# Patient Record
Sex: MALE | Race: WHITE | NOT HISPANIC OR LATINO | Employment: FULL TIME | URBAN - NONMETROPOLITAN AREA
[De-identification: names, ages, dates, MRNs, and addresses within clinical notes are randomized per-mention and may not be internally consistent; named-entity substitution may affect disease eponyms.]

---

## 2017-01-04 ENCOUNTER — OFFICE VISIT (OUTPATIENT)
Dept: CARDIOLOGY | Facility: CLINIC | Age: 47
End: 2017-01-04

## 2017-01-04 VITALS
SYSTOLIC BLOOD PRESSURE: 135 MMHG | WEIGHT: 310.2 LBS | HEART RATE: 84 BPM | DIASTOLIC BLOOD PRESSURE: 86 MMHG | BODY MASS INDEX: 37.77 KG/M2 | HEIGHT: 76 IN | OXYGEN SATURATION: 98 %

## 2017-01-04 DIAGNOSIS — I10 ESSENTIAL HYPERTENSION: ICD-10-CM

## 2017-01-04 DIAGNOSIS — I47.1 SVT (SUPRAVENTRICULAR TACHYCARDIA) (HCC): ICD-10-CM

## 2017-01-04 DIAGNOSIS — R00.2 PALPITATIONS: ICD-10-CM

## 2017-01-04 DIAGNOSIS — K21.9 GASTROESOPHAGEAL REFLUX DISEASE WITHOUT ESOPHAGITIS: ICD-10-CM

## 2017-01-04 DIAGNOSIS — I10 ESSENTIAL HYPERTENSION: Primary | ICD-10-CM

## 2017-01-04 DIAGNOSIS — I45.6 WPW (WOLFF-PARKINSON-WHITE SYNDROME): ICD-10-CM

## 2017-01-04 PROBLEM — I47.10 SVT (SUPRAVENTRICULAR TACHYCARDIA): Status: ACTIVE | Noted: 2017-01-04

## 2017-01-04 PROBLEM — I47.10 SVT (SUPRAVENTRICULAR TACHYCARDIA): Status: RESOLVED | Noted: 2017-01-04 | Resolved: 2017-01-04

## 2017-01-04 PROCEDURE — 93000 ELECTROCARDIOGRAM COMPLETE: CPT | Performed by: INTERNAL MEDICINE

## 2017-01-04 PROCEDURE — 99213 OFFICE O/P EST LOW 20 MIN: CPT | Performed by: INTERNAL MEDICINE

## 2017-01-04 NOTE — TELEPHONE ENCOUNTER
TERRENCE NEEDS REFILL ON LOSARTAN   ZOLOFT   RANITIDINE   CATAFLAM . SEND TO [facility]. HIS # [phone]

## 2017-01-04 NOTE — TELEPHONE ENCOUNTER
Last OV 11/3/16    Losartan  9/29/16  #30 x 0 refills    Ranitidine  9/29/16  #30 x 0 refills    Cataflam  11/3/16  #90 x 1 refill    Zoloft  9/29/16  #45 x 2 refills

## 2017-01-04 NOTE — LETTER
January 5, 2017     Miels Garvin MD  [address] [facility] [address]    Patient: Jimmy Dawson   YOB: 1970   Date of Visit: 1/4/2017       Dear Dr. Bharathi MD:    Thank you for referring Jimmy Dawson to me for evaluation. Below are the relevant portions of my assessment and plan of care.    If you have questions, please do not hesitate to call me. I look forward to following Jimmy along with you.         Sincerely,        Ric Cotton MD        CC: No Recipients  Ric Cotton MD  1/5/2017  6:27 PM  Sign at close encounter  Miles Garvin MD  Jimmy Dawson  1970 01/04/2017    Patient Active Problem List   Diagnosis   • Gastroesophageal reflux disease without esophagitis   • Depression   • Essential hypertension   • Palpitations   • SVT (supraventricular tachycardia), s/p RF ablation, in 2000 x2   • WPW (Brayan-Parkinson-White syndrome), s/p RF ablation 2000.       Dear Dr. Garvin:    Subjective      Chief Complaint   Patient presents with   • Follow-up     SVT       Jimmy Dawson is a 46 y.o. male with the problems as listed above, presents for follow up of history of WPW syndrome and SVT, status post RF ablation.      History of Present Illness: Mr. Dawson has a history of WPW syndrome and SVT s/p RF ablation x2 in 2000.  At this time, he denies palpitations, dizziness or syncope.  He notes the feeling of congestion after eating.  No exertional chest pain or shortness of breath.  No history of PND, orthopnea or pedal edema.        Allergies   Allergen Reactions   • Aleve [Naproxen] Hives   :      Current Outpatient Prescriptions:   •  Cetirizine HCl (ZYRTEC ALLERGY) 10 MG capsule, Take 10 mg by mouth Daily., Disp: , Rfl:   •  diclofenac (CATAFLAM) 50 MG tablet, Take 1 tablet by mouth 3 (Three) Times a Day., Disp: 90 tablet, Rfl: 1  •  losartan (COZAAR) 50 MG tablet, Take 1 tablet by mouth Daily., Disp: 30 tablet, Rfl: 0  •  ranitidine (ZANTAC) 300 MG  "tablet, TAKE ONE TABLET BY MOUTH EVERY DAY FOR THE STOMACH, Disp: 30 tablet, Rfl: 0  •  sertraline (ZOLOFT) 100 MG tablet, Take 1.5 tablets by mouth Daily., Disp: 45 tablet, Rfl: 2      The following portions of the patient's history were reviewed and updated as appropriate: allergies, current medications, past family history, past medical history, past social history, past surgical history and problem list.    Social History   Substance Use Topics   • Smoking status: Never Smoker   • Smokeless tobacco: Never Used   • Alcohol use Yes      Comment: 1-3 times a month       Review of Systems   Constitution: Negative for chills and fever.   HENT: Positive for congestion (Worse after eats. ), headaches and sore throat. Negative for nosebleeds.    Cardiovascular: Negative for chest pain, leg swelling, palpitations and syncope.   Respiratory: Positive for cough. Negative for hemoptysis, shortness of breath and wheezing.    Gastrointestinal: Negative for abdominal pain, hematemesis, hematochezia, melena, nausea and vomiting.   Genitourinary: Negative for dysuria and hematuria.   Neurological: Negative for dizziness and light-headedness.       Objective   Vitals:    01/04/17 1311   BP: 135/86   BP Location: Left arm   Patient Position: Sitting   Cuff Size: Adult   Pulse: 84   SpO2: 98%   Weight: (!) 310 lb 3.2 oz (141 kg)   Height: 76\" (193 cm)     Body mass index is 37.76 kg/(m^2).        Physical Exam   Constitutional: He is oriented to person, place, and time. He appears well-developed and well-nourished.   HENT:   Mouth/Throat: Oropharynx is clear and moist.   Eyes: EOM are normal. Pupils are equal, round, and reactive to light.   Neck: Neck supple. No JVD present. No tracheal deviation present. No thyromegaly present.   Cardiovascular: Normal rate, regular rhythm, S1 normal and S2 normal.  Exam reveals no gallop and no friction rub.    No murmur heard.  Pulmonary/Chest: Effort normal and breath sounds normal. "   Abdominal: Soft. Bowel sounds are normal. He exhibits no mass. There is no tenderness.   Musculoskeletal: Normal range of motion. He exhibits no edema.   Lymphadenopathy:     He has no cervical adenopathy.   Neurological: He is alert and oriented to person, place, and time.   Skin: Skin is warm and dry. No rash noted.   Psychiatric: He has a normal mood and affect.       Lab Results   Component Value Date     11/03/2016    K 4.3 11/03/2016     11/03/2016    CO2 35.6 (H) 11/03/2016    BUN 29 (H) 11/03/2016    CREATININE 0.87 11/03/2016    GLUCOSE 96 11/03/2016    CALCIUM 10.2 (H) 11/03/2016    AST 26 01/25/2016    ALT 41 01/25/2016    ALKPHOS 86 01/25/2016    LABIL2 1.3 (L) 01/25/2016     Lab Results   Component Value Date    WBC 8.15 11/03/2016    HGB 14.7 11/03/2016    HCT 44.0 11/03/2016     11/03/2016       Lab Results   Component Value Date    TSH 3.654 11/03/2016      No results found for: BNP  Echo   No results found for: ECHOEFEST    ECG 12 Lead  Date/Time: 1/4/2017 1:04 PM  Performed by: RIC UL  Authorized by: RIC LU   Rhythm: sinus rhythm  BPM: 82  Clinical impression: normal ECG  Comments: QTc 401          Assessment/Plan :  1. SVT (supraventricular tachycardia), s/p RF ablation, in 2000 x2     2. WPW (Brayan-Parkinson-White syndrome), s/p RF ablation 2000.     3.  Essential hypertension, controlled.             Recommendations:     Continue Losartan.  Return in about 7 months (around 8/4/2017) for Or sooner if needed.    As always, I appreciate very much the opportunity to participate in the cardiovascular care of your patients.      With Best Regards,    Ric Lu MD, [facility]    Dragon disclaimer:  Much of this encounter note is an electronic transcription/translation of spoken language to printed text. The electronic translation of spoken language may permit erroneous, or at times, nonsensical words or phrases to be inadvertently transcribed; Although I have  reviewed the note for such errors, some may still exist.

## 2017-01-04 NOTE — PROGRESS NOTES
Miles Garvin MD  Jimmy Dawson  1970 01/04/2017    Patient Active Problem List   Diagnosis   • Gastroesophageal reflux disease without esophagitis   • Depression   • Essential hypertension   • Palpitations   • SVT (supraventricular tachycardia), s/p RF ablation, in 2000 x2   • WPW (Brayan-Parkinson-White syndrome), s/p RF ablation 2000.       Dear Dr. Garvin:    Subjective      Chief Complaint   Patient presents with   • Follow-up     SVT       Jimmy Dawson is a 46 y.o. male with the problems as listed above, presents for follow up of history of WPW syndrome and SVT, status post RF ablation.      History of Present Illness: Mr. Dawson has a history of WPW syndrome and SVT s/p RF ablation x2 in 2000.  At this time, he denies palpitations, dizziness or syncope.  He notes the feeling of congestion after eating.  No exertional chest pain or shortness of breath.  No history of PND, orthopnea or pedal edema.        Allergies   Allergen Reactions   • Aleve [Naproxen] Hives   :      Current Outpatient Prescriptions:   •  Cetirizine HCl (ZYRTEC ALLERGY) 10 MG capsule, Take 10 mg by mouth Daily., Disp: , Rfl:   •  diclofenac (CATAFLAM) 50 MG tablet, Take 1 tablet by mouth 3 (Three) Times a Day., Disp: 90 tablet, Rfl: 1  •  losartan (COZAAR) 50 MG tablet, Take 1 tablet by mouth Daily., Disp: 30 tablet, Rfl: 0  •  ranitidine (ZANTAC) 300 MG tablet, TAKE ONE TABLET BY MOUTH EVERY DAY FOR THE STOMACH, Disp: 30 tablet, Rfl: 0  •  sertraline (ZOLOFT) 100 MG tablet, Take 1.5 tablets by mouth Daily., Disp: 45 tablet, Rfl: 2      The following portions of the patient's history were reviewed and updated as appropriate: allergies, current medications, past family history, past medical history, past social history, past surgical history and problem list.    Social History   Substance Use Topics   • Smoking status: Never Smoker   • Smokeless tobacco: Never Used   • Alcohol use Yes      Comment: 1-3 times a month  "      Review of Systems   Constitution: Negative for chills and fever.   HENT: Positive for congestion (Worse after eats. ), headaches and sore throat. Negative for nosebleeds.    Cardiovascular: Negative for chest pain, leg swelling, palpitations and syncope.   Respiratory: Positive for cough. Negative for hemoptysis, shortness of breath and wheezing.    Gastrointestinal: Negative for abdominal pain, hematemesis, hematochezia, melena, nausea and vomiting.   Genitourinary: Negative for dysuria and hematuria.   Neurological: Negative for dizziness and light-headedness.       Objective   Vitals:    01/04/17 1311   BP: 135/86   BP Location: Left arm   Patient Position: Sitting   Cuff Size: Adult   Pulse: 84   SpO2: 98%   Weight: (!) 310 lb 3.2 oz (141 kg)   Height: 76\" (193 cm)     Body mass index is 37.76 kg/(m^2).        Physical Exam   Constitutional: He is oriented to person, place, and time. He appears well-developed and well-nourished.   HENT:   Mouth/Throat: Oropharynx is clear and moist.   Eyes: EOM are normal. Pupils are equal, round, and reactive to light.   Neck: Neck supple. No JVD present. No tracheal deviation present. No thyromegaly present.   Cardiovascular: Normal rate, regular rhythm, S1 normal and S2 normal.  Exam reveals no gallop and no friction rub.    No murmur heard.  Pulmonary/Chest: Effort normal and breath sounds normal.   Abdominal: Soft. Bowel sounds are normal. He exhibits no mass. There is no tenderness.   Musculoskeletal: Normal range of motion. He exhibits no edema.   Lymphadenopathy:     He has no cervical adenopathy.   Neurological: He is alert and oriented to person, place, and time.   Skin: Skin is warm and dry. No rash noted.   Psychiatric: He has a normal mood and affect.       Lab Results   Component Value Date     11/03/2016    K 4.3 11/03/2016     11/03/2016    CO2 35.6 (H) 11/03/2016    BUN 29 (H) 11/03/2016    CREATININE 0.87 11/03/2016    GLUCOSE 96 11/03/2016    " CALCIUM 10.2 (H) 11/03/2016    AST 26 01/25/2016    ALT 41 01/25/2016    ALKPHOS 86 01/25/2016    LABIL2 1.3 (L) 01/25/2016     Lab Results   Component Value Date    WBC 8.15 11/03/2016    HGB 14.7 11/03/2016    HCT 44.0 11/03/2016     11/03/2016       Lab Results   Component Value Date    TSH 3.654 11/03/2016      No results found for: BNP  Echo   No results found for: ECHOEFEST    ECG 12 Lead  Date/Time: 1/4/2017 1:04 PM  Performed by: RIC LU  Authorized by: RIC LU   Rhythm: sinus rhythm  BPM: 82  Clinical impression: normal ECG  Comments: QTc 401          Assessment/Plan :  1. SVT (supraventricular tachycardia), s/p RF ablation, in 2000 x2     2. WPW (Brayan-Parkinson-White syndrome), s/p RF ablation 2000.     3.  Essential hypertension, controlled.             Recommendations:     Continue Losartan.  Return in about 7 months (around 8/4/2017) for Or sooner if needed.    As always, I appreciate very much the opportunity to participate in the cardiovascular care of your patients.      With Best Regards,    Ric Lu MD, [facility]    Dragon disclaimer:  Much of this encounter note is an electronic transcription/translation of spoken language to printed text. The electronic translation of spoken language may permit erroneous, or at times, nonsensical words or phrases to be inadvertently transcribed; Although I have reviewed the note for such errors, some may still exist.

## 2017-01-04 NOTE — MR AVS SNAPSHOT
Jimmy Dawson   2017 1:00 PM   Office Visit    Dept Phone:  [phone]   Encounter #:  [id number]    Provider:  Ric Cotton MD   Department:  [facility] CARDIOLOGY                Your Full Care Plan              Your Updated Medication List          This list is accurate as of: 17  1:53 PM.  Always use your most recent med list.                diclofenac 50 MG tablet   Commonly known as:  CATAFLAM   Take 1 tablet by mouth 3 (Three) Times a Day.       losartan 50 MG tablet   Commonly known as:  COZAAR   Take 1 tablet by mouth Daily.       raNITIdine 300 MG tablet   Commonly known as:  ZANTAC   TAKE ONE TABLET BY MOUTH EVERY DAY FOR THE STOMACH       sertraline 100 MG tablet   Commonly known as:  ZOLOFT   Take 1.5 tablets by mouth Daily.       ZYRTEC ALLERGY 10 MG capsule   Generic drug:  Cetirizine HCl               We Performed the Following     ECG 12 Lead       You Were Diagnosed With        Codes Comments    Essential hypertension    -  Primary ICD-10-CM: I10  ICD-9-CM: 401.9     Palpitations     ICD-10-CM: R00.2  ICD-9-CM: 785.1     SVT (supraventricular tachycardia)     ICD-10-CM: I47.1  ICD-9-CM: 427.89     WPW (Brayan-Parkinson-White syndrome)     ICD-10-CM: I45.6  ICD-9-CM: 426.7       Instructions     None    Patient Instructions History      Upcoming Appointments     Visit Type Date Time Department    FOLLOW UP 2017  1:00 PM [facility] Signup     [facility] allows you to send messages to your doctor, view your test results, renew your prescriptions, schedule appointments, and more. To sign up, go to [employer] and click on the Sign Up Now link in the New User? box. Enter your [employer] Activation Code exactly as it appears below along with the last four digits of your Social Security Number and your Date of Birth () to complete the sign-up process. If you do not sign up before the  "expiration date, you must request a new code.    [employer] Activation Code: [email or web address]  Expires: 1/18/2017  1:53 PM    If you have questions, you can email [email or web address] or call [phone] to talk to our [employer] staff. Remember, [employer] is NOT to be used for urgent needs. For medical emergencies, dial 911.               Other Info from Your Visit           Allergies     Aleve [Naproxen]  Hives      Reason for Visit     Follow-up SVT      Vital Signs     Blood Pressure Pulse Height Weight Oxygen Saturation Body Mass Index    135/86 (BP Location: Left arm, Patient Position: Sitting, Cuff Size: Adult) 84 76\" (193 cm) 310 lb 3.2 oz (141 kg) 98% 37.76 kg/m2    Smoking Status                   Never Smoker           Problems and Diagnoses Noted     High blood pressure    Palpitations    SVT (supraventricular tachycardia)    WPW (Brayan-Parkinson-White syndrome)      No Longer an Issue     SVT (supraventricular tachycardia)        "

## 2017-01-05 RX ORDER — DICLOFENAC POTASSIUM 50 MG/1
50 TABLET, FILM COATED ORAL 3 TIMES DAILY
Qty: 90 TABLET | Refills: 6 | Status: SHIPPED | OUTPATIENT
Start: 2017-01-05 | End: 2018-01-25 | Stop reason: SDUPTHER

## 2017-01-05 RX ORDER — RANITIDINE 300 MG/1
300 TABLET ORAL DAILY
Qty: 30 TABLET | Refills: 6 | Status: SHIPPED | OUTPATIENT
Start: 2017-01-05 | End: 2018-01-30 | Stop reason: SDUPTHER

## 2017-01-05 RX ORDER — SERTRALINE HYDROCHLORIDE 100 MG/1
150 TABLET, FILM COATED ORAL DAILY
Qty: 45 TABLET | Refills: 6 | Status: SHIPPED | OUTPATIENT
Start: 2017-01-05 | End: 2017-11-15 | Stop reason: SDUPTHER

## 2017-01-05 RX ORDER — LOSARTAN POTASSIUM 50 MG/1
50 TABLET ORAL DAILY
Qty: 30 TABLET | Refills: 6 | Status: SHIPPED | OUTPATIENT
Start: 2017-01-05 | End: 2017-12-19 | Stop reason: SDUPTHER

## 2017-01-18 ENCOUNTER — TELEPHONE (OUTPATIENT)
Dept: FAMILY MEDICINE CLINIC | Facility: CLINIC | Age: 47
End: 2017-01-18

## 2017-10-13 DIAGNOSIS — I10 ESSENTIAL HYPERTENSION: ICD-10-CM

## 2017-10-13 DIAGNOSIS — F32.A DEPRESSION: ICD-10-CM

## 2017-10-16 RX ORDER — LOSARTAN POTASSIUM 50 MG/1
TABLET ORAL
Qty: 30 TABLET | Refills: 0 | OUTPATIENT
Start: 2017-10-16

## 2017-10-16 RX ORDER — SERTRALINE HYDROCHLORIDE 100 MG/1
TABLET, FILM COATED ORAL
Qty: 45 TABLET | Refills: 2 | OUTPATIENT
Start: 2017-10-16

## 2017-11-14 NOTE — TELEPHONE ENCOUNTER
It has been over a year since he has been in.  Call him and see when you can get him in for an appointment and how much meds he has left in relation to that appointment.

## 2017-11-15 NOTE — TELEPHONE ENCOUNTER
PATIENT WIFE CALLED AND STATES PATIENT IS OUT OF ZOLOFT AND WILL HAVE HIM CALL TO SCHEDULE APPOINTMENT. APPOINTMENT SCHEDULED FOR Tuesday 11/28/17

## 2017-11-16 DIAGNOSIS — I10 ESSENTIAL HYPERTENSION: ICD-10-CM

## 2017-11-16 RX ORDER — SERTRALINE HYDROCHLORIDE 100 MG/1
150 TABLET, FILM COATED ORAL DAILY
Qty: 45 TABLET | Refills: 0 | Status: SHIPPED | OUTPATIENT
Start: 2017-11-16 | End: 2017-12-19 | Stop reason: SDUPTHER

## 2017-11-16 RX ORDER — LOSARTAN POTASSIUM 50 MG/1
TABLET ORAL
Qty: 30 TABLET | Refills: 0 | OUTPATIENT
Start: 2017-11-16

## 2017-12-07 RX ORDER — FLUCONAZOLE 150 MG/1
150 TABLET ORAL ONCE
Qty: 1 TABLET | Refills: 0 | Status: SHIPPED | OUTPATIENT
Start: 2017-12-07 | End: 2017-12-07

## 2017-12-07 NOTE — PROGRESS NOTES
The patient's wife has had a yeast infection. He is now having burning and itching. Requests a diflucan. Sent to pharmacy

## 2017-12-19 DIAGNOSIS — I10 ESSENTIAL HYPERTENSION: ICD-10-CM

## 2017-12-19 RX ORDER — LOSARTAN POTASSIUM 50 MG/1
50 TABLET ORAL DAILY
Qty: 30 TABLET | Refills: 0 | Status: SHIPPED | OUTPATIENT
Start: 2017-12-19 | End: 2018-01-30 | Stop reason: SDUPTHER

## 2017-12-19 RX ORDER — SERTRALINE HYDROCHLORIDE 100 MG/1
150 TABLET, FILM COATED ORAL DAILY
Qty: 45 TABLET | Refills: 0 | Status: SHIPPED | OUTPATIENT
Start: 2017-12-19 | End: 2018-01-25 | Stop reason: SDUPTHER

## 2017-12-19 NOTE — TELEPHONE ENCOUNTER
Having problems getting [facility] for visit here.  Can we send in one month, problem is almost fixed, has appt tues but [facility] did not promise the it would be authorized by then

## 2018-01-25 RX ORDER — DICLOFENAC POTASSIUM 50 MG/1
50 TABLET, FILM COATED ORAL 3 TIMES DAILY
Qty: 90 TABLET | Refills: 6 | Status: SHIPPED | OUTPATIENT
Start: 2018-01-25 | End: 2018-01-30 | Stop reason: SDUPTHER

## 2018-01-25 RX ORDER — SERTRALINE HYDROCHLORIDE 100 MG/1
150 TABLET, FILM COATED ORAL DAILY
Qty: 45 TABLET | Refills: 0 | Status: SHIPPED | OUTPATIENT
Start: 2018-01-25 | End: 2018-01-30 | Stop reason: SDUPTHER

## 2018-01-25 NOTE — TELEPHONE ENCOUNTER
DICLOFENAC LAST FILLED ON 1/5/17 WITH 6 REFILLS.  ZOLOFT LAST FILLED ON  12/19/17 WITH 0 REFILLS.      Last seen on 11/3/16 no evelyn blanca.   Please see note below.

## 2018-01-25 NOTE — TELEPHONE ENCOUNTER
PATIENT CALLED AND STATED HE HAD TALK[facility] WITH [facility] AND THEY SHOULD BE CONTACTING THE OFFICE WITHIN 5 DAYS TO SCHEDULE PATIENT APPOINTMENT WITH APPROVAL. PATIENT ONLY HAS 6 DAYS OF MEDICATION LEFT AND NEEDS AT LEAST 1 MONTH SENT IN UNTIL HE CAN GET SCHEDULED. SEND TO [facility].

## 2018-01-30 ENCOUNTER — OFFICE VISIT (OUTPATIENT)
Dept: FAMILY MEDICINE CLINIC | Facility: CLINIC | Age: 48
End: 2018-01-30

## 2018-01-30 VITALS
HEIGHT: 76 IN | DIASTOLIC BLOOD PRESSURE: 94 MMHG | SYSTOLIC BLOOD PRESSURE: 156 MMHG | BODY MASS INDEX: 38.36 KG/M2 | TEMPERATURE: 98 F | HEART RATE: 98 BPM | WEIGHT: 315 LBS

## 2018-01-30 DIAGNOSIS — F32.4 MAJOR DEPRESSIVE DISORDER WITH SINGLE EPISODE, IN PARTIAL REMISSION (HCC): ICD-10-CM

## 2018-01-30 DIAGNOSIS — J06.9 ACUTE URI: ICD-10-CM

## 2018-01-30 DIAGNOSIS — H61.21 IMPACTED CERUMEN OF RIGHT EAR: ICD-10-CM

## 2018-01-30 DIAGNOSIS — J31.0 OTHER CHRONIC RHINITIS: ICD-10-CM

## 2018-01-30 DIAGNOSIS — K21.9 GASTROESOPHAGEAL REFLUX DISEASE WITHOUT ESOPHAGITIS: ICD-10-CM

## 2018-01-30 DIAGNOSIS — I10 ESSENTIAL HYPERTENSION: Primary | ICD-10-CM

## 2018-01-30 PROCEDURE — 69210 REMOVE IMPACTED EAR WAX UNI: CPT | Performed by: FAMILY MEDICINE

## 2018-01-30 PROCEDURE — 99213 OFFICE O/P EST LOW 20 MIN: CPT | Performed by: FAMILY MEDICINE

## 2018-01-30 RX ORDER — LOSARTAN POTASSIUM 50 MG/1
50 TABLET ORAL DAILY
Qty: 30 TABLET | Refills: 6 | Status: SHIPPED | OUTPATIENT
Start: 2018-01-30 | End: 2018-08-09 | Stop reason: SDUPTHER

## 2018-01-30 RX ORDER — DICLOFENAC POTASSIUM 50 MG/1
50 TABLET, FILM COATED ORAL 3 TIMES DAILY
Qty: 90 TABLET | Refills: 1 | Status: SHIPPED | OUTPATIENT
Start: 2018-01-30 | End: 2018-10-22

## 2018-01-30 RX ORDER — HYDROCHLOROTHIAZIDE 25 MG/1
12.5 TABLET ORAL DAILY
Qty: 30 TABLET | Refills: 5 | Status: SHIPPED | OUTPATIENT
Start: 2018-01-30 | End: 2018-08-09 | Stop reason: SDUPTHER

## 2018-01-30 RX ORDER — FLUTICASONE PROPIONATE 50 MCG
2 SPRAY, SUSPENSION (ML) NASAL DAILY
Qty: 1 BOTTLE | Refills: 2 | Status: SHIPPED | OUTPATIENT
Start: 2018-01-30 | End: 2018-08-09 | Stop reason: SDUPTHER

## 2018-01-30 RX ORDER — RANITIDINE 300 MG/1
300 TABLET ORAL DAILY
Qty: 30 TABLET | Refills: 6 | Status: SHIPPED | OUTPATIENT
Start: 2018-01-30 | End: 2018-05-27 | Stop reason: CLARIF

## 2018-01-30 RX ORDER — AMOXICILLIN 875 MG/1
875 TABLET, COATED ORAL EVERY 12 HOURS SCHEDULED
Qty: 20 TABLET | Refills: 0 | Status: SHIPPED | OUTPATIENT
Start: 2018-01-30 | End: 2018-02-09

## 2018-01-30 RX ORDER — SERTRALINE HYDROCHLORIDE 100 MG/1
150 TABLET, FILM COATED ORAL DAILY
Qty: 45 TABLET | Refills: 6 | Status: SHIPPED | OUTPATIENT
Start: 2018-01-30 | End: 2018-08-09 | Stop reason: SDUPTHER

## 2018-01-30 NOTE — PROGRESS NOTES
Subjective     Chief Complaint   Patient presents with   • Hypertension   • Headache   • Nasal Congestion       Jimmy Dawson is a 47 y.o. male.     History of Present Illness follow-up regarding hypertension.  Having current upper respiratory symptoms of several days duration with significant nasal congestion some cough.  Bilateral ears stopped up.  No GI  symptoms.  Has not been compliant with dietary.  Very active at work but no aerobic exercise.  Is now working a different job and is enjoying less stress but more responsibility.  Be following with cardiology soon.  Compliant with medications.    The following portions of the patient's history were reviewed and updated as appropriate: allergies, current medications, past medical history, past social history, past surgical history and problem list.    Review of Systems see the history of present illness    Objective   Physical Exam   Constitutional: He is oriented to person, place, and time. He appears well-developed and well-nourished.   HENT:   Head: Normocephalic.   Left Ear: External ear normal.   Mouth/Throat: Oropharynx is clear and moist.   Right TM is negative after removing cerumen impaction utilizing soft curette under direct visualization.  Marked nasal congestion with bilateral mild maxillary percussion tenderness.  Postnasal drainage.   Eyes: Conjunctivae and EOM are normal. Pupils are equal, round, and reactive to light.   Neck: Normal range of motion. Neck supple. No tracheal deviation present. No thyromegaly present.   Cardiovascular: Normal rate, regular rhythm and normal heart sounds.    No murmur heard.  Pulmonary/Chest: Effort normal and breath sounds normal. He has no wheezes.   Musculoskeletal: Normal range of motion. He exhibits no edema.   Lymphadenopathy:     He has no cervical adenopathy.   Neurological: He is alert and oriented to person, place, and time.   Skin: Skin is warm and dry.   Psychiatric: He has a normal mood and affect.  "  Vitals reviewed.    /94 (BP Location: Right arm, Patient Position: Sitting, Cuff Size: Adult)  Pulse 98  Temp 98 °F (36.7 °C) (Oral)   Ht 193 cm (76\")  Wt (!) 146 kg (322 lb 3.2 oz)  BMI 39.22 kg/m2  Assessment/Plan   Jimmy was seen today for hypertension, headache and nasal congestion.    Diagnoses and all orders for this visit:    Essential hypertension  -     losartan (COZAAR) 50 MG tablet; Take 1 tablet by mouth Daily.  -     CBC & Differential; Future  -     Comprehensive Metabolic Panel; Future  -     Lipid Panel; Future  -     TSH; Future  -     hydrochlorothiazide (HYDRODIURIL) 25 MG tablet; Take 0.5 tablets by mouth Daily.    Gastroesophageal reflux disease without esophagitis  -     raNITIdine (ZANTAC) 300 MG tablet; Take 1 tablet by mouth Daily.  -     CBC & Differential; Future    Impacted cerumen of right ear    Major depressive disorder with single episode, in partial remission  -     sertraline (ZOLOFT) 100 MG tablet; Take 1.5 tablets by mouth Daily.    Acute URI  -     amoxicillin (AMOXIL) 875 MG tablet; Take 1 tablet by mouth Every 12 (Twelve) Hours for 10 days.  -     fluticasone (FLONASE) 50 MCG/ACT nasal spray; 2 sprays into each nostril Daily.    Other chronic rhinitis  -     Cetirizine HCl (ZYRTEC ALLERGY) 10 MG capsule; Take 10 mg by mouth Daily.  -     fluticasone (FLONASE) 50 MCG/ACT nasal spray; 2 sprays into each nostril Daily.    Other orders  -     diclofenac (CATAFLAM) 50 MG tablet; Take 1 tablet by mouth 3 (Three) Times a Day.     Antibiotics authorized.  Nasal spray.  Blood pressure not to goal.  Will add low-dose HCTZ.  Will arrange fasting metabolic screening in near future.  Must get aggressive with TLC to get that weight down.  Dietary modification discussed.  Some regular exercise discussed.  Keep follow-up with cardiology.  Recheck here in 6 months or as needed.           "

## 2018-02-02 ENCOUNTER — LAB (OUTPATIENT)
Dept: FAMILY MEDICINE CLINIC | Facility: CLINIC | Age: 48
End: 2018-02-02

## 2018-02-02 DIAGNOSIS — K21.9 GASTROESOPHAGEAL REFLUX DISEASE WITHOUT ESOPHAGITIS: ICD-10-CM

## 2018-02-02 DIAGNOSIS — I10 ESSENTIAL HYPERTENSION: ICD-10-CM

## 2018-02-02 LAB
ALBUMIN SERPL-MCNC: 4.9 G/DL (ref 3.5–5)
ALBUMIN/GLOB SERPL: 1.4 G/DL (ref 1.5–2.5)
ALP SERPL-CCNC: 92 U/L (ref 40–129)
ALT SERPL W P-5'-P-CCNC: 48 U/L (ref 10–44)
ANION GAP SERPL CALCULATED.3IONS-SCNC: 8.1 MMOL/L (ref 3.6–11.2)
AST SERPL-CCNC: 25 U/L (ref 10–34)
BASOPHILS # BLD AUTO: 0.05 10*3/MM3 (ref 0–0.3)
BASOPHILS NFR BLD AUTO: 0.7 % (ref 0–2)
BILIRUB SERPL-MCNC: 0.4 MG/DL (ref 0.2–1.8)
BUN BLD-MCNC: 18 MG/DL (ref 7–21)
BUN/CREAT SERPL: 18.4 (ref 7–25)
CALCIUM SPEC-SCNC: 9.7 MG/DL (ref 7.7–10)
CHLORIDE SERPL-SCNC: 104 MMOL/L (ref 99–112)
CHOLEST SERPL-MCNC: 188 MG/DL (ref 0–200)
CO2 SERPL-SCNC: 26.9 MMOL/L (ref 24.3–31.9)
CREAT BLD-MCNC: 0.98 MG/DL (ref 0.43–1.29)
DEPRECATED RDW RBC AUTO: 42.3 FL (ref 37–54)
EOSINOPHIL # BLD AUTO: 0.1 10*3/MM3 (ref 0–0.7)
EOSINOPHIL NFR BLD AUTO: 1.4 % (ref 0–5)
ERYTHROCYTE [DISTWIDTH] IN BLOOD BY AUTOMATED COUNT: 13.5 % (ref 11.5–14.5)
GFR SERPL CREATININE-BSD FRML MDRD: 81 ML/MIN/1.73
GLOBULIN UR ELPH-MCNC: 3.5 GM/DL
GLUCOSE BLD-MCNC: 109 MG/DL (ref 70–110)
HCT VFR BLD AUTO: 46.1 % (ref 42–52)
HDLC SERPL-MCNC: 34 MG/DL (ref 60–100)
HGB BLD-MCNC: 15.2 G/DL (ref 14–18)
IMM GRANULOCYTES # BLD: 0.01 10*3/MM3 (ref 0–0.03)
IMM GRANULOCYTES NFR BLD: 0.1 % (ref 0–0.5)
LDLC SERPL CALC-MCNC: 110 MG/DL (ref 0–100)
LDLC/HDLC SERPL: 3.24 {RATIO}
LYMPHOCYTES # BLD AUTO: 2.49 10*3/MM3 (ref 1–3)
LYMPHOCYTES NFR BLD AUTO: 33.7 % (ref 21–51)
MCH RBC QN AUTO: 28.4 PG (ref 27–33)
MCHC RBC AUTO-ENTMCNC: 33 G/DL (ref 33–37)
MCV RBC AUTO: 86 FL (ref 80–94)
MONOCYTES # BLD AUTO: 0.61 10*3/MM3 (ref 0.1–0.9)
MONOCYTES NFR BLD AUTO: 8.3 % (ref 0–10)
NEUTROPHILS # BLD AUTO: 4.12 10*3/MM3 (ref 1.4–6.5)
NEUTROPHILS NFR BLD AUTO: 55.8 % (ref 30–70)
OSMOLALITY SERPL CALC.SUM OF ELEC: 280 MOSM/KG (ref 273–305)
PLATELET # BLD AUTO: 302 10*3/MM3 (ref 130–400)
PMV BLD AUTO: 10.2 FL (ref 6–10)
POTASSIUM BLD-SCNC: 4.2 MMOL/L (ref 3.5–5.3)
PROT SERPL-MCNC: 8.4 G/DL (ref 6–8)
RBC # BLD AUTO: 5.36 10*6/MM3 (ref 4.7–6.1)
SODIUM BLD-SCNC: 139 MMOL/L (ref 135–153)
TRIGL SERPL-MCNC: 219 MG/DL (ref 0–150)
TSH SERPL DL<=0.05 MIU/L-ACNC: 2.72 MIU/ML (ref 0.55–4.78)
VLDLC SERPL-MCNC: 43.8 MG/DL
WBC NRBC COR # BLD: 7.38 10*3/MM3 (ref 4.5–12.5)

## 2018-02-02 PROCEDURE — 80061 LIPID PANEL: CPT | Performed by: FAMILY MEDICINE

## 2018-02-02 PROCEDURE — 80053 COMPREHEN METABOLIC PANEL: CPT | Performed by: FAMILY MEDICINE

## 2018-02-02 PROCEDURE — 36415 COLL VENOUS BLD VENIPUNCTURE: CPT | Performed by: NURSE PRACTITIONER

## 2018-02-02 PROCEDURE — 84443 ASSAY THYROID STIM HORMONE: CPT | Performed by: FAMILY MEDICINE

## 2018-02-02 PROCEDURE — 85025 COMPLETE CBC W/AUTO DIFF WBC: CPT | Performed by: FAMILY MEDICINE

## 2018-02-05 ENCOUNTER — OFFICE VISIT (OUTPATIENT)
Dept: CARDIOLOGY | Facility: CLINIC | Age: 48
End: 2018-02-05

## 2018-02-05 VITALS
SYSTOLIC BLOOD PRESSURE: 124 MMHG | WEIGHT: 315 LBS | HEIGHT: 76 IN | BODY MASS INDEX: 38.36 KG/M2 | HEART RATE: 79 BPM | RESPIRATION RATE: 16 BRPM | DIASTOLIC BLOOD PRESSURE: 80 MMHG

## 2018-02-05 DIAGNOSIS — I45.6 WPW (WOLFF-PARKINSON-WHITE SYNDROME): Primary | ICD-10-CM

## 2018-02-05 DIAGNOSIS — I10 ESSENTIAL HYPERTENSION: ICD-10-CM

## 2018-02-05 DIAGNOSIS — I47.1 SVT (SUPRAVENTRICULAR TACHYCARDIA) (HCC): ICD-10-CM

## 2018-02-05 PROCEDURE — 93000 ELECTROCARDIOGRAM COMPLETE: CPT | Performed by: INTERNAL MEDICINE

## 2018-02-05 PROCEDURE — 99213 OFFICE O/P EST LOW 20 MIN: CPT | Performed by: INTERNAL MEDICINE

## 2018-02-05 NOTE — PROGRESS NOTES
Miles Garvin MD  Jimmy Dawson  1970 02/05/2018    Patient Active Problem List   Diagnosis   • Gastroesophageal reflux disease without esophagitis   • Depression   • Essential hypertension   • Palpitations   • SVT (supraventricular tachycardia), s/p RF ablation, in 2000 x2   • WPW (Brayan-Parkinson-White syndrome), s/p RF ablation 2000.   • Chronic rhinitis       Dear Miles Garvin MD:    Subjective     Jimmy Dawson is a 47 y.o. male with the problems as listed above, presents    Chief complaint: Follow-up of paroxysmal SVT and WPW syndrome, status post RF ablation.     History of Present Illness:Mr. Dawson is a pleasant 47-year-old  male with history of WPW syndrome and paroxysmal supraventricular tachycardia for which he underwent RF ablation in 2000.  He is here for regular cardiology follow-up.  He denies any complaints of palpitations, dizziness or syncope and has been doing well.    Allergies   Allergen Reactions   • Aleve [Naproxen] Hives   :      Current Outpatient Prescriptions:   •  amoxicillin (AMOXIL) 875 MG tablet, Take 1 tablet by mouth Every 12 (Twelve) Hours for 10 days., Disp: 20 tablet, Rfl: 0  •  Cetirizine HCl (ZYRTEC ALLERGY) 10 MG capsule, Take 10 mg by mouth Daily., Disp: 30 capsule, Rfl: 6  •  diclofenac (CATAFLAM) 50 MG tablet, Take 1 tablet by mouth 3 (Three) Times a Day., Disp: 90 tablet, Rfl: 1  •  fluticasone (FLONASE) 50 MCG/ACT nasal spray, 2 sprays into each nostril Daily., Disp: 1 bottle, Rfl: 2  •  hydrochlorothiazide (HYDRODIURIL) 25 MG tablet, Take 0.5 tablets by mouth Daily., Disp: 30 tablet, Rfl: 5  •  losartan (COZAAR) 50 MG tablet, Take 1 tablet by mouth Daily., Disp: 30 tablet, Rfl: 6  •  raNITIdine (ZANTAC) 300 MG tablet, Take 1 tablet by mouth Daily., Disp: 30 tablet, Rfl: 6  •  sertraline (ZOLOFT) 100 MG tablet, Take 1.5 tablets by mouth Daily., Disp: 45 tablet, Rfl: 6      The following portions of the patient's history were reviewed and  "updated as appropriate: allergies, current medications, past family history, past medical history, past social history, past surgical history and problem list.    Social History   Substance Use Topics   • Smoking status: Never Smoker   • Smokeless tobacco: Never Used   • Alcohol use Yes      Comment: 1-3 times a month       Review of Systems   Constitution: Negative for chills and fever.   HENT: Negative for nosebleeds and sore throat.    Respiratory: Negative for cough, hemoptysis and wheezing.    Gastrointestinal: Negative for abdominal pain, hematemesis, hematochezia, melena, nausea and vomiting.   Genitourinary: Negative for dysuria and hematuria.   Neurological: Negative for headaches.       Objective   Vitals:    02/05/18 1003   BP: 124/80   BP Location: Right arm   Patient Position: Sitting   Cuff Size: Adult   Pulse: 79   Resp: 16   Weight: (!) 144 kg (317 lb)   Height: 193 cm (76\")     Body mass index is 38.59 kg/(m^2).        Physical Exam   Constitutional: He is oriented to person, place, and time. He appears well-developed and well-nourished.   HENT:   Head: Normocephalic.   Eyes: Conjunctivae and EOM are normal.   Neck: Normal range of motion. Neck supple. No JVD present. No tracheal deviation present. No thyromegaly present.   Cardiovascular: Normal rate and regular rhythm.  Exam reveals no gallop and no friction rub.    No murmur heard.  Pulmonary/Chest: Breath sounds normal. No respiratory distress. He has no wheezes. He has no rales.   Abdominal: Soft. Bowel sounds are normal. He exhibits no mass. There is no tenderness.   Musculoskeletal: He exhibits no edema.   Neurological: He is alert and oriented to person, place, and time. No cranial nerve deficit.   Skin: Skin is warm and dry.   Psychiatric: He has a normal mood and affect.       Lab Results   Component Value Date     02/02/2018    K 4.2 02/02/2018     02/02/2018    CO2 26.9 02/02/2018    BUN 18 02/02/2018    CREATININE 0.98 " 02/02/2018    GLUCOSE 109 02/02/2018    CALCIUM 9.7 02/02/2018    AST 25 02/02/2018    ALT 48 (H) 02/02/2018    ALKPHOS 92 02/02/2018    LABIL2 1.4 (L) 02/02/2018     No results found for: CKTOTAL  Lab Results   Component Value Date    WBC 7.38 02/02/2018    HGB 15.2 02/02/2018    HCT 46.1 02/02/2018     02/02/2018     No results found for: INR  No results found for: MG  Lab Results   Component Value Date    TSH 2.718 02/02/2018    TRIG 219 (H) 02/02/2018    HDL 34 (L) 02/02/2018      No results found for: BNP  Echo   No results found for: ECHOEFEST    ECG 12 Lead  Date/Time: 2/5/2018 10:08 AM  Performed by: RIC LU  Authorized by: RIC LU   Rhythm: sinus rhythm  Conduction: conduction normal  ST Segments: ST segments normal  T Waves: T waves normal  Clinical impression: normal ECG            Assessment/Plan :    1. WPW (Brayan-Parkinson-White syndrome), s/p RF ablation 2000.     2. SVT (supraventricular tachycardia), s/p RF ablation, in 2000 x2     3. Essential hypertension, Controlled.           Recommendations:    1. Continue the losartan and HCTZ for his hypertension.  2. We'll see him back in follow-up in about 9 months.    Return in about 9 months (around 11/5/2018).    As always, I appreciate very much the opportunity to participate in the cardiovascular care of your patients.      With Best Regards,    Ric Lu MD, FACC    Dragon disclaimer:  Much of this encounter note is an electronic transcription/translation of spoken language to printed text. The electronic translation of spoken language may permit erroneous, or at times, nonsensical words or phrases to be inadvertently transcribed; Although I have reviewed the note for such errors, some may still exist.

## 2018-03-02 ENCOUNTER — TELEPHONE (OUTPATIENT)
Dept: FAMILY MEDICINE CLINIC | Facility: CLINIC | Age: 48
End: 2018-03-02

## 2018-03-02 RX ORDER — SULFAMETHOXAZOLE AND TRIMETHOPRIM 800; 160 MG/1; MG/1
1 TABLET ORAL 2 TIMES DAILY
Qty: 20 TABLET | Refills: 0 | Status: SHIPPED | OUTPATIENT
Start: 2018-03-02 | End: 2018-05-27

## 2018-05-27 ENCOUNTER — OFFICE VISIT (OUTPATIENT)
Dept: RETAIL CLINIC | Facility: CLINIC | Age: 48
End: 2018-05-27

## 2018-05-27 VITALS
OXYGEN SATURATION: 98 % | WEIGHT: 315 LBS | HEART RATE: 98 BPM | BODY MASS INDEX: 38.59 KG/M2 | TEMPERATURE: 97.7 F | RESPIRATION RATE: 20 BRPM

## 2018-05-27 DIAGNOSIS — J01.00 ACUTE MAXILLARY SINUSITIS, RECURRENCE NOT SPECIFIED: Primary | ICD-10-CM

## 2018-05-27 DIAGNOSIS — K21.9 GASTROESOPHAGEAL REFLUX DISEASE, ESOPHAGITIS PRESENCE NOT SPECIFIED: ICD-10-CM

## 2018-05-27 DIAGNOSIS — K12.2 UVULITIS: ICD-10-CM

## 2018-05-27 PROCEDURE — 99213 OFFICE O/P EST LOW 20 MIN: CPT | Performed by: NURSE PRACTITIONER

## 2018-05-27 RX ORDER — BENZONATATE 100 MG/1
100 CAPSULE ORAL 3 TIMES DAILY PRN
Qty: 30 CAPSULE | Refills: 0 | Status: SHIPPED | OUTPATIENT
Start: 2018-05-27 | End: 2018-06-06

## 2018-05-27 RX ORDER — OMEPRAZOLE 40 MG/1
40 CAPSULE, DELAYED RELEASE ORAL DAILY
Qty: 30 CAPSULE | Refills: 0 | Status: SHIPPED | OUTPATIENT
Start: 2018-05-27 | End: 2018-08-09 | Stop reason: SDUPTHER

## 2018-05-27 RX ORDER — AMOXICILLIN AND CLAVULANATE POTASSIUM 875; 125 MG/1; MG/1
1 TABLET, FILM COATED ORAL 2 TIMES DAILY
Qty: 20 TABLET | Refills: 0 | Status: SHIPPED | OUTPATIENT
Start: 2018-05-27 | End: 2018-06-06

## 2018-05-27 RX ORDER — PREDNISONE 10 MG/1
TABLET ORAL
Qty: 21 TABLET | Refills: 0 | Status: SHIPPED | OUTPATIENT
Start: 2018-05-27 | End: 2018-08-09

## 2018-05-27 NOTE — PATIENT INSTRUCTIONS
Gastroesophageal Reflux Disease, Adult  Normally, food travels down the esophagus and stays in the stomach to be digested. If a person has gastroesophageal reflux disease (GERD), food and stomach acid move back up into the esophagus. When this happens, the esophagus becomes sore and swollen (inflamed). Over time, GERD can make small holes (ulcers) in the lining of the esophagus.  Follow these instructions at home:  Diet   · Follow a diet as told by your doctor. You may need to avoid foods and drinks such as:  ¨ Coffee and tea (with or without caffeine).  ¨ Drinks that contain alcohol.  ¨ Energy drinks and sports drinks.  ¨ Carbonated drinks or sodas.  ¨ Chocolate and cocoa.  ¨ Peppermint and mint flavorings.  ¨ Garlic and onions.  ¨ Horseradish.  ¨ Spicy and acidic foods, such as peppers, chili powder, mckoy powder, vinegar, hot sauces, and BBQ sauce.  ¨ Citrus fruit juices and citrus fruits, such as oranges, harish, and limes.  ¨ Tomato-based foods, such as red sauce, chili, salsa, and pizza with red sauce.  ¨ Fried and fatty foods, such as donuts, french fries, potato chips, and high-fat dressings.  ¨ High-fat meats, such as hot dogs, rib eye steak, sausage, ham, and west.  ¨ High-fat dairy items, such as whole milk, butter, and cream cheese.  · Eat small meals often. Avoid eating large meals.  · Avoid drinking large amounts of liquid with your meals.  · Avoid eating meals during the 2-3 hours before bedtime.  · Avoid lying down right after you eat.  · Do not exercise right after you eat.  General instructions   · Pay attention to any changes in your symptoms.  · Take over-the-counter and prescription medicines only as told by your doctor. Do not take aspirin, ibuprofen, or other NSAIDs unless your doctor says it is okay.  · Do not use any tobacco products, including cigarettes, chewing tobacco, and e-cigarettes. If you need help quitting, ask your doctor.  · Wear loose clothes. Do not wear anything tight around  your waist.  · Raise (elevate) the head of your bed about 6 inches (15 cm).  · Try to lower your stress. If you need help doing this, ask your doctor.  · If you are overweight, lose an amount of weight that is healthy for you. Ask your doctor about a safe weight loss goal.  · Keep all follow-up visits as told by your doctor. This is important.  Contact a doctor if:  · You have new symptoms.  · You lose weight and you do not know why it is happening.  · You have trouble swallowing, or it hurts to swallow.  · You have wheezing or a cough that keeps happening.  · Your symptoms do not get better with treatment.  · You have a hoarse voice.  Get help right away if:  · You have pain in your arms, neck, jaw, teeth, or back.  · You feel sweaty, dizzy, or light-headed.  · You have chest pain or shortness of breath.  · You throw up (vomit) and your throw up looks like blood or coffee grounds.  · You pass out (faint).  · Your poop (stool) is bloody or black.  · You cannot swallow, drink, or eat.  This information is not intended to replace advice given to you by your health care provider. Make sure you discuss any questions you have with your health care provider.  Document Released: 06/05/2009 Document Revised: 05/25/2017 Document Reviewed: 04/13/2016  [employer] Interactive Patient Education © 2017 [employer]    Sinusitis, Adult  Sinusitis is soreness and inflammation of your sinuses. Sinuses are hollow spaces in the bones around your face. They are located:  · Around your eyes.  · In the middle of your forehead.  · Behind your nose.  · In your cheekbones.  Your sinuses and nasal passages are lined with a stringy fluid (mucus). Mucus normally drains out of your sinuses. When your nasal tissues get inflamed or swollen, the mucus can get trapped or blocked so air cannot flow through your sinuses. This lets bacteria, viruses, and funguses grow, and that leads to infection.  Follow these instructions at home:  Medicines    · Take, use, or apply over-the-counter and prescription medicines only as told by your doctor. These may include nasal sprays.  · If you were prescribed an antibiotic medicine, take it as told by your doctor. Do not stop taking the antibiotic even if you start to feel better.  Hydrate and Humidify   · Drink enough water to keep your pee (urine) clear or pale yellow.  · Use a cool mist humidifier to keep the humidity level in your home above 50%.  · Breathe in steam for 10-15 minutes, 3-4 times a day or as told by your doctor. You can do this in the bathroom while a hot shower is running.  · Try not to spend time in cool or dry air.  Rest   · Rest as much as possible.  · Sleep with your head raised (elevated).  · Make sure to get enough sleep each night.  General instructions   · Put a warm, moist washcloth on your face 3-4 times a day or as told by your doctor. This will help with discomfort.  · Wash your hands often with soap and water. If there is no soap and water, use hand .  · Do not smoke. Avoid being around people who are smoking (secondhand smoke).  · Keep all follow-up visits as told by your doctor. This is important.  Contact a doctor if:  · You have a fever.  · Your symptoms get worse.  · Your symptoms do not get better within 10 days.  Get help right away if:  · You have a very bad headache.  · You cannot stop throwing up (vomiting).  · You have pain or swelling around your face or eyes.  · You have trouble seeing.  · You feel confused.  · Your neck is stiff.  · You have trouble breathing.  This information is not intended to replace advice given to you by your health care provider. Make sure you discuss any questions you have with your health care provider.  Document Released: 06/05/2009 Document Revised: 08/13/2017 Document Reviewed: 10/12/2016  [employer] Interactive Patient Education © 2017 [employer]    Uvulitis  Uvulitis is infection or inflammation of the uvula. The uvula is the small,  finger-like piece of tissue that hangs down at the back of your throat.  What are the causes?  This condition may be caused by:  · An infection in the mouth or throat. This is the most common cause.  · Trauma to the uvula. Causes of trauma include burning your mouth and heavy snoring.  · Fluid build-up (edema). Edema can be triggered be an allergic reaction. Uvulitis that is caused by edema is called Quincke disease.  · Inhaling irritants, such as chemical agents, smoke, or steam.  What are the signs or symptoms?  Symptoms of this condition depend on the cause.  Symptoms of uvulitis that is caused by infection include:  · Red, swollen uvula.  · Sore throat.  · Fever.  · Headache.  · Swollen neck glands.  Symptoms of uvulitis that is caused by trauma, edema, or irritation include:  · Red, swollen uvula.  · Sore throat.  · Trouble swallowing.  · Choking or gagging.  · Trouble breathing.  How is this diagnosed?  This condition is diagnosed with a physical exam. You also may have tests, such as a throat culture and blood tests.  How is this treated?  Treatment for this condition depends on the cause. Treatment may involve:  · Antibiotic medicine. Antibiotics may be prescribed if a bacterial infection is the cause.  · Steroid medicine. Steroids may be given if edema is the cause.  · Surgery to remove part of the uvula (partial uvulectomy).  Follow these instructions at home:  · Rest as much as possible until your condition improves.  · Drink enough fluid to keep your urine clear or pale yellow.  · Take over-the-counter and prescription medicines only as told by your health care provider.  · If you were prescribed an antibiotic medicine, take it as told by your health care provider. Do not stop taking the antibiotic even if you start to feel better.  · Use a cool-mist humidifier to ease irritation in your throat.  · While your throat is sore:  ¨ Eat soft foods or drink liquids, such as soup.  ¨ Gargle with a salt-water  mixture 3-4 times per day or as needed. To make a salt-water mixture, completely dissolve ½-1 tsp of salt in 1 cup of warm water.  · Keep all follow-up visits as told by your health care provider. This is important.  Contact a health care provider if:  · You have a fever.  · You have trouble eating.  · Your symptoms do not get better.  · Your symptoms come back after treatment.  Get help right away if:  · You have trouble breathing.  · You have trouble swallowing.  This information is not intended to replace advice given to you by your health care provider. Make sure you discuss any questions you have with your health care provider.  Document Released: 07/28/2005 Document Revised: 08/20/2017 Document Reviewed: 03/09/2016  [employer] Interactive Patient Education © 2017 [employer]

## 2018-05-27 NOTE — PROGRESS NOTES
Mikayla Dawson is a 47 y.o. male.   Chief Complaint   Patient presents with   • Sinusitis      Sinusitis   This is a new problem. The current episode started 1 to 4 weeks ago (2+ weeks). The problem has been gradually worsening since onset. There has been no fever. Associated symptoms include congestion, coughing, sinus pressure and a sore throat. (Sinus drainage) Treatments tried: allergy meds. The treatment provided no relief.        The following portions of the patient's history were reviewed and updated as appropriate: allergies, current medications, past family history, past medical history, past social history, past surgical history and problem list.    Review of Systems   Constitutional: Negative.  Negative for fever.   HENT: Positive for congestion, postnasal drip, sinus pain, sinus pressure, sore throat and trouble swallowing. Negative for drooling.    Eyes: Negative.    Respiratory: Positive for cough. Negative for choking and chest tightness.    Gastrointestinal: Negative.         Acid reflux; worse in am   Genitourinary: Negative.    Skin: Negative.  Negative for rash.   Allergic/Immunologic: Positive for environmental allergies.   Hematological: Negative for adenopathy.   Psychiatric/Behavioral: Negative.    All other systems reviewed and are negative.      Objective   Allergies   Allergen Reactions   • Aleve [Naproxen] Hives       Physical Exam   Constitutional: He is oriented to person, place, and time. He appears well-developed and well-nourished. He has a sickly appearance.   HENT:   Nose: Mucosal edema present. Right sinus exhibits maxillary sinus tenderness. Left sinus exhibits maxillary sinus tenderness.   Mouth/Throat: Uvula swelling present. Posterior oropharyngeal erythema present. No oropharyngeal exudate. Tonsils are 1+ on the right. Tonsils are 1+ on the left. No tonsillar exudate.   Mucoid PND   Eyes: Pupils are equal, round, and reactive to light.   Neck: Neck supple.    Cardiovascular: Normal rate and regular rhythm.    Pulmonary/Chest: Effort normal and breath sounds normal.   Abdominal: Soft. Bowel sounds are normal.   Lymphadenopathy:     He has no cervical adenopathy.   Neurological: He is alert and oriented to person, place, and time.   Skin: Skin is warm and dry.   Psychiatric: He has a normal mood and affect.   Vitals reviewed.      Assessment/Plan   Jimmy was seen today for sinusitis.    Diagnoses and all orders for this visit:    Acute maxillary sinusitis, recurrence not specified    Gastroesophageal reflux disease, esophagitis presence not specified    Uvulitis    Other orders  -     amoxicillin-clavulanate (AUGMENTIN) 875-125 MG per tablet; Take 1 tablet by mouth 2 (Two) Times a Day for 10 days.  -     predniSONE (DELTASONE) 10 MG tablet; Take as directed per 6 day pred shari  -     benzonatate (TESSALON) 100 MG capsule; Take 1 capsule by mouth 3 (Three) Times a Day As Needed for Cough for up to 10 days.  -     omeprazole (PRILOSEC) 40 MG capsule; Take 1 capsule by mouth Daily.                 This document has been electronically signed by LIONEL Rich May 27, 2018 11:31 AM

## 2018-08-09 ENCOUNTER — OFFICE VISIT (OUTPATIENT)
Dept: FAMILY MEDICINE CLINIC | Facility: CLINIC | Age: 48
End: 2018-08-09

## 2018-08-09 VITALS
HEART RATE: 103 BPM | SYSTOLIC BLOOD PRESSURE: 131 MMHG | BODY MASS INDEX: 38.36 KG/M2 | OXYGEN SATURATION: 98 % | WEIGHT: 315 LBS | TEMPERATURE: 97.9 F | DIASTOLIC BLOOD PRESSURE: 95 MMHG | HEIGHT: 76 IN

## 2018-08-09 DIAGNOSIS — F32.4 MAJOR DEPRESSIVE DISORDER WITH SINGLE EPISODE, IN PARTIAL REMISSION (HCC): ICD-10-CM

## 2018-08-09 DIAGNOSIS — G47.9 SLEEP DISTURBANCE: ICD-10-CM

## 2018-08-09 DIAGNOSIS — I10 ESSENTIAL HYPERTENSION: Primary | ICD-10-CM

## 2018-08-09 DIAGNOSIS — J31.0 OTHER CHRONIC RHINITIS: ICD-10-CM

## 2018-08-09 PROCEDURE — 99214 OFFICE O/P EST MOD 30 MIN: CPT | Performed by: FAMILY MEDICINE

## 2018-08-09 RX ORDER — LOSARTAN POTASSIUM 50 MG/1
50 TABLET ORAL DAILY
Qty: 30 TABLET | Refills: 6 | Status: SHIPPED | OUTPATIENT
Start: 2018-08-09 | End: 2019-03-04 | Stop reason: SDUPTHER

## 2018-08-09 RX ORDER — OMEPRAZOLE 40 MG/1
40 CAPSULE, DELAYED RELEASE ORAL DAILY
Qty: 30 CAPSULE | Refills: 6 | Status: SHIPPED | OUTPATIENT
Start: 2018-08-09 | End: 2019-03-04 | Stop reason: SDUPTHER

## 2018-08-09 RX ORDER — CETIRIZINE HYDROCHLORIDE 10 MG/1
TABLET ORAL
Refills: 6 | COMMUNITY
Start: 2018-07-11 | End: 2019-06-03

## 2018-08-09 RX ORDER — HYDROCHLOROTHIAZIDE 25 MG/1
25 TABLET ORAL DAILY
Qty: 30 TABLET | Refills: 6 | Status: SHIPPED | OUTPATIENT
Start: 2018-08-09 | End: 2019-03-04 | Stop reason: SDUPTHER

## 2018-08-09 RX ORDER — FLUTICASONE PROPIONATE 50 MCG
2 SPRAY, SUSPENSION (ML) NASAL DAILY
Qty: 1 BOTTLE | Refills: 3 | Status: SHIPPED | OUTPATIENT
Start: 2018-08-09 | End: 2019-03-04 | Stop reason: SDUPTHER

## 2018-08-09 RX ORDER — SERTRALINE HYDROCHLORIDE 100 MG/1
150 TABLET, FILM COATED ORAL DAILY
Qty: 45 TABLET | Refills: 6 | Status: SHIPPED | OUTPATIENT
Start: 2018-08-09 | End: 2019-03-04 | Stop reason: SDUPTHER

## 2018-08-09 NOTE — PROGRESS NOTES
Subjective   Jimmy Dawson is a 47 y.o. male.     History of Present Illness follow-up regarding hypertension anxiety.  Generally no new problems.  Active physically.  Dietary compliance is poor.  Apparently over the last several months having significant sleep difficulties.  Is able to go to sleep comfortably but waking up several times.  States wife reports excessive snoring.  There is been some increase in daytime fatigue and sleepiness.  Has had difficulties losing weight.  Denies CP SOB palpitations  GI changes.  No skin concerns.  No new orthopedic concerns.    The following portions of the patient's history were reviewed and updated as appropriate: allergies, current medications, past medical history, past social history, past surgical history and problem list.    Review of Systems see the history of present illness    Objective   Physical Exam   Constitutional: He is oriented to person, place, and time. He appears well-developed and well-nourished.   HENT:   Head: Normocephalic.   Right Ear: External ear normal.   Left Ear: External ear normal.   Mouth/Throat: Oropharynx is clear and moist.   Eyes: Pupils are equal, round, and reactive to light. Conjunctivae and EOM are normal.   Neck: Normal range of motion. Neck supple. No tracheal deviation present. No thyromegaly present.   Cardiovascular: Normal rate, regular rhythm and normal heart sounds.    No murmur heard.  Pulmonary/Chest: Effort normal and breath sounds normal.   Abdominal: He exhibits no distension. There is no tenderness.   Musculoskeletal: He exhibits no edema.   Neurological: He is alert and oriented to person, place, and time.   Skin: Skin is warm and dry.   Psychiatric: He has a normal mood and affect.   Vitals reviewed.      Assessment/Plan   Jimmy was seen today for essential hypertension.    Diagnoses and all orders for this visit:    Essential hypertension  -     hydrochlorothiazide (HYDRODIURIL) 25 MG tablet; Take 1 tablet by mouth  Daily.  -     losartan (COZAAR) 50 MG tablet; Take 1 tablet by mouth Daily.    Other chronic rhinitis  -     fluticasone (FLONASE) 50 MCG/ACT nasal spray; 2 sprays into the nostril(s) as directed by provider Daily.    Major depressive disorder with single episode, in partial remission (CMS/HCC)  -     sertraline (ZOLOFT) 100 MG tablet; Take 1.5 tablets by mouth Daily.    Sleep disturbance  -     Ambulatory Referral to Sleep Medicine    Other orders  -     omeprazole (PRILOSEC) 40 MG capsule; Take 1 capsule by mouth Daily.     Blood pressure not at goal.  Will increase HCTZ to 25 mg.  Counseled about much more aggressive TLC.  In regards to the sleep disturbance I believe a sleep evaluation with potential intervention could help several factors including weight loss would pressure control daytime fatigue.  Referral made.  Will ask you to recheck here in about 6 months or as needed.  Maintain aggressive activity.  Try to maintain heart healthy low-cholesterol diet to achieve some degree of weight loss.    Patient's Body mass index is 39.24 kg/m². BMI is above normal parameters. Recommendations include: exercise counseling and nutrition counseling.

## 2018-10-22 ENCOUNTER — OFFICE VISIT (OUTPATIENT)
Dept: PULMONOLOGY | Facility: CLINIC | Age: 48
End: 2018-10-22

## 2018-10-22 VITALS
BODY MASS INDEX: 38.36 KG/M2 | HEART RATE: 83 BPM | TEMPERATURE: 97.9 F | OXYGEN SATURATION: 97 % | SYSTOLIC BLOOD PRESSURE: 144 MMHG | DIASTOLIC BLOOD PRESSURE: 86 MMHG | HEIGHT: 76 IN | WEIGHT: 315 LBS

## 2018-10-22 DIAGNOSIS — G47.33 OSA (OBSTRUCTIVE SLEEP APNEA): Primary | ICD-10-CM

## 2018-10-22 PROCEDURE — 99203 OFFICE O/P NEW LOW 30 MIN: CPT | Performed by: NURSE PRACTITIONER

## 2018-10-22 NOTE — PROGRESS NOTES
Were you born premature?  no    Any Childhood infections? yes      Breathing problems when you were a child? no    Any childhood allergies?    no             At what age did you begin smoking? no    Smoking marijuana? yes    Any IV drugs? no    How many packs per day? Patient does not smoke.     Lung Function Test? no  Chest X-Ray? no    CT Chest? no Allergy Test? no    Family hx of Lung disease or Lung Cancer?no    If FHx is posivitive for lung cancer, what is the relationship of the family member? na    Any hospitalization in the last year? no    How far can you walk without getting short of breath? Na      Any coughing? yes    Any wheezing? no    Acid Reflux? yes    Do you snore? yes    Daytime Fatigue? yes    Any pets? yes   Any pet allergies? no    Occupation?  at [employer].     Have you been exposed to any chemicals at your job? yes    What inhalers are you currently using? None    Have you had the Influenza Vaccine? Yes    Would you like to receive this Vaccine today? No     Have you had the Pneumonia Vaccine?  no  Would you like to receive this Vaccine today? no      Subjective    Jimmy Dawson presents for the following Sleep Apnea  .    History of Present Illness     Mr. Dawson is a 47 year old male that presented with complaints of snoring and sleep disturbance.  A consultation for sleep apnea was placed by Dr. Garvin.  He states that his wife told him that for the past several months he has been waking up in the middle the night or having periods of apnea he states that she says his snoring has also significantly increased.  He reports feeling fatigued during the day as well as having increased daytime sleepiness.  He also states that he has headaches.  He is a nonsmoker but does use occasional alcohol.        Review of Systems   Constitutional: Positive for fatigue (with increased daytime sleepiness).   HENT:        Loud snoring   Respiratory: Positive for apnea (Witnessed apnea per  family/spouse) and shortness of breath (exhustional).    Neurological: Positive for headaches.   Psychiatric/Behavioral: Positive for sleep disturbance (Fragmented sleep with unrefreshed feeling in the morning ).        SLEEP: Loud snoring, fragmented sleep, un refreshed feeling in the morning, increased daytime sleepiness    All other systems reviewed and are negative.      Active Problems:  Problem List Items Addressed This Visit        Respiratory    TAMIKO (obstructive sleep apnea) - Primary    Relevant Orders    Ambulatory Referral to Sleep Lab          Past Medical History:  Past Medical History:   Diagnosis Date   • Acid reflux    • Allergic    • Anxiety    • Arthritis    • Depression    • GERD (gastroesophageal reflux disease)    • Hypertension    • Infectious mononucleosis    • Sinusitis    • Urinary tract infection    • WPW (Brayan-Parkinson-White syndrome)        Family History:  Family History   Problem Relation Age of Onset   • No Known Problems Mother    • Hypertension Father    • Hyperlipidemia Father    • Diabetes Father    • Heart attack Maternal Grandfather        Social History:  Social History   Substance Use Topics   • Smoking status: Never Smoker   • Smokeless tobacco: Never Used   • Alcohol use Yes      Comment: 1-3 times a month       Current Medications:  Current Outpatient Prescriptions   Medication Sig Dispense Refill   • cetirizine (zyrTEC) 10 MG tablet TAKE ONE TABLET BY MOUTH EVERY DAY FOR ALLERGIES  6   • fluticasone (FLONASE) 50 MCG/ACT nasal spray 2 sprays into the nostril(s) as directed by provider Daily. 1 bottle 3   • hydrochlorothiazide (HYDRODIURIL) 25 MG tablet Take 1 tablet by mouth Daily. 30 tablet 6   • losartan (COZAAR) 50 MG tablet Take 1 tablet by mouth Daily. 30 tablet 6   • omeprazole (PRILOSEC) 40 MG capsule Take 1 capsule by mouth Daily. 30 capsule 6   • sertraline (ZOLOFT) 100 MG tablet Take 1.5 tablets by mouth Daily. 45 tablet 6     No current facility-administered  "medications for this visit.        Allergies:  Allergies   Allergen Reactions   • Aleve [Naproxen] Hives       Vitals:  /86   Pulse 83   Temp 97.9 °F (36.6 °C)   Ht 193 cm (76\")   Wt (!) 145 kg (320 lb)   SpO2 97%   BMI 38.95 kg/m²     Imaging:    Imaging Results (most recent)     None          Pulmonary Functions Testing Results:    No results found for: FEV1, FVC, KMO1OML, TLC, DLCO    Results for orders placed or performed in visit on 02/02/18   Comprehensive Metabolic Panel   Result Value Ref Range    Glucose 109 70 - 110 mg/dL    BUN 18 7 - 21 mg/dL    Creatinine 0.98 0.43 - 1.29 mg/dL    Sodium 139 135 - 153 mmol/L    Potassium 4.2 3.5 - 5.3 mmol/L    Chloride 104 99 - 112 mmol/L    CO2 26.9 24.3 - 31.9 mmol/L    Calcium 9.7 7.7 - 10.0 mg/dL    Total Protein 8.4 (H) 6.0 - 8.0 g/dL    Albumin 4.90 3.50 - 5.00 g/dL    ALT (SGPT) 48 (H) 10 - 44 U/L    AST (SGOT) 25 10 - 34 U/L    Alkaline Phosphatase 92 40 - 129 U/L    Total Bilirubin 0.4 0.2 - 1.8 mg/dL    eGFR Non African Amer 81 >60 mL/min/1.73    Globulin 3.5 gm/dL    A/G Ratio 1.4 (L) 1.5 - 2.5 g/dL    BUN/Creatinine Ratio 18.4 7.0 - 25.0    Anion Gap 8.1 3.6 - 11.2 mmol/L   Lipid Panel   Result Value Ref Range    Total Cholesterol 188 0 - 200 mg/dL    Triglycerides 219 (H) 0 - 150 mg/dL    HDL Cholesterol 34 (L) 60 - 100 mg/dL    LDL Cholesterol  110 (H) 0 - 100 mg/dL    VLDL Cholesterol 43.8 mg/dL    LDL/HDL Ratio 3.24    TSH   Result Value Ref Range    TSH 2.718 0.550 - 4.780 mIU/mL   CBC Auto Differential   Result Value Ref Range    WBC 7.38 4.50 - 12.50 10*3/mm3    RBC 5.36 4.70 - 6.10 10*6/mm3    Hemoglobin 15.2 14.0 - 18.0 g/dL    Hematocrit 46.1 42.0 - 52.0 %    MCV 86.0 80.0 - 94.0 fL    MCH 28.4 27.0 - 33.0 pg    MCHC 33.0 33.0 - 37.0 g/dL    RDW 13.5 11.5 - 14.5 %    RDW-SD 42.3 37.0 - 54.0 fl    MPV 10.2 (H) 6.0 - 10.0 fL    Platelets 302 130 - 400 10*3/mm3    Neutrophil % 55.8 30.0 - 70.0 %    Lymphocyte % 33.7 21.0 - 51.0 %    " Monocyte % 8.3 0.0 - 10.0 %    Eosinophil % 1.4 0.0 - 5.0 %    Basophil % 0.7 0.0 - 2.0 %    Immature Grans % 0.1 0.0 - 0.5 %    Neutrophils, Absolute 4.12 1.40 - 6.50 10*3/mm3    Lymphocytes, Absolute 2.49 1.00 - 3.00 10*3/mm3    Monocytes, Absolute 0.61 0.10 - 0.90 10*3/mm3    Eosinophils, Absolute 0.10 0.00 - 0.70 10*3/mm3    Basophils, Absolute 0.05 0.00 - 0.30 10*3/mm3    Immature Grans, Absolute 0.01 0.00 - 0.03 10*3/mm3   Osmolality, Calculated   Result Value Ref Range    Osmolality Calc 280.0 273.0 - 305.0 mOsm/kg       Objective   Physical Exam   Constitutional:   Neck Size: 20in   Dwarf Score: 12       GENERAL APPEARANCE: Well developed, well nourished, alert and cooperative, and appears to be in no acute distress.    HEAD: normocephalic.    EYES: PERRL, EOMI. Fundi normal, vision is grossly intact.    THROAT: Oral cavity and pharynx normal. No inflammation, swelling, exudate, or lesions.     NECK: Neck supple.     CARDIAC: Normal S1 and S2. No S3, S4 or murmurs. Rhythm is regular. There is no peripheral edema, cyanosis or pallor. Extremities are warm and well perfused. Capillary refill is less than 2 seconds. No carotid bruits.    RESPIRATORY: Clear to auscultation without rales, rhonchi, wheezing or diminished breath sounds.    GI: Positive bowel sounds. Soft, nondistended, nontender.     MUSCULOSKELETAL: No significant deformity or joint abnormality. No edema. Peripheral pulses intact. No varicosities.    NEUROLOGICAL: Strength and sensation symmetric and intact throughout.     PSYCHIATRIC: The mental examination revealed the patient was oriented to person, place, and time.       Assessment/Plan      Obstructive sleep apnea  Dwarf's sleepiness score: 12  STOP-BANG score: 5  Morbid Obesity: present  TSH: within normal limits.    Plan:  - Ordered polysomnography  - Will treat with CPAP according to results of polysomnography.  - Patient's Body mass index is 38.95 kg/m². BMI is above normal parameters.  Recommendations include: exercise counseling and nutrition counseling.  -Educated patient on the complications associated with untreated sleep apnea -- that it increases risk of MI, stroke, depression, hypertension, heart failure, arrhythmias, coronary artery disease, and potential death due to complication of that mentioned previously.  Also patient was educated  about the hazards of driving if they are sleep deprived and have sleep apnea.  Was instructed not to involving driving or any activity which involves higher level of mental function- like operating a machine-  if they are sleep deprived and not wearing  their CPAP and sleep apnea is not treated.  - Educated to Avoid excess sedatives and ETOH use.        ICD-10-CM ICD-9-CM   1. TAMIKO (obstructive sleep apnea) G47.33 327.23       Return in about 2 months (around 12/22/2018).

## 2018-11-15 ENCOUNTER — OFFICE VISIT (OUTPATIENT)
Dept: CARDIOLOGY | Facility: CLINIC | Age: 48
End: 2018-11-15

## 2018-11-15 VITALS
HEART RATE: 92 BPM | DIASTOLIC BLOOD PRESSURE: 85 MMHG | RESPIRATION RATE: 16 BRPM | WEIGHT: 315 LBS | HEIGHT: 76 IN | BODY MASS INDEX: 38.36 KG/M2 | SYSTOLIC BLOOD PRESSURE: 129 MMHG

## 2018-11-15 DIAGNOSIS — R00.2 PALPITATIONS: ICD-10-CM

## 2018-11-15 DIAGNOSIS — I45.6 WPW (WOLFF-PARKINSON-WHITE SYNDROME): Primary | ICD-10-CM

## 2018-11-15 DIAGNOSIS — I47.1 SVT (SUPRAVENTRICULAR TACHYCARDIA) (HCC): ICD-10-CM

## 2018-11-15 PROCEDURE — 93000 ELECTROCARDIOGRAM COMPLETE: CPT | Performed by: PHYSICIAN ASSISTANT

## 2018-11-15 PROCEDURE — 99213 OFFICE O/P EST LOW 20 MIN: CPT | Performed by: PHYSICIAN ASSISTANT

## 2018-11-15 RX ORDER — DICLOFENAC POTASSIUM 50 MG/1
50 TABLET, FILM COATED ORAL 3 TIMES DAILY
COMMUNITY
End: 2019-03-04 | Stop reason: SDUPTHER

## 2018-11-15 NOTE — PROGRESS NOTES
"Miles Garvin MD  Jimmy Dawson  1970  11/15/2018    Patient Active Problem List   Diagnosis   • Gastroesophageal reflux disease without esophagitis   • Depression   • Essential hypertension   • Palpitations   • SVT (supraventricular tachycardia), s/p RF ablation, in 2000 x2   • WPW (Brayan-Parkinson-White syndrome), s/p RF ablation 2000.   • Chronic rhinitis   • TAMIKO (obstructive sleep apnea)       Dear Miles Garvin MD:    Subjective       History of Present Illness:    Chief Complaint   Patient presents with   • Follow-up     WPW, 9mo follow   • Palpitations     excessive last 3 weeks, worse in p.m.   • Shortness of Breath     prior to palp episodes   • Med Management     list provided       Jimmy Dawson is a pleasant 47 y.o. male with a past medical history significant for WPW syndrome and paroxysmal supraventricular tachycardia for which he underwent RF ablation in 2000.  He is here for regular cardiology follow-up.    Patient states that has been having a sensation of his heart slowing down that is sort of like a the symptoms that he would get before he would go into SVT in the past, however, after it slows down he does not go into SVT or at least doesn't feel like it. He has tried to cut back caffeine to try help the symptoms but this has not helped. He states that symptoms will last around a few minutes then resolve but they are severe enoough for him to feel slightly off balance and \"weird.\" He does deny any chest pain, shortness of breath, weakness, fatigue or syncope.         Allergies   Allergen Reactions   • Aleve [Naproxen] Hives       Current Outpatient Medications:   •  cetirizine (zyrTEC) 10 MG tablet, TAKE ONE TABLET BY MOUTH EVERY DAY FOR ALLERGIES, Disp: , Rfl: 6  •  diclofenac (CATAFLAM) 50 MG tablet, Take 50 mg by mouth 3 (Three) Times a Day., Disp: , Rfl:   •  fluticasone (FLONASE) 50 MCG/ACT nasal spray, 2 sprays into the nostril(s) as directed by provider Daily., " "Disp: 1 bottle, Rfl: 3  •  hydrochlorothiazide (HYDRODIURIL) 25 MG tablet, Take 1 tablet by mouth Daily., Disp: 30 tablet, Rfl: 6  •  losartan (COZAAR) 50 MG tablet, Take 1 tablet by mouth Daily., Disp: 30 tablet, Rfl: 6  •  omeprazole (PRILOSEC) 40 MG capsule, Take 1 capsule by mouth Daily., Disp: 30 capsule, Rfl: 6  •  sertraline (ZOLOFT) 100 MG tablet, Take 1.5 tablets by mouth Daily., Disp: 45 tablet, Rfl: 6      The following portions of the patient's history were reviewed and updated as appropriate: allergies, current medications, past family history, past medical history, past social history, past surgical history and problem list.    Social History     Tobacco Use   • Smoking status: Never Smoker   • Smokeless tobacco: Never Used   Substance Use Topics   • Alcohol use: Yes     Comment: 1-3 times a month   • Drug use: No       Review of Systems   Constitution: Negative for weakness and malaise/fatigue.   Cardiovascular: Positive for palpitations. Negative for chest pain, dyspnea on exertion, irregular heartbeat and leg swelling.   Respiratory: Positive for shortness of breath. Negative for cough.         Prior to onset of palp   Hematologic/Lymphatic: Negative for bleeding problem. Does not bruise/bleed easily.   Gastrointestinal: Negative for nausea and vomiting.       Objective   Vitals:    11/15/18 0859   BP: 129/85   Pulse: 92   Resp: 16   Weight: (!) 144 kg (318 lb)   Height: 193 cm (76\")     Body mass index is 38.71 kg/m².        Physical Exam   Constitutional: He is oriented to person, place, and time. He appears well-developed and well-nourished. No distress.   HENT:   Head: Normocephalic and atraumatic.   Cardiovascular: Normal rate, regular rhythm, normal heart sounds and intact distal pulses.   Pulmonary/Chest: Effort normal and breath sounds normal. No respiratory distress.   Musculoskeletal: He exhibits no edema.   Neurological: He is alert and oriented to person, place, and time.   Skin: He is " not diaphoretic.       Lab Results   Component Value Date     02/02/2018    K 4.2 02/02/2018     02/02/2018    CO2 26.9 02/02/2018    BUN 18 02/02/2018    CREATININE 0.98 02/02/2018    GLUCOSE 109 02/02/2018    CALCIUM 9.7 02/02/2018    AST 25 02/02/2018    ALT 48 (H) 02/02/2018    ALKPHOS 92 02/02/2018    LABIL2 1.3 (L) 01/25/2016     No results found for: CKTOTAL  Lab Results   Component Value Date    WBC 7.38 02/02/2018    HGB 15.2 02/02/2018    HCT 46.1 02/02/2018     02/02/2018     No results found for: INR  No results found for: MG  Lab Results   Component Value Date    TSH 2.718 02/02/2018    TRIG 219 (H) 02/02/2018    HDL 34 (L) 02/02/2018     (H) 02/02/2018      No results found for: BNP    During this visit the following were done:  Labs Reviewed [x]    Labs Ordered []    Radiology Reports Reviewed [x]    Radiology Ordered []    PCP Records Reviewed []    Referring Provider Records Reviewed []    ER Records Reviewed []    Hospital Records Reviewed []    History Obtained From Family []    Radiology Images Reviewed []    Other Reviewed []    Records Requested []         ECG 12 Lead  Date/Time: 11/15/2018 9:02 AM  Performed by: Anthony Ruff PA-C  Authorized by: Anthony Ruff PA-C   Rhythm: sinus rhythm  Conduction: conduction normal  ST Segments: ST segments normal  T depression: III  T flattening: V1  Clinical impression: normal ECG and non-specific ECG              Assessment/Plan    Diagnosis Plan   1. WPW (Brayan-Parkinson-White syndrome), s/p RF ablation 2000.     2. SVT (supraventricular tachycardia), s/p RF ablation, in 2000 x2     3. Palpitations  Cardiac Event Monitor                Recommendations:  1. Since patient has been having sensations of his heart slowing down/stopping for a couple seconds I will evaluate further with a cardiac event monitor.   2. Patient is not on any medication that could his heart to slow down like beta blockers or CCB.   3. Follow up in  6 weeks or sooner if monitor reveals any dysrhythmia sooner.     Patient's Body mass index is 38.71 kg/m². BMI is above normal parameters. Recommendations include: educational material.       Return in about 6 weeks (around 12/27/2018).    As always, I appreciate very much the opportunity to participate in the cardiovascular care of your patients.      With Best Regards,    JACINTA Castillo disclaimer:  Much of this encounter note is an electronic transcription/translation of spoken language to printed text. The electronic translation of spoken language may permit erroneous, or at times, nonsensical words or phrases to be inadvertently transcribed; Although I have reviewed the note for such errors, some may still exist.

## 2018-11-15 NOTE — PATIENT INSTRUCTIONS
BMI for Adults  Body mass index (BMI) is a number that is calculated from a person's weight and height. In most adults, the number is used to find how much of an adult's weight is made up of fat. BMI is not as accurate as a direct measure of body fat.  HOW IS BMI CALCULATED?  BMI is calculated by dividing weight in kilograms by height in meters squared. It can also be calculated by dividing weight in pounds by height in inches squared, then multiplying the resulting number by 703. Charts are available to help you find your BMI quickly and easily without doing this calculation.   HOW IS BMI INTERPRETED?  Health care professionals use BMI charts to identify whether an adult is underweight, at a normal weight, or overweight based on the following guidelines:  · Underweight: BMI less than 18.5.  · Normal weight: BMI between 18.5 and 24.9.  · Overweight: BMI between 25 and 29.9.  · Obese: BMI of 30 and above.  BMI is usually interpreted the same for males and females.  Weight includes both fat and muscle, so someone with a muscular build, such as an athlete, may have a BMI that is higher than 24.9. In cases like these, BMI may not accurately depict body fat. To determine if excess body fat is the cause of a BMI of 25 or higher, further assessments may need to be done by a health care provider.  WHY IS BMI A USEFUL TOOL?  BMI is used to identify a possible weight problem that may be related to a medical problem or may increase the risk for medical problems. BMI can also be used to promote changes to reach a healthy weight.     This information is not intended to replace advice given to you by your health care provider. Make sure you discuss any questions you have with your health care provider.     Document Released: 08/29/2005 Document Revised: 01/08/2016 Document Reviewed: 05/15/2015  [employer] Interactive Patient Education ©2017 [employer]       Calorie Counting for Weight Loss  Calories are energy you get from the  things you eat and drink. Your body uses this energy to keep you going throughout the day. The number of calories you eat affects your weight. When you eat more calories than your body needs, your body stores the extra calories as fat. When you eat fewer calories than your body needs, your body burns fat to get the energy it needs.  Calorie counting means keeping track of how many calories you eat and drink each day. If you make sure to eat fewer calories than your body needs, you should lose weight. In order for calorie counting to work, you will need to eat the number of calories that are right for you in a day to lose a healthy amount of weight per week. A healthy amount of weight to lose per week is usually 1-2 lb (0.5-0.9 kg). A dietitian can determine how many calories you need in a day and give you suggestions on how to reach your calorie goal.   WHAT IS MY MY PLAN?  My goal is to have __________ calories per day.   If I have this many calories per day, I should lose around __________ pounds per week.  WHAT DO I NEED TO KNOW ABOUT CALORIE COUNTING?  In order to meet your daily calorie goal, you will need to:  · Find out how many calories are in each food you would like to eat. Try to do this before you eat.  · Decide how much of the food you can eat.  · Write down what you ate and how many calories it had. Doing this is called keeping a food log.  WHERE DO I FIND CALORIE INFORMATION?  The number of calories in a food can be found on a Nutrition Facts label. Note that all the information on a label is based on a specific serving of the food. If a food does not have a Nutrition Facts label, try to look up the calories online or ask your dietitian for help.  HOW DO I DECIDE HOW MUCH TO EAT?  To decide how much of the food you can eat, you will need to consider both the number of calories in one serving and the size of one serving. This information can be found on the Nutrition Facts label. If a food does not  have a Nutrition Facts label, look up the information online or ask your dietitian for help.  Remember that calories are listed per serving. If you choose to have more than one serving of a food, you will have to multiply the calories per serving by the amount of servings you plan to eat. For example, the label on a package of bread might say that a serving size is 1 slice and that there are 90 calories in a serving. If you eat 1 slice, you will have eaten 90 calories. If you eat 2 slices, you will have eaten 180 calories.  HOW DO I KEEP A FOOD LOG?  After each meal, record the following information in your food log:  · What you ate.  · How much of it you ate.  · How many calories it had.  · Then, add up your calories.  Keep your food log near you, such as in a small notebook in your pocket. Another option is to use a mobile evelyn or website. Some programs will calculate calories for you and show you how many calories you have left each time you add an item to the log.  WHAT ARE SOME CALORIE COUNTING TIPS?  · Use your calories on foods and drinks that will fill you up and not leave you hungry. Some examples of this include foods like nuts and nut butters, vegetables, lean proteins, and high-fiber foods (more than 5 g fiber per serving).  · Eat nutritious foods and avoid empty calories. Empty calories are calories you get from foods or beverages that do not have many nutrients, such as candy and soda. It is better to have a nutritious high-calorie food (such as an avocado) than a food with few nutrients (such as a bag of chips).  · Know how many calories are in the foods you eat most often. This way, you do not have to look up how many calories they have each time you eat them.  · Look out for foods that may seem like low-calorie foods but are really high-calorie foods, such as baked goods, soda, and fat-free candy.  · Pay attention to calories in drinks. Drinks such as sodas, specialty coffee drinks, alcohol, and  juices have a lot of calories yet do not fill you up. Choose low-calorie drinks like water and diet drinks.  · Focus your calorie counting efforts on higher calorie items. Logging the calories in a garden salad that contains only vegetables is less important than calculating the calories in a milk shake.  · Find a way of tracking calories that works for you. Get creative. Most people who are successful find ways to keep track of how much they eat in a day, even if they do not count every calorie.  WHAT ARE SOME PORTION CONTROL TIPS?  · Know how many calories are in a serving. This will help you know how many servings of a certain food you can have.  · Use a measuring cup to measure serving sizes. This is helpful when you start out. With time, you will be able to estimate serving sizes for some foods.  · Take some time to put servings of different foods on your favorite plates, bowls, and cups so you know what a serving looks like.  · Try not to eat straight from a bag or box. Doing this can lead to overeating. Put the amount you would like to eat in a cup or on a plate to make sure you are eating the right portion.  · Use smaller plates, glasses, and bowls to prevent overeating. This is a quick and easy way to practice portion control. If your plate is smaller, less food can fit on it.  · Try not to multitask while eating, such as watching TV or using your computer. If it is time to eat, sit down at a table and enjoy your food. Doing this will help you to start recognizing when you are full. It will also make you more aware of what and how much you are eating.  HOW CAN I CALORIE COUNT WHEN EATING OUT?  · Ask for smaller portion sizes or child-sized portions.  · Consider sharing an entree and sides instead of getting your own entree.  · If you get your own entree, eat only half. Ask for a box at the beginning of your meal and put the rest of your entree in it so you are not tempted to eat it.  · Look for the calories  "on the menu. If calories are listed, choose the lower calorie options.  · Choose dishes that include vegetables, fruits, whole grains, low-fat dairy products, and lean protein. Focusing on smart food choices from each of the 5 food groups can help you stay on track at restaurants.  · Choose items that are boiled, broiled, grilled, or steamed.  · Choose water, milk, unsweetened iced tea, or other drinks without added sugars. If you want an alcoholic beverage, choose a lower calorie option. For example, a regular lanie can have up to 700 calories and a glass of wine has around 150.  · Stay away from items that are buttered, battered, fried, or served with cream sauce. Items labeled \"crispy\" are usually fried, unless stated otherwise.  · Ask for dressings, sauces, and syrups on the side. These are usually very high in calories, so do not eat much of them.  · Watch out for salads. Many people think salads are a healthy option, but this is often not the case. Many salads come with west, fried chicken, lots of cheese, fried chips, and dressing. All of these items have a lot of calories. If you want a salad, choose a garden salad and ask for grilled meats or steak. Ask for the dressing on the side, or ask for olive oil and vinegar or lemon to use as dressing.  · Estimate how many servings of a food you are given. For example, a serving of cooked rice is ½ cup or about the size of half a tennis ball or one cupcake wrapper. Knowing serving sizes will help you be aware of how much food you are eating at restaurants. The list below tells you how big or small some common portion sizes are based on everyday objects.  ¨ 1 oz--4 stacked dice.  ¨ 3 oz--1 deck of cards.  ¨ 1 tsp--1 dice.  ¨ 1 Tbsp--½ a Ping-Pong ball.  ¨ 2 Tbsp--1 Ping-Pong ball.  ¨ ½ cup--1 tennis ball or 1 cupcake wrapper.  ¨ 1 cup--1 baseball.     This information is not intended to replace advice given to you by your health care provider. Make sure you " discuss any questions you have with your health care provider.     Document Released: 12/18/2006 Document Revised: 01/08/2016 Document Reviewed: 10/23/2014  [employer] Interactive Patient Education ©2017 [employer]

## 2018-12-04 DIAGNOSIS — G47.33 OSA (OBSTRUCTIVE SLEEP APNEA): Primary | ICD-10-CM

## 2018-12-04 RX ORDER — CETIRIZINE HYDROCHLORIDE 10 MG/1
TABLET ORAL
Qty: 30 TABLET | Refills: 0 | OUTPATIENT
Start: 2018-12-04

## 2018-12-04 NOTE — PROGRESS NOTES
Called patient to let them know their sleep study showed severe sleep apnea. Sent order for autopap, will follow him at his next appointment.

## 2018-12-05 ENCOUNTER — TELEPHONE (OUTPATIENT)
Dept: PULMONOLOGY | Facility: CLINIC | Age: 48
End: 2018-12-05

## 2018-12-05 NOTE — TELEPHONE ENCOUNTER
Patient informed requesting [facility] for dme co.    ----- Message from LIONEL Agrawal sent at 12/4/2018  8:43 AM EST -----  Will you call and let him know that his sleep study showed severe sleep apnea and that I am sending an order for autopap.  I will see him at his next scheduled appointment.    ----- Message -----  From: Saima Polk  Sent: 12/3/2018   3:52 PM  To: LIONEL Agrawal    SLEEP STUDY IN

## 2018-12-27 ENCOUNTER — OFFICE VISIT (OUTPATIENT)
Dept: CARDIOLOGY | Facility: CLINIC | Age: 48
End: 2018-12-27

## 2018-12-27 VITALS
HEART RATE: 90 BPM | DIASTOLIC BLOOD PRESSURE: 89 MMHG | HEIGHT: 76 IN | BODY MASS INDEX: 38.36 KG/M2 | OXYGEN SATURATION: 98 % | RESPIRATION RATE: 16 BRPM | WEIGHT: 315 LBS | SYSTOLIC BLOOD PRESSURE: 133 MMHG

## 2018-12-27 DIAGNOSIS — I48.3 TYPICAL ATRIAL FLUTTER (HCC): ICD-10-CM

## 2018-12-27 DIAGNOSIS — I45.6 WPW (WOLFF-PARKINSON-WHITE SYNDROME): Primary | ICD-10-CM

## 2018-12-27 DIAGNOSIS — R00.2 PALPITATIONS: ICD-10-CM

## 2018-12-27 DIAGNOSIS — G47.33 OSA (OBSTRUCTIVE SLEEP APNEA): ICD-10-CM

## 2018-12-27 DIAGNOSIS — I10 ESSENTIAL HYPERTENSION: ICD-10-CM

## 2018-12-27 PROCEDURE — 99214 OFFICE O/P EST MOD 30 MIN: CPT | Performed by: PHYSICIAN ASSISTANT

## 2018-12-27 NOTE — PROGRESS NOTES
Miles Garvin MD  Jimmy Dawson  1970 12/27/2018    Patient Active Problem List   Diagnosis   • Gastroesophageal reflux disease without esophagitis   • Depression   • Essential hypertension   • Palpitations   • SVT (supraventricular tachycardia), s/p RF ablation, in 2000 x2   • WPW (Brayan-Parkinson-White syndrome), s/p RF ablation 2000.   • Chronic rhinitis   • TAMIKO (obstructive sleep apnea)   • Typical atrial flutter (CMS/HCC)       Dear Miles Garvin MD:    Subjective       History of Present Illness:    Chief Complaint   Patient presents with   • Follow-up     holter monitor results   • Palpitations   • Shortness of Breath   • Med Management     med list        Jimmy Dawson is a pleasant 48 y.o. male with a past medical history significant for WPW syndrome and paroxysmal supraventricular tachycardia for which he underwent RF ablation in 2000.  He is here to discuss recent 30 day event monitor.    Patient's 30 day event monitor did reveal 3 episodes of atrial flutter with controlled ventricular rates he also had several episodes with frequent PACs or PVCs.    Patient states he has been symptomatic while he wore this monitor with shortness of breath he states typically the shortness of breath and palpitations come on in the afternoon.  He denies any chest pain during this period.  He does state that he can drink caffeine so that he did not change his routine while wearing this and he also reports that he recently was seen by pulmonologist who had a sleep study performed that showed severe sleep apnea and was prescribed a CPAP has not received this yet.  He does report that he was seen by Dr. Jensen in 2000 for his Brayan-Parkinson-White where he had an ablation done.      Allergies   Allergen Reactions   • Aleve [Naproxen] Hives   :      Current Outpatient Medications:   •  cetirizine (zyrTEC) 10 MG tablet, TAKE ONE TABLET BY MOUTH EVERY DAY FOR ALLERGIES, Disp: , Rfl: 6  •  diclofenac  "(CATAFLAM) 50 MG tablet, Take 50 mg by mouth 3 (Three) Times a Day., Disp: , Rfl:   •  fluticasone (FLONASE) 50 MCG/ACT nasal spray, 2 sprays into the nostril(s) as directed by provider Daily., Disp: 1 bottle, Rfl: 3  •  hydrochlorothiazide (HYDRODIURIL) 25 MG tablet, Take 1 tablet by mouth Daily., Disp: 30 tablet, Rfl: 6  •  losartan (COZAAR) 50 MG tablet, Take 1 tablet by mouth Daily., Disp: 30 tablet, Rfl: 6  •  omeprazole (PRILOSEC) 40 MG capsule, Take 1 capsule by mouth Daily., Disp: 30 capsule, Rfl: 6  •  sertraline (ZOLOFT) 100 MG tablet, Take 1.5 tablets by mouth Daily., Disp: 45 tablet, Rfl: 6  •  metoprolol tartrate (LOPRESSOR) 25 MG tablet, Take 0.5 tablets by mouth 2 (Two) Times a Day., Disp: 30 tablet, Rfl: 3      The following portions of the patient's history were reviewed and updated as appropriate: allergies, current medications, past family history, past medical history, past social history, past surgical history and problem list.    Social History     Tobacco Use   • Smoking status: Never Smoker   • Smokeless tobacco: Never Used   Substance Use Topics   • Alcohol use: Yes     Comment: 1-3 times a month   • Drug use: No       Review of Systems   Constitution: Negative for weakness and malaise/fatigue.   Cardiovascular: Positive for palpitations. Negative for chest pain, dyspnea on exertion, irregular heartbeat, leg swelling and syncope.   Respiratory: Positive for shortness of breath. Negative for cough.    Hematologic/Lymphatic: Negative for bleeding problem. Does not bruise/bleed easily.   Gastrointestinal: Negative for nausea and vomiting.   Neurological: Negative for dizziness.       Objective   Vitals:    12/27/18 0911   BP: 133/89   BP Location: Right arm   Patient Position: Sitting   Cuff Size: Adult   Pulse: 90   Resp: 16   SpO2: 98%   Weight: (!) 146 kg (321 lb)   Height: 193 cm (75.98\")     Body mass index is 39.09 kg/m².     Physical Exam   Constitutional: He is oriented to person, " place, and time. He appears well-developed and well-nourished. No distress.   HENT:   Head: Normocephalic and atraumatic.   Cardiovascular: Normal rate, regular rhythm, normal heart sounds and intact distal pulses.   Pulmonary/Chest: Effort normal and breath sounds normal. No respiratory distress.   Musculoskeletal: He exhibits no edema.   Neurological: He is alert and oriented to person, place, and time.   Skin: He is not diaphoretic.       Lab Results   Component Value Date     02/02/2018    K 4.2 02/02/2018     02/02/2018    CO2 26.9 02/02/2018    BUN 18 02/02/2018    CREATININE 0.98 02/02/2018    GLUCOSE 109 02/02/2018    CALCIUM 9.7 02/02/2018    AST 25 02/02/2018    ALT 48 (H) 02/02/2018    ALKPHOS 92 02/02/2018    LABIL2 1.3 (L) 01/25/2016     No results found for: CKTOTAL  Lab Results   Component Value Date    WBC 7.38 02/02/2018    HGB 15.2 02/02/2018    HCT 46.1 02/02/2018     02/02/2018     No results found for: INR  No results found for: MG  Lab Results   Component Value Date    TSH 2.718 02/02/2018    TRIG 219 (H) 02/02/2018    HDL 34 (L) 02/02/2018     (H) 02/02/2018      No results found for: BNP    During this visit the following were done:  Labs Reviewed [x]    Labs Ordered []    Radiology Reports Reviewed [x]    Radiology Ordered []    PCP Records Reviewed []    Referring Provider Records Reviewed []    ER Records Reviewed []    Hospital Records Reviewed []    History Obtained From Family []    Radiology Images Reviewed []    Other Reviewed []    Records Requested []       Procedures      Assessment/Plan    Diagnosis Plan   1. WPW (Brayan-Parkinson-White syndrome), s/p RF ablation 2000.  Ambulatory Referral to Cardiac Electrophysiology   2. Typical atrial flutter (CMS/HCC)  Ambulatory Referral to Cardiac Electrophysiology   3. Palpitations     4. Essential hypertension     5. TAMIKO (obstructive sleep apnea)            Recommendations:  1. I reviewed patient's 30 day cardiac  event monitor with him.  2. Since patient's [id number] is only 1 for essential hypertension we'll hold off on anticoagulation at this time.  3. I also will start him on metoprolol tartrate 12.5 mg due to atrial flutter I discussed with him blood pressure and heart rate parameters and call our office should his blood pressure start running low (100/60) or heart rate (60).   4. Since he has already been seen by Dr. Jensen in the past and he would like to stay with this provider I'll send a referral to him to discuss possible options for his atrial flutter such as antiarrhythmics, ablation or just rate control..  5. I did encourage him to wear the CPAP when he gets it as its very likely that the severe obstructive sleep apnea is contributing to him going in atrial flutter.  6. I also advised him to now stop all caffeine as this too contributes to going in atrial flutter, expressed understanding and states he will stop caffeine.  7. Follow up in 3 months.       Return in about 3 months (around 3/27/2019).    As always, I appreciate very much the opportunity to participate in the cardiovascular care of your patients.      With Best Regards,    JACINTA Castillo disclaimer:  Much of this encounter note is an electronic transcription/translation of spoken language to printed text. The electronic translation of spoken language may permit erroneous, or at times, nonsensical words or phrases to be inadvertently transcribed; Although I have reviewed the note for such errors, some may still exist.

## 2018-12-28 ENCOUNTER — OFFICE VISIT (OUTPATIENT)
Dept: PULMONOLOGY | Facility: CLINIC | Age: 48
End: 2018-12-28

## 2018-12-28 VITALS
HEIGHT: 76 IN | SYSTOLIC BLOOD PRESSURE: 122 MMHG | DIASTOLIC BLOOD PRESSURE: 77 MMHG | HEART RATE: 95 BPM | WEIGHT: 315 LBS | OXYGEN SATURATION: 95 % | BODY MASS INDEX: 38.36 KG/M2

## 2018-12-28 DIAGNOSIS — G47.33 OSA (OBSTRUCTIVE SLEEP APNEA): Primary | ICD-10-CM

## 2018-12-28 DIAGNOSIS — J31.0 CHRONIC RHINITIS: ICD-10-CM

## 2018-12-28 DIAGNOSIS — R53.83 FATIGUE, UNSPECIFIED TYPE: ICD-10-CM

## 2018-12-28 PROCEDURE — 99214 OFFICE O/P EST MOD 30 MIN: CPT | Performed by: PHYSICIAN ASSISTANT

## 2018-12-28 NOTE — PROGRESS NOTES
Interval history since last visit: alvarado     Recent hospitalizations: none    Investigations (imaging, PFT's, labs, sleep study, record requests, etc.) sleep study    Have you had the Influenza Vaccine? yes    Would you like to receive this Vaccine today? no    Have you had the Pneumonia Vaccine?  no  Would you like to receive this Vaccine today? no    Subjective    Jimmy Dawson presents for the following Sleep Apnea and Results    Mr. Dawson is here today for follow up of sleep apnea. He was ordered a CPAP machine at the previous visit, but this has not went through yet and has not yet received the machine. He reports continued daytime fatigue, and states that he has been increasing his caffeine intake. However, this has conflicted with his history of Brayan Parkinson White syndrome. He recently followed up with cardiology due to increased episodes. Was discussed that this could be related to his increased caffeine intake, and recommended that the sleep apnea be treated to improve the likely source of his fatigue.   He has also noted a significant weight increase within the last few months. He reports that the fatigue and weight gain were noted suddenly within the past few months. He reported previously weighing around 250. Did not report any recent changes in eating habits.   He also reports some phlegm when eating at dinner time. He reports that he had noted some post nasal drainage. He has not use Flonase nasal spray recently, but continues to take zyrtec daily.         History of Present Illness     Review of Systems   Constitutional: Positive for fatigue (with increased daytime sleepiness).   HENT:        Loud snoring   Respiratory: Positive for apnea (Witnessed apnea per family/spouse) and shortness of breath (exhustional).    Neurological: Positive for headaches.   Psychiatric/Behavioral: Positive for sleep disturbance (Fragmented sleep with unrefreshed feeling in the morning ).        SLEEP: Loud snoring,  fragmented sleep, un refreshed feeling in the morning, increased daytime sleepiness    All other systems reviewed and are negative.      Active Problems:  Problem List Items Addressed This Visit     Chronic rhinitis    TAMIKO (obstructive sleep apnea)      Other Visit Diagnoses     Fatigue, unspecified type    -  Primary    Relevant Orders    TSH    T4, Free          Past Medical History:  Past Medical History:   Diagnosis Date   • Acid reflux    • Allergic    • Anxiety    • Arthritis    • Depression    • GERD (gastroesophageal reflux disease)    • Hypertension    • Infectious mononucleosis    • Sinusitis    • Urinary tract infection    • WPW (Brayan-Parkinson-White syndrome)        Family History:  Family History   Problem Relation Age of Onset   • No Known Problems Mother    • Hypertension Father    • Hyperlipidemia Father    • Diabetes Father    • Heart attack Maternal Grandfather        Social History:  Social History     Tobacco Use   • Smoking status: Never Smoker   • Smokeless tobacco: Never Used   Substance Use Topics   • Alcohol use: Yes     Comment: 1-3 times a month       Current Medications:  Current Outpatient Medications   Medication Sig Dispense Refill   • cetirizine (zyrTEC) 10 MG tablet TAKE ONE TABLET BY MOUTH EVERY DAY FOR ALLERGIES  6   • diclofenac (CATAFLAM) 50 MG tablet Take 50 mg by mouth 3 (Three) Times a Day.     • fluticasone (FLONASE) 50 MCG/ACT nasal spray 2 sprays into the nostril(s) as directed by provider Daily. 1 bottle 3   • hydrochlorothiazide (HYDRODIURIL) 25 MG tablet Take 1 tablet by mouth Daily. 30 tablet 6   • losartan (COZAAR) 50 MG tablet Take 1 tablet by mouth Daily. 30 tablet 6   • metoprolol tartrate (LOPRESSOR) 25 MG tablet Take 0.5 tablets by mouth 2 (Two) Times a Day. 30 tablet 3   • omeprazole (PRILOSEC) 40 MG capsule Take 1 capsule by mouth Daily. 30 capsule 6   • sertraline (ZOLOFT) 100 MG tablet Take 1.5 tablets by mouth Daily. 45 tablet 6     No current  "facility-administered medications for this visit.        Allergies:  Allergies   Allergen Reactions   • Aleve [Naproxen] Hives       Vitals:  /77   Pulse 95   Ht 193 cm (76\")   Wt (!) 146 kg (321 lb)   SpO2 95%   BMI 39.07 kg/m²     Imaging:    Imaging Results (most recent)     None          Pulmonary Functions Testing Results:    No results found for: FEV1, FVC, PJJ0MDU, TLC, DLCO    Results for orders placed or performed in visit on 02/02/18   Comprehensive Metabolic Panel   Result Value Ref Range    Glucose 109 70 - 110 mg/dL    BUN 18 7 - 21 mg/dL    Creatinine 0.98 0.43 - 1.29 mg/dL    Sodium 139 135 - 153 mmol/L    Potassium 4.2 3.5 - 5.3 mmol/L    Chloride 104 99 - 112 mmol/L    CO2 26.9 24.3 - 31.9 mmol/L    Calcium 9.7 7.7 - 10.0 mg/dL    Total Protein 8.4 (H) 6.0 - 8.0 g/dL    Albumin 4.90 3.50 - 5.00 g/dL    ALT (SGPT) 48 (H) 10 - 44 U/L    AST (SGOT) 25 10 - 34 U/L    Alkaline Phosphatase 92 40 - 129 U/L    Total Bilirubin 0.4 0.2 - 1.8 mg/dL    eGFR Non African Amer 81 >60 mL/min/1.73    Globulin 3.5 gm/dL    A/G Ratio 1.4 (L) 1.5 - 2.5 g/dL    BUN/Creatinine Ratio 18.4 7.0 - 25.0    Anion Gap 8.1 3.6 - 11.2 mmol/L   Lipid Panel   Result Value Ref Range    Total Cholesterol 188 0 - 200 mg/dL    Triglycerides 219 (H) 0 - 150 mg/dL    HDL Cholesterol 34 (L) 60 - 100 mg/dL    LDL Cholesterol  110 (H) 0 - 100 mg/dL    VLDL Cholesterol 43.8 mg/dL    LDL/HDL Ratio 3.24    TSH   Result Value Ref Range    TSH 2.718 0.550 - 4.780 mIU/mL   CBC Auto Differential   Result Value Ref Range    WBC 7.38 4.50 - 12.50 10*3/mm3    RBC 5.36 4.70 - 6.10 10*6/mm3    Hemoglobin 15.2 14.0 - 18.0 g/dL    Hematocrit 46.1 42.0 - 52.0 %    MCV 86.0 80.0 - 94.0 fL    MCH 28.4 27.0 - 33.0 pg    MCHC 33.0 33.0 - 37.0 g/dL    RDW 13.5 11.5 - 14.5 %    RDW-SD 42.3 37.0 - 54.0 fl    MPV 10.2 (H) 6.0 - 10.0 fL    Platelets 302 130 - 400 10*3/mm3    Neutrophil % 55.8 30.0 - 70.0 %    Lymphocyte % 33.7 21.0 - 51.0 %    Monocyte " % 8.3 0.0 - 10.0 %    Eosinophil % 1.4 0.0 - 5.0 %    Basophil % 0.7 0.0 - 2.0 %    Immature Grans % 0.1 0.0 - 0.5 %    Neutrophils, Absolute 4.12 1.40 - 6.50 10*3/mm3    Lymphocytes, Absolute 2.49 1.00 - 3.00 10*3/mm3    Monocytes, Absolute 0.61 0.10 - 0.90 10*3/mm3    Eosinophils, Absolute 0.10 0.00 - 0.70 10*3/mm3    Basophils, Absolute 0.05 0.00 - 0.30 10*3/mm3    Immature Grans, Absolute 0.01 0.00 - 0.03 10*3/mm3   Osmolality, Calculated   Result Value Ref Range    Osmolality Calc 280.0 273.0 - 305.0 mOsm/kg       Objective   Physical Exam    General - normal appearance. No signs of acute distress.    HEENT - pupils equally reactive to light, normal in size, no scleral icterus    Neck - supple. No thyromegaly.     Respiratory - Normal rate and effort. Breath sounds equal bilaterally. No wheezing, rhonchi, crackles or rales on auscultation.     Cardiovascular - Normal S1 and S2. No S3, S4 or murmurs. No JVD, no edema, pulses normal bilaterally.    GI - nontender nondistended. Active bowel sounds.    CNS - strength and sensation equal bilaterally.     Musculoskeletal - no edema. No visible joint deformities.     Psychiatric - mood good, good eye contact. Alert, awake, and oriented.        Assessment/Plan        Obstructive Sleep Apnea:  Reviewed sleep study results. Sleep study suggest a CPAP machine with the pressure of 10. Order was placed, but not [facility].   [facility] to [facility], so that his sleep apnea may be treated soon as possible, as his daytime fatigue is increased.  Discussed that he will be contacted as soon as new information is available.     Educated patient on the complications associated with untreated sleep apnea -- that it increases risk of MI, stroke, depression, hypertension, heart failure, arrhythmias, coronary artery disease, and potential death due to complication of that mentioned previously.    He was educated about the hazards of driving if they are  sleep deprived and have sleep apnea.  Was instructed not to involving driving or any activity which involves higher level of mental function- like operating a machine- if sleep deprived, not wearing the CPAP and sleep apnea is not treated.  Recommended decreased caffeine intake as per cardiology due to his history of WPW. We are hopeful that fatigue will be improve once sleep apnea symptoms are treated.     Discussed the importance of weight loss, which will also benefit overall health and obstructive sleep apnea symptoms.       Fatigue: Reviewed previous labs, but was not able to access a previous TSH/T4 level.   Ordered TSH, Free T4 due to fatigue and recent weight gain.       Chronic Rhinitis: Recommended restarting Flonase to improve post nasal drainage and continue zyrtec, as symptoms may have increased with the change in weather.       Patient's Body mass index is 39.07 kg/m². BMI is above normal parameters. Recommendations include: exercise counseling and nutrition counseling.          ICD-10-CM ICD-9-CM   1. Fatigue, unspecified type R53.83 780.79   2. TAMIKO (obstructive sleep apnea) G47.33 327.23   3. Chronic rhinitis J31.0 472.0       Return in about 3 months (around 3/28/2019).

## 2019-01-18 ENCOUNTER — APPOINTMENT (OUTPATIENT)
Dept: LAB | Facility: HOSPITAL | Age: 49
End: 2019-01-18

## 2019-03-04 ENCOUNTER — OFFICE VISIT (OUTPATIENT)
Dept: FAMILY MEDICINE CLINIC | Facility: CLINIC | Age: 49
End: 2019-03-04

## 2019-03-04 VITALS
TEMPERATURE: 97.4 F | WEIGHT: 315 LBS | SYSTOLIC BLOOD PRESSURE: 123 MMHG | HEIGHT: 76 IN | HEART RATE: 71 BPM | BODY MASS INDEX: 38.36 KG/M2 | DIASTOLIC BLOOD PRESSURE: 80 MMHG

## 2019-03-04 DIAGNOSIS — J30.9 ALLERGIC RHINITIS, UNSPECIFIED SEASONALITY, UNSPECIFIED TRIGGER: ICD-10-CM

## 2019-03-04 DIAGNOSIS — I10 ESSENTIAL HYPERTENSION: Primary | ICD-10-CM

## 2019-03-04 DIAGNOSIS — F32.4 MAJOR DEPRESSIVE DISORDER WITH SINGLE EPISODE, IN PARTIAL REMISSION (HCC): ICD-10-CM

## 2019-03-04 PROCEDURE — 99214 OFFICE O/P EST MOD 30 MIN: CPT | Performed by: FAMILY MEDICINE

## 2019-03-04 RX ORDER — CETIRIZINE HYDROCHLORIDE 10 MG/1
TABLET ORAL
Refills: 6 | Status: CANCELLED | OUTPATIENT
Start: 2019-03-04

## 2019-03-04 RX ORDER — FLUTICASONE PROPIONATE 50 MCG
2 SPRAY, SUSPENSION (ML) NASAL DAILY
Qty: 1 BOTTLE | Refills: 3 | Status: SHIPPED | OUTPATIENT
Start: 2019-03-04 | End: 2020-11-05 | Stop reason: SDUPTHER

## 2019-03-04 RX ORDER — DICLOFENAC POTASSIUM 50 MG/1
50 TABLET, FILM COATED ORAL 3 TIMES DAILY
Qty: 90 TABLET | Refills: 1 | Status: SHIPPED | OUTPATIENT
Start: 2019-03-04 | End: 2020-04-28

## 2019-03-04 RX ORDER — LOSARTAN POTASSIUM 50 MG/1
50 TABLET ORAL DAILY
Qty: 30 TABLET | Refills: 6 | Status: SHIPPED | OUTPATIENT
Start: 2019-03-04 | End: 2019-12-10 | Stop reason: SDUPTHER

## 2019-03-04 RX ORDER — HYDROCHLOROTHIAZIDE 25 MG/1
25 TABLET ORAL DAILY
Qty: 30 TABLET | Refills: 6 | Status: SHIPPED | OUTPATIENT
Start: 2019-03-04 | End: 2019-06-13

## 2019-03-04 RX ORDER — OMEPRAZOLE 40 MG/1
40 CAPSULE, DELAYED RELEASE ORAL DAILY
Qty: 30 CAPSULE | Refills: 6 | Status: SHIPPED | OUTPATIENT
Start: 2019-03-04 | End: 2019-05-01

## 2019-03-04 RX ORDER — SERTRALINE HYDROCHLORIDE 100 MG/1
150 TABLET, FILM COATED ORAL DAILY
Qty: 45 TABLET | Refills: 6 | Status: SHIPPED | OUTPATIENT
Start: 2019-03-04 | End: 2019-12-10 | Stop reason: SDUPTHER

## 2019-03-04 NOTE — PROGRESS NOTES
Subjective   Jimmyfranklin Dawson is a 48 y.o. male.     History of Present Illness follow-up regarding hypertension cardiac arrhythmia sleep apnea.  Globally has done remarkably better since utilizing the device for the TAMIKO.  Did have evaluation from cardiology locally in regard to some increased palpitations.  Resolution of those since discontinued caffeine and did not start the metoprolol.  Will be visiting with electrophysiologist soon.  Energy better.  Sleeping wonderful.  Feels like exercising some now.  No weight loss yet.  Utilizing all other medications as reconciled.  Denies S OB CP palpitations GI  orthopedic concerns.  Skin with a spot on back and multiple skin tags.    The following portions of the patient's history were reviewed and updated as appropriate: allergies, current medications, past medical history, past social history, past surgical history and problem list.    Review of Systems see the HPI    Objective   Physical Exam   Constitutional: He is oriented to person, place, and time. He appears well-developed and well-nourished.   HENT:   Head: Normocephalic.   Right Ear: External ear normal.   Left Ear: External ear normal.   Mouth/Throat: Oropharynx is clear and moist.   Eyes: Conjunctivae and EOM are normal. Pupils are equal, round, and reactive to light.   Neck: Normal range of motion. Neck supple. No tracheal deviation present. No thyromegaly present.   Cardiovascular: Normal rate, regular rhythm and normal heart sounds.   No murmur heard.  Pulmonary/Chest: Effort normal and breath sounds normal.   Musculoskeletal: He exhibits no edema.   Neurological: He is alert and oriented to person, place, and time.   Skin: Skin is warm and dry.   Benign macule on back.  Multiple axillary skin tags.   Psychiatric: He has a normal mood and affect.   Vitals reviewed.      Assessment/Plan   Jimmy was seen today for hypertension.    Diagnoses and all orders for this visit:    Essential hypertension  -      hydrochlorothiazide (HYDRODIURIL) 25 MG tablet; Take 1 tablet by mouth Daily.  -     losartan (COZAAR) 50 MG tablet; Take 1 tablet by mouth Daily.    Allergic rhinitis, unspecified seasonality, unspecified trigger  -     fluticasone (FLONASE) 50 MCG/ACT nasal spray; 2 sprays into the nostril(s) as directed by provider Daily.    Major depressive disorder with single episode, in partial remission (CMS/HCC)  -     sertraline (ZOLOFT) 100 MG tablet; Take 1.5 tablets by mouth Daily.    Other orders  -     Cancel: cetirizine (zyrTEC) 10 MG tablet;   -     diclofenac (CATAFLAM) 50 MG tablet; Take 1 tablet by mouth 3 (Three) Times a Day.  -     Cancel: metoprolol tartrate (LOPRESSOR) 25 MG tablet; Take 0.5 tablets by mouth 2 (Two) Times a Day.  -     omeprazole (PRILOSEC) 40 MG capsule; Take 1 capsule by mouth Daily.    Continue aggressive TLC.  I believe worthwhile to stop the cetirizine for a while and see if can benefit without it.  Your report overall multiple symptoms improved with the TAMIKO treatment.  I would not start the metoprolol.  Keep visit with electrophysiologist for further consideration.  Continue to avoid all caffeine as we have discussed.  Recheck here in 6 months or as needed..    Patient's Body mass index is 39.37 kg/m². BMI is above normal parameters. Recommendations include: exercise counseling and nutrition counseling.

## 2019-05-01 ENCOUNTER — CONSULT (OUTPATIENT)
Dept: CARDIOLOGY | Facility: CLINIC | Age: 49
End: 2019-05-01

## 2019-05-01 VITALS
HEIGHT: 77 IN | SYSTOLIC BLOOD PRESSURE: 144 MMHG | WEIGHT: 315 LBS | OXYGEN SATURATION: 97 % | HEART RATE: 67 BPM | DIASTOLIC BLOOD PRESSURE: 90 MMHG | BODY MASS INDEX: 37.19 KG/M2

## 2019-05-01 DIAGNOSIS — I47.1 SVT (SUPRAVENTRICULAR TACHYCARDIA) (HCC): ICD-10-CM

## 2019-05-01 DIAGNOSIS — I48.3 TYPICAL ATRIAL FLUTTER (HCC): Primary | ICD-10-CM

## 2019-05-01 DIAGNOSIS — I10 ESSENTIAL HYPERTENSION: ICD-10-CM

## 2019-05-01 DIAGNOSIS — I45.6 WPW (WOLFF-PARKINSON-WHITE SYNDROME): ICD-10-CM

## 2019-05-01 DIAGNOSIS — G47.33 OSA (OBSTRUCTIVE SLEEP APNEA): ICD-10-CM

## 2019-05-01 PROCEDURE — 93000 ELECTROCARDIOGRAM COMPLETE: CPT | Performed by: INTERNAL MEDICINE

## 2019-05-01 PROCEDURE — 99204 OFFICE O/P NEW MOD 45 MIN: CPT | Performed by: INTERNAL MEDICINE

## 2019-05-01 RX ORDER — OMEPRAZOLE 20 MG/1
1 CAPSULE, DELAYED RELEASE ORAL 2 TIMES DAILY
Refills: 6 | COMMUNITY
Start: 2019-04-29 | End: 2020-01-06 | Stop reason: SDUPTHER

## 2019-05-08 ENCOUNTER — PREP FOR SURGERY (OUTPATIENT)
Dept: OTHER | Facility: HOSPITAL | Age: 49
End: 2019-05-08

## 2019-05-08 DIAGNOSIS — I48.3 TYPICAL ATRIAL FLUTTER (HCC): Primary | ICD-10-CM

## 2019-05-08 RX ORDER — PROMETHAZINE HYDROCHLORIDE 25 MG/ML
12.5 INJECTION, SOLUTION INTRAMUSCULAR; INTRAVENOUS EVERY 4 HOURS PRN
Status: CANCELLED | OUTPATIENT
Start: 2019-05-08

## 2019-05-08 RX ORDER — SODIUM CHLORIDE 0.9 % (FLUSH) 0.9 %
3 SYRINGE (ML) INJECTION EVERY 12 HOURS SCHEDULED
Status: CANCELLED | OUTPATIENT
Start: 2019-05-08

## 2019-05-08 RX ORDER — SODIUM CHLORIDE 0.9 % (FLUSH) 0.9 %
1-10 SYRINGE (ML) INJECTION AS NEEDED
Status: CANCELLED | OUTPATIENT
Start: 2019-05-08

## 2019-05-08 RX ORDER — ACETAMINOPHEN 325 MG/1
650 TABLET ORAL EVERY 4 HOURS PRN
Status: CANCELLED | OUTPATIENT
Start: 2019-05-08

## 2019-05-08 RX ORDER — SODIUM CHLORIDE 9 MG/ML
1 INJECTION, SOLUTION INTRAVENOUS CONTINUOUS
Status: CANCELLED | OUTPATIENT
Start: 2019-05-08 | End: 2019-05-08

## 2019-05-08 RX ORDER — NITROGLYCERIN 0.4 MG/1
0.4 TABLET SUBLINGUAL
Status: CANCELLED | OUTPATIENT
Start: 2019-05-08

## 2019-06-03 ENCOUNTER — APPOINTMENT (OUTPATIENT)
Dept: PREADMISSION TESTING | Facility: HOSPITAL | Age: 49
End: 2019-06-03

## 2019-06-03 DIAGNOSIS — I48.3 TYPICAL ATRIAL FLUTTER (HCC): ICD-10-CM

## 2019-06-03 LAB
ANION GAP SERPL CALCULATED.3IONS-SCNC: 15 MMOL/L
BUN BLD-MCNC: 17 MG/DL (ref 6–20)
BUN/CREAT SERPL: 18.7 (ref 7–25)
CALCIUM SPEC-SCNC: 9.6 MG/DL (ref 8.6–10.5)
CHLORIDE SERPL-SCNC: 99 MMOL/L (ref 98–107)
CO2 SERPL-SCNC: 25 MMOL/L (ref 22–29)
CREAT BLD-MCNC: 0.91 MG/DL (ref 0.76–1.27)
DEPRECATED RDW RBC AUTO: 42.7 FL (ref 37–54)
ERYTHROCYTE [DISTWIDTH] IN BLOOD BY AUTOMATED COUNT: 13.8 % (ref 12.3–15.4)
GFR SERPL CREATININE-BSD FRML MDRD: 88 ML/MIN/1.73
GLUCOSE BLD-MCNC: 157 MG/DL (ref 65–99)
HCT VFR BLD AUTO: 42 % (ref 37.5–51)
HGB BLD-MCNC: 14.3 G/DL (ref 13–17.7)
INR PPP: 1.08 (ref 0.85–1.16)
MCH RBC QN AUTO: 28.8 PG (ref 26.6–33)
MCHC RBC AUTO-ENTMCNC: 34 G/DL (ref 31.5–35.7)
MCV RBC AUTO: 84.7 FL (ref 79–97)
PLATELET # BLD AUTO: 293 10*3/MM3 (ref 140–450)
PMV BLD AUTO: 9.3 FL (ref 6–12)
POTASSIUM BLD-SCNC: 3.5 MMOL/L (ref 3.5–5.2)
PROTHROMBIN TIME: 13.5 SECONDS (ref 11.2–14.3)
RBC # BLD AUTO: 4.96 10*6/MM3 (ref 4.14–5.8)
SODIUM BLD-SCNC: 139 MMOL/L (ref 136–145)
WBC NRBC COR # BLD: 7.76 10*3/MM3 (ref 3.4–10.8)

## 2019-06-03 PROCEDURE — 85027 COMPLETE CBC AUTOMATED: CPT | Performed by: PHYSICIAN ASSISTANT

## 2019-06-03 PROCEDURE — 85610 PROTHROMBIN TIME: CPT | Performed by: PHYSICIAN ASSISTANT

## 2019-06-03 PROCEDURE — 80048 BASIC METABOLIC PNL TOTAL CA: CPT | Performed by: PHYSICIAN ASSISTANT

## 2019-06-03 PROCEDURE — 36415 COLL VENOUS BLD VENIPUNCTURE: CPT

## 2019-06-03 NOTE — DISCHARGE INSTRUCTIONS
The following instructions given during Pre Admission Testing visit:    Do not eat or drink anything after MN except for sips of water with your a.m. Prescription meds unless otherwise instructed by your physician.    Glasses and jewelry may be worn, but dentures must be removed prior to cath/procedure.    Leave any items you consider valuable at home.    Family members may wait in [facility] waiting area during procedure.    Bring all medications in their original containers the day of procedure.    Bring photo ID and insurance cards on the day of procedure.    Need to make arrangements for transportation prior to discharge.    The following handouts were given:     Heart Cath pathway (if applicable)   Cardiac Cath booklet published by [facility]    OR appropriate [facility] procedure booklet    If applicable, pt instructed to bring CPAP mask and tubing the day of procedure.

## 2019-06-04 ENCOUNTER — HOSPITAL ENCOUNTER (INPATIENT)
Facility: HOSPITAL | Age: 49
LOS: 1 days | Discharge: HOME | End: 2019-06-05
Attending: INTERNAL MEDICINE | Admitting: INTERNAL MEDICINE

## 2019-06-04 DIAGNOSIS — I48.3 TYPICAL ATRIAL FLUTTER (HCC): ICD-10-CM

## 2019-06-04 LAB — HBA1C MFR BLD: 5.7 % (ref 4.8–5.6)

## 2019-06-04 PROCEDURE — C1894 INTRO/SHEATH, NON-LASER: HCPCS | Performed by: INTERNAL MEDICINE

## 2019-06-04 PROCEDURE — 02583ZZ DESTRUCTION OF CONDUCTION MECHANISM, PERCUTANEOUS APPROACH: ICD-10-PCS | Performed by: INTERNAL MEDICINE

## 2019-06-04 PROCEDURE — 94799 UNLISTED PULMONARY SVC/PX: CPT

## 2019-06-04 PROCEDURE — 93662 INTRACARDIAC ECG (ICE): CPT | Performed by: INTERNAL MEDICINE

## 2019-06-04 PROCEDURE — 25010000002 ONDANSETRON PER 1 MG: Performed by: INTERNAL MEDICINE

## 2019-06-04 PROCEDURE — 93653 COMPRE EP EVAL TX SVT: CPT | Performed by: INTERNAL MEDICINE

## 2019-06-04 PROCEDURE — C1730 CATH, EP, 19 OR FEW ELECT: HCPCS | Performed by: INTERNAL MEDICINE

## 2019-06-04 PROCEDURE — C1732 CATH, EP, DIAG/ABL, 3D/VECT: HCPCS | Performed by: INTERNAL MEDICINE

## 2019-06-04 PROCEDURE — 93613 INTRACARDIAC EPHYS 3D MAPG: CPT | Performed by: INTERNAL MEDICINE

## 2019-06-04 PROCEDURE — 93621 COMP EP EVL L PAC&REC C SINS: CPT | Performed by: INTERNAL MEDICINE

## 2019-06-04 PROCEDURE — 93623 PRGRMD STIMJ&PACG IV RX NFS: CPT | Performed by: INTERNAL MEDICINE

## 2019-06-04 PROCEDURE — C1759 CATH, INTRA ECHOCARDIOGRAPHY: HCPCS | Performed by: INTERNAL MEDICINE

## 2019-06-04 PROCEDURE — 99152 MOD SED SAME PHYS/QHP 5/>YRS: CPT | Performed by: INTERNAL MEDICINE

## 2019-06-04 PROCEDURE — 25010000002 MIDAZOLAM PER 1 MG: Performed by: INTERNAL MEDICINE

## 2019-06-04 PROCEDURE — 99153 MOD SED SAME PHYS/QHP EA: CPT | Performed by: INTERNAL MEDICINE

## 2019-06-04 PROCEDURE — 36415 COLL VENOUS BLD VENIPUNCTURE: CPT

## 2019-06-04 PROCEDURE — 83036 HEMOGLOBIN GLYCOSYLATED A1C: CPT | Performed by: PHYSICIAN ASSISTANT

## 2019-06-04 PROCEDURE — 4A0234Z MEASUREMENT OF CARDIAC ELECTRICAL ACTIVITY, PERCUTANEOUS APPROACH: ICD-10-PCS | Performed by: INTERNAL MEDICINE

## 2019-06-04 PROCEDURE — 4A023FZ MEASUREMENT OF CARDIAC RHYTHM, PERCUTANEOUS APPROACH: ICD-10-PCS | Performed by: INTERNAL MEDICINE

## 2019-06-04 PROCEDURE — 94660 CPAP INITIATION&MGMT: CPT

## 2019-06-04 PROCEDURE — 25010000002 FENTANYL CITRATE (PF) 100 MCG/2ML SOLUTION: Performed by: INTERNAL MEDICINE

## 2019-06-04 RX ORDER — MIDAZOLAM HYDROCHLORIDE 1 MG/ML
INJECTION INTRAMUSCULAR; INTRAVENOUS AS NEEDED
Status: DISCONTINUED | OUTPATIENT
Start: 2019-06-04 | End: 2019-06-04 | Stop reason: HOSPADM

## 2019-06-04 RX ORDER — ACETAMINOPHEN 325 MG/1
650 TABLET ORAL EVERY 4 HOURS PRN
Status: DISCONTINUED | OUTPATIENT
Start: 2019-06-04 | End: 2019-06-04 | Stop reason: HOSPADM

## 2019-06-04 RX ORDER — SODIUM CHLORIDE 9 MG/ML
INJECTION, SOLUTION INTRAVENOUS CONTINUOUS PRN
Status: COMPLETED | OUTPATIENT
Start: 2019-06-04 | End: 2019-06-04

## 2019-06-04 RX ORDER — PANTOPRAZOLE SODIUM 40 MG/1
40 TABLET, DELAYED RELEASE ORAL
Status: DISCONTINUED | OUTPATIENT
Start: 2019-06-05 | End: 2019-06-05 | Stop reason: HOSPADM

## 2019-06-04 RX ORDER — FLUTICASONE PROPIONATE 50 MCG
2 SPRAY, SUSPENSION (ML) NASAL DAILY
Status: DISCONTINUED | OUTPATIENT
Start: 2019-06-05 | End: 2019-06-05 | Stop reason: HOSPADM

## 2019-06-04 RX ORDER — ONDANSETRON 2 MG/ML
4 INJECTION INTRAMUSCULAR; INTRAVENOUS EVERY 6 HOURS PRN
Status: DISCONTINUED | OUTPATIENT
Start: 2019-06-04 | End: 2019-06-05 | Stop reason: HOSPADM

## 2019-06-04 RX ORDER — ONDANSETRON 2 MG/ML
INJECTION INTRAMUSCULAR; INTRAVENOUS AS NEEDED
Status: DISCONTINUED | OUTPATIENT
Start: 2019-06-04 | End: 2019-06-04 | Stop reason: HOSPADM

## 2019-06-04 RX ORDER — NITROGLYCERIN 0.4 MG/1
0.4 TABLET SUBLINGUAL
Status: DISCONTINUED | OUTPATIENT
Start: 2019-06-04 | End: 2019-06-04 | Stop reason: HOSPADM

## 2019-06-04 RX ORDER — LOSARTAN POTASSIUM 50 MG/1
50 TABLET ORAL DAILY
Status: DISCONTINUED | OUTPATIENT
Start: 2019-06-05 | End: 2019-06-05 | Stop reason: HOSPADM

## 2019-06-04 RX ORDER — HYDROCODONE BITARTRATE AND ACETAMINOPHEN 5; 325 MG/1; MG/1
1 TABLET ORAL EVERY 4 HOURS PRN
Status: DISCONTINUED | OUTPATIENT
Start: 2019-06-04 | End: 2019-06-05 | Stop reason: HOSPADM

## 2019-06-04 RX ORDER — SODIUM CHLORIDE 0.9 % (FLUSH) 0.9 %
1-10 SYRINGE (ML) INJECTION AS NEEDED
Status: DISCONTINUED | OUTPATIENT
Start: 2019-06-04 | End: 2019-06-04 | Stop reason: HOSPADM

## 2019-06-04 RX ORDER — PROMETHAZINE HYDROCHLORIDE 25 MG/ML
12.5 INJECTION, SOLUTION INTRAMUSCULAR; INTRAVENOUS EVERY 4 HOURS PRN
Status: DISCONTINUED | OUTPATIENT
Start: 2019-06-04 | End: 2019-06-04 | Stop reason: HOSPADM

## 2019-06-04 RX ORDER — SODIUM CHLORIDE 9 MG/ML
1 INJECTION, SOLUTION INTRAVENOUS CONTINUOUS
Status: ACTIVE | OUTPATIENT
Start: 2019-06-04 | End: 2019-06-04

## 2019-06-04 RX ORDER — HYDROCHLOROTHIAZIDE 25 MG/1
25 TABLET ORAL DAILY
Status: DISCONTINUED | OUTPATIENT
Start: 2019-06-05 | End: 2019-06-05 | Stop reason: HOSPADM

## 2019-06-04 RX ORDER — LIDOCAINE HYDROCHLORIDE 10 MG/ML
INJECTION, SOLUTION INFILTRATION; PERINEURAL AS NEEDED
Status: DISCONTINUED | OUTPATIENT
Start: 2019-06-04 | End: 2019-06-04 | Stop reason: HOSPADM

## 2019-06-04 RX ORDER — SODIUM CHLORIDE 0.9 % (FLUSH) 0.9 %
3 SYRINGE (ML) INJECTION EVERY 12 HOURS SCHEDULED
Status: DISCONTINUED | OUTPATIENT
Start: 2019-06-04 | End: 2019-06-04 | Stop reason: HOSPADM

## 2019-06-04 RX ORDER — FENTANYL CITRATE 50 UG/ML
INJECTION, SOLUTION INTRAMUSCULAR; INTRAVENOUS AS NEEDED
Status: DISCONTINUED | OUTPATIENT
Start: 2019-06-04 | End: 2019-06-04 | Stop reason: HOSPADM

## 2019-06-04 RX ADMIN — APIXABAN 5 MG: 5 TABLET, FILM COATED ORAL at 13:46

## 2019-06-04 RX ADMIN — SODIUM CHLORIDE 1 ML/KG/HR: 9 INJECTION, SOLUTION INTRAVENOUS at 13:11

## 2019-06-04 RX ADMIN — APIXABAN 5 MG: 5 TABLET, FILM COATED ORAL at 21:23

## 2019-06-05 VITALS
DIASTOLIC BLOOD PRESSURE: 90 MMHG | OXYGEN SATURATION: 94 % | RESPIRATION RATE: 14 BRPM | HEART RATE: 80 BPM | WEIGHT: 315 LBS | HEIGHT: 76 IN | TEMPERATURE: 97.8 F | SYSTOLIC BLOOD PRESSURE: 131 MMHG | BODY MASS INDEX: 38.36 KG/M2

## 2019-06-05 PROCEDURE — 93010 ELECTROCARDIOGRAM REPORT: CPT | Performed by: INTERNAL MEDICINE

## 2019-06-05 PROCEDURE — 94660 CPAP INITIATION&MGMT: CPT

## 2019-06-05 PROCEDURE — 94799 UNLISTED PULMONARY SVC/PX: CPT

## 2019-06-05 PROCEDURE — 93005 ELECTROCARDIOGRAM TRACING: CPT | Performed by: INTERNAL MEDICINE

## 2019-06-05 PROCEDURE — 99238 HOSP IP/OBS DSCHRG MGMT 30/<: CPT | Performed by: INTERNAL MEDICINE

## 2019-06-05 RX ORDER — POTASSIUM CHLORIDE 1.5 G/1.77G
40 POWDER, FOR SOLUTION ORAL ONCE
Status: DISCONTINUED | OUTPATIENT
Start: 2019-06-05 | End: 2019-06-05

## 2019-06-05 RX ORDER — POTASSIUM CHLORIDE 750 MG/1
40 CAPSULE, EXTENDED RELEASE ORAL ONCE
Status: COMPLETED | OUTPATIENT
Start: 2019-06-05 | End: 2019-06-05

## 2019-06-05 RX ADMIN — SERTRALINE HYDROCHLORIDE 150 MG: 100 TABLET ORAL at 07:24

## 2019-06-05 RX ADMIN — POTASSIUM CHLORIDE 40 MEQ: 750 CAPSULE, EXTENDED RELEASE ORAL at 08:55

## 2019-06-05 RX ADMIN — HYDROCHLOROTHIAZIDE 25 MG: 25 TABLET ORAL at 07:25

## 2019-06-05 RX ADMIN — APIXABAN 5 MG: 5 TABLET, FILM COATED ORAL at 07:24

## 2019-06-05 RX ADMIN — LOSARTAN POTASSIUM 50 MG: 50 TABLET ORAL at 07:25

## 2019-06-05 RX ADMIN — FLUTICASONE PROPIONATE 2 SPRAY: 50 SPRAY, METERED NASAL at 07:24

## 2019-06-05 NOTE — PLAN OF CARE
Problem: Patient Care Overview  Goal: Plan of Care Review  Outcome: Ongoing (interventions implemented as appropriate)   06/04/19 2200   Coping/Psychosocial   Plan of Care Reviewed With patient;spouse   Plan of Care Review   Progress improving   OTHER   Outcome Summary Patient denies pain/chest pain; No signs of bleeding; VSS; Will continue to monitor     Goal: Individualization and Mutuality  Outcome: Ongoing (interventions implemented as appropriate)    Goal: Discharge Needs Assessment  Outcome: Ongoing (interventions implemented as appropriate)    Goal: Interprofessional Rounds/Family Conf  Outcome: Ongoing (interventions implemented as appropriate)      Problem: Arrhythmia/Dysrhythmia (Symptomatic) (Adult)  Goal: Signs and Symptoms of Listed Potential Problems Will be Absent, Minimized or Managed (Arrhythmia/Dysrhythmia)  Outcome: Ongoing (interventions implemented as appropriate)

## 2019-06-05 NOTE — PLAN OF CARE
Problem: Patient Care Overview  Goal: Plan of Care Review  Outcome: Ongoing (interventions implemented as appropriate)   06/05/19 0900   Coping/Psychosocial   Plan of Care Reviewed With patient;spouse   Plan of Care Review   Progress improving   OTHER   Outcome Summary VSS. NSR on tele. Denies any pain. Ambulating without assistance. Right IJ and femoral sites remain stable, no signs of bleeding. Will discharge home.      Goal: Individualization and Mutuality  Outcome: Ongoing (interventions implemented as appropriate)   06/05/19 0900   Individualization   Patient Specific Preferences family at bedside   Patient Specific Goals (Include Timeframe) pain tolerable   Patient Specific Interventions CPAP when sleeping     Goal: Discharge Needs Assessment  Outcome: Ongoing (interventions implemented as appropriate)   06/04/19 1155 06/04/19 2200 06/05/19 0900   Discharge Needs Assessment   Readmission Within the Last 30 Days --  --  no previous admission in last 30 days   Concerns to be Addressed --  --  no discharge needs identified   Patient/Family Anticipates Transition to home with family --  --    Patient/Family Anticipated Services at Transition none --  --    Transportation Concerns car, none --  --    Transportation Anticipated family or friend will provide --  --    Anticipated Changes Related to Illness --  none --    Equipment Needed After Discharge --  none --    Disability   Equipment Currently Used at Home none --  --        Problem: Arrhythmia/Dysrhythmia (Symptomatic) (Adult)  Goal: Signs and Symptoms of Listed Potential Problems Will be Absent, Minimized or Managed (Arrhythmia/Dysrhythmia)  Outcome: Ongoing (interventions implemented as appropriate)   06/05/19 0900   Goal/Outcome Evaluation   Problems Assessed (Arrhythmia/Dysrhythmia) all   Problems Present (Dysrhythmia) none

## 2019-06-05 NOTE — PROGRESS NOTES
[facility]  Discharge Progress Note     LOS: 1 day   Patient Care Team:  Miles Garvin MD as PCP - General  Miles Garvin MD as PCP - Family Medicine    Chief Complaint:  Atrial flutter    Subjective     Interval History: Patient is s/p EP study yesterday and has done well overnight.  Has ambulated without issue.  Groin site stable.  Denies any c/o CP, SOB, or palpitations.  Overall doing well.  No chest pain or new complaints at this time.  No palpitations.        Review of Systems:   Pertinent positives in HPI, all others reviewed and negative.      Objective       Current Facility-Administered Medications:   •  apixaban (ELIQUIS) tablet 5 mg, 5 mg, Oral, Q12H, Merary Chadwick, APRN, 5 mg at 06/05/19 0724  •  fluticasone (FLONASE) 50 MCG/ACT nasal spray 2 spray, 2 spray, Nasal, Daily, Merary Chadwick, APRN, 2 spray at 06/05/19 0724  •  hydrochlorothiazide (HYDRODIURIL) tablet 25 mg, 25 mg, Oral, Daily, Merary Chadwick, APRN, 25 mg at 06/05/19 0725  •  HYDROcodone-acetaminophen (NORCO) 5-325 MG per tablet 1 tablet, 1 tablet, Oral, Q4H PRN, Juan Carlin MD  •  losartan (COZAAR) tablet 50 mg, 50 mg, Oral, Daily, Merary Chadwick, APRN, 50 mg at 06/05/19 0725  •  ondansetron (ZOFRAN) injection 4 mg, 4 mg, Intravenous, Q6H PRN, Juan Carlin MD  •  pantoprazole (PROTONIX) EC tablet 40 mg, 40 mg, Oral, QAM AC, Merary Chadwickle, APRN  •  sertraline (ZOLOFT) tablet 150 mg, 150 mg, Oral, Daily, Merary Chadwickle, APRN, 150 mg at 06/05/19 0724    Vital Sign Min/Max for last 24 hours  Temp  Min: 97.8 °F (36.6 °C)  Max: 98.1 °F (36.7 °C)   BP  Min: 107/65  Max: 143/106   Pulse  Min: 64  Max: 108   Resp  Min: 12  Max: 21   SpO2  Min: 86 %  Max: 99 %   No Data Recorded   Weight  Min: 145 kg (319 lb 10.7 oz)  Max: 145 kg (319 lb 10.7 oz)     Flowsheet Rows      First Filed Value   Admission Height  193  "cm (76\") Documented at 06/04/2019 1155   Admission Weight  145 kg (319 lb 10.7 oz)  (Abnormal)  Documented at 06/04/2019 1155          Physical Exam:     General Appearance:    Alert, cooperative, in no acute distress   Lungs:    Clear to auscultation bilaterally.  Respiratory effort normal.    Heart:   Regular rate rhythm normal S1-S2.  No S3-S4 murmurs.   Chest Wall:    No abnormalities observed   Abdomen:     Normal bowel sounds, no masses, soft, non-tender, non-distended, benign.   Extremities:   Moves all extremities well, no edema, no cyanosis, no             redness   Pulses:   Pulses palpable and equal bilaterally   Skin:   Groin and neck sites are clean, dry and intact. No bleeding, bruising, or hematoma.  Venous access sites normal.        Results Review:   Results from last 7 days   Lab Units 06/03/19  1530   WBC 10*3/mm3 7.76   HEMOGLOBIN g/dL 14.3   HEMATOCRIT % 42.0   PLATELETS 10*3/mm3 293     Results from last 7 days   Lab Units 06/03/19  1536   SODIUM mmol/L 139   POTASSIUM mmol/L 3.5   CHLORIDE mmol/L 99   CO2 mmol/L 25.0   BUN mg/dL 17   CREATININE mg/dL 0.91   GLUCOSE mg/dL 157*      Results from last 7 days   Lab Units 06/04/19  1158   HEMOGLOBIN A1C % 5.70*                 Results from last 7 days   Lab Units 06/03/19  1531   PROTIME Seconds 13.5   INR  1.08           Intake/Output Summary (Last 24 hours) at 6/5/2019 0730  Last data filed at 6/4/2019 1730  Gross per 24 hour   Intake 500 ml   Output --   Net 500 ml       I personally viewed and interpreted the patient's EKG/Telemetry data    EKG: NSR, PAC, HR 67 bpm, normal MN, QRS, and QT intervals.    Telemetry: NSR, HR  bpm, no A. fib or atrial flutter.      Present on Admission:  **None**    Assessment/Plan   1. Typical atrial flutter: Maintaining sinus rhythm.  - New onset atrial flutter noted per event monitor in November, appears typical in nature.    - S/p EP study yesterday.  There were no sustained atrial arrhythmias both on and " off isoproterenol but did have runs of probable nonsustained atrial flutter.  He is s/p successful RFA of a probable right atrial isthmus dependent flutter.  Pt has done well over night. Medications, wound care and follow have been reviewed with the patient.  - CHADSVASc = 1 (HTN), continue Eliquis until we see him back in clinic.     2. WPW:  - S/p RFA in the past, no documented recurrence by EP study yesterday.     3. HTN:  - Well controlled on losartan/HCTZ      Plan for disposition: The patient is stable and will be discharged to home today with plan for follow up with Dr. Carlin in 3 months.    Electronically signed by LIONEL Wolf, 06/05/19, 7:30 AM.      I, Juan Carlin MD, personally performed the services face to face as described and documented by the above named individual. I have made any necessary edits and it is both accurate and complete 6/5/2019  8:33 AM

## 2019-06-13 ENCOUNTER — OFFICE VISIT (OUTPATIENT)
Dept: CARDIOLOGY | Facility: CLINIC | Age: 49
End: 2019-06-13

## 2019-06-13 VITALS
WEIGHT: 315 LBS | HEIGHT: 76 IN | BODY MASS INDEX: 38.36 KG/M2 | RESPIRATION RATE: 16 BRPM | SYSTOLIC BLOOD PRESSURE: 123 MMHG | DIASTOLIC BLOOD PRESSURE: 73 MMHG | HEART RATE: 74 BPM

## 2019-06-13 DIAGNOSIS — I48.3 TYPICAL ATRIAL FLUTTER (HCC): ICD-10-CM

## 2019-06-13 DIAGNOSIS — I45.6 WPW (WOLFF-PARKINSON-WHITE SYNDROME): Primary | ICD-10-CM

## 2019-06-13 DIAGNOSIS — G47.33 OSA (OBSTRUCTIVE SLEEP APNEA): ICD-10-CM

## 2019-06-13 DIAGNOSIS — I47.1 SVT (SUPRAVENTRICULAR TACHYCARDIA) (HCC): ICD-10-CM

## 2019-06-13 DIAGNOSIS — I10 ESSENTIAL HYPERTENSION: ICD-10-CM

## 2019-06-13 PROCEDURE — 99213 OFFICE O/P EST LOW 20 MIN: CPT | Performed by: PHYSICIAN ASSISTANT

## 2019-06-13 RX ORDER — AMLODIPINE BESYLATE 5 MG/1
5 TABLET ORAL DAILY
Qty: 30 TABLET | Refills: 11 | Status: SHIPPED | OUTPATIENT
Start: 2019-06-13 | End: 2020-07-06

## 2019-06-13 NOTE — PROGRESS NOTES
Miles Garvin MD  Jimmy Dawson Jr.  1970 06/13/2019    Patient Active Problem List   Diagnosis   • Gastroesophageal reflux disease without esophagitis   • Depression   • Essential hypertension   • Palpitations   • SVT (supraventricular tachycardia), s/p RF ablation, in 2000 x2   • WPW (Brayan-Parkinson-White syndrome), s/p RF ablation 2000.   • Chronic rhinitis   • TAMIKO (obstructive sleep apnea)   • Typical atrial flutter (CMS/HCC)       Dear Miles Garvin MD:    Subjective     History of Present Illness:    Chief Complaint   Patient presents with   • Follow-up     5+ mos, recent ablat. procedure, Dr. Carlin   • Med Management     verbal       Jimmy Dawson Jr. is a pleasant 48 y.o. male with a past medical history significant for WPW syndrome and paroxysmal supraventricular tachycardia for which he underwent ablation in 2010 and recently did again on 6/4/2018 with a successful ablation on right atrial isthmus, tricuspid valve/inferior vena cava isthmus. He comes in for cardiology follow up.     Patient reports since having ablation by Dr. Carlin he has been doing well and he denies any episodes where he feels like he has been in atrial flutter.  He does report that he has been having episodes where he feels anxious/nervous. He previously associated the symptoms with him going into atrial flutter however when he was in the hospital he would watch his cardiac monitor and he would have the symptoms with a normal regular rhythm.  He reports that he is now having them and have actually become worse recently, reporting that they essentially do not go away lasting throughout most of the day.  He does state that when he goes riding in trails and drinking beer that will help relieve this tension.  He does report that he has been on Zoloft for some anxiety and roughly 6 months ago his primary care provider did offer to increase it but he declined to have this done at that time.  He  does deny any chest pains, shortness of breath, dizziness, or syncope.  Also denies any bleeding issues with Eliquis.  He is also on hydrochlorothiazide and does believe that he is dehydrated and has a dry mouth most of the day.    Allergies   Allergen Reactions   • Aleve [Naproxen] Hives   :      Current Outpatient Medications:   •  apixaban (ELIQUIS) 5 MG tablet tablet, Take 1 tablet by mouth Every 12 (Twelve) Hours., Disp: 60 tablet, Rfl: 2  •  diclofenac (CATAFLAM) 50 MG tablet, Take 1 tablet by mouth 3 (Three) Times a Day. (Patient taking differently: Take 50 mg by mouth 3 (Three) Times a Day As Needed.), Disp: 90 tablet, Rfl: 1  •  fluticasone (FLONASE) 50 MCG/ACT nasal spray, 2 sprays into the nostril(s) as directed by provider Daily. (Patient taking differently: 2 sprays into the nostril(s) as directed by provider Every Morning.), Disp: 1 bottle, Rfl: 3  •  losartan (COZAAR) 50 MG tablet, Take 1 tablet by mouth Daily. (Patient taking differently: Take 50 mg by mouth Every Morning.), Disp: 30 tablet, Rfl: 6  •  omeprazole (priLOSEC) 20 MG capsule, Take 1 capsule by mouth 2 (Two) Times a Day., Disp: , Rfl: 6  •  sertraline (ZOLOFT) 100 MG tablet, Take 1.5 tablets by mouth Daily. (Patient taking differently: Take 150 mg by mouth Every Morning.), Disp: 45 tablet, Rfl: 6  •  amLODIPine (NORVASC) 5 MG tablet, Take 1 tablet by mouth Daily., Disp: 30 tablet, Rfl: 11    The following portions of the patient's history were reviewed and updated as appropriate: allergies, current medications, past family history, past medical history, past social history, past surgical history and problem list.    Social History     Tobacco Use   • Smoking status: Never Smoker   • Smokeless tobacco: Never Used   Substance Use Topics   • Alcohol use: Yes     Comment: 4-5 BEERS, TWICE A WEEK   • Drug use: No       Review of Systems   Constitution: Negative for weakness and malaise/fatigue.   Cardiovascular: Negative for chest pain,  "dyspnea on exertion, irregular heartbeat, leg swelling and palpitations.   Respiratory: Negative for cough and shortness of breath.    Hematologic/Lymphatic: Negative for bleeding problem. Does not bruise/bleed easily.   Gastrointestinal: Negative for nausea and vomiting.   Psychiatric/Behavioral: The patient is nervous/anxious.        Objective   Vitals:    06/13/19 1308   BP: 123/73   Pulse: 74   Resp: 16   Weight: (!) 146 kg (322 lb)   Height: 193 cm (76\")     Body mass index is 39.2 kg/m².    Physical Exam   Constitutional: He is oriented to person, place, and time. He appears well-developed and well-nourished. No distress.   HENT:   Head: Normocephalic and atraumatic.   Cardiovascular: Normal rate, regular rhythm, normal heart sounds and intact distal pulses.   Pulmonary/Chest: Effort normal and breath sounds normal. No respiratory distress.   Musculoskeletal: He exhibits no edema.   Neurological: He is alert and oriented to person, place, and time.   Skin: He is not diaphoretic.       Lab Results   Component Value Date     06/03/2019    K 3.5 06/03/2019    CL 99 06/03/2019    CO2 25.0 06/03/2019    BUN 17 06/03/2019    CREATININE 0.91 06/03/2019    GLUCOSE 157 (H) 06/03/2019    CALCIUM 9.6 06/03/2019    AST 25 02/02/2018    ALT 48 (H) 02/02/2018    ALKPHOS 92 02/02/2018    LABIL2 1.3 (L) 01/25/2016     No results found for: CKTOTAL  Lab Results   Component Value Date    WBC 7.76 06/03/2019    HGB 14.3 06/03/2019    HCT 42.0 06/03/2019     06/03/2019     Lab Results   Component Value Date    INR 1.08 06/03/2019     No results found for: MG  Lab Results   Component Value Date    TSH 2.718 02/02/2018    TRIG 219 (H) 02/02/2018    HDL 34 (L) 02/02/2018     (H) 02/02/2018      No results found for: BNP    During this visit the following were done:  Labs Reviewed [x]    Labs Ordered []    Radiology Reports Reviewed [x]    Radiology Ordered []    PCP Records Reviewed []    Referring Provider " "Records Reviewed []    ER Records Reviewed []    Hospital Records Reviewed []    History Obtained From Family []    Radiology Images Reviewed []    Other Reviewed []    Records Requested []       Procedures    Assessment/Plan    Diagnosis Plan   1. WPW (Brayan-Parkinson-White syndrome), s/p RF ablation 2000.     2. SVT (supraventricular tachycardia), s/p RF ablation, in 2000 x2     3. Essential hypertension     4. Typical atrial flutter (CMS/HCC)     5. TAMIKO (obstructive sleep apnea)              Recommendations:  1. Since patient has been having episodes where he feels he is anxious with a description of \"fidgety\" I will discontinue his hydrochlorothiazide as it is possible he is remaining chronically dehydrated I am also discontinuing this because he recently had a hemoglobin A1c that was 5.7 which is within prediabetic range and since HCTZ does have a known side effect of increased insulin sensitivity.  I will replace HCTZ with 5 mg of amlodipine I asked for him to get a blood pressure machine and check his blood pressure daily every day he expressed understanding and reported that he would.  2. Otherwise I encouraged him to see his primary care provider about possibly increasing or changing his anti-anxiolytic therapy.  3. Reports he is going to be seen Dr. Carlin in September at that time he is to stop his Eliquis so we will continue this for another couple months as well as losartan.    Return in about 6 months (around 12/13/2019).    As always, I appreciate very much the opportunity to participate in the cardiovascular care of your patients.      With Best Regards,    Anthony Ruff PA-C          "

## 2019-08-07 ENCOUNTER — OFFICE VISIT (OUTPATIENT)
Dept: FAMILY MEDICINE CLINIC | Facility: CLINIC | Age: 49
End: 2019-08-07

## 2019-08-07 VITALS
HEART RATE: 104 BPM | SYSTOLIC BLOOD PRESSURE: 140 MMHG | HEIGHT: 76 IN | WEIGHT: 315 LBS | OXYGEN SATURATION: 97 % | TEMPERATURE: 97.9 F | BODY MASS INDEX: 38.36 KG/M2 | DIASTOLIC BLOOD PRESSURE: 80 MMHG

## 2019-08-07 DIAGNOSIS — J02.9 SORE THROAT: Primary | ICD-10-CM

## 2019-08-07 DIAGNOSIS — J20.9 ACUTE BRONCHITIS, UNSPECIFIED ORGANISM: ICD-10-CM

## 2019-08-07 LAB
EXPIRATION DATE: NORMAL
INTERNAL CONTROL: NORMAL
Lab: NORMAL
S PYO AG THROAT QL: NEGATIVE

## 2019-08-07 PROCEDURE — 87880 STREP A ASSAY W/OPTIC: CPT | Performed by: NURSE PRACTITIONER

## 2019-08-07 PROCEDURE — 99214 OFFICE O/P EST MOD 30 MIN: CPT | Performed by: NURSE PRACTITIONER

## 2019-08-07 PROCEDURE — 96372 THER/PROPH/DIAG INJ SC/IM: CPT | Performed by: NURSE PRACTITIONER

## 2019-08-07 RX ORDER — CEFTRIAXONE 1 G/1
1 INJECTION, POWDER, FOR SOLUTION INTRAMUSCULAR; INTRAVENOUS ONCE
Status: COMPLETED | OUTPATIENT
Start: 2019-08-07 | End: 2019-08-07

## 2019-08-07 RX ORDER — METHYLPREDNISOLONE ACETATE 40 MG/ML
40 INJECTION, SUSPENSION INTRA-ARTICULAR; INTRALESIONAL; INTRAMUSCULAR; SOFT TISSUE ONCE
Status: COMPLETED | OUTPATIENT
Start: 2019-08-07 | End: 2019-08-07

## 2019-08-07 RX ORDER — AMOXICILLIN AND CLAVULANATE POTASSIUM 875; 125 MG/1; MG/1
1 TABLET, FILM COATED ORAL 2 TIMES DAILY
Qty: 20 TABLET | Refills: 0 | Status: SHIPPED | OUTPATIENT
Start: 2019-08-07 | End: 2019-08-17

## 2019-08-07 RX ADMIN — CEFTRIAXONE 1 G: 1 INJECTION, POWDER, FOR SOLUTION INTRAMUSCULAR; INTRAVENOUS at 10:18

## 2019-08-07 RX ADMIN — METHYLPREDNISOLONE ACETATE 40 MG: 40 INJECTION, SUSPENSION INTRA-ARTICULAR; INTRALESIONAL; INTRAMUSCULAR; SOFT TISSUE at 10:18

## 2019-08-07 NOTE — PATIENT INSTRUCTIONS

## 2019-08-07 NOTE — PROGRESS NOTES
Subjective   Jimmy Dawson Jr. is a 48 y.o. male.     Chief Complaint   Patient presents with   • Nasal Congestion   • Sore Throat       He presents with c/o feeling sick since Friday. He states he got up this morning and felt like someone was sitting on his chest, like it is tight with congestion. He states he felt to weak to go to work today. He states he is getting up a lot of thick yellow sputum. He denies fever, chills, and earache. He c/o sore throat, runny nose, and sneezing. He reports taking some otc sinus and allergy pills. He states he took some round balls for cough last night that seemed to help. He states he had a cardiac ablation around the first of June for arrythmia and he feels better now.         The following portions of the patient's history were reviewed and updated as appropriate: allergies, current medications, past family history, past medical history, past social history, past surgical history and problem list.    Review of Systems   Constitutional: Negative for fever and unexpected weight change.   HENT: Positive for rhinorrhea and sore throat. Negative for ear pain and trouble swallowing.    Eyes: Negative for visual disturbance.   Respiratory: Positive for cough and wheezing. Negative for shortness of breath.    Cardiovascular: Negative for chest pain and palpitations.   Gastrointestinal: Negative for abdominal pain, blood in stool, constipation, diarrhea, nausea and vomiting.   Endocrine: Negative for cold intolerance and heat intolerance.   Genitourinary: Negative for dysuria and hematuria.   Musculoskeletal: Negative for arthralgias and myalgias.   Skin: Negative for color change.   Allergic/Immunologic: Positive for environmental allergies.   Neurological: Positive for headaches. Negative for dizziness, seizures and syncope.   Hematological: Negative for adenopathy.   Psychiatric/Behavioral: Negative for sleep disturbance and suicidal ideas. The patient is not nervous/anxious.   "      Objective     /80 (BP Location: Left arm, Patient Position: Sitting, Cuff Size: Large Adult)   Pulse 104   Temp 97.9 °F (36.6 °C) (Oral)   Ht 193 cm (75.98\")   Wt (!) 145 kg (320 lb 9.6 oz)   SpO2 97%   BMI 39.04 kg/m²     Physical Exam   Constitutional: He is oriented to person, place, and time. He appears well-developed and well-nourished. No distress.   HENT:   Head: Normocephalic and atraumatic.   Cardiovascular: Normal rate, regular rhythm, normal heart sounds and intact distal pulses. Exam reveals no gallop and no friction rub.   No murmur heard.  Pulmonary/Chest: Effort normal and breath sounds normal. No respiratory distress.   Abdominal: Soft. Bowel sounds are normal. He exhibits no distension. There is no tenderness.   Neurological: He is alert and oriented to person, place, and time.   Skin: Skin is warm and dry. He is not diaphoretic.   Psychiatric: He has a normal mood and affect. His behavior is normal. Judgment and thought content normal.       Assessment/Plan     Problem List Items Addressed This Visit     None      Visit Diagnoses     Sore throat    -  Primary    Relevant Orders    POCT rapid strep A (Completed)    Acute bronchitis, unspecified organism        Relevant Medications    cefTRIAXone (ROCEPHIN) injection 1 g (Completed)    methylPREDNISolone acetate (DEPO-medrol) injection 40 mg (Completed)    amoxicillin-clavulanate (AUGMENTIN) 875-125 MG per tablet          Plan: Rapid strep is negative. Rocephin and depo-medrol given IM in office today for bronchitis. Augmentin ordered PO for bronchitis. Rest. Drink lots of fluids. Follow up if no better next week otherwise follow up prn.    Patient's Body mass index is 39.04 kg/m². BMI is above normal parameters. Recommendations include: educational material.   (Normal BMI:  18.5-24.9, OW 25-29.9, Obesity 30 or greater)            Understands disease processes and need for medications.  Understands reasons for urgent and emergent " care.  Patient (& family) verbalized agreement for treatment plan.   Emotional support and active listening provided.  Patient provided time to verbalize feelings.               This document has been electronically signed by LIONEL Grider   August 7, 2019 11:34 AM

## 2019-09-09 NOTE — TELEPHONE ENCOUNTER
Pt called asking for samples of Eliquis 5 mg. Per Anthony's last office note, pt to continue this until pt sees Dr. Carlin @ next follow up (9/18/19).  Samples gathered for pt to .

## 2019-09-18 ENCOUNTER — OFFICE VISIT (OUTPATIENT)
Dept: CARDIOLOGY | Facility: CLINIC | Age: 49
End: 2019-09-18

## 2019-09-18 VITALS
WEIGHT: 315 LBS | SYSTOLIC BLOOD PRESSURE: 136 MMHG | OXYGEN SATURATION: 97 % | HEIGHT: 76 IN | DIASTOLIC BLOOD PRESSURE: 82 MMHG | HEART RATE: 92 BPM | BODY MASS INDEX: 38.36 KG/M2

## 2019-09-18 DIAGNOSIS — I48.3 TYPICAL ATRIAL FLUTTER (HCC): Primary | ICD-10-CM

## 2019-09-18 DIAGNOSIS — I45.6 WPW (WOLFF-PARKINSON-WHITE SYNDROME): ICD-10-CM

## 2019-09-18 DIAGNOSIS — I10 ESSENTIAL HYPERTENSION: ICD-10-CM

## 2019-09-18 PROCEDURE — 99213 OFFICE O/P EST LOW 20 MIN: CPT | Performed by: INTERNAL MEDICINE

## 2019-09-18 PROCEDURE — 93000 ELECTROCARDIOGRAM COMPLETE: CPT | Performed by: INTERNAL MEDICINE

## 2019-09-18 NOTE — PROGRESS NOTES
Jimmy Dawson Jr.  1970  [phone]    09/18/2019    [facility] CARDIOLOGY     BharathiMiles MD  [address] [facility] [address]    Chief Complaint   Patient presents with   • Brayan-Parkinson-White Syndrome       Problem List:   1. Typical atrial flutter  a. Event monitor 11/15/2018: Predominantly normal sinus rhythm, average HR 86 bpm ( bpm), episodes of atrial flutter  b. CHADSVASc = 1 (HTN)  c. RFA of RA flutter 6/4/19  2. SVT/WPW  a. S/p RFA, 2000  3. Essential hypertension  4. TAMIKO  5. GERD  6. Depression      Allergies  Allergies   Allergen Reactions   • Aleve [Naproxen] Hives       Current Medications    Current Outpatient Medications:   •  amLODIPine (NORVASC) 5 MG tablet, Take 1 tablet by mouth Daily., Disp: 30 tablet, Rfl: 11  •  apixaban (ELIQUIS) 5 MG tablet tablet, Take 1 tablet by mouth Every 12 (Twelve) Hours., Disp: 42 tablet, Rfl: 0  •  diclofenac (CATAFLAM) 50 MG tablet, Take 1 tablet by mouth 3 (Three) Times a Day. (Patient taking differently: Take 50 mg by mouth 3 (Three) Times a Day As Needed.), Disp: 90 tablet, Rfl: 1  •  fluticasone (FLONASE) 50 MCG/ACT nasal spray, 2 sprays into the nostril(s) as directed by provider Daily. (Patient taking differently: 2 sprays into the nostril(s) as directed by provider Every Morning.), Disp: 1 bottle, Rfl: 3  •  losartan (COZAAR) 50 MG tablet, Take 1 tablet by mouth Daily. (Patient taking differently: Take 50 mg by mouth Every Morning.), Disp: 30 tablet, Rfl: 6  •  omeprazole (priLOSEC) 20 MG capsule, Take 1 capsule by mouth 2 (Two) Times a Day., Disp: , Rfl: 6  •  sertraline (ZOLOFT) 100 MG tablet, Take 1.5 tablets by mouth Daily. (Patient taking differently: Take 150 mg by mouth Every Morning.), Disp: 45 tablet, Rfl: 6    History of Present Illness     Pt presents for follow up of AFL/SVT . Since we last saw the pt, pt denies any AFL episodes, SOB, CP, LH, and dizziness. Denies any  "hospitalizations, ER visits, bleeding, or TIA/CVA symptoms. Overall feels well. Does not check BP at home    ROS:  General:  Denies fatigue, weight gain or loss  Cardiovascular:  Denies CP, PND, syncope, near syncope, edema or palpitations.  Pulmonary:  Denies ERICKSON, cough, or wheezing      Vitals:    09/18/19 1531   BP: 136/82   BP Location: Left arm   Patient Position: Sitting   Pulse: 92   SpO2: 97%   Weight: (!) 147 kg (324 lb)   Height: 193 cm (76\")     Body mass index is 39.44 kg/m².  PE:  General: NAD  Neck: no JVD, no carotid bruits, no TM  Heart RRR, NL S1, S2, S4 present, no rubs, murmurs  Lungs: CTA, no wheezes, rhonchi, or rales  Abd: soft, non-tender, NL BS  Ext: No musculoskeletal deformities, no edema, cyanosis, or clubbing  Psych: normal mood and affect    Diagnostic Data:        ECG 12 Lead  Date/Time: 9/18/2019 3:40 PM  Performed by: Juan Carlin MD  Authorized by: Juan Carlin MD   Comparison: compared with previous ECG from 5/1/2019  Similar to previous ECG  Rhythm: sinus rhythm  BPM: 90              1. Typical atrial flutter (CMS/HCC)    2. WPW (Brayan-Parkinson-White syndrome), s/p RF ablation 2000.    3. Essential hypertension          Plan:  1) AFL s/p RFA with no recurrence  Continue present medications.   2) SVT s/p RFA: no recurrence  3) Anticoagulation  Stop eliquis  4) HTN  Wt loss, exercise, salt reduction      Monitor for AF     F/up prn       "

## 2019-09-23 ENCOUNTER — OFFICE VISIT (OUTPATIENT)
Dept: FAMILY MEDICINE CLINIC | Facility: CLINIC | Age: 49
End: 2019-09-23

## 2019-09-23 VITALS
SYSTOLIC BLOOD PRESSURE: 140 MMHG | BODY MASS INDEX: 38.36 KG/M2 | OXYGEN SATURATION: 97 % | HEART RATE: 85 BPM | WEIGHT: 315 LBS | HEIGHT: 76 IN | DIASTOLIC BLOOD PRESSURE: 78 MMHG | TEMPERATURE: 97.6 F

## 2019-09-23 DIAGNOSIS — R73.9 HYPERGLYCEMIA: ICD-10-CM

## 2019-09-23 DIAGNOSIS — J31.0 CHRONIC RHINITIS: ICD-10-CM

## 2019-09-23 DIAGNOSIS — I10 ESSENTIAL HYPERTENSION: Primary | ICD-10-CM

## 2019-09-23 DIAGNOSIS — Z23 NEEDS FLU SHOT: ICD-10-CM

## 2019-09-23 PROCEDURE — 36415 COLL VENOUS BLD VENIPUNCTURE: CPT | Performed by: FAMILY MEDICINE

## 2019-09-23 PROCEDURE — 83036 HEMOGLOBIN GLYCOSYLATED A1C: CPT | Performed by: FAMILY MEDICINE

## 2019-09-23 PROCEDURE — 99213 OFFICE O/P EST LOW 20 MIN: CPT | Performed by: FAMILY MEDICINE

## 2019-09-23 PROCEDURE — 90674 CCIIV4 VAC NO PRSV 0.5 ML IM: CPT | Performed by: FAMILY MEDICINE

## 2019-09-23 PROCEDURE — 90471 IMMUNIZATION ADMIN: CPT | Performed by: FAMILY MEDICINE

## 2019-09-23 PROCEDURE — 80048 BASIC METABOLIC PNL TOTAL CA: CPT | Performed by: FAMILY MEDICINE

## 2019-09-23 NOTE — PROGRESS NOTES
Subjective   Jimmyfranklin Dawson Jr. is a 48 y.o. male.     History of Present Illness follow-up regarding hypertension chronic medical conditions medication management.  Has visited with cardiology.  Some med changes made.  Generally feels great.  Still having challenges with dietary compliance.  Very active physically.  Walking every day.  Consumes too many calories.  Denies S OB CP palpitations GI .  Sleep patterns are great with the TAMIKO being treated.    The following portions of the patient's history were reviewed and updated as appropriate: current medications, past family history, past medical history, past social history, past surgical history and problem list.    Review of Systems  See history of Present Illness     Objective     Physical Exam   Constitutional: He is oriented to person, place, and time. He appears well-developed and well-nourished.   HENT:   Head: Normocephalic.   Mouth/Throat: Oropharynx is clear and moist.   Eyes: Conjunctivae and EOM are normal. Pupils are equal, round, and reactive to light.   Neck: Normal range of motion. Neck supple. No tracheal deviation present. No thyromegaly present.   Cardiovascular: Normal rate, regular rhythm and normal heart sounds.   No murmur heard.  Pulmonary/Chest: Effort normal and breath sounds normal.   Musculoskeletal: He exhibits no edema.   Neurological: He is alert and oriented to person, place, and time.   Skin: Skin is warm and dry.   Psychiatric: He has a normal mood and affect.   Vitals reviewed.      PHQ-9 Total Score:      Patient's Body mass index is 39.89 kg/m². BMI is above normal parameters. Recommendations include: exercise counseling and nutrition counseling.   (Normal BMI:  18.5-24.9, OW 25-29.9, Obesity 30 or greater)      Assessment/Plan     Jimmy was seen today for essential hypertension.    Diagnoses and all orders for this visit:    Essential hypertension  -     Basic Metabolic Panel    Chronic rhinitis    Needs flu shot  -      Flucelvax Quad=>4Years (PFS)    Hyperglycemia  -     Basic Metabolic Panel  -     Hemoglobin A1c    Continue medications as reconciled ordered.  Stressed dietary modifications continuing aggressive exercise.  Flu vaccine given.  Check labs.  I am concerned about the hyperglycemia noted.  Recheck in a few months routinely will notify.                     This document has been electronically signed by Miles Garvin MD   September 23, 2019 3:57 PM

## 2019-09-24 LAB
ANION GAP SERPL CALCULATED.3IONS-SCNC: 13.7 MMOL/L (ref 5–15)
BUN BLD-MCNC: 16 MG/DL (ref 6–20)
BUN/CREAT SERPL: 16 (ref 7–25)
CALCIUM SPEC-SCNC: 9.9 MG/DL (ref 8.6–10.5)
CHLORIDE SERPL-SCNC: 101 MMOL/L (ref 98–107)
CO2 SERPL-SCNC: 25.3 MMOL/L (ref 22–29)
CREAT BLD-MCNC: 1 MG/DL (ref 0.76–1.27)
GFR SERPL CREATININE-BSD FRML MDRD: 79 ML/MIN/1.73
GLUCOSE BLD-MCNC: 90 MG/DL (ref 65–99)
HBA1C MFR BLD: 5.9 % (ref 4.8–5.6)
POTASSIUM BLD-SCNC: 4.8 MMOL/L (ref 3.5–5.2)
SODIUM BLD-SCNC: 140 MMOL/L (ref 136–145)

## 2019-09-24 NOTE — PROGRESS NOTES
Blood sugar was 90.  Normal result.  A1c was very slightly above normal consistent with prediabetic state.  Must get aggressive with dietary changes get weight down.  Really decrease carbohydrates.

## 2019-10-21 ENCOUNTER — OFFICE VISIT (OUTPATIENT)
Dept: FAMILY MEDICINE CLINIC | Facility: CLINIC | Age: 49
End: 2019-10-21

## 2019-10-21 VITALS
HEIGHT: 76 IN | DIASTOLIC BLOOD PRESSURE: 96 MMHG | WEIGHT: 315 LBS | BODY MASS INDEX: 38.36 KG/M2 | HEART RATE: 106 BPM | SYSTOLIC BLOOD PRESSURE: 150 MMHG | OXYGEN SATURATION: 98 % | TEMPERATURE: 98.9 F

## 2019-10-21 DIAGNOSIS — S39.011A STRAIN OF ABDOMINAL WALL, INITIAL ENCOUNTER: ICD-10-CM

## 2019-10-21 DIAGNOSIS — R39.9 UTI SYMPTOMS: Primary | ICD-10-CM

## 2019-10-21 LAB
BILIRUB BLD-MCNC: NEGATIVE MG/DL
CLARITY, POC: CLEAR
COLOR UR: YELLOW
GLUCOSE UR STRIP-MCNC: NEGATIVE MG/DL
KETONES UR QL: NEGATIVE
LEUKOCYTE EST, POC: NEGATIVE
NITRITE UR-MCNC: NEGATIVE MG/ML
PH UR: 6.5 [PH] (ref 5–8)
PROT UR STRIP-MCNC: NEGATIVE MG/DL
RBC # UR STRIP: NEGATIVE /UL
SP GR UR: 1.01 (ref 1–1.03)
UROBILINOGEN UR QL: ABNORMAL

## 2019-10-21 PROCEDURE — 81003 URINALYSIS AUTO W/O SCOPE: CPT | Performed by: FAMILY MEDICINE

## 2019-10-21 PROCEDURE — 99213 OFFICE O/P EST LOW 20 MIN: CPT | Performed by: FAMILY MEDICINE

## 2019-10-21 NOTE — PROGRESS NOTES
Subjective   Jimmy Dawson Jr. is a 48 y.o. male.     History of Present Illness a few days of some burning with urination.  Unfortunately did start after some heavy lifting activities.  Denies discharge frequency nocturia blood.  Denies nausea.  Denies bowel pattern changes.  Denies obvious abdominal bulge but does have some lower abdominal discomfort.    The following portions of the patient's history were reviewed and updated as appropriate: allergies, past family history, past medical history, past social history, past surgical history and problem list.    Review of Systems  See history of Present Illness     Objective     Physical Exam   Constitutional: He is oriented to person, place, and time. He appears well-developed and well-nourished.   HENT:   Head: Normocephalic.   Abdominal: Soft. He exhibits no distension. There is no tenderness. Hernia confirmed negative in the right inguinal area and confirmed negative in the left inguinal area.   Genitourinary: Testes normal and penis normal. Right testis shows no tenderness. Left testis shows no tenderness. Uncircumcised. No discharge found.   Neurological: He is alert and oriented to person, place, and time.   Skin: Skin is warm and dry.   Psychiatric: He has a normal mood and affect.   Vitals reviewed.      PHQ-9 Total Score:      Patient's Body mass index is 39.9 kg/m². BMI is above normal parameters. Recommendations include: exercise counseling and nutrition counseling.   (Normal BMI:  18.5-24.9, OW 25-29.9, Obesity 30 or greater)      Assessment/Plan     Jimmy was seen today for burning when urinating.    Diagnoses and all orders for this visit:    UTI symptoms  -     POCT urinalysis dipstick, automated    Strain of abdominal wall, initial encounter    The in office urine test was negative.  I believe this likely represents abdominal wall strain.  Will not use antibiotics.  Increase the Cataflam to twice daily for a while.  Stay well-hydrated.  Notify  us recheck if no quick resolution definitely if worsens or changes.                     This document has been electronically signed by Miles Garvin MD   October 21, 2019 5:36 PM

## 2019-11-11 RX ORDER — OMEPRAZOLE 20 MG/1
CAPSULE, DELAYED RELEASE ORAL
Qty: 60 CAPSULE | Refills: 6 | Status: SHIPPED | OUTPATIENT
Start: 2019-11-11 | End: 2020-06-04

## 2019-11-13 ENCOUNTER — OFFICE VISIT (OUTPATIENT)
Dept: FAMILY MEDICINE CLINIC | Facility: CLINIC | Age: 49
End: 2019-11-13

## 2019-11-13 VITALS
TEMPERATURE: 97.6 F | BODY MASS INDEX: 38.36 KG/M2 | SYSTOLIC BLOOD PRESSURE: 130 MMHG | HEIGHT: 76 IN | DIASTOLIC BLOOD PRESSURE: 84 MMHG | HEART RATE: 97 BPM | OXYGEN SATURATION: 98 % | WEIGHT: 315 LBS

## 2019-11-13 DIAGNOSIS — J01.10 ACUTE FRONTAL SINUSITIS, RECURRENCE NOT SPECIFIED: ICD-10-CM

## 2019-11-13 DIAGNOSIS — J20.9 ACUTE BRONCHITIS, UNSPECIFIED ORGANISM: Primary | ICD-10-CM

## 2019-11-13 PROCEDURE — 96372 THER/PROPH/DIAG INJ SC/IM: CPT | Performed by: NURSE PRACTITIONER

## 2019-11-13 PROCEDURE — 99214 OFFICE O/P EST MOD 30 MIN: CPT | Performed by: NURSE PRACTITIONER

## 2019-11-13 RX ORDER — CEFTRIAXONE 1 G/1
1 INJECTION, POWDER, FOR SOLUTION INTRAMUSCULAR; INTRAVENOUS ONCE
Status: COMPLETED | OUTPATIENT
Start: 2019-11-13 | End: 2019-11-13

## 2019-11-13 RX ORDER — FEXOFENADINE HCL 180 MG/1
180 TABLET ORAL DAILY
Qty: 30 TABLET | Refills: 5 | Status: SHIPPED | OUTPATIENT
Start: 2019-11-13 | End: 2020-07-03 | Stop reason: SDUPTHER

## 2019-11-13 RX ORDER — MONTELUKAST SODIUM 10 MG/1
10 TABLET ORAL NIGHTLY
Qty: 30 TABLET | Refills: 2 | Status: SHIPPED | OUTPATIENT
Start: 2019-11-13 | End: 2020-03-10

## 2019-11-13 RX ORDER — DOXYCYCLINE 100 MG/1
100 CAPSULE ORAL 2 TIMES DAILY
Qty: 20 CAPSULE | Refills: 0 | Status: SHIPPED | OUTPATIENT
Start: 2019-11-13 | End: 2019-11-23

## 2019-11-13 RX ORDER — METHYLPREDNISOLONE ACETATE 80 MG/ML
80 INJECTION, SUSPENSION INTRA-ARTICULAR; INTRALESIONAL; INTRAMUSCULAR; SOFT TISSUE ONCE
Status: COMPLETED | OUTPATIENT
Start: 2019-11-13 | End: 2019-11-13

## 2019-11-13 RX ADMIN — CEFTRIAXONE 1 G: 1 INJECTION, POWDER, FOR SOLUTION INTRAMUSCULAR; INTRAVENOUS at 17:25

## 2019-11-13 RX ADMIN — METHYLPREDNISOLONE ACETATE 80 MG: 80 INJECTION, SUSPENSION INTRA-ARTICULAR; INTRALESIONAL; INTRAMUSCULAR; SOFT TISSUE at 17:28

## 2019-11-13 NOTE — PATIENT INSTRUCTIONS

## 2019-11-13 NOTE — PROGRESS NOTES
"Subjective   Jimmy Dawson Jr. is a 48 y.o. male.     Chief Complaint   Patient presents with   • URI       He presents with c/o chest congestion with cough. He states he is coughing up big chunks of yellow. He c/o fever. He c/o headache. He has been taking tylenol. He has ill contacts at work.          The following portions of the patient's history were reviewed and updated as appropriate: allergies, current medications, past family history, past medical history, past social history, past surgical history and problem list.    Review of Systems   Constitutional: Positive for fatigue and fever. Negative for unexpected weight change.   HENT: Positive for postnasal drip, rhinorrhea, sinus pain and sore throat. Negative for ear pain and trouble swallowing.    Eyes: Negative for visual disturbance.   Respiratory: Positive for cough, chest tightness and wheezing. Negative for shortness of breath.    Cardiovascular: Negative for chest pain and palpitations.   Gastrointestinal: Negative for abdominal pain, blood in stool, constipation, diarrhea, nausea and vomiting.   Genitourinary: Negative for dysuria.   Musculoskeletal: Negative for arthralgias and myalgias.   Skin: Negative for color change.   Allergic/Immunologic: Negative for environmental allergies.   Neurological: Positive for dizziness and headaches.   Hematological: Negative for adenopathy.   Psychiatric/Behavioral: Negative for sleep disturbance and suicidal ideas. The patient is not nervous/anxious.        Objective     /84 (BP Location: Right arm, Patient Position: Sitting, Cuff Size: Adult)   Pulse 97   Temp 97.6 °F (36.4 °C) (Oral)   Ht 193 cm (75.98\")   Wt (!) 148 kg (326 lb 9.6 oz)   SpO2 98%   BMI 39.77 kg/m²     Physical Exam   Constitutional: He is oriented to person, place, and time. Vital signs are normal. He appears well-developed and well-nourished. He appears ill. No distress.   HENT:   Head: Normocephalic and atraumatic.   Right " Ear: Hearing, tympanic membrane, external ear and ear canal normal.   Left Ear: Hearing, tympanic membrane, external ear and ear canal normal.   Nose: Nose normal. Right sinus exhibits no maxillary sinus tenderness and no frontal sinus tenderness. Left sinus exhibits no maxillary sinus tenderness and no frontal sinus tenderness.   Mouth/Throat: Uvula is midline, oropharynx is clear and moist and mucous membranes are normal.   Eyes: Conjunctivae, EOM and lids are normal. Pupils are equal, round, and reactive to light.   Neck: Trachea normal. No tracheal tenderness present. No tracheal deviation present.   Cardiovascular: Normal rate, regular rhythm, S1 normal, S2 normal, normal heart sounds and intact distal pulses. Exam reveals no gallop and no friction rub.   No murmur heard.  Pulmonary/Chest: Effort normal. No respiratory distress. He has wheezes in the right middle field. He has rales in the right lower field and the left lower field.   Abdominal: Soft. Bowel sounds are normal. He exhibits no distension. There is no tenderness.   Neurological: He is alert and oriented to person, place, and time.   Skin: Skin is warm and dry. He is not diaphoretic.   Psychiatric: He has a normal mood and affect. His behavior is normal. Judgment and thought content normal.   Vitals reviewed.      Assessment/Plan     Problem List Items Addressed This Visit     None      Visit Diagnoses     Acute bronchitis, unspecified organism    -  Primary    Relevant Medications    cefTRIAXone (ROCEPHIN) injection 1 g (Completed)    methylPREDNISolone acetate (DEPO-medrol) injection 80 mg (Completed)    doxycycline (MONODOX) 100 MG capsule    fexofenadine (ALLEGRA) 180 MG tablet    montelukast (SINGULAIR) 10 MG tablet    Acute frontal sinusitis, recurrence not specified        Relevant Medications    cefTRIAXone (ROCEPHIN) injection 1 g (Completed)    methylPREDNISolone acetate (DEPO-medrol) injection 80 mg (Completed)    doxycycline (MONODOX)  100 MG capsule    fexofenadine (ALLEGRA) 180 MG tablet          Plan: Rocephin and depo-medrol ordered for bronchitis. Doxycycline ordered for bronchitis. Fexofenadine, and montelukast ordered to prevent future sinus issues. Rest, stay warm. Follow up as needed.    Patient's Body mass index is 39.77 kg/m². BMI is above normal parameters. Recommendations include: educational material.   (Normal BMI:  18.5-24.9, OW 25-29.9, Obesity 30 or greater)      Understands disease processes and need for medications.  Understands reasons for urgent and emergent care.  Patient (& family) verbalized agreement for treatment plan.   Emotional support and active listening provided.  Patient provided time to verbalize feelings.               This document has been electronically signed by LIONEL Grider   November 14, 2019 12:52 PM

## 2019-12-04 ENCOUNTER — OFFICE VISIT (OUTPATIENT)
Dept: CARDIOLOGY | Facility: CLINIC | Age: 49
End: 2019-12-04

## 2019-12-04 VITALS
HEART RATE: 82 BPM | SYSTOLIC BLOOD PRESSURE: 133 MMHG | DIASTOLIC BLOOD PRESSURE: 90 MMHG | HEIGHT: 76 IN | BODY MASS INDEX: 38.36 KG/M2 | WEIGHT: 315 LBS

## 2019-12-04 DIAGNOSIS — I45.6 WPW (WOLFF-PARKINSON-WHITE SYNDROME): ICD-10-CM

## 2019-12-04 DIAGNOSIS — I10 ESSENTIAL HYPERTENSION: ICD-10-CM

## 2019-12-04 DIAGNOSIS — I47.1 SVT (SUPRAVENTRICULAR TACHYCARDIA) (HCC): Primary | ICD-10-CM

## 2019-12-04 PROCEDURE — 99213 OFFICE O/P EST LOW 20 MIN: CPT | Performed by: PHYSICIAN ASSISTANT

## 2019-12-04 NOTE — PROGRESS NOTES
"Miles Garvin MD  Jimmy Dawson Jr.  1970 12/04/2019    Patient Active Problem List   Diagnosis   • Gastroesophageal reflux disease without esophagitis   • Depression   • Essential hypertension   • Palpitations   • SVT (supraventricular tachycardia), s/p RF ablation, in 2000 x2   • WPW (Brayan-Parkinson-White syndrome), s/p RF ablation 2000.   • Chronic rhinitis   • TAMIKO (obstructive sleep apnea)   • Typical atrial flutter (CMS/HCC)       Dear Miles Garvin MD:    Subjective     History of Present Illness:    Chief Complaint   Patient presents with   • WPW     6 MON FOLLOW UP       Jimmy Dawson Jr. is a pleasant 48 y.o. male with a past medical history significant for WPW syndrome and paroxysmal supraventricular tachycardia for which he underwent ablation in 2010 and recently did again on 6/4/2018 with a successful ablation on right atrial isthmus, tricuspid valve/inferior vena cava isthmus. He comes in for cardiology follow up.     Mr. Dawson reports that he has been doing very well.  He last saw Dr. Carlin who discontinued his Eliquis since he has had no further episodes of atrial flutter after ablation has been performed.  Patient reports that his symptoms that he was having at last visit that he describes as \"fidgeting\" has completely resolved since stopping HCTZ and replacing it with amlodipine.  He does deny any chest pains, shortness of breath, palpitations, dizziness, or syncope.    Allergies   Allergen Reactions   • Aleve [Naproxen] Hives   :      Current Outpatient Medications:   •  amLODIPine (NORVASC) 5 MG tablet, Take 1 tablet by mouth Daily., Disp: 30 tablet, Rfl: 11  •  diclofenac (CATAFLAM) 50 MG tablet, Take 1 tablet by mouth 3 (Three) Times a Day. (Patient taking differently: Take 50 mg by mouth 3 (Three) Times a Day As Needed.), Disp: 90 tablet, Rfl: 1  •  fexofenadine (ALLEGRA) 180 MG tablet, Take 1 tablet by mouth Daily., Disp: 30 tablet, Rfl: 5  •  fluticasone " "(FLONASE) 50 MCG/ACT nasal spray, 2 sprays into the nostril(s) as directed by provider Daily. (Patient taking differently: 2 sprays into the nostril(s) as directed by provider Every Morning.), Disp: 1 bottle, Rfl: 3  •  losartan (COZAAR) 50 MG tablet, Take 1 tablet by mouth Daily. (Patient taking differently: Take 50 mg by mouth Every Morning.), Disp: 30 tablet, Rfl: 6  •  montelukast (SINGULAIR) 10 MG tablet, Take 1 tablet by mouth Every Night., Disp: 30 tablet, Rfl: 2  •  omeprazole (priLOSEC) 20 MG capsule, Take 1 capsule by mouth 2 (Two) Times a Day., Disp: , Rfl: 6  •  omeprazole (priLOSEC) 20 MG capsule, TAKE TWO CAPSULES BY MOUTH EVERY DAY FOR THE STOMACH, Disp: 60 capsule, Rfl: 6  •  sertraline (ZOLOFT) 100 MG tablet, Take 1.5 tablets by mouth Daily. (Patient taking differently: Take 150 mg by mouth Every Morning.), Disp: 45 tablet, Rfl: 6    The following portions of the patient's history were reviewed and updated as appropriate: allergies, current medications, past family history, past medical history, past social history, past surgical history and problem list.    Social History     Tobacco Use   • Smoking status: Never Smoker   • Smokeless tobacco: Never Used   Substance Use Topics   • Alcohol use: Yes     Comment: 4-5 BEERS, TWICE A WEEK   • Drug use: No       Review of Systems   Constitution: Negative for weakness and malaise/fatigue.   Cardiovascular: Negative for chest pain, dyspnea on exertion and irregular heartbeat.   Respiratory: Negative for cough and shortness of breath.    Hematologic/Lymphatic: Negative for bleeding problem. Does not bruise/bleed easily.   Gastrointestinal: Negative for nausea and vomiting.       Objective   Vitals:    12/04/19 1031   BP: 133/90   BP Location: Right arm   Patient Position: Sitting   Cuff Size: Adult   Pulse: 82   Weight: (!) 149 kg (329 lb 3.2 oz)   Height: 193 cm (75.98\")     Body mass index is 40.09 kg/m².    Physical Exam   Constitutional: He is oriented " to person, place, and time. He appears well-developed and well-nourished. No distress.   HENT:   Head: Normocephalic and atraumatic.   Cardiovascular: Normal rate, regular rhythm and normal heart sounds.   Pulmonary/Chest: Effort normal and breath sounds normal. No respiratory distress.   Musculoskeletal: He exhibits no edema.   Neurological: He is alert and oriented to person, place, and time.   Skin: He is not diaphoretic.       Lab Results   Component Value Date     09/23/2019    K 4.8 09/23/2019     09/23/2019    CO2 25.3 09/23/2019    BUN 16 09/23/2019    CREATININE 1.00 09/23/2019    GLUCOSE 90 09/23/2019    CALCIUM 9.9 09/23/2019    AST 25 02/02/2018    ALT 48 (H) 02/02/2018    ALKPHOS 92 02/02/2018    LABIL2 1.3 (L) 01/25/2016     No results found for: CKTOTAL  Lab Results   Component Value Date    WBC 7.76 06/03/2019    HGB 14.3 06/03/2019    HCT 42.0 06/03/2019     06/03/2019     Lab Results   Component Value Date    INR 1.08 06/03/2019     No results found for: MG  Lab Results   Component Value Date    TSH 2.718 02/02/2018    TRIG 219 (H) 02/02/2018    HDL 34 (L) 02/02/2018     (H) 02/02/2018      No results found for: BNP    During this visit the following were done:  Labs Reviewed [x]    Labs Ordered []    Radiology Reports Reviewed [x]    Radiology Ordered []    PCP Records Reviewed []    Referring Provider Records Reviewed []    ER Records Reviewed []    Hospital Records Reviewed []    History Obtained From Family []    Radiology Images Reviewed []    Other Reviewed []    Records Requested []       Procedures    Assessment/Plan    Diagnosis Plan   1. SVT (supraventricular tachycardia), s/p RF ablation, in 2000 x2     2. WPW (Brayan-Parkinson-White syndrome), s/p RF ablation 2000.     3. Essential hypertension              Recommendations:  1. Clinically patient is doing well from cardiac standpoint I will continue losartan and amlodipine.  2. I did have a very long discussion  with the patient regarding diet and exercise.  He was very inquisitive about going on the keto diet I did discuss the benefits of this as well as the risks I did agree that cutting back on carbohydrates would be very beneficial option for him however increasing subsequently and red and unclean meat such as pork and state would be detrimental and increase his cholesterol.  I encouraged him to still keep a very high content of green leafy vegetables in his diet while decreasing carbohydrates and that if he increases his meat intake to try to keep the mean clean such as chicken, fish, or turkey.  I also recommended him to pursue daily aerobic low impact exercises such as walking the.    Patient's Body mass index is 40.09 kg/m². BMI is above normal parameters. Recommendations include: exercise counseling and nutrition counseling.     Return in about 6 months (around 6/4/2020).    As always, I appreciate very much the opportunity to participate in the cardiovascular care of your patients.      With Best Regards,    Anthony Ruff PA-C

## 2019-12-10 ENCOUNTER — TELEPHONE (OUTPATIENT)
Dept: FAMILY MEDICINE CLINIC | Facility: CLINIC | Age: 49
End: 2019-12-10

## 2019-12-10 DIAGNOSIS — F32.4 MAJOR DEPRESSIVE DISORDER WITH SINGLE EPISODE, IN PARTIAL REMISSION (HCC): ICD-10-CM

## 2019-12-10 DIAGNOSIS — I10 ESSENTIAL HYPERTENSION: ICD-10-CM

## 2019-12-10 RX ORDER — SERTRALINE HYDROCHLORIDE 100 MG/1
150 TABLET, FILM COATED ORAL EVERY MORNING
Qty: 45 TABLET | Refills: 6 | Status: SHIPPED | OUTPATIENT
Start: 2019-12-10 | End: 2020-10-06

## 2019-12-10 RX ORDER — LOSARTAN POTASSIUM 50 MG/1
50 TABLET ORAL EVERY MORNING
Qty: 30 TABLET | Refills: 6 | Status: SHIPPED | OUTPATIENT
Start: 2019-12-10 | End: 2020-11-05 | Stop reason: SDUPTHER

## 2019-12-10 NOTE — TELEPHONE ENCOUNTER
FAX REQUEST FROM [facility]    LOSARTAN  3-4-19 # 30 REFILL X 6    SERTRALINE  3-4-19 #45 X 6    LAST APPOINTMENT: 11-13-19    NEXT APPOINTMENT: 3-23-20

## 2019-12-19 ENCOUNTER — TELEPHONE (OUTPATIENT)
Dept: FAMILY MEDICINE CLINIC | Facility: CLINIC | Age: 49
End: 2019-12-19

## 2019-12-19 NOTE — TELEPHONE ENCOUNTER
TERRENCE SAW MINOR A FEW WEEKS AGO. STILL HAS COUGH AND WHEEZING A LOT. HAS TAKEN ALL THE MEDS SHE GAVE HIM. CAN YOU SEND SOMETHING IN FOR HIM?  [facility]  [phone]    WIFE CALLED BACK WANTED TO MAKE SURE IF YOU DO INHALER TO MAKE SURE ITS SOMETHING THAT WONT EFFECT HIS HEART.

## 2019-12-23 ENCOUNTER — TELEPHONE (OUTPATIENT)
Dept: FAMILY MEDICINE CLINIC | Facility: CLINIC | Age: 49
End: 2019-12-23

## 2019-12-23 ENCOUNTER — OFFICE VISIT (OUTPATIENT)
Dept: FAMILY MEDICINE CLINIC | Facility: CLINIC | Age: 49
End: 2019-12-23

## 2019-12-23 ENCOUNTER — HOSPITAL ENCOUNTER (OUTPATIENT)
Dept: GENERAL RADIOLOGY | Facility: HOSPITAL | Age: 49
Discharge: HOME | End: 2019-12-23
Admitting: NURSE PRACTITIONER

## 2019-12-23 VITALS
WEIGHT: 315 LBS | BODY MASS INDEX: 38.36 KG/M2 | OXYGEN SATURATION: 97 % | HEIGHT: 76 IN | DIASTOLIC BLOOD PRESSURE: 84 MMHG | HEART RATE: 92 BPM | TEMPERATURE: 98.4 F | SYSTOLIC BLOOD PRESSURE: 140 MMHG

## 2019-12-23 DIAGNOSIS — R52 GENERALIZED BODY ACHES: ICD-10-CM

## 2019-12-23 DIAGNOSIS — R50.9 FEVER, UNSPECIFIED FEVER CAUSE: ICD-10-CM

## 2019-12-23 DIAGNOSIS — J10.1 INFLUENZA A: ICD-10-CM

## 2019-12-23 DIAGNOSIS — J20.9 ACUTE BRONCHITIS, UNSPECIFIED ORGANISM: ICD-10-CM

## 2019-12-23 DIAGNOSIS — R50.9 FEVER, UNSPECIFIED FEVER CAUSE: Primary | ICD-10-CM

## 2019-12-23 DIAGNOSIS — J20.9 ACUTE BRONCHITIS, UNSPECIFIED ORGANISM: Primary | ICD-10-CM

## 2019-12-23 LAB
EXPIRATION DATE: ABNORMAL
FLUAV AG NPH QL: POSITIVE
FLUBV AG NPH QL: NEGATIVE
INTERNAL CONTROL: ABNORMAL
Lab: ABNORMAL

## 2019-12-23 PROCEDURE — 71046 X-RAY EXAM CHEST 2 VIEWS: CPT | Performed by: RADIOLOGY

## 2019-12-23 PROCEDURE — 71046 X-RAY EXAM CHEST 2 VIEWS: CPT

## 2019-12-23 PROCEDURE — 87804 INFLUENZA ASSAY W/OPTIC: CPT | Performed by: NURSE PRACTITIONER

## 2019-12-23 PROCEDURE — 99213 OFFICE O/P EST LOW 20 MIN: CPT | Performed by: NURSE PRACTITIONER

## 2019-12-23 RX ORDER — OSELTAMIVIR PHOSPHATE 75 MG/1
75 CAPSULE ORAL 2 TIMES DAILY
Qty: 10 CAPSULE | Refills: 0 | Status: SHIPPED | OUTPATIENT
Start: 2019-12-23 | End: 2020-01-06

## 2019-12-23 NOTE — PROGRESS NOTES
"Subjective   Jimmy Dawson Jr. is a 49 y.o. male.     Chief Complaint   Patient presents with   • Cough   • Generalized Body Aches   • Fever       He presents with c/o ache and fever since Thursday. He states his  had the flu. He states he is coughing up yellow sputum all day long. He states his fever was 99.5. He has taken tylenol and ibuprofen. He has been taking robitussin dm with honey as well. He states he is taking allegra and singulair.       The following portions of the patient's history were reviewed and updated as appropriate: allergies, current medications, past family history, past medical history, past social history, past surgical history and problem list.    Review of Systems   Constitutional: Negative for fever and unexpected weight change.   HENT: Positive for ear pain, postnasal drip, rhinorrhea, sinus pressure, sinus pain, sore throat and trouble swallowing.    Eyes: Negative for visual disturbance.   Respiratory: Positive for cough and wheezing. Negative for shortness of breath.    Cardiovascular: Negative for chest pain and palpitations.   Gastrointestinal: Positive for diarrhea and nausea. Negative for abdominal pain, blood in stool, constipation and vomiting.   Genitourinary: Negative for dysuria and hematuria.   Musculoskeletal: Positive for arthralgias and myalgias.   Skin: Negative for color change.   Allergic/Immunologic: Negative for environmental allergies.   Neurological: Positive for dizziness and headaches.   Hematological: Negative for adenopathy.   Psychiatric/Behavioral: Negative for sleep disturbance and suicidal ideas. The patient is not nervous/anxious.        Objective     /84 (BP Location: Right arm, Patient Position: Sitting, Cuff Size: Adult)   Pulse 92   Temp 98.4 °F (36.9 °C) (Oral)   Ht 193 cm (75.98\")   Wt (!) 149 kg (328 lb 3.2 oz)   SpO2 97%   BMI 39.97 kg/m²     Physical Exam   Constitutional: He is oriented to person, place, and time. He " appears well-developed and well-nourished. He appears ill. No distress.   HENT:   Head: Normocephalic and atraumatic.   Right Ear: Hearing, tympanic membrane, external ear and ear canal normal.   Left Ear: Hearing, tympanic membrane, external ear and ear canal normal.   Nose: Nose normal. Right sinus exhibits no maxillary sinus tenderness and no frontal sinus tenderness. Left sinus exhibits no maxillary sinus tenderness and no frontal sinus tenderness.   Mouth/Throat: Uvula is midline, oropharynx is clear and moist and mucous membranes are normal.   Eyes: Pupils are equal, round, and reactive to light. Conjunctivae, EOM and lids are normal.   Neck: Trachea normal. No tracheal tenderness present. No tracheal deviation present.   Cardiovascular: Normal rate, regular rhythm, S1 normal, S2 normal, normal heart sounds and intact distal pulses. Exam reveals no gallop and no friction rub.   No murmur heard.  Pulmonary/Chest: Effort normal and breath sounds normal. No respiratory distress.   Abdominal: Soft. Bowel sounds are normal. He exhibits no distension. There is no tenderness.   Neurological: He is alert and oriented to person, place, and time.   Skin: Skin is warm and dry. He is not diaphoretic.   Psychiatric: He has a normal mood and affect. His behavior is normal. Judgment and thought content normal.   Vitals reviewed.      Assessment/Plan     Problem List Items Addressed This Visit     None      Visit Diagnoses     Fever, unspecified fever cause    -  Primary    Relevant Orders    POCT Influenza A/B    Generalized body aches        Relevant Orders    POCT Influenza A/B          Plan: Flu screen positive type A. Rest, drink lots of fluids. You are very contagious, please stay home as much as possible. Tamiflu ordered. Chest xray ordered to further evaluate abnormal lung sounds. Follow up as needed.     Patient's Body mass index is 39.97 kg/m². BMI is above normal parameters. Recommendations include: educational  material.   (Normal BMI:  18.5-24.9, OW 25-29.9, Obesity 30 or greater)        Understands disease processes and need for medications.  Understands reasons for urgent and emergent care.  Patient (& family) verbalized agreement for treatment plan.   Emotional support and active listening provided.  Patient provided time to verbalize feelings.               This document has been electronically signed by LIONEL Grider   December 23, 2019 9:10 AM

## 2019-12-23 NOTE — PATIENT INSTRUCTIONS

## 2019-12-23 NOTE — TELEPHONE ENCOUNTER
WIFE CALLED LEFT MESSAGE ON [employer] , SHE STATED THE INHALER CALLED IN THIS MORNING THERE IS NOT COUPONS FOR PHARMACY SUGGESTED THE SAME MEDICINE IN A NEBULIZER.

## 2020-01-06 ENCOUNTER — OFFICE VISIT (OUTPATIENT)
Dept: FAMILY MEDICINE CLINIC | Facility: CLINIC | Age: 50
End: 2020-01-06

## 2020-01-06 VITALS
WEIGHT: 315 LBS | HEIGHT: 75 IN | TEMPERATURE: 97.9 F | HEART RATE: 95 BPM | SYSTOLIC BLOOD PRESSURE: 140 MMHG | BODY MASS INDEX: 39.17 KG/M2 | OXYGEN SATURATION: 97 % | DIASTOLIC BLOOD PRESSURE: 82 MMHG

## 2020-01-06 DIAGNOSIS — J31.0 CHRONIC RHINITIS: ICD-10-CM

## 2020-01-06 DIAGNOSIS — L73.9 CHRONIC FOLLICULITIS: ICD-10-CM

## 2020-01-06 DIAGNOSIS — R53.83 FATIGUE, UNSPECIFIED TYPE: Primary | ICD-10-CM

## 2020-01-06 PROCEDURE — 99214 OFFICE O/P EST MOD 30 MIN: CPT | Performed by: FAMILY MEDICINE

## 2020-01-06 PROCEDURE — 36415 COLL VENOUS BLD VENIPUNCTURE: CPT | Performed by: FAMILY MEDICINE

## 2020-01-06 PROCEDURE — 85025 COMPLETE CBC W/AUTO DIFF WBC: CPT | Performed by: FAMILY MEDICINE

## 2020-01-06 PROCEDURE — 80048 BASIC METABOLIC PNL TOTAL CA: CPT | Performed by: FAMILY MEDICINE

## 2020-01-06 PROCEDURE — 84443 ASSAY THYROID STIM HORMONE: CPT | Performed by: FAMILY MEDICINE

## 2020-01-06 NOTE — PROGRESS NOTES
Subjective   Jimmyfranklin Dawson Jr. is a 49 y.o. male.     History of Present Illness chronic rash back of neck minimal.  Comes and goes improves with courses of antibiotics.  Has had somewhat persistent cough since the recent flu.  The Atrovent nebulized helps.  Energy waxes wanes.  Denies respiratory otherwise GI .  Dietary compliance labile.  Continues to work aggressively.  The TAMIKO treatment has been immensely helpful.    The following portions of the patient's history were reviewed and updated as appropriate: allergies, current medications, past medical history, past social history, past surgical history and problem list.    Review of Systems  See history of Present Illness     Objective     Physical Exam   Constitutional: He is oriented to person, place, and time. He appears well-developed and well-nourished.   HENT:   Head: Normocephalic.   Right Ear: External ear normal.   Left Ear: External ear normal.   Mouth/Throat: Oropharynx is clear and moist.   Eyes: Pupils are equal, round, and reactive to light. Conjunctivae and EOM are normal.   Neck: Normal range of motion. Neck supple. No tracheal deviation present. No thyromegaly present.   Cardiovascular: Normal rate, regular rhythm and normal heart sounds.   No murmur heard.  Pulmonary/Chest: Effort normal and breath sounds normal. No stridor.   Some coughing.   Musculoskeletal: He exhibits no edema.   Neurological: He is alert and oriented to person, place, and time.   Skin: Skin is warm and dry.   Minimal follicular eruption nape of neck.   Psychiatric: He has a normal mood and affect.   Vitals reviewed.      PHQ-9 Total Score:      Patient's Body mass index is 40.85 kg/m². BMI is above normal parameters. Recommendations include: exercise counseling and nutrition counseling.   (Normal BMI:  18.5-24.9, OW 25-29.9, Obesity 30 or greater)      Assessment/Plan     Jimmy was seen today for cough and rash.    Diagnoses and all orders for this visit:    Fatigue,  unspecified type  -     TSH  -     Basic Metabolic Panel  -     CBC & Differential  -     CBC Auto Differential    Chronic rhinitis    Chronic folliculitis    Counseled.  Check labs.  Weight loss will be your friend.  Discussed strategies.  The cough I believe is expected in regards to what you have recently experienced.  Will decrease the Zoloft to 100 mg daily.  May be contributor to fatigue longstanding using.  Will notify of results.  Symptom report.                   This document has been electronically signed by Miles Garvin MD   January 6, 2020 3:36 PM

## 2020-01-07 LAB
ANION GAP SERPL CALCULATED.3IONS-SCNC: 14.8 MMOL/L (ref 5–15)
BASOPHILS # BLD AUTO: 0.09 10*3/MM3 (ref 0–0.2)
BASOPHILS NFR BLD AUTO: 0.9 % (ref 0–1.5)
BUN BLD-MCNC: 16 MG/DL (ref 6–20)
BUN/CREAT SERPL: 19.3 (ref 7–25)
CALCIUM SPEC-SCNC: 9.9 MG/DL (ref 8.6–10.5)
CHLORIDE SERPL-SCNC: 104 MMOL/L (ref 98–107)
CO2 SERPL-SCNC: 22.2 MMOL/L (ref 22–29)
CREAT BLD-MCNC: 0.83 MG/DL (ref 0.76–1.27)
DEPRECATED RDW RBC AUTO: 40.7 FL (ref 37–54)
EOSINOPHIL # BLD AUTO: 0.11 10*3/MM3 (ref 0–0.4)
EOSINOPHIL NFR BLD AUTO: 1.1 % (ref 0.3–6.2)
ERYTHROCYTE [DISTWIDTH] IN BLOOD BY AUTOMATED COUNT: 13.4 % (ref 12.3–15.4)
GFR SERPL CREATININE-BSD FRML MDRD: 98 ML/MIN/1.73
GLUCOSE BLD-MCNC: 92 MG/DL (ref 65–99)
HCT VFR BLD AUTO: 40.7 % (ref 37.5–51)
HGB BLD-MCNC: 14.2 G/DL (ref 13–17.7)
IMM GRANULOCYTES # BLD AUTO: 0.04 10*3/MM3 (ref 0–0.05)
IMM GRANULOCYTES NFR BLD AUTO: 0.4 % (ref 0–0.5)
LYMPHOCYTES # BLD AUTO: 3.28 10*3/MM3 (ref 0.7–3.1)
LYMPHOCYTES NFR BLD AUTO: 32.5 % (ref 19.6–45.3)
MCH RBC QN AUTO: 29.3 PG (ref 26.6–33)
MCHC RBC AUTO-ENTMCNC: 34.9 G/DL (ref 31.5–35.7)
MCV RBC AUTO: 84.1 FL (ref 79–97)
MONOCYTES # BLD AUTO: 0.8 10*3/MM3 (ref 0.1–0.9)
MONOCYTES NFR BLD AUTO: 7.9 % (ref 5–12)
NEUTROPHILS # BLD AUTO: 5.77 10*3/MM3 (ref 1.7–7)
NEUTROPHILS NFR BLD AUTO: 57.2 % (ref 42.7–76)
NRBC BLD AUTO-RTO: 0 /100 WBC (ref 0–0.2)
PLATELET # BLD AUTO: 341 10*3/MM3 (ref 140–450)
PMV BLD AUTO: 10.1 FL (ref 6–12)
POTASSIUM BLD-SCNC: 4.2 MMOL/L (ref 3.5–5.2)
RBC # BLD AUTO: 4.84 10*6/MM3 (ref 4.14–5.8)
SODIUM BLD-SCNC: 141 MMOL/L (ref 136–145)
TSH SERPL DL<=0.05 MIU/L-ACNC: 4.25 UIU/ML (ref 0.27–4.2)
WBC NRBC COR # BLD: 10.09 10*3/MM3 (ref 3.4–10.8)

## 2020-01-07 NOTE — PROGRESS NOTES
Lab testing unremarkable.  No changes needed.  Continue to work aggressively on diet activity.  Continue medications as we discussed.

## 2020-03-10 DIAGNOSIS — J20.9 ACUTE BRONCHITIS, UNSPECIFIED ORGANISM: ICD-10-CM

## 2020-03-10 RX ORDER — MONTELUKAST SODIUM 10 MG/1
TABLET ORAL
Qty: 30 TABLET | Refills: 0 | Status: SHIPPED | OUTPATIENT
Start: 2020-03-10 | End: 2020-06-08

## 2020-04-28 RX ORDER — DICLOFENAC POTASSIUM 50 MG/1
TABLET, FILM COATED ORAL
Qty: 90 TABLET | Refills: 1 | Status: SHIPPED | OUTPATIENT
Start: 2020-04-28 | End: 2022-02-03

## 2020-06-04 RX ORDER — OMEPRAZOLE 40 MG/1
CAPSULE, DELAYED RELEASE ORAL
COMMUNITY
Start: 2020-05-08 | End: 2020-11-05 | Stop reason: SDUPTHER

## 2020-06-04 NOTE — PROGRESS NOTES
Subjective   Jimmy Dawson Jr. is a 49 y.o. male.     History of Present Illness follow-up regarding hypertension the mild anxiety.  Very busy at work.  Stressful times at home with extended family.  Stressful times at work.  Sleep patterns are a little disrupted.  Having some mild increase in GI symptoms.  Denies chest pain palpitations shortness of breath  symptoms.  Very active.  Dietary compliance is very labile.  Admittedly eats to excess and evenings.  Utilizing medications as reconciled.  Went back on the Zoloft at 150 mg daily.  The following portions of the patient's history were reviewed and updated as appropriate: allergies, past family history, past medical history, past social history, past surgical history and problem list.    Review of Systems  See history of Present Illness     Objective     Physical Exam   Constitutional: He is oriented to person, place, and time. He appears well-developed and well-nourished.   HENT:   Head: Normocephalic.   Neck: Normal range of motion. Neck supple. No tracheal deviation present. No thyromegaly present.   Cardiovascular: Normal rate, regular rhythm and normal heart sounds.   No murmur heard.  Pulmonary/Chest: Effort normal and breath sounds normal.   Musculoskeletal: He exhibits no edema.   Neurological: He is alert and oriented to person, place, and time.   Skin: Skin is warm and dry.   Psychiatric: He has a normal mood and affect.   Vitals reviewed.      PHQ-9 Total Score:      Patient's Body mass index is 40.87 kg/m². BMI is above normal parameters. Recommendations include: exercise counseling and nutrition counseling.   (Normal BMI:  18.5-24.9, OW 25-29.9, Obesity 30 or greater)      Assessment/Plan     Jimmy was seen today for essential hypertension.    Diagnoses and all orders for this visit:    Essential hypertension    Anxiety  -     busPIRone (BUSPAR) 7.5 MG tablet; Take 1 tablet by mouth 2 (Two) Times a Day.    Will discontinue the Singulair.   Could actually be culprit in worsening the anxiety.  We will continue other medications as reconciled.  Will initiate BuSpar 7.5 twice daily to see if help with the anxiety.  Work diligently on dietary choices maintaining activity.  We will ask you to recheck in about 4 months or as needed.  If new medicine causes problems stop it and call us.                     This document has been electronically signed by Miles Garvin MD   June 8, 2020 16:13

## 2020-06-08 ENCOUNTER — OFFICE VISIT (OUTPATIENT)
Dept: FAMILY MEDICINE CLINIC | Facility: CLINIC | Age: 50
End: 2020-06-08

## 2020-06-08 VITALS
BODY MASS INDEX: 39.17 KG/M2 | HEIGHT: 75 IN | DIASTOLIC BLOOD PRESSURE: 85 MMHG | OXYGEN SATURATION: 98 % | WEIGHT: 315 LBS | SYSTOLIC BLOOD PRESSURE: 135 MMHG | TEMPERATURE: 98 F | HEART RATE: 96 BPM

## 2020-06-08 DIAGNOSIS — I10 ESSENTIAL HYPERTENSION: Primary | ICD-10-CM

## 2020-06-08 DIAGNOSIS — F41.9 ANXIETY: ICD-10-CM

## 2020-06-08 PROCEDURE — 99213 OFFICE O/P EST LOW 20 MIN: CPT | Performed by: FAMILY MEDICINE

## 2020-06-08 RX ORDER — BUSPIRONE HYDROCHLORIDE 7.5 MG/1
7.5 TABLET ORAL 2 TIMES DAILY
Qty: 60 TABLET | Refills: 1 | Status: SHIPPED | OUTPATIENT
Start: 2020-06-08 | End: 2020-11-05 | Stop reason: SDUPTHER

## 2020-07-03 DIAGNOSIS — J20.9 ACUTE BRONCHITIS, UNSPECIFIED ORGANISM: ICD-10-CM

## 2020-07-03 DIAGNOSIS — J01.10 ACUTE FRONTAL SINUSITIS, RECURRENCE NOT SPECIFIED: ICD-10-CM

## 2020-07-03 RX ORDER — FEXOFENADINE HCL 180 MG/1
TABLET ORAL
Qty: 30 TABLET | Refills: 0 | Status: SHIPPED | OUTPATIENT
Start: 2020-07-03 | End: 2020-11-05 | Stop reason: SDUPTHER

## 2020-07-06 RX ORDER — AMLODIPINE BESYLATE 5 MG/1
TABLET ORAL
Qty: 30 TABLET | Refills: 11 | Status: SHIPPED | OUTPATIENT
Start: 2020-07-06 | End: 2021-12-27 | Stop reason: SDUPTHER

## 2020-07-06 RX ORDER — APIXABAN 5 MG/1
TABLET, FILM COATED ORAL
Qty: 60 TABLET | Refills: 0 | OUTPATIENT
Start: 2020-07-06

## 2020-07-06 RX ORDER — MONTELUKAST SODIUM 10 MG/1
TABLET ORAL
Qty: 30 TABLET | Refills: 0 | OUTPATIENT
Start: 2020-07-06

## 2020-10-06 ENCOUNTER — OFFICE VISIT (OUTPATIENT)
Dept: FAMILY MEDICINE CLINIC | Facility: CLINIC | Age: 50
End: 2020-10-06

## 2020-10-06 VITALS
HEIGHT: 76 IN | HEART RATE: 94 BPM | OXYGEN SATURATION: 97 % | RESPIRATION RATE: 16 BRPM | DIASTOLIC BLOOD PRESSURE: 80 MMHG | WEIGHT: 315 LBS | BODY MASS INDEX: 38.36 KG/M2 | TEMPERATURE: 98.2 F | SYSTOLIC BLOOD PRESSURE: 140 MMHG

## 2020-10-06 DIAGNOSIS — F32.4 MAJOR DEPRESSIVE DISORDER WITH SINGLE EPISODE, IN PARTIAL REMISSION (HCC): ICD-10-CM

## 2020-10-06 DIAGNOSIS — I10 ESSENTIAL HYPERTENSION: Primary | ICD-10-CM

## 2020-10-06 DIAGNOSIS — F41.9 ANXIETY: ICD-10-CM

## 2020-10-06 PROCEDURE — 99213 OFFICE O/P EST LOW 20 MIN: CPT | Performed by: FAMILY MEDICINE

## 2020-10-06 RX ORDER — VENLAFAXINE HYDROCHLORIDE 75 MG/1
75 CAPSULE, EXTENDED RELEASE ORAL DAILY
Qty: 30 CAPSULE | Refills: 3 | Status: SHIPPED | OUTPATIENT
Start: 2020-10-06 | End: 2021-02-09 | Stop reason: SDUPTHER

## 2020-10-06 NOTE — PROGRESS NOTES
Subjective   Jimmyfranklin Dawson Jr. is a 49 y.o. male.     History of Present Illness follow-up hypertension anxiety.  Anxiety is really not significantly better.  Lots of stressors at work.  Normal stressors at home.  Denies palpitations shortness of breath.  Sleep patterns are reasonable energy is good.  Diet is poor.  Does agree that needs to lose weight and do better.  Medicines are as reconciled.    The following portions of the patient's history were reviewed and updated as appropriate: allergies, current medications, past family history, past social history, past surgical history and problem list.    Review of Systems  See history of Present Illness     Objective     Physical Exam  Vitals signs reviewed.   Constitutional:       Appearance: Normal appearance. He is well-developed. He is obese.   HENT:      Head: Normocephalic.   Neck:      Musculoskeletal: Normal range of motion and neck supple.      Thyroid: No thyromegaly.      Trachea: No tracheal deviation.   Cardiovascular:      Rate and Rhythm: Normal rate and regular rhythm.      Heart sounds: Normal heart sounds. No murmur.   Pulmonary:      Effort: Pulmonary effort is normal.      Breath sounds: Normal breath sounds.   Lymphadenopathy:      Cervical: No cervical adenopathy.   Skin:     General: Skin is warm and dry.   Neurological:      Mental Status: He is alert and oriented to person, place, and time.   Psychiatric:         Mood and Affect: Mood normal.         PHQ-9 Total Score:      Patient's Body mass index is 40.17 kg/m². BMI is above normal parameters. Recommendations include: exercise counseling and nutrition counseling.   (Normal BMI:  18.5-24.9, OW 25-29.9, Obesity 30 or greater)      Assessment/Plan     Jimmy was seen today for hypertension.    Diagnoses and all orders for this visit:    Essential hypertension    Major depressive disorder with single episode, in partial remission (CMS/[facility])  -     venlafaxine XR (Effexor XR) 75 MG 24 hr  capsule; Take 1 capsule by mouth Daily.    Anxiety  -     venlafaxine XR (Effexor XR) 75 MG 24 hr capsule; Take 1 capsule by mouth Daily.    Continue medications for blood pressure.  Continue BuSpar.  At this time will change from SSRI to SNRI.  Make the Zoloft 100 mg daily for 3 days then 50 mg daily for 3 days then stop.  When stopping Zoloft start venlafaxine 75 mg daily.  Counseled.  Contact us if problems intervene.  Recheck in about 4 months.  Maintain pandemic response.  We briefly discussed the potential for referral to bariatric surgical clinic.  You report having had flu vaccine elsewhere.                   This document has been electronically signed by Miles Garvin MD   October 6, 2020 15:44 EDT

## 2020-11-04 DIAGNOSIS — F41.9 ANXIETY: ICD-10-CM

## 2020-11-04 DIAGNOSIS — I10 ESSENTIAL HYPERTENSION: ICD-10-CM

## 2020-11-04 DIAGNOSIS — J20.9 ACUTE BRONCHITIS, UNSPECIFIED ORGANISM: ICD-10-CM

## 2020-11-04 DIAGNOSIS — J01.10 ACUTE FRONTAL SINUSITIS, RECURRENCE NOT SPECIFIED: ICD-10-CM

## 2020-11-04 DIAGNOSIS — J30.9 ALLERGIC RHINITIS, UNSPECIFIED SEASONALITY, UNSPECIFIED TRIGGER: ICD-10-CM

## 2020-11-04 RX ORDER — APIXABAN 5 MG/1
TABLET, FILM COATED ORAL
Qty: 60 TABLET | Refills: 0 | OUTPATIENT
Start: 2020-11-04

## 2020-11-05 RX ORDER — OMEPRAZOLE 40 MG/1
CAPSULE, DELAYED RELEASE ORAL
Qty: 30 CAPSULE | Refills: 6 | Status: SHIPPED | OUTPATIENT
Start: 2020-11-05 | End: 2021-06-30

## 2020-11-05 RX ORDER — FLUTICASONE PROPIONATE 50 MCG
SPRAY, SUSPENSION (ML) NASAL
Qty: 16 G | Refills: 0 | Status: SHIPPED | OUTPATIENT
Start: 2020-11-05 | End: 2020-12-12

## 2020-11-05 RX ORDER — MONTELUKAST SODIUM 10 MG/1
TABLET ORAL
Qty: 30 TABLET | Refills: 0 | Status: SHIPPED | OUTPATIENT
Start: 2020-11-05 | End: 2021-08-09

## 2020-11-05 RX ORDER — FEXOFENADINE HCL 180 MG/1
TABLET ORAL
Qty: 30 TABLET | Refills: 0 | Status: SHIPPED | OUTPATIENT
Start: 2020-11-05 | End: 2020-12-12

## 2020-11-05 RX ORDER — BUSPIRONE HYDROCHLORIDE 7.5 MG/1
TABLET ORAL
Qty: 60 TABLET | Refills: 0 | Status: SHIPPED | OUTPATIENT
Start: 2020-11-05 | End: 2020-12-12

## 2020-11-05 RX ORDER — LOSARTAN POTASSIUM 50 MG/1
TABLET ORAL
Qty: 30 TABLET | Refills: 0 | Status: SHIPPED | OUTPATIENT
Start: 2020-11-05 | End: 2020-12-12

## 2020-11-06 ENCOUNTER — APPOINTMENT (OUTPATIENT)
Dept: GENERAL RADIOLOGY | Facility: HOSPITAL | Age: 50
End: 2020-11-06

## 2020-11-06 ENCOUNTER — HOSPITAL ENCOUNTER (EMERGENCY)
Facility: HOSPITAL | Age: 50
Discharge: HOME | End: 2020-11-06
Attending: EMERGENCY MEDICINE | Admitting: EMERGENCY MEDICINE

## 2020-11-06 VITALS
DIASTOLIC BLOOD PRESSURE: 93 MMHG | HEIGHT: 76 IN | OXYGEN SATURATION: 98 % | WEIGHT: 315 LBS | BODY MASS INDEX: 38.36 KG/M2 | SYSTOLIC BLOOD PRESSURE: 138 MMHG | HEART RATE: 85 BPM | RESPIRATION RATE: 18 BRPM | TEMPERATURE: 98.2 F

## 2020-11-06 DIAGNOSIS — U07.1 COVID-19: Primary | ICD-10-CM

## 2020-11-06 LAB
6-ACETYL MORPHINE: NEGATIVE
A-A DO2: 20.6 MMHG (ref 0–300)
ALBUMIN SERPL-MCNC: 4.45 G/DL (ref 3.5–5.2)
ALBUMIN/GLOB SERPL: 1.3 G/DL
ALP SERPL-CCNC: 92 U/L (ref 39–117)
ALT SERPL W P-5'-P-CCNC: 35 U/L (ref 1–41)
AMPHET+METHAMPHET UR QL: NEGATIVE
ANION GAP SERPL CALCULATED.3IONS-SCNC: 11.4 MMOL/L (ref 5–15)
ARTERIAL PATENCY WRIST A: POSITIVE
AST SERPL-CCNC: 20 U/L (ref 1–40)
ATMOSPHERIC PRESS: 733 MMHG
B PARAPERT DNA SPEC QL NAA+PROBE: NOT DETECTED
B PERT DNA SPEC QL NAA+PROBE: NOT DETECTED
BARBITURATES UR QL SCN: NEGATIVE
BASE EXCESS BLDA CALC-SCNC: 0.3 MMOL/L (ref 0–2)
BASOPHILS # BLD AUTO: 0.04 10*3/MM3 (ref 0–0.2)
BASOPHILS NFR BLD AUTO: 0.5 % (ref 0–1.5)
BDY SITE: ABNORMAL
BENZODIAZ UR QL SCN: NEGATIVE
BILIRUB SERPL-MCNC: 0.3 MG/DL (ref 0–1.2)
BILIRUB UR QL STRIP: NEGATIVE
BODY TEMPERATURE: 0 C
BUN SERPL-MCNC: 13 MG/DL (ref 6–20)
BUN/CREAT SERPL: 15.3 (ref 7–25)
BUPRENORPHINE SERPL-MCNC: NEGATIVE NG/ML
C PNEUM DNA NPH QL NAA+NON-PROBE: NOT DETECTED
CALCIUM SPEC-SCNC: 9.5 MG/DL (ref 8.6–10.5)
CANNABINOIDS SERPL QL: NEGATIVE
CHLORIDE SERPL-SCNC: 103 MMOL/L (ref 98–107)
CK SERPL-CCNC: 147 U/L (ref 20–200)
CLARITY UR: CLEAR
CO2 BLDA-SCNC: 25.5 MMOL/L (ref 22–33)
CO2 SERPL-SCNC: 23.6 MMOL/L (ref 22–29)
COCAINE UR QL: NEGATIVE
COHGB MFR BLD: 0.6 % (ref 0–5)
COLOR UR: YELLOW
CREAT SERPL-MCNC: 0.85 MG/DL (ref 0.76–1.27)
CRP SERPL-MCNC: 0.43 MG/DL (ref 0–0.5)
D-LACTATE SERPL-SCNC: 1.4 MMOL/L (ref 0.5–2)
DEPRECATED RDW RBC AUTO: 41.5 FL (ref 37–54)
EOSINOPHIL # BLD AUTO: 0.06 10*3/MM3 (ref 0–0.4)
EOSINOPHIL NFR BLD AUTO: 0.8 % (ref 0.3–6.2)
ERYTHROCYTE [DISTWIDTH] IN BLOOD BY AUTOMATED COUNT: 13.2 % (ref 12.3–15.4)
ERYTHROCYTE [SEDIMENTATION RATE] IN BLOOD: 14 MM/HR (ref 0–20)
ETHANOL BLD-MCNC: <10 MG/DL (ref 0–10)
ETHANOL UR QL: <0.01 %
FLUAV H1 2009 PAND RNA NPH QL NAA+PROBE: NOT DETECTED
FLUAV H1 HA GENE NPH QL NAA+PROBE: NOT DETECTED
FLUAV H3 RNA NPH QL NAA+PROBE: NOT DETECTED
FLUAV SUBTYP SPEC NAA+PROBE: NOT DETECTED
FLUBV RNA ISLT QL NAA+PROBE: NOT DETECTED
GFR SERPL CREATININE-BSD FRML MDRD: 95 ML/MIN/1.73
GLOBULIN UR ELPH-MCNC: 3.6 GM/DL
GLUCOSE SERPL-MCNC: 141 MG/DL (ref 65–99)
GLUCOSE UR STRIP-MCNC: NEGATIVE MG/DL
HADV DNA SPEC NAA+PROBE: NOT DETECTED
HCO3 BLDA-SCNC: 24.4 MMOL/L (ref 20–26)
HCOV 229E RNA SPEC QL NAA+PROBE: NOT DETECTED
HCOV HKU1 RNA SPEC QL NAA+PROBE: NOT DETECTED
HCOV NL63 RNA SPEC QL NAA+PROBE: NOT DETECTED
HCOV OC43 RNA SPEC QL NAA+PROBE: NOT DETECTED
HCT VFR BLD AUTO: 42.5 % (ref 37.5–51)
HCT VFR BLD CALC: 45 % (ref 38–51)
HGB BLD-MCNC: 14 G/DL (ref 13–17.7)
HGB BLDA-MCNC: 14.7 G/DL (ref 14–18)
HGB UR QL STRIP.AUTO: NEGATIVE
HMPV RNA NPH QL NAA+NON-PROBE: NOT DETECTED
HPIV1 RNA SPEC QL NAA+PROBE: NOT DETECTED
HPIV2 RNA SPEC QL NAA+PROBE: NOT DETECTED
HPIV3 RNA NPH QL NAA+PROBE: NOT DETECTED
HPIV4 P GENE NPH QL NAA+PROBE: NOT DETECTED
IMM GRANULOCYTES # BLD AUTO: 0.02 10*3/MM3 (ref 0–0.05)
IMM GRANULOCYTES NFR BLD AUTO: 0.3 % (ref 0–0.5)
INHALED O2 CONCENTRATION: 21 %
KETONES UR QL STRIP: NEGATIVE
LEUKOCYTE ESTERASE UR QL STRIP.AUTO: NEGATIVE
LYMPHOCYTES # BLD AUTO: 1.62 10*3/MM3 (ref 0.7–3.1)
LYMPHOCYTES NFR BLD AUTO: 22.3 % (ref 19.6–45.3)
Lab: ABNORMAL
M PNEUMO IGG SER IA-ACNC: NOT DETECTED
MAGNESIUM SERPL-MCNC: 2 MG/DL (ref 1.6–2.6)
MCH RBC QN AUTO: 28.3 PG (ref 26.6–33)
MCHC RBC AUTO-ENTMCNC: 32.9 G/DL (ref 31.5–35.7)
MCV RBC AUTO: 85.9 FL (ref 79–97)
METHADONE UR QL SCN: NEGATIVE
METHGB BLD QL: 0.4 % (ref 0–3)
MODALITY: ABNORMAL
MONOCYTES # BLD AUTO: 0.46 10*3/MM3 (ref 0.1–0.9)
MONOCYTES NFR BLD AUTO: 6.3 % (ref 5–12)
NEUTROPHILS NFR BLD AUTO: 5.08 10*3/MM3 (ref 1.7–7)
NEUTROPHILS NFR BLD AUTO: 69.8 % (ref 42.7–76)
NITRITE UR QL STRIP: NEGATIVE
NOTE: ABNORMAL
NRBC BLD AUTO-RTO: 0 /100 WBC (ref 0–0.2)
NT-PROBNP SERPL-MCNC: 45.3 PG/ML (ref 0–900)
OPIATES UR QL: NEGATIVE
OXYCODONE UR QL SCN: NEGATIVE
OXYHGB MFR BLDV: 95.9 % (ref 94–99)
PCO2 BLDA: 37.2 MM HG (ref 35–45)
PCO2 TEMP ADJ BLD: ABNORMAL MM[HG]
PCP UR QL SCN: NEGATIVE
PH BLDA: 7.42 PH UNITS (ref 7.35–7.45)
PH UR STRIP.AUTO: 6.5 [PH] (ref 5–8)
PH, TEMP CORRECTED: ABNORMAL
PHOSPHATE SERPL-MCNC: 2.5 MG/DL (ref 2.5–4.5)
PLATELET # BLD AUTO: 239 10*3/MM3 (ref 140–450)
PMV BLD AUTO: 9.8 FL (ref 6–12)
PO2 BLDA: 81.6 MM HG (ref 83–108)
PO2 TEMP ADJ BLD: ABNORMAL MM[HG]
POTASSIUM SERPL-SCNC: 3.5 MMOL/L (ref 3.5–5.2)
PROT SERPL-MCNC: 8 G/DL (ref 6–8.5)
PROT UR QL STRIP: NEGATIVE
RBC # BLD AUTO: 4.95 10*6/MM3 (ref 4.14–5.8)
RHINOVIRUS RNA SPEC NAA+PROBE: NOT DETECTED
RSV RNA NPH QL NAA+NON-PROBE: NOT DETECTED
SAO2 % BLDCOA: 96.9 % (ref 94–99)
SARS-COV-2 RNA NPH QL NAA+NON-PROBE: DETECTED
SODIUM SERPL-SCNC: 138 MMOL/L (ref 136–145)
SP GR UR STRIP: 1.02 (ref 1–1.03)
T4 FREE SERPL-MCNC: 0.73 NG/DL (ref 0.93–1.7)
TROPONIN T SERPL-MCNC: <0.01 NG/ML (ref 0–0.03)
TSH SERPL DL<=0.05 MIU/L-ACNC: 1.78 UIU/ML (ref 0.27–4.2)
UROBILINOGEN UR QL STRIP: NORMAL
VENTILATOR MODE: ABNORMAL
WBC # BLD AUTO: 7.28 10*3/MM3 (ref 3.4–10.8)

## 2020-11-06 PROCEDURE — 80307 DRUG TEST PRSMV CHEM ANLYZR: CPT | Performed by: EMERGENCY MEDICINE

## 2020-11-06 PROCEDURE — 84100 ASSAY OF PHOSPHORUS: CPT | Performed by: EMERGENCY MEDICINE

## 2020-11-06 PROCEDURE — 83735 ASSAY OF MAGNESIUM: CPT | Performed by: EMERGENCY MEDICINE

## 2020-11-06 PROCEDURE — 84484 ASSAY OF TROPONIN QUANT: CPT | Performed by: EMERGENCY MEDICINE

## 2020-11-06 PROCEDURE — 36600 WITHDRAWAL OF ARTERIAL BLOOD: CPT

## 2020-11-06 PROCEDURE — 81003 URINALYSIS AUTO W/O SCOPE: CPT | Performed by: EMERGENCY MEDICINE

## 2020-11-06 PROCEDURE — 87040 BLOOD CULTURE FOR BACTERIA: CPT | Performed by: EMERGENCY MEDICINE

## 2020-11-06 PROCEDURE — 82550 ASSAY OF CK (CPK): CPT | Performed by: EMERGENCY MEDICINE

## 2020-11-06 PROCEDURE — 83050 HGB METHEMOGLOBIN QUAN: CPT

## 2020-11-06 PROCEDURE — 0202U NFCT DS 22 TRGT SARS-COV-2: CPT | Performed by: EMERGENCY MEDICINE

## 2020-11-06 PROCEDURE — 93005 ELECTROCARDIOGRAM TRACING: CPT | Performed by: EMERGENCY MEDICINE

## 2020-11-06 PROCEDURE — 71045 X-RAY EXAM CHEST 1 VIEW: CPT | Performed by: RADIOLOGY

## 2020-11-06 PROCEDURE — 99283 EMERGENCY DEPT VISIT LOW MDM: CPT

## 2020-11-06 PROCEDURE — 83880 ASSAY OF NATRIURETIC PEPTIDE: CPT | Performed by: EMERGENCY MEDICINE

## 2020-11-06 PROCEDURE — 80053 COMPREHEN METABOLIC PANEL: CPT | Performed by: EMERGENCY MEDICINE

## 2020-11-06 PROCEDURE — 86140 C-REACTIVE PROTEIN: CPT | Performed by: EMERGENCY MEDICINE

## 2020-11-06 PROCEDURE — 84145 PROCALCITONIN (PCT): CPT | Performed by: EMERGENCY MEDICINE

## 2020-11-06 PROCEDURE — 83605 ASSAY OF LACTIC ACID: CPT | Performed by: EMERGENCY MEDICINE

## 2020-11-06 PROCEDURE — 99284 EMERGENCY DEPT VISIT MOD MDM: CPT

## 2020-11-06 PROCEDURE — 36415 COLL VENOUS BLD VENIPUNCTURE: CPT

## 2020-11-06 PROCEDURE — 71045 X-RAY EXAM CHEST 1 VIEW: CPT

## 2020-11-06 PROCEDURE — 93010 ELECTROCARDIOGRAM REPORT: CPT | Performed by: INTERNAL MEDICINE

## 2020-11-06 PROCEDURE — 85025 COMPLETE CBC W/AUTO DIFF WBC: CPT | Performed by: EMERGENCY MEDICINE

## 2020-11-06 PROCEDURE — 84439 ASSAY OF FREE THYROXINE: CPT | Performed by: EMERGENCY MEDICINE

## 2020-11-06 PROCEDURE — 82805 BLOOD GASES W/O2 SATURATION: CPT

## 2020-11-06 PROCEDURE — 82375 ASSAY CARBOXYHB QUANT: CPT

## 2020-11-06 PROCEDURE — 84443 ASSAY THYROID STIM HORMONE: CPT | Performed by: EMERGENCY MEDICINE

## 2020-11-06 PROCEDURE — 85652 RBC SED RATE AUTOMATED: CPT | Performed by: EMERGENCY MEDICINE

## 2020-11-06 RX ORDER — METHYLPREDNISOLONE 4 MG/1
TABLET ORAL
Qty: 21 TABLET | Refills: 0 | Status: SHIPPED | OUTPATIENT
Start: 2020-11-06 | End: 2020-11-17

## 2020-11-06 RX ORDER — SODIUM CHLORIDE 0.9 % (FLUSH) 0.9 %
10 SYRINGE (ML) INJECTION AS NEEDED
Status: DISCONTINUED | OUTPATIENT
Start: 2020-11-06 | End: 2020-11-07 | Stop reason: HOSPADM

## 2020-11-06 RX ORDER — AZITHROMYCIN 500 MG/1
500 TABLET, FILM COATED ORAL DAILY
Qty: 5 TABLET | Refills: 0 | Status: SHIPPED | OUTPATIENT
Start: 2020-11-06 | End: 2020-11-17

## 2020-11-07 LAB
PROCALCITONIN SERPL-MCNC: 0.03 NG/ML (ref 0–0.25)
QT INTERVAL: 352 MS
QTC INTERVAL: 425 MS

## 2020-11-11 LAB
BACTERIA SPEC AEROBE CULT: NORMAL
BACTERIA SPEC AEROBE CULT: NORMAL

## 2020-11-11 NOTE — ED PROVIDER NOTES
Subjective   49-year-old male in the emergency department with fatigue, fever, and generalized weakness.  Denies other symptoms such as nausea, vomiting, diarrhea, chest pain, or shortness of breath.  Reports he may have been exposed to COVID-19.  Has significant past medical history so please refer to the chart.      History provided by:  Patient   used: No        Review of Systems   Constitutional: Positive for fatigue and fever. Negative for activity change, appetite change, chills and diaphoresis.   HENT: Negative for congestion, ear pain and sore throat.    Eyes: Negative for redness.   Respiratory: Negative for cough, chest tightness, shortness of breath and wheezing.    Cardiovascular: Negative for chest pain, palpitations and leg swelling.   Gastrointestinal: Negative for abdominal pain, diarrhea, nausea and vomiting.   Genitourinary: Negative for dysuria and urgency.   Musculoskeletal: Negative for arthralgias, back pain, myalgias and neck pain.   Skin: Negative for pallor, rash and wound.   Neurological: Positive for weakness. Negative for dizziness, speech difficulty and headaches.   Psychiatric/Behavioral: Negative for agitation, behavioral problems, confusion and decreased concentration.   All other systems reviewed and are negative.      Past Medical History:   Diagnosis Date   • Acid reflux    • Allergic    • Anxiety    • Arthritis    • Depression    • GERD (gastroesophageal reflux disease)    • Hypertension    • Infectious mononucleosis    • TAMIKO (obstructive sleep apnea)     NO OXYGEN USE   • PONV (postoperative nausea and vomiting)    • Seasonal allergies    • Sinusitis    • Urinary tract infection    • WPW (Brayan-Parkinson-White syndrome)        Allergies   Allergen Reactions   • Aleve [Naproxen] Hives       Past Surgical History:   Procedure Laterality Date   • ABLATION OF DYSRHYTHMIC FOCUS  1999 and 2000    Dr. Carlin   • CARDIAC ELECTROPHYSIOLOGY PROCEDURE N/A 6/4/2019     Procedure: Flutter;  Surgeon: Juan Carlin MD;  Location: [facility] INVASIVE LOCATION;  Service: Cardiology   • CARDIAC SURGERY     • WISDOM TOOTH EXTRACTION         Family History   Problem Relation Age of Onset   • Hypertension Mother    • Hypertension Father    • Hyperlipidemia Father    • Diabetes Father    • Heart attack Maternal Grandfather    • Diabetes Brother        Social History     Socioeconomic History   • Marital status:      Spouse name: Not on file   • Number of children: Not on file   • Years of education: Not on file   • Highest education level: Not on file   Tobacco Use   • Smoking status: Never Smoker   • Smokeless tobacco: Never Used   Substance and Sexual Activity   • Alcohol use: Yes     Comment: 4-5 BEERS, TWICE A WEEK   • Drug use: No   • Sexual activity: Defer           Objective   Physical Exam  Vitals signs and nursing note reviewed.   Constitutional:       General: He is not in acute distress.     Appearance: Normal appearance. He is well-developed. He is not toxic-appearing or diaphoretic.   HENT:      Head: Normocephalic and atraumatic.      Right Ear: External ear normal.      Left Ear: External ear normal.      Nose: Nose normal.      Mouth/Throat:      Pharynx: No oropharyngeal exudate.      Tonsils: No tonsillar exudate.   Eyes:      General: Lids are normal.      Conjunctiva/sclera: Conjunctivae normal.      Pupils: Pupils are equal, round, and reactive to light.   Neck:      Musculoskeletal: Full passive range of motion without pain, normal range of motion and neck supple.      Thyroid: No thyromegaly.   Cardiovascular:      Rate and Rhythm: Normal rate and regular rhythm.      Pulses: Normal pulses.      Heart sounds: Normal heart sounds, S1 normal and S2 normal.   Pulmonary:      Effort: Pulmonary effort is normal. No tachypnea or respiratory distress.      Breath sounds: Normal breath sounds. No decreased breath sounds, wheezing or rales.   Chest:      Chest wall:  No tenderness.   Abdominal:      General: Bowel sounds are normal. There is no distension.      Palpations: Abdomen is soft.      Tenderness: There is no abdominal tenderness. There is no guarding or rebound.   Musculoskeletal: Normal range of motion.         General: No tenderness or deformity.   Lymphadenopathy:      Cervical: No cervical adenopathy.   Skin:     General: Skin is warm and dry.      Coloration: Skin is not pale.      Findings: No erythema or rash.   Neurological:      Mental Status: He is alert and oriented to person, place, and time.      GCS: GCS eye subscore is 4. GCS verbal subscore is 5. GCS motor subscore is 6.      Cranial Nerves: No cranial nerve deficit.      Sensory: No sensory deficit.   Psychiatric:         Speech: Speech normal.         Behavior: Behavior normal.         Thought Content: Thought content normal.         Judgment: Judgment normal.         Procedures           ED Course  ED Course as of Nov 11 0554 Fri Nov 06, 2020 2128 IMPRESSION:  Stable chest. No acute cardiopulmonary findings identified.   XR Chest 1 View [ES]   2128 Normal sinus rhythm  Normal ECG  No previous ECGs available  Vent. rate 88 BPM  WA interval 136 ms  QRS duration 80 ms  QT/QTc 352/425 ms  P-R-T axes 57 2 14   ECG 12 Lead [ES]      ED Course User Index  [ES] Bob Rodrigez MD                                           MDM  Number of Diagnoses or Management Options  COVID-19: new and requires workup     Amount and/or Complexity of Data Reviewed  Clinical lab tests: reviewed and ordered  Tests in the radiology section of CPT®: reviewed and ordered  Tests in the medicine section of CPT®: reviewed and ordered  Review and summarize past medical records: yes  Independent visualization of images, tracings, or specimens: yes    Risk of Complications, Morbidity, and/or Mortality  Presenting problems: moderate  Diagnostic procedures: moderate  Management options: moderate    Patient Progress  Patient  progress: stable      Final diagnoses:   COVID-19            Bob Rodrigez MD  11/11/20 4091

## 2020-11-17 ENCOUNTER — OFFICE VISIT (OUTPATIENT)
Dept: FAMILY MEDICINE CLINIC | Facility: CLINIC | Age: 50
End: 2020-11-17

## 2020-11-17 VITALS
HEART RATE: 91 BPM | SYSTOLIC BLOOD PRESSURE: 140 MMHG | DIASTOLIC BLOOD PRESSURE: 86 MMHG | RESPIRATION RATE: 18 BRPM | TEMPERATURE: 97.8 F | OXYGEN SATURATION: 98 % | BODY MASS INDEX: 38.36 KG/M2 | HEIGHT: 76 IN | WEIGHT: 315 LBS

## 2020-11-17 DIAGNOSIS — I10 ESSENTIAL HYPERTENSION: Primary | ICD-10-CM

## 2020-11-17 DIAGNOSIS — F32.5 MAJOR DEPRESSIVE DISORDER WITH SINGLE EPISODE, IN FULL REMISSION (HCC): ICD-10-CM

## 2020-11-17 PROBLEM — Z86.16 HISTORY OF 2019 NOVEL CORONAVIRUS DISEASE (COVID-19): Status: ACTIVE | Noted: 2020-11-17

## 2020-11-17 PROCEDURE — 99213 OFFICE O/P EST LOW 20 MIN: CPT | Performed by: FAMILY MEDICINE

## 2020-11-17 NOTE — PROGRESS NOTES
Subjective   Jimmy Dawson Jr. is a 49 y.o. male.     History of Present Illness follow-up regarding the depression with mild anxiety.  Has recently come off of Covid quarantine.  Did feel bad for a while and honestly unsure if was acclimating to the Effexor sore or post Covid symptoms.  Has felt significantly better last couple of days.  Energy improving.  Affect is significantly improved even over prior medicine.    The following portions of the patient's history were reviewed and updated as appropriate: allergies, current medications, past family history, past medical history, past social history and problem list.    Review of Systems  See history of Present Illness     Objective     Physical Exam  Vitals signs reviewed.   Constitutional:       Appearance: Normal appearance. He is well-developed.   HENT:      Head: Normocephalic.   Neck:      Musculoskeletal: Normal range of motion and neck supple.      Thyroid: No thyromegaly.      Trachea: No tracheal deviation.   Cardiovascular:      Rate and Rhythm: Normal rate and regular rhythm.      Heart sounds: Normal heart sounds. No murmur.   Pulmonary:      Effort: Pulmonary effort is normal.      Breath sounds: Normal breath sounds.   Skin:     General: Skin is warm and dry.   Neurological:      Mental Status: He is alert and oriented to person, place, and time.   Psychiatric:         Mood and Affect: Mood normal.         PHQ-9 Total Score:      Patient's There is no height or weight on file to calculate BMI. BMI is above normal parameters. Recommendations include: exercise counseling and nutrition counseling.   (Normal BMI:  18.5-24.9, OW 25-29.9, Obesity 30 or greater)      Assessment/Plan     Diagnoses and all orders for this visit:    1. Essential hypertension (Primary)    2. Major depressive disorder with single episode, in full remission (CMS/[facility])    Globally you seem to be doing quite well.  We will continue medications as reconciled ordered.  Stay safely  active.  No indication to retest for Covid.  I believe you can safely return to work since you are now outside of quarantine.  Work tolerance I believe will depend on stamina.  Stay safely active maintain pandemic response.  Work on diet and activity of course.  Recheck in a few months routinely as scheduled.  See us as needed otherwise.                     This document has been electronically signed by Miles Garvin MD   November 17, 2020 09:50 EST    Part of this note may be an electronic transcription/translation of spoken language to printed text using the Dragon Dictation System.

## 2020-12-11 DIAGNOSIS — J20.9 ACUTE BRONCHITIS, UNSPECIFIED ORGANISM: ICD-10-CM

## 2020-12-11 DIAGNOSIS — J30.9 ALLERGIC RHINITIS, UNSPECIFIED SEASONALITY, UNSPECIFIED TRIGGER: ICD-10-CM

## 2020-12-11 DIAGNOSIS — I10 ESSENTIAL HYPERTENSION: ICD-10-CM

## 2020-12-11 DIAGNOSIS — F41.9 ANXIETY: ICD-10-CM

## 2020-12-11 DIAGNOSIS — J01.10 ACUTE FRONTAL SINUSITIS, RECURRENCE NOT SPECIFIED: ICD-10-CM

## 2020-12-12 RX ORDER — FLUTICASONE PROPIONATE 50 MCG
SPRAY, SUSPENSION (ML) NASAL
Qty: 16 G | Refills: 6 | Status: SHIPPED | OUTPATIENT
Start: 2020-12-12 | End: 2021-09-20

## 2020-12-12 RX ORDER — FEXOFENADINE HCL 180 MG/1
TABLET ORAL
Qty: 30 TABLET | Refills: 6 | Status: SHIPPED | OUTPATIENT
Start: 2020-12-12 | End: 2021-08-14 | Stop reason: SDUPTHER

## 2020-12-12 RX ORDER — LOSARTAN POTASSIUM 50 MG/1
TABLET ORAL
Qty: 30 TABLET | Refills: 6 | Status: SHIPPED | OUTPATIENT
Start: 2020-12-12 | End: 2021-08-14 | Stop reason: SDUPTHER

## 2020-12-12 RX ORDER — BUSPIRONE HYDROCHLORIDE 7.5 MG/1
TABLET ORAL
Qty: 60 TABLET | Refills: 6 | Status: SHIPPED | OUTPATIENT
Start: 2020-12-12 | End: 2021-02-09

## 2021-02-09 ENCOUNTER — OFFICE VISIT (OUTPATIENT)
Dept: FAMILY MEDICINE CLINIC | Facility: CLINIC | Age: 51
End: 2021-02-09

## 2021-02-09 VITALS
SYSTOLIC BLOOD PRESSURE: 140 MMHG | OXYGEN SATURATION: 98 % | BODY MASS INDEX: 38.36 KG/M2 | RESPIRATION RATE: 18 BRPM | WEIGHT: 315 LBS | TEMPERATURE: 98.2 F | DIASTOLIC BLOOD PRESSURE: 86 MMHG | HEART RATE: 95 BPM | HEIGHT: 76 IN

## 2021-02-09 DIAGNOSIS — F32.4 MAJOR DEPRESSIVE DISORDER WITH SINGLE EPISODE, IN PARTIAL REMISSION (HCC): ICD-10-CM

## 2021-02-09 DIAGNOSIS — I10 ESSENTIAL HYPERTENSION: Primary | ICD-10-CM

## 2021-02-09 DIAGNOSIS — F41.9 ANXIETY: ICD-10-CM

## 2021-02-09 PROBLEM — G47.33 OSA (OBSTRUCTIVE SLEEP APNEA): Chronic | Status: ACTIVE | Noted: 2018-10-22

## 2021-02-09 PROBLEM — J31.0 CHRONIC RHINITIS: Chronic | Status: ACTIVE | Noted: 2018-01-30

## 2021-02-09 PROBLEM — I45.6 WPW (WOLFF-PARKINSON-WHITE SYNDROME): Chronic | Status: ACTIVE | Noted: 2017-01-04

## 2021-02-09 PROCEDURE — 99213 OFFICE O/P EST LOW 20 MIN: CPT | Performed by: FAMILY MEDICINE

## 2021-02-09 RX ORDER — VENLAFAXINE HYDROCHLORIDE 150 MG/1
150 CAPSULE, EXTENDED RELEASE ORAL DAILY
Qty: 30 CAPSULE | Refills: 6 | Status: SHIPPED | OUTPATIENT
Start: 2021-02-09 | End: 2021-09-13 | Stop reason: SDUPTHER

## 2021-02-09 RX ORDER — BUSPIRONE HYDROCHLORIDE 7.5 MG/1
7.5 TABLET ORAL DAILY
Qty: 60 TABLET | Refills: 6 | COMMUNITY
Start: 2021-02-09 | End: 2021-08-14 | Stop reason: SDUPTHER

## 2021-02-09 NOTE — PROGRESS NOTES
Subjective   Jimmy Dawson Jr. is a 50 y.o. male.     History of Present Illness follow-up hypertension anxiety.  Globally no new problems.  Trying to meet the challenges of all the stresses of work home.  The recent Covid seems to have resolved but questions have has some difficulties with mental acuity.  Utilizing medications as reconciled.  Denies respiratory CDV GI  orthopedic.    The following portions of the patient's history were reviewed and updated as appropriate: allergies, current medications, past medical history, past social history, past surgical history and problem list.    Review of Systems  See history of Present Illness     Objective     Physical Exam  Vitals signs reviewed.   Constitutional:       Appearance: Normal appearance.   HENT:      Head: Normocephalic.   Eyes:      Conjunctiva/sclera: Conjunctivae normal.   Neck:      Musculoskeletal: Normal range of motion and neck supple.   Cardiovascular:      Rate and Rhythm: Normal rate and regular rhythm.      Heart sounds: Normal heart sounds.   Pulmonary:      Effort: Pulmonary effort is normal.      Breath sounds: Normal breath sounds.   Skin:     General: Skin is warm and dry.   Neurological:      Mental Status: He is alert and oriented to person, place, and time.   Psychiatric:         Mood and Affect: Mood normal.         PHQ-9 Total Score:      Patient's Body mass index is 40.66 kg/m². BMI is above normal parameters. Recommendations include: exercise counseling and nutrition counseling.   (Normal BMI:  18.5-24.9, OW 25-29.9, Obesity 30 or greater)      Assessment/Plan     Diagnoses and all orders for this visit:    1. Essential hypertension (Primary)    2. Anxiety  -     venlafaxine XR (EFFEXOR-XR) 150 MG 24 hr capsule; Take 1 capsule by mouth Daily.  Dispense: 30 capsule; Refill: 6    3. Major depressive disorder with single episode, in partial remission (CMS/HCC)  -     venlafaxine XR (EFFEXOR-XR) 150 MG 24 hr capsule; Take 1 capsule  by mouth Daily.  Dispense: 30 capsule; Refill: 6    Will increase the venlafaxine to 150 mg daily.  Will decrease the BuSpar to once daily hoping to discontinue that very soon.  Follow-up in a few months routinely.  Contact us sooner if you not seeing significant improvement with some of the symptoms you discussed.  Work aggressively on weight reduction.                     This document has been electronically signed by Miles Garvin MD   February 9, 2021 17:16 EST    Part of this note may be an electronic transcription/translation of spoken language to printed text using the Dragon Dictation System.

## 2021-02-10 ENCOUNTER — TELEPHONE (OUTPATIENT)
Dept: FAMILY MEDICINE CLINIC | Facility: CLINIC | Age: 51
End: 2021-02-10

## 2021-02-10 NOTE — TELEPHONE ENCOUNTER
PATIENT HAD AN APPOINTMENT ON 02/09 AND WAS TOLD THAT HIS PCP WAS CHANGING THE INSTRUCTIONS FOR HIS MEDICATION. THE PATIENT WAS NOT TOLD WHAT THESE INSTRUCTIONS WERE HOWEVER AND THEY WOULD LIKE CLARIFICATION.    CONTACT:[phone]

## 2021-02-11 NOTE — TELEPHONE ENCOUNTER
Decrease the buspirone to once daily.  A new dose of the venlafaxine to take once daily has been sent to pharmacy   let us know if problems.

## 2021-06-10 ENCOUNTER — OFFICE VISIT (OUTPATIENT)
Dept: FAMILY MEDICINE CLINIC | Facility: CLINIC | Age: 51
End: 2021-06-10

## 2021-06-10 VITALS
SYSTOLIC BLOOD PRESSURE: 138 MMHG | TEMPERATURE: 97.8 F | HEART RATE: 97 BPM | WEIGHT: 315 LBS | BODY MASS INDEX: 38.36 KG/M2 | OXYGEN SATURATION: 97 % | HEIGHT: 76 IN | DIASTOLIC BLOOD PRESSURE: 88 MMHG

## 2021-06-10 DIAGNOSIS — R00.2 PALPITATIONS: Primary | ICD-10-CM

## 2021-06-10 LAB
ALBUMIN SERPL-MCNC: 4.4 G/DL (ref 3.5–5.2)
ALBUMIN/GLOB SERPL: 1.4 G/DL
ALP SERPL-CCNC: 86 U/L (ref 39–117)
ALT SERPL W P-5'-P-CCNC: 39 U/L (ref 1–41)
ANION GAP SERPL CALCULATED.3IONS-SCNC: 11.3 MMOL/L (ref 5–15)
AST SERPL-CCNC: 19 U/L (ref 1–40)
BASOPHILS # BLD AUTO: 0.09 10*3/MM3 (ref 0–0.2)
BASOPHILS NFR BLD AUTO: 1.1 % (ref 0–1.5)
BILIRUB SERPL-MCNC: 0.3 MG/DL (ref 0–1.2)
BUN SERPL-MCNC: 17 MG/DL (ref 6–20)
BUN/CREAT SERPL: 20.5 (ref 7–25)
CALCIUM SPEC-SCNC: 9.4 MG/DL (ref 8.6–10.5)
CHLORIDE SERPL-SCNC: 102 MMOL/L (ref 98–107)
CO2 SERPL-SCNC: 22.7 MMOL/L (ref 22–29)
CREAT SERPL-MCNC: 0.83 MG/DL (ref 0.76–1.27)
DEPRECATED RDW RBC AUTO: 43.1 FL (ref 37–54)
EOSINOPHIL # BLD AUTO: 0.11 10*3/MM3 (ref 0–0.4)
EOSINOPHIL NFR BLD AUTO: 1.4 % (ref 0.3–6.2)
ERYTHROCYTE [DISTWIDTH] IN BLOOD BY AUTOMATED COUNT: 13.7 % (ref 12.3–15.4)
GFR SERPL CREATININE-BSD FRML MDRD: 97 ML/MIN/1.73
GLOBULIN UR ELPH-MCNC: 3.2 GM/DL
GLUCOSE SERPL-MCNC: 108 MG/DL (ref 65–99)
HCT VFR BLD AUTO: 44 % (ref 37.5–51)
HGB BLD-MCNC: 15 G/DL (ref 13–17.7)
IMM GRANULOCYTES # BLD AUTO: 0.03 10*3/MM3 (ref 0–0.05)
IMM GRANULOCYTES NFR BLD AUTO: 0.4 % (ref 0–0.5)
LYMPHOCYTES # BLD AUTO: 2.72 10*3/MM3 (ref 0.7–3.1)
LYMPHOCYTES NFR BLD AUTO: 34.3 % (ref 19.6–45.3)
MCH RBC QN AUTO: 29.5 PG (ref 26.6–33)
MCHC RBC AUTO-ENTMCNC: 34.1 G/DL (ref 31.5–35.7)
MCV RBC AUTO: 86.4 FL (ref 79–97)
MONOCYTES # BLD AUTO: 0.62 10*3/MM3 (ref 0.1–0.9)
MONOCYTES NFR BLD AUTO: 7.8 % (ref 5–12)
NEUTROPHILS NFR BLD AUTO: 4.37 10*3/MM3 (ref 1.7–7)
NEUTROPHILS NFR BLD AUTO: 55 % (ref 42.7–76)
NRBC BLD AUTO-RTO: 0 /100 WBC (ref 0–0.2)
PLATELET # BLD AUTO: 311 10*3/MM3 (ref 140–450)
PMV BLD AUTO: 10.3 FL (ref 6–12)
POTASSIUM SERPL-SCNC: 4.1 MMOL/L (ref 3.5–5.2)
PROT SERPL-MCNC: 7.6 G/DL (ref 6–8.5)
RBC # BLD AUTO: 5.09 10*6/MM3 (ref 4.14–5.8)
SODIUM SERPL-SCNC: 136 MMOL/L (ref 136–145)
WBC # BLD AUTO: 7.94 10*3/MM3 (ref 3.4–10.8)

## 2021-06-10 PROCEDURE — 99213 OFFICE O/P EST LOW 20 MIN: CPT | Performed by: FAMILY MEDICINE

## 2021-06-10 PROCEDURE — 85025 COMPLETE CBC W/AUTO DIFF WBC: CPT | Performed by: FAMILY MEDICINE

## 2021-06-10 PROCEDURE — 80053 COMPREHEN METABOLIC PANEL: CPT | Performed by: FAMILY MEDICINE

## 2021-06-10 PROCEDURE — 36415 COLL VENOUS BLD VENIPUNCTURE: CPT | Performed by: FAMILY MEDICINE

## 2021-06-10 NOTE — PROGRESS NOTES
Subjective   Jimmy Dawson Jr. is a 50 y.o. male.     History of Present Illness had an episode of palpitations a few days ago.  He deems this being secondary to overdosing on caffeine a degree of dehydration increased stress and anxiety secondary to some business activities.  Took dosing of Eliquis during that time up until today.  Has not had any further palpitations.  Denies shortness of breath chest pain.  Very comfortable that he can perceive when this happens which is extremely rare since the ablations.  Also desires some documentation of visits for vertiginous symptoms over the years to help with the [facility] claim.  Today feels entirely normal.  Questions whether to continue Eliquis.    The following portions of the patient's history were reviewed and updated as appropriate: allergies, current medications, past family history, past social history, past surgical history and problem list.    Review of Systems  See history of Present Illness     Objective     Physical Exam  Vitals reviewed.   Constitutional:       Appearance: Normal appearance.   HENT:      Head: Normocephalic.   Cardiovascular:      Rate and Rhythm: Normal rate and regular rhythm.      Heart sounds: Normal heart sounds.   Pulmonary:      Effort: Pulmonary effort is normal.      Breath sounds: Normal breath sounds.   Musculoskeletal:      Cervical back: Normal range of motion and neck supple.   Skin:     General: Skin is warm and dry.   Neurological:      Mental Status: He is alert and oriented to person, place, and time.   Psychiatric:         Mood and Affect: Mood normal.         PHQ-9 Total Score: 0    Body mass index is 40.95 kg/m².       Assessment/Plan     Diagnoses and all orders for this visit:    1. Palpitations (Primary)  -     CBC & Differential  -     Comprehensive Metabolic Panel    I am perplexed by your taking the Eliquis.  Electro physiology cardiologist discontinued that medication.  I recommend stopping it we discussed the  rationale of its use and its indication.  Will check labs regarding electrolytes.  Keep follow-up with cardiology routinely.  Rhythm is very regular nontachycardic presently.  If you experience recurrent palpitations I encourage cardiology follow-up.                   This document has been electronically signed by Miles Garvin MD   Ina 10, 2021 09:57 EDT    Part of this note may be an electronic transcription/translation of spoken language to printed text using the Dragon Dictation System.

## 2021-06-17 ENCOUNTER — TELEPHONE (OUTPATIENT)
Dept: FAMILY MEDICINE CLINIC | Facility: CLINIC | Age: 51
End: 2021-06-17

## 2021-06-17 NOTE — TELEPHONE ENCOUNTER
Patients wife Vero came into the office to  letter that patient had requested during his visit on 06/10. States that the letter should state something about the patient being treated for vertigo for however many number of years, etc.      Informed patient wife that Dr. Garvin is out this week but once he returns we will contact them about the requested letter.

## 2021-06-22 NOTE — TELEPHONE ENCOUNTER
I looked back over readily available records. I don't find easily documented complaints of dizziness unless it was related to episodes of anxiety and/or the early heart history from several years ago. All of the records of course are no longer readily available. I am unsure how to address this request.

## 2021-06-25 NOTE — TELEPHONE ENCOUNTER
Spoke with pt, states probably 20 years ago. Found old records related to dizziness.  Pt states still has episodic dizziness but since meclazine is OTC he just gets that and uses as needed.

## 2021-06-29 NOTE — TELEPHONE ENCOUNTER
Please generate a letter stating that I have treated the patient for at least 20 years regarding episodic dizziness/vertigo.  Currently the patient manages symptoms with OTC meclizine.  This of course appears to be a chronic but unpredictable condition.  If further information is needed we will be happy to try to assist.

## 2021-06-30 RX ORDER — OMEPRAZOLE 40 MG/1
CAPSULE, DELAYED RELEASE ORAL
Qty: 30 CAPSULE | Refills: 6 | Status: SHIPPED | OUTPATIENT
Start: 2021-06-30 | End: 2022-03-23 | Stop reason: SDUPTHER

## 2021-08-09 ENCOUNTER — OFFICE VISIT (OUTPATIENT)
Dept: FAMILY MEDICINE CLINIC | Facility: CLINIC | Age: 51
End: 2021-08-09

## 2021-08-09 VITALS
HEART RATE: 97 BPM | OXYGEN SATURATION: 95 % | HEIGHT: 76 IN | BODY MASS INDEX: 38.36 KG/M2 | WEIGHT: 315 LBS | SYSTOLIC BLOOD PRESSURE: 160 MMHG | TEMPERATURE: 95.5 F | DIASTOLIC BLOOD PRESSURE: 88 MMHG

## 2021-08-09 DIAGNOSIS — I10 ESSENTIAL HYPERTENSION: Primary | Chronic | ICD-10-CM

## 2021-08-09 DIAGNOSIS — L73.9 CHRONIC FOLLICULITIS: ICD-10-CM

## 2021-08-09 PROCEDURE — 99213 OFFICE O/P EST LOW 20 MIN: CPT | Performed by: FAMILY MEDICINE

## 2021-08-09 RX ORDER — SULFAMETHOXAZOLE AND TRIMETHOPRIM 800; 160 MG/1; MG/1
1 TABLET ORAL DAILY
Qty: 30 TABLET | Refills: 1 | Status: SHIPPED | OUTPATIENT
Start: 2021-08-09 | End: 2021-09-16 | Stop reason: SDUPTHER

## 2021-08-09 NOTE — PROGRESS NOTES
Subjective   Jimmy Dawson Jr. is a 50 y.o. male.     History of Present Illness follow-up hypertension.  Globally no significant new problems.  Has been trying to work on diet some.  Stress level is about the same.  Denies respiratory CDV GI .  Chronic skin concerns are more problematic scalp back and neck sometimes on abdominal wall.  Always seem to get better after an episode of antibiotic.  Tolerating medication at current dosing.    The following portions of the patient's history were reviewed and updated as appropriate: allergies, current medications, past family history, past social history, past surgical history and problem list.    Review of Systems  See history of Present Illness     Objective     Physical Exam  Vitals reviewed.   Constitutional:       Appearance: Normal appearance.   HENT:      Head: Normocephalic.   Eyes:      Conjunctiva/sclera: Conjunctivae normal.   Cardiovascular:      Rate and Rhythm: Normal rate and regular rhythm.      Heart sounds: Normal heart sounds.   Pulmonary:      Effort: Pulmonary effort is normal.      Breath sounds: Normal breath sounds.   Musculoskeletal:      Cervical back: Normal range of motion and neck supple.   Skin:     General: Skin is warm and dry.      Comments: Follicular eruption back of neck scalp   Neurological:      Mental Status: He is alert and oriented to person, place, and time.   Psychiatric:         Mood and Affect: Mood normal.         PHQ-9 Total Score:      Body mass index is 41.18 kg/m².       Assessment/Plan     Diagnoses and all orders for this visit:    1. Essential hypertension (Primary)    2. Chronic folliculitis  -     sulfamethoxazole-trimethoprim (BACTRIM DS,SEPTRA DS) 800-160 MG per tablet; Take 1 tablet by mouth Daily for 60 days.  Dispense: 30 tablet; Refill: 1    Continue meds same at this time.  Check BP at home on a regular basis for a while and report that back.  Discussed at length with you and wife regarding dietary  modifications and exercise modifications.  Maintain pandemic response.  Will initiate low-dose antibiotic to take daily for a while and then may be every other daily when improves.  Recheck in a few months routinely.                     This document has been electronically signed by Miles Garvin MD   August 9, 2021 16:55 EDT    Part of this note may be an electronic transcription/translation of spoken language to printed text using the Dragon Dictation System.

## 2021-08-13 DIAGNOSIS — I10 ESSENTIAL HYPERTENSION: ICD-10-CM

## 2021-08-13 DIAGNOSIS — J20.9 ACUTE BRONCHITIS, UNSPECIFIED ORGANISM: ICD-10-CM

## 2021-08-13 DIAGNOSIS — J01.10 ACUTE FRONTAL SINUSITIS, RECURRENCE NOT SPECIFIED: ICD-10-CM

## 2021-08-13 DIAGNOSIS — F41.9 ANXIETY: ICD-10-CM

## 2021-08-14 RX ORDER — LOSARTAN POTASSIUM 50 MG/1
TABLET ORAL
Qty: 30 TABLET | Refills: 6 | Status: SHIPPED | OUTPATIENT
Start: 2021-08-14 | End: 2022-03-01 | Stop reason: SDUPTHER

## 2021-08-14 RX ORDER — BUSPIRONE HYDROCHLORIDE 7.5 MG/1
TABLET ORAL
Qty: 60 TABLET | Refills: 6 | Status: SHIPPED | OUTPATIENT
Start: 2021-08-14 | End: 2022-04-12

## 2021-08-14 RX ORDER — FEXOFENADINE HCL 180 MG/1
TABLET ORAL
Qty: 30 TABLET | Refills: 6 | Status: SHIPPED | OUTPATIENT
Start: 2021-08-14 | End: 2022-04-12

## 2021-08-16 RX ORDER — AMLODIPINE BESYLATE 5 MG/1
TABLET ORAL
Qty: 30 TABLET | Refills: 0 | OUTPATIENT
Start: 2021-08-16

## 2021-09-13 DIAGNOSIS — F32.4 MAJOR DEPRESSIVE DISORDER WITH SINGLE EPISODE, IN PARTIAL REMISSION (HCC): ICD-10-CM

## 2021-09-13 DIAGNOSIS — F41.9 ANXIETY: ICD-10-CM

## 2021-09-13 RX ORDER — VENLAFAXINE HYDROCHLORIDE 150 MG/1
CAPSULE, EXTENDED RELEASE ORAL
Qty: 30 CAPSULE | Refills: 6 | Status: SHIPPED | OUTPATIENT
Start: 2021-09-13 | End: 2022-05-06 | Stop reason: SDUPTHER

## 2021-09-14 RX ORDER — AMLODIPINE BESYLATE 5 MG/1
TABLET ORAL
Qty: 30 TABLET | Refills: 0 | OUTPATIENT
Start: 2021-09-14

## 2021-09-16 ENCOUNTER — TELEPHONE (OUTPATIENT)
Dept: FAMILY MEDICINE CLINIC | Facility: CLINIC | Age: 51
End: 2021-09-16

## 2021-09-16 DIAGNOSIS — L73.9 CHRONIC FOLLICULITIS: ICD-10-CM

## 2021-09-16 RX ORDER — SULFAMETHOXAZOLE AND TRIMETHOPRIM 800; 160 MG/1; MG/1
1 TABLET ORAL DAILY
Qty: 30 TABLET | Refills: 1 | Status: SHIPPED | OUTPATIENT
Start: 2021-09-16 | End: 2021-11-18

## 2021-09-16 NOTE — TELEPHONE ENCOUNTER
Pts wife came in and states that patient is requesting a refill on his Bactrim sent to [facility].

## 2021-09-18 DIAGNOSIS — J30.9 ALLERGIC RHINITIS, UNSPECIFIED SEASONALITY, UNSPECIFIED TRIGGER: ICD-10-CM

## 2021-09-20 RX ORDER — FLUTICASONE PROPIONATE 50 MCG
SPRAY, SUSPENSION (ML) NASAL
Qty: 16 G | Refills: 6 | Status: SHIPPED | OUTPATIENT
Start: 2021-09-20 | End: 2022-03-01 | Stop reason: SDUPTHER

## 2021-11-01 RX ORDER — AMLODIPINE BESYLATE 5 MG/1
TABLET ORAL
Qty: 30 TABLET | Refills: 0 | OUTPATIENT
Start: 2021-11-01

## 2021-11-18 DIAGNOSIS — L73.9 CHRONIC FOLLICULITIS: ICD-10-CM

## 2021-11-18 RX ORDER — SULFAMETHOXAZOLE AND TRIMETHOPRIM 800; 160 MG/1; MG/1
TABLET ORAL
Qty: 30 TABLET | Refills: 1 | Status: SHIPPED | OUTPATIENT
Start: 2021-11-18 | End: 2022-01-27 | Stop reason: SDUPTHER

## 2021-12-01 RX ORDER — AMLODIPINE BESYLATE 5 MG/1
TABLET ORAL
Qty: 30 TABLET | Refills: 0 | OUTPATIENT
Start: 2021-12-01

## 2021-12-27 ENCOUNTER — OFFICE VISIT (OUTPATIENT)
Dept: CARDIOLOGY | Facility: CLINIC | Age: 51
End: 2021-12-27

## 2021-12-27 VITALS
DIASTOLIC BLOOD PRESSURE: 86 MMHG | BODY MASS INDEX: 38.36 KG/M2 | SYSTOLIC BLOOD PRESSURE: 139 MMHG | TEMPERATURE: 97.1 F | HEIGHT: 76 IN | HEART RATE: 89 BPM | WEIGHT: 315 LBS

## 2021-12-27 DIAGNOSIS — I45.6 WPW (WOLFF-PARKINSON-WHITE SYNDROME): Primary | Chronic | ICD-10-CM

## 2021-12-27 DIAGNOSIS — I48.3 TYPICAL ATRIAL FLUTTER (HCC): ICD-10-CM

## 2021-12-27 PROCEDURE — 99213 OFFICE O/P EST LOW 20 MIN: CPT | Performed by: PHYSICIAN ASSISTANT

## 2021-12-27 PROCEDURE — 93000 ELECTROCARDIOGRAM COMPLETE: CPT | Performed by: PHYSICIAN ASSISTANT

## 2021-12-27 RX ORDER — AMLODIPINE BESYLATE 5 MG/1
5 TABLET ORAL DAILY
Qty: 90 TABLET | Refills: 4 | Status: SHIPPED | OUTPATIENT
Start: 2021-12-27 | End: 2023-01-29 | Stop reason: SDUPTHER

## 2021-12-27 NOTE — PROGRESS NOTES
Miles Garvin MD  Jimmy Dawson Jr.  1970 12/27/2021    Patient Active Problem List   Diagnosis   • Gastroesophageal reflux disease without esophagitis   • Depression   • Essential hypertension   • Palpitations   • SVT (supraventricular tachycardia), s/p RF ablation, in 2000 x2   • WPW (Brayan-Parkinson-White syndrome), s/p RF ablation 2000.   • Chronic rhinitis   • TAMIKO (obstructive sleep apnea)   • Typical atrial flutter (HCC)   • History of 2019 novel coronavirus disease (COVID-19)   • Anxiety   • Chronic folliculitis       Dear Miles Garvin MD:    Subjective     History of Present Illness:    Chief Complaint   Patient presents with   • Follow-up     one year follow up and med refill   • Med Management     pt provided list        Jimmy Dawson Jr. is a pleasant 51 y.o. male with a past medical history significant for WPW as well as atrial flutter status post ablation with no recurrence since.  Patient comes in for routine follow-up.    Jimmy reports he has been doing well with open questioning.  Upon further questioning he reports he has been asymptomatic denying any chest pains, shortness of breath, palpitations, dizziness, or syncope.    Allergies   Allergen Reactions   • Aleve [Naproxen] Hives   :      Current Outpatient Medications:   •  amLODIPine (NORVASC) 5 MG tablet, Take 1 tablet by mouth Daily. as directed, Disp: 90 tablet, Rfl: 4  •  busPIRone (BUSPAR) 7.5 MG tablet, TAKE ONE TABLET BY MOUTH TWO TIMES A DAY AS DIRECTED, Disp: 60 tablet, Rfl: 6  •  diclofenac (CATAFLAM) 50 MG tablet, TAKE ONE TABLET BY MOUTH THREE TIMES A DAY AS DIRECTED, Disp: 90 tablet, Rfl: 1  •  fexofenadine (ALLEGRA) 180 MG tablet, TAKE ONE TABLET BY MOUTH EVERY DAY FOR ALLERGIES, Disp: 30 tablet, Rfl: 6  •  fluticasone (FLONASE) 50 MCG/ACT nasal spray, USE 2 SPRAYS IN EACH NOSTRIL EVERY DAY FOR ALLERGIES, Disp: 16 g, Rfl: 6  •  ipratropium (ATROVENT) 0.02 % nebulizer solution, Take 2.5 mL by  "nebulization 4 (Four) Times a Day., Disp: 300 mL, Rfl: 12  •  losartan (COZAAR) 50 MG tablet, TAKE ONE TABLET BY MOUTH EVERY DAY FOR BLOOD PRESSURE, Disp: 30 tablet, Rfl: 6  •  omeprazole (priLOSEC) 40 MG capsule, TAKE ONE CAPSULE BY MOUTH EVERY DAY FOR THE STOMACH, Disp: 30 capsule, Rfl: 6  •  sulfamethoxazole-trimethoprim (BACTRIM DS,SEPTRA DS) 800-160 MG per tablet, TAKE ONE TABLET BY MOUTH EVERY DAY AS DIRECTED, Disp: 30 tablet, Rfl: 1  •  venlafaxine XR (EFFEXOR-XR) 150 MG 24 hr capsule, TAKE ONE CAPSULE BY MOUTH EVERY DAY FOR MOOD, Disp: 30 capsule, Rfl: 6    The following portions of the patient's history were reviewed and updated as appropriate: allergies, current medications, past family history, past medical history, past social history, past surgical history and problem list.    Social History     Tobacco Use   • Smoking status: Never Smoker   • Smokeless tobacco: Never Used   Vaping Use   • Vaping Use: Never used   Substance Use Topics   • Alcohol use: Yes     Comment: 4-5 BEERS, TWICE A WEEK   • Drug use: No         Objective   Vitals:    12/27/21 1528   BP: 139/86   Pulse: 89   Temp: 97.1 °F (36.2 °C)   Weight: (!) 156 kg (344 lb)   Height: 193 cm (75.98\")     Body mass index is 41.89 kg/m².    Constitutional:       General: Not in acute distress.     Appearance: Healthy appearance. Well-developed and not in distress. Not diaphoretic.   Eyes:      Conjunctiva/sclera: Conjunctivae normal.      Pupils: Pupils are equal, round, and reactive to light.   HENT:      Head: Normocephalic and atraumatic.   Neck:      Vascular: No carotid bruit or JVD.   Pulmonary:      Effort: Pulmonary effort is normal. No respiratory distress.      Breath sounds: Normal breath sounds.   Cardiovascular:      Normal rate. Regular rhythm.   Skin:     General: Skin is cool.   Neurological:      Mental Status: Alert, oriented to person, place, and time and oriented to person, place and time.         Lab Results   Component Value " Date     06/10/2021    K 4.1 06/10/2021     06/10/2021    CO2 22.7 06/10/2021    BUN 17 06/10/2021    CREATININE 0.83 06/10/2021    GLUCOSE 108 (H) 06/10/2021    CALCIUM 9.4 06/10/2021    AST 19 06/10/2021    ALT 39 06/10/2021    ALKPHOS 86 06/10/2021    LABIL2 1.3 (L) 01/25/2016     Lab Results   Component Value Date    CKTOTAL 147 11/06/2020     Lab Results   Component Value Date    WBC 7.94 06/10/2021    HGB 15.0 06/10/2021    HCT 44.0 06/10/2021     06/10/2021     Lab Results   Component Value Date    INR 1.08 06/03/2019     Lab Results   Component Value Date    MG 2.0 11/06/2020     Lab Results   Component Value Date    TSH 1.780 11/06/2020    TRIG 219 (H) 02/02/2018    HDL 34 (L) 02/02/2018     (H) 02/02/2018      No results found for: BNP    During this visit the following were done:  Labs Reviewed []    Labs Ordered []    Radiology Reports Reviewed []    Radiology Ordered []    PCP Records Reviewed []    Referring Provider Records Reviewed []    ER Records Reviewed []    Hospital Records Reviewed []    History Obtained From Family []    Radiology Images Reviewed []    Other Reviewed []    Records Requested []         ECG 12 Lead    Date/Time: 12/27/2021 3:23 PM  Performed by: Anthony Ruff PA-C  Authorized by: Anthony Ruff PA-C   Comparison: compared with previous ECG   Similar to previous ECG  Rhythm: sinus rhythm  ST Segments: ST segments normal    Clinical impression: normal ECG            Assessment/Plan    Diagnosis Plan   1. WPW (Brayan-Parkinson-White syndrome), s/p RF ablation 2000.  ECG 12 Lead   2. Typical atrial flutter (HCC)  ECG 12 Lead            Recommendations:  1. Typical atrial flutter  1. Completely asymptomatic no known recurrence since ablation in 2000 no further work-up indicated.  2. Essential hypertension  1. Controlled continue losartan and amlodipine.      No follow-ups on file.    As always, I appreciate very much the opportunity to participate in  the cardiovascular care of your patients.      With Best Regards,    Anthony Ruff PA-C

## 2022-01-26 ENCOUNTER — TELEPHONE (OUTPATIENT)
Dept: FAMILY MEDICINE CLINIC | Facility: CLINIC | Age: 52
End: 2022-01-26

## 2022-01-27 ENCOUNTER — OFFICE VISIT (OUTPATIENT)
Dept: FAMILY MEDICINE CLINIC | Facility: CLINIC | Age: 52
End: 2022-01-27

## 2022-01-27 VITALS
SYSTOLIC BLOOD PRESSURE: 140 MMHG | HEIGHT: 76 IN | WEIGHT: 315 LBS | OXYGEN SATURATION: 98 % | TEMPERATURE: 97.6 F | DIASTOLIC BLOOD PRESSURE: 90 MMHG | HEART RATE: 78 BPM | BODY MASS INDEX: 38.36 KG/M2

## 2022-01-27 DIAGNOSIS — L73.9 CHRONIC FOLLICULITIS: ICD-10-CM

## 2022-01-27 DIAGNOSIS — M23.92 INTERNAL DERANGEMENT OF KNEE JOINT, LEFT: Primary | ICD-10-CM

## 2022-01-27 PROCEDURE — 99213 OFFICE O/P EST LOW 20 MIN: CPT | Performed by: FAMILY MEDICINE

## 2022-01-27 RX ORDER — SULFAMETHOXAZOLE AND TRIMETHOPRIM 800; 160 MG/1; MG/1
1 TABLET ORAL DAILY
Qty: 30 TABLET | Refills: 1 | Status: SHIPPED | OUTPATIENT
Start: 2022-01-27 | End: 2022-03-07 | Stop reason: ALTCHOICE

## 2022-01-27 NOTE — PROGRESS NOTES
Subjective   Jimmy Dawson Jr. is a 51 y.o. male.     History of Present Illness about 3-week history of minimally improved mechanical nontraumatic left knee pain.  No redness or warmth.  Has experienced some episodic swelling worse in the evenings.  Has been trying to ice it utilizing medications as reconciled.  No other joint issues.    The following portions of the patient's history were reviewed and updated as appropriate: allergies, current medications, past family history, past medical history, past social history and problem list.    Review of Systems  See history of Present Illness     Objective     Physical Exam  Constitutional:       Appearance: Normal appearance.   Musculoskeletal:      Right lower leg: No edema.      Left lower leg: No edema.      Comments: Painful range of motion of left knee.  Minimal medial joint line tenderness.  Some general medial tenderness.  No current popliteal effusion or tenderness.  Calf negative.   Skin:     General: Skin is warm and dry.   Neurological:      Mental Status: He is alert and oriented to person, place, and time.   Psychiatric:         Mood and Affect: Mood normal.         PHQ-9 Total Score: 0    Body mass index is 41.57 kg/m².       Assessment/Plan     Diagnoses and all orders for this visit:    1. Internal derangement of knee joint, left (Primary)  -     XR Knee 3 View Left; Future  -     Ambulatory Referral to Orthopedic Surgery    Potential internal derangement on top of likely DJD of that knee.  Significant comorbidity of obesity.  Continue medications as reconciled.  We will ask for x-ray of that knee and proceed with orthopedic referral.  Follow-up with us as needed scheduled.  Continue to episodic ice.  Activity as tolerated.                     This document has been electronically signed by Miles Garvin MD   January 27, 2022 14:04 EST    Part of this note may be an electronic transcription/translation of spoken language to printed text using  the Dragon Dictation System.

## 2022-02-03 RX ORDER — DICLOFENAC POTASSIUM 50 MG/1
TABLET, FILM COATED ORAL
Qty: 90 TABLET | Refills: 1 | Status: SHIPPED | OUTPATIENT
Start: 2022-02-03 | End: 2022-04-12

## 2022-02-04 ENCOUNTER — HOSPITAL ENCOUNTER (OUTPATIENT)
Dept: GENERAL RADIOLOGY | Facility: HOSPITAL | Age: 52
Discharge: HOME | End: 2022-02-04
Admitting: FAMILY MEDICINE

## 2022-02-04 DIAGNOSIS — M23.92 INTERNAL DERANGEMENT OF KNEE JOINT, LEFT: ICD-10-CM

## 2022-02-04 PROCEDURE — 73562 X-RAY EXAM OF KNEE 3: CPT | Performed by: RADIOLOGY

## 2022-02-04 PROCEDURE — 73562 X-RAY EXAM OF KNEE 3: CPT

## 2022-02-07 NOTE — PROGRESS NOTES
X-ray shows arthritic changes.  This was unfortunately expected.  Hopefully referral to orthopedist is upcoming.

## 2022-02-24 ENCOUNTER — TRANSCRIBE ORDERS (OUTPATIENT)
Dept: ADMINISTRATIVE | Facility: HOSPITAL | Age: 52
End: 2022-02-24

## 2022-02-24 DIAGNOSIS — Z01.818 PREOPERATIVE CLEARANCE: Primary | ICD-10-CM

## 2022-03-01 ENCOUNTER — OFFICE VISIT (OUTPATIENT)
Dept: FAMILY MEDICINE CLINIC | Facility: CLINIC | Age: 52
End: 2022-03-01

## 2022-03-01 VITALS
DIASTOLIC BLOOD PRESSURE: 93 MMHG | HEART RATE: 101 BPM | OXYGEN SATURATION: 96 % | BODY MASS INDEX: 38.36 KG/M2 | HEIGHT: 76 IN | SYSTOLIC BLOOD PRESSURE: 155 MMHG | TEMPERATURE: 98.2 F | WEIGHT: 315 LBS

## 2022-03-01 DIAGNOSIS — J31.0 CHRONIC RHINITIS: Chronic | ICD-10-CM

## 2022-03-01 DIAGNOSIS — I10 ESSENTIAL HYPERTENSION: Primary | ICD-10-CM

## 2022-03-01 PROCEDURE — 80061 LIPID PANEL: CPT | Performed by: FAMILY MEDICINE

## 2022-03-01 PROCEDURE — 36415 COLL VENOUS BLD VENIPUNCTURE: CPT | Performed by: FAMILY MEDICINE

## 2022-03-01 PROCEDURE — 99214 OFFICE O/P EST MOD 30 MIN: CPT | Performed by: FAMILY MEDICINE

## 2022-03-01 PROCEDURE — 80053 COMPREHEN METABOLIC PANEL: CPT | Performed by: FAMILY MEDICINE

## 2022-03-01 PROCEDURE — 85025 COMPLETE CBC W/AUTO DIFF WBC: CPT | Performed by: FAMILY MEDICINE

## 2022-03-01 RX ORDER — FLUTICASONE PROPIONATE 50 MCG
2 SPRAY, SUSPENSION (ML) NASAL DAILY
Qty: 16 G | Refills: 6 | Status: SHIPPED | OUTPATIENT
Start: 2022-03-01 | End: 2022-09-13 | Stop reason: SDUPTHER

## 2022-03-01 RX ORDER — LOSARTAN POTASSIUM 50 MG/1
75 TABLET ORAL DAILY
Qty: 45 TABLET | Refills: 6 | Status: SHIPPED | OUTPATIENT
Start: 2022-03-01 | End: 2022-09-13 | Stop reason: SDUPTHER

## 2022-03-01 NOTE — PROGRESS NOTES
Venipuncture Blood Specimen Collection  Venipuncture performed in rIGHT ARM by Teresa Piedra MA with good hemostasis. Patient tolerated the procedure well without complications.   03/01/22   Teresa Piedra MA

## 2022-03-01 NOTE — PROGRESS NOTES
Subjective   Jimmy Dawson Jr. is a 51 y.o. male.     History of Present Illness follow-up regarding hypertension.  Globally no significant new problems.  Will be following with orthopedics soon regarding the knee.  Extremely active at work and at home.  Unfortunately admittedly dietary compliance is quite poor.  Denies respiratory CDV GI  skin problems.  Sleep patterns are reasonable.  Uses the CPAP.    The following portions of the patient's history were reviewed and updated as appropriate: allergies, current medications, past medical history, past social history, past surgical history and problem list.    Review of Systems  See history of Present Illness     Objective     Physical Exam  Vitals reviewed.   Constitutional:       Appearance: Normal appearance.   HENT:      Head: Normocephalic.   Cardiovascular:      Rate and Rhythm: Normal rate and regular rhythm.      Heart sounds: Normal heart sounds.   Pulmonary:      Effort: Pulmonary effort is normal.      Breath sounds: Normal breath sounds.   Musculoskeletal:      Cervical back: Normal range of motion and neck supple.   Skin:     General: Skin is warm and dry.   Neurological:      Mental Status: He is alert and oriented to person, place, and time.   Psychiatric:         Mood and Affect: Mood normal.         PHQ-9 Total Score:      Body mass index is 41.82 kg/m².       Assessment/Plan     Diagnoses and all orders for this visit:    1. Essential hypertension (Primary)  -     losartan (COZAAR) 50 MG tablet; Take 1.5 tablets by mouth Daily. for blood pressure  Dispense: 45 tablet; Refill: 6  -     Comprehensive Metabolic Panel  -     CBC & Differential  -     Lipid Panel    2. Chronic rhinitis  -     fluticasone (FLONASE) 50 MCG/ACT nasal spray; 2 sprays into the nostril(s) as directed by provider Daily.  Dispense: 16 g; Refill: 6      Labs as noted.  Renewed Flonase.  BP not controlled.  Increase losartan to a dose of 75 mg daily.  You are in control of  your diet I encourage you to comply.  Weight loss would be your best friend.  Recheck in a few months routinely.  Will notify of results.  Maintain pandemic response.  Keep requested follow-ups with cardiology.                   This document has been electronically signed by Miles Garvin MD   March 1, 2022 16:09 EST    Part of this note may be an electronic transcription/translation of spoken language to printed text using the Dragon Dictation System.

## 2022-03-02 LAB
ALBUMIN SERPL-MCNC: 4.8 G/DL (ref 3.5–5.2)
ALBUMIN/GLOB SERPL: 1.5 G/DL
ALP SERPL-CCNC: 87 U/L (ref 39–117)
ALT SERPL W P-5'-P-CCNC: 49 U/L (ref 1–41)
ANION GAP SERPL CALCULATED.3IONS-SCNC: 11.6 MMOL/L (ref 5–15)
AST SERPL-CCNC: 18 U/L (ref 1–40)
BASOPHILS # BLD AUTO: 0.09 10*3/MM3 (ref 0–0.2)
BASOPHILS NFR BLD AUTO: 0.9 % (ref 0–1.5)
BILIRUB SERPL-MCNC: 0.2 MG/DL (ref 0–1.2)
BUN SERPL-MCNC: 18 MG/DL (ref 6–20)
BUN/CREAT SERPL: 17.8 (ref 7–25)
CALCIUM SPEC-SCNC: 9.7 MG/DL (ref 8.6–10.5)
CHLORIDE SERPL-SCNC: 103 MMOL/L (ref 98–107)
CHOLEST SERPL-MCNC: 172 MG/DL (ref 0–200)
CO2 SERPL-SCNC: 25.4 MMOL/L (ref 22–29)
CREAT SERPL-MCNC: 1.01 MG/DL (ref 0.76–1.27)
DEPRECATED RDW RBC AUTO: 40.8 FL (ref 37–54)
EGFRCR SERPLBLD CKD-EPI 2021: 88.9 ML/MIN/1.73
EOSINOPHIL # BLD AUTO: 0.13 10*3/MM3 (ref 0–0.4)
EOSINOPHIL NFR BLD AUTO: 1.3 % (ref 0.3–6.2)
ERYTHROCYTE [DISTWIDTH] IN BLOOD BY AUTOMATED COUNT: 13.2 % (ref 12.3–15.4)
GLOBULIN UR ELPH-MCNC: 3.3 GM/DL
GLUCOSE SERPL-MCNC: 84 MG/DL (ref 65–99)
HCT VFR BLD AUTO: 42.3 % (ref 37.5–51)
HDLC SERPL-MCNC: 29 MG/DL (ref 40–60)
HGB BLD-MCNC: 14.4 G/DL (ref 13–17.7)
IMM GRANULOCYTES # BLD AUTO: 0.04 10*3/MM3 (ref 0–0.05)
IMM GRANULOCYTES NFR BLD AUTO: 0.4 % (ref 0–0.5)
LDLC SERPL CALC-MCNC: 100 MG/DL (ref 0–100)
LDLC/HDLC SERPL: 3.21 {RATIO}
LYMPHOCYTES # BLD AUTO: 3.48 10*3/MM3 (ref 0.7–3.1)
LYMPHOCYTES NFR BLD AUTO: 34.9 % (ref 19.6–45.3)
MCH RBC QN AUTO: 29.3 PG (ref 26.6–33)
MCHC RBC AUTO-ENTMCNC: 34 G/DL (ref 31.5–35.7)
MCV RBC AUTO: 86.2 FL (ref 79–97)
MONOCYTES # BLD AUTO: 0.83 10*3/MM3 (ref 0.1–0.9)
MONOCYTES NFR BLD AUTO: 8.3 % (ref 5–12)
NEUTROPHILS NFR BLD AUTO: 5.4 10*3/MM3 (ref 1.7–7)
NEUTROPHILS NFR BLD AUTO: 54.2 % (ref 42.7–76)
NRBC BLD AUTO-RTO: 0 /100 WBC (ref 0–0.2)
PLATELET # BLD AUTO: 339 10*3/MM3 (ref 140–450)
PMV BLD AUTO: 10.2 FL (ref 6–12)
POTASSIUM SERPL-SCNC: 4.1 MMOL/L (ref 3.5–5.2)
PROT SERPL-MCNC: 8.1 G/DL (ref 6–8.5)
RBC # BLD AUTO: 4.91 10*6/MM3 (ref 4.14–5.8)
SODIUM SERPL-SCNC: 140 MMOL/L (ref 136–145)
TRIGL SERPL-MCNC: 250 MG/DL (ref 0–150)
VLDLC SERPL-MCNC: 43 MG/DL (ref 5–40)
WBC NRBC COR # BLD: 9.97 10*3/MM3 (ref 3.4–10.8)

## 2022-03-02 NOTE — PROGRESS NOTES
Lab testing unremarkable.  Cholesterol profile slightly better.  Main thing is good diet weight loss.  Hopefully BP will respond to medication adjustment.

## 2022-03-03 ENCOUNTER — LAB (OUTPATIENT)
Dept: LAB | Facility: HOSPITAL | Age: 52
End: 2022-03-03

## 2022-03-03 DIAGNOSIS — Z01.818 PREOPERATIVE CLEARANCE: ICD-10-CM

## 2022-03-03 LAB
FLUAV RNA RESP QL NAA+PROBE: NOT DETECTED
FLUBV RNA RESP QL NAA+PROBE: NOT DETECTED
SARS-COV-2 RNA RESP QL NAA+PROBE: NOT DETECTED

## 2022-03-03 PROCEDURE — C9803 HOPD COVID-19 SPEC COLLECT: HCPCS

## 2022-03-03 PROCEDURE — 87636 SARSCOV2 & INF A&B AMP PRB: CPT | Performed by: INTERNAL MEDICINE

## 2022-03-07 ENCOUNTER — OFFICE VISIT (OUTPATIENT)
Dept: ORTHOPEDIC SURGERY | Facility: CLINIC | Age: 52
End: 2022-03-07

## 2022-03-07 VITALS
DIASTOLIC BLOOD PRESSURE: 85 MMHG | WEIGHT: 315 LBS | BODY MASS INDEX: 38.36 KG/M2 | HEIGHT: 76 IN | HEART RATE: 89 BPM | SYSTOLIC BLOOD PRESSURE: 136 MMHG

## 2022-03-07 DIAGNOSIS — G89.29 CHRONIC PAIN OF LEFT KNEE: ICD-10-CM

## 2022-03-07 DIAGNOSIS — M23.92 LOCKING KNEE, LEFT: Primary | ICD-10-CM

## 2022-03-07 DIAGNOSIS — M25.562 CHRONIC PAIN OF LEFT KNEE: ICD-10-CM

## 2022-03-07 PROCEDURE — 99203 OFFICE O/P NEW LOW 30 MIN: CPT | Performed by: ORTHOPAEDIC SURGERY

## 2022-03-07 RX ORDER — SULFAMETHOXAZOLE AND TRIMETHOPRIM 800; 160 MG/1; MG/1
1 TABLET ORAL 2 TIMES DAILY
COMMUNITY
End: 2022-03-31 | Stop reason: SDUPTHER

## 2022-03-07 NOTE — PROGRESS NOTES
New Patient Visit      Patient: Jimmy Dawson Jr.  YOB: 1970  Date of Encounter: 03/07/2022        Chief Complaint:   Chief Complaint   Patient presents with   • Left Knee - Initial Evaluation, Pain           HPI:   Jimmy Dawson Jr., 51 y.o. male, referred by Miles Garvin MD presents evaluation of left knee pain which began a few years ago his major complaint is inability to squat and when he does he is unable to straighten his leg.  He experiences pain along the medial aspect.  He is not experience giving way or locking.  He denies weakness or numbness to his left leg.  He served in the  and since then he has worked as a .  Does a good deal of squatting and crawling.  Denies specific injury to his left knee.  Medical history is remarkable for Brayan-Parkinson-White syndrome he has undergone ablation.  He also suffers from obstructive sleep apnea.        Active Problem List:  Patient Active Problem List   Diagnosis   • Gastroesophageal reflux disease without esophagitis   • Depression   • Essential hypertension   • Palpitations   • SVT (supraventricular tachycardia), s/p RF ablation, in 2000 x2   • WPW (Brayan-Parkinson-White syndrome), s/p RF ablation 2000.   • Chronic rhinitis   • TAMIKO (obstructive sleep apnea)   • Typical atrial flutter (HCC)   • History of 2019 novel coronavirus disease (COVID-19)   • Anxiety   • Chronic folliculitis           Past Medical History:  Past Medical History:   Diagnosis Date   • Acid reflux    • Allergic    • Anxiety    • Arthritis    • Depression    • GERD (gastroesophageal reflux disease)    • Hypertension    • Infectious mononucleosis    • TAMIKO (obstructive sleep apnea)     NO OXYGEN USE   • PONV (postoperative nausea and vomiting)    • Seasonal allergies    • Sinusitis    • Sleep apnea    • Urinary tract infection    • WPW (Brayan-Parkinson-White syndrome)            Past Surgical History:  Past Surgical History:   Procedure  Laterality Date   • ABLATION OF DYSRHYTHMIC FOCUS  1999 and 2000    Dr. Carlin   • CARDIAC ELECTROPHYSIOLOGY PROCEDURE N/A 6/4/2019    Procedure: Flutter;  Surgeon: Juan Carlin MD;  Location: [facility] INVASIVE LOCATION;  Service: Cardiology   • CARDIAC SURGERY     • WISDOM TOOTH EXTRACTION             Family History:  Family History   Problem Relation Age of Onset   • Hypertension Mother    • Hypertension Father    • Hyperlipidemia Father    • Diabetes Father    • Heart disease Father    • Diabetes Brother    • Diabetes Maternal Grandfather    • Heart attack Maternal Grandfather    • Diabetes Paternal Grandfather    • Diabetes Maternal Grandmother    • Cancer Maternal Grandmother          Social History:  Social History     Socioeconomic History   • Marital status:    Tobacco Use   • Smoking status: Never Smoker   • Smokeless tobacco: Never Used   Vaping Use   • Vaping Use: Never used   Substance and Sexual Activity   • Alcohol use: Yes     Comment: 4-5 BEERS, TWICE A WEEK   • Drug use: No   • Sexual activity: Defer     Body mass index is 41.75 kg/m².      Medications:  Current Outpatient Medications   Medication Sig Dispense Refill   • amLODIPine (NORVASC) 5 MG tablet Take 1 tablet by mouth Daily. as directed 90 tablet 4   • busPIRone (BUSPAR) 7.5 MG tablet TAKE ONE TABLET BY MOUTH TWO TIMES A DAY AS DIRECTED 60 tablet 6   • diclofenac (CATAFLAM) 50 MG tablet TAKE ONE TABLET BY MOUTH THREE TIMES A DAY AS DIRECTED 90 tablet 1   • fexofenadine (ALLEGRA) 180 MG tablet TAKE ONE TABLET BY MOUTH EVERY DAY FOR ALLERGIES 30 tablet 6   • fluticasone (FLONASE) 50 MCG/ACT nasal spray 2 sprays into the nostril(s) as directed by provider Daily. 16 g 6   • ipratropium (ATROVENT) 0.02 % nebulizer solution Take 2.5 mL by nebulization 4 (Four) Times a Day. 300 mL 12   • losartan (COZAAR) 50 MG tablet Take 1.5 tablets by mouth Daily. for blood pressure 45 tablet 6   • omeprazole (priLOSEC) 40 MG capsule TAKE ONE  "CAPSULE BY MOUTH EVERY DAY FOR THE STOMACH 30 capsule 6   • sulfamethoxazole-trimethoprim (BACTRIM DS,SEPTRA DS) 800-160 MG per tablet Take 1 tablet by mouth 2 (Two) Times a Day.     • venlafaxine XR (EFFEXOR-XR) 150 MG 24 hr capsule TAKE ONE CAPSULE BY MOUTH EVERY DAY FOR MOOD 30 capsule 6     No current facility-administered medications for this visit.         Allergies:  Allergies   Allergen Reactions   • Aleve [Naproxen] Hives         Review of Systems:   Review of Systems   Constitutional: Negative.    HENT: Negative.    Eyes: Negative.    Respiratory: Positive for apnea.    Cardiovascular: Negative.    Gastrointestinal: Negative.    Endocrine: Negative.    Genitourinary: Negative.    Musculoskeletal: Positive for arthralgias, back pain, gait problem and joint swelling.   Skin: Negative.    Allergic/Immunologic: Negative.    Hematological: Negative.    Psychiatric/Behavioral: Positive for dysphoric mood. The patient is nervous/anxious.          Physical Exam:   Physical Exam  GENERAL: 51 y.o. male, alert and oriented X 3 in no acute distress.   Visit Vitals  /85   Pulse 89   Ht 193 cm (76\")   Wt (!) 156 kg (343 lb)   BMI 41.75 kg/m²         Musculoskeletal:   Examination left knee demonstrates mild effusion with moderate medial joint line tenderness no lateral joint line tenderness has full extension with flexion to 130 degrees no gross instability with varus valgus stressing Lachman or drawer.  Neurovascular exam is grossly intact.        Radiology/Labs:     EXAMINATION: XR KNEE 3 VW LEFT-      CLINICAL INDICATION: pain swelling x 3 weeks; M23.92-Unspecified  internal derangement of left knee        COMPARISON: None available     FINDINGS:  4 views of the left knee show patellofemoral narrowing     Spurs are seen on the tibial spines     No fracture.     IMPRESSION:  Arthritic change, but no acute bony abnormality.      This report was finalized on 2/4/2022 2:24 PM by Dr. Alessio Severino MD.   "       Radiographs reviewed identify mild squaring of the medial femoral condyle otherwise negative.        Assessment & Plan:   51 y.o. male presents with several year history of left knee pain with description of locking of left knee when he gets in a squatted position.  He is relatively asymptomatic when this does not occur.  We discussed his options.  He is recommended MRI left knee.  We will request his insurance provider and see him back once completed.        ICD-10-CM ICD-9-CM   1. Chronic pain of left knee  M25.562 719.46    G89.29 338.29         Cc:   Miles Garvin MD                This document has been electronically signed by Huan Corrales MD   March 8, 2022 09:46 EST

## 2022-03-23 RX ORDER — OMEPRAZOLE 40 MG/1
CAPSULE, DELAYED RELEASE ORAL
Qty: 30 CAPSULE | Refills: 0 | Status: SHIPPED | OUTPATIENT
Start: 2022-03-23 | End: 2022-05-06 | Stop reason: SDUPTHER

## 2022-03-24 ENCOUNTER — TELEPHONE (OUTPATIENT)
Dept: FAMILY MEDICINE CLINIC | Facility: CLINIC | Age: 52
End: 2022-03-24

## 2022-03-24 NOTE — TELEPHONE ENCOUNTER
SAMRA SAYS SHE HAS BEEN WORKING WITH [facility] ON HIS VERTIGO, BUT IT ISN'T HELPING AS WELL AS IT SHOULD BE IF THE PROBLEM WERE JUST VERTIGO. HE HAS AN MRI ON HIS KNEE April 8TH, AND SHE IS WONDERING IF SHE COULD ADD AN MRI OF THE HEAD TO THAT.

## 2022-03-29 ENCOUNTER — TELEPHONE (OUTPATIENT)
Dept: FAMILY MEDICINE CLINIC | Facility: CLINIC | Age: 52
End: 2022-03-29

## 2022-03-29 NOTE — TELEPHONE ENCOUNTER
Caller: SAMRA     Relationship: [facility]    Best call back number: [phone]    What orders are you requesting (i.e. lab or imaging):   MRI FOR ACOUSTIC NEUROMA     In what timeframe would the patient need to come in: 04/08/2022    Additional notes:  SAMRA FROM [facility] STATED PATIENT IS SCHEDULED FOR A MRI OF HIS KNEE 04/08/2022 AND SAMRA STATED THAT SHE HAS BEEN SEEING PATIENT FOR VERTIGO AND WOULD LIKE FOR A MRI ORDER OF THE HEAD WITH CONTRAST TO BE PLACED FOR 04/08/2022 TO RULE OUT ACOUSTIC NEUROMA

## 2022-03-30 DIAGNOSIS — H91.93 HEARING DEFICIT, BILATERAL: ICD-10-CM

## 2022-03-30 DIAGNOSIS — R42 DIZZINESS: Primary | ICD-10-CM

## 2022-03-31 ENCOUNTER — OFFICE VISIT (OUTPATIENT)
Dept: FAMILY MEDICINE CLINIC | Facility: CLINIC | Age: 52
End: 2022-03-31

## 2022-03-31 VITALS
DIASTOLIC BLOOD PRESSURE: 79 MMHG | HEART RATE: 95 BPM | WEIGHT: 315 LBS | OXYGEN SATURATION: 97 % | TEMPERATURE: 98.2 F | HEIGHT: 76 IN | RESPIRATION RATE: 16 BRPM | BODY MASS INDEX: 38.36 KG/M2 | SYSTOLIC BLOOD PRESSURE: 137 MMHG

## 2022-03-31 DIAGNOSIS — L73.9 CHRONIC FOLLICULITIS: ICD-10-CM

## 2022-03-31 DIAGNOSIS — R42 DIZZINESS: Primary | ICD-10-CM

## 2022-03-31 DIAGNOSIS — H91.93 BILATERAL HEARING LOSS, UNSPECIFIED HEARING LOSS TYPE: ICD-10-CM

## 2022-03-31 PROCEDURE — 99213 OFFICE O/P EST LOW 20 MIN: CPT | Performed by: FAMILY MEDICINE

## 2022-03-31 RX ORDER — SULFAMETHOXAZOLE AND TRIMETHOPRIM 800; 160 MG/1; MG/1
1 TABLET ORAL DAILY
Qty: 30 TABLET | Refills: 5 | Status: SHIPPED | OUTPATIENT
Start: 2022-03-31 | End: 2022-06-17

## 2022-03-31 NOTE — PROGRESS NOTES
Subjective   Jimmy Dawson Jr. is a 51 y.o. male.     History of Present Illness follow-up.  Has been visiting with [facility] in regard to the chronic dizziness.  [facility] [place]  The physical therapist sees some transient improvements but no resolution with the training so far.  There is longstanding hearing loss also bilateral but right much worse than left.  The dizziness is longstanding and usually positional in nature.  He is felt to have overcome this consistently with OTC Antivert but also self teaching to avoid head movements and essentially work with movements.  The symptoms are all longstanding.  Has never seen ENT.    The following portions of the patient's history were reviewed and updated as appropriate: allergies, current medications, past medical history, past social history, past surgical history and problem list.    Review of Systems  See history of Present Illness     Objective     Physical Exam  Vitals reviewed.   Constitutional:       Appearance: Normal appearance.   Neurological:      Mental Status: He is oriented to person, place, and time.   Psychiatric:         Mood and Affect: Mood normal.         PHQ-9 Total Score:      BMI is above normal parameters. Recommendations: exercise counseling/recommendations and nutrition counseling/recommendations         Assessment/Plan     Diagnoses and all orders for this visit:    1. Dizziness (Primary)    2. Chronic folliculitis  -     sulfamethoxazole-trimethoprim (BACTRIM DS,SEPTRA DS) 800-160 MG per tablet; Take 1 tablet by mouth Daily.  Dispense: 30 tablet; Refill: 5    3. Bilateral hearing loss, unspecified hearing loss type    Did not repeat exam.  Renewed medicines for the folliculitis.  Will ask for MRI with and without contrast of head in regard to the chronic dizziness with hearing loss.  Acoustic neuroma would be a consideration.  He [facility].   Follow-up as scheduled needed.                     This document has been electronically signed by Miles Garvin MD   March 31, 2022 13:11 EDT    Part of this note may be an electronic transcription/translation of spoken language to printed text using the Dragon Dictation System.

## 2022-04-08 ENCOUNTER — TELEPHONE (OUTPATIENT)
Dept: FAMILY MEDICINE CLINIC | Facility: CLINIC | Age: 52
End: 2022-04-08

## 2022-04-08 ENCOUNTER — HOSPITAL ENCOUNTER (OUTPATIENT)
Dept: MRI IMAGING | Facility: HOSPITAL | Age: 52
Discharge: HOME | End: 2022-04-08
Admitting: ORTHOPAEDIC SURGERY

## 2022-04-08 DIAGNOSIS — G89.29 CHRONIC PAIN OF LEFT KNEE: ICD-10-CM

## 2022-04-08 DIAGNOSIS — M25.562 CHRONIC PAIN OF LEFT KNEE: ICD-10-CM

## 2022-04-08 DIAGNOSIS — M23.92 LOCKING KNEE, LEFT: ICD-10-CM

## 2022-04-08 PROCEDURE — 73721 MRI JNT OF LWR EXTRE W/O DYE: CPT

## 2022-04-08 PROCEDURE — 73721 MRI JNT OF LWR EXTRE W/O DYE: CPT | Performed by: RADIOLOGY

## 2022-04-08 NOTE — TELEPHONE ENCOUNTER
WIFE CALLED AND SAID PT WENT IN FOR HIS KNEE MRI THAT WAS ALSO SUPPOSED TO BE A HEAD MRI. HIS SHOULDERS ARE TOO BROAD TO FIT AND THE HOSP TOLD THEM TO HAVE HIS PROVIDER ORDER A NEW MRI AT A FACILITY WITH THE LARGER MACHINE.    WIFE SAID THEY TAKE THEIR DAUGHTER TO THE [facility] IF YOU CAN SEND HIM THERE.

## 2022-04-12 DIAGNOSIS — J20.9 ACUTE BRONCHITIS, UNSPECIFIED ORGANISM: ICD-10-CM

## 2022-04-12 DIAGNOSIS — J01.10 ACUTE FRONTAL SINUSITIS, RECURRENCE NOT SPECIFIED: ICD-10-CM

## 2022-04-12 DIAGNOSIS — F41.9 ANXIETY: ICD-10-CM

## 2022-04-12 RX ORDER — FEXOFENADINE HCL 180 MG/1
TABLET ORAL
Qty: 30 TABLET | Refills: 6 | Status: SHIPPED | OUTPATIENT
Start: 2022-04-12 | End: 2022-09-13 | Stop reason: SDUPTHER

## 2022-04-12 RX ORDER — BUSPIRONE HYDROCHLORIDE 7.5 MG/1
TABLET ORAL
Qty: 60 TABLET | Refills: 6 | Status: SHIPPED | OUTPATIENT
Start: 2022-04-12 | End: 2022-09-13 | Stop reason: SDUPTHER

## 2022-04-12 RX ORDER — DICLOFENAC POTASSIUM 50 MG/1
TABLET, FILM COATED ORAL
Qty: 90 TABLET | Refills: 1 | Status: SHIPPED | OUTPATIENT
Start: 2022-04-12 | End: 2022-09-13 | Stop reason: SDUPTHER

## 2022-04-18 ENCOUNTER — OFFICE VISIT (OUTPATIENT)
Dept: ORTHOPEDIC SURGERY | Facility: CLINIC | Age: 52
End: 2022-04-18

## 2022-04-18 VITALS — BODY MASS INDEX: 38.36 KG/M2 | WEIGHT: 315 LBS | HEIGHT: 76 IN

## 2022-04-18 DIAGNOSIS — M17.12 PRIMARY OSTEOARTHRITIS OF LEFT KNEE: Primary | ICD-10-CM

## 2022-04-18 PROCEDURE — 20610 DRAIN/INJ JOINT/BURSA W/O US: CPT | Performed by: ORTHOPAEDIC SURGERY

## 2022-04-18 PROCEDURE — 99213 OFFICE O/P EST LOW 20 MIN: CPT | Performed by: ORTHOPAEDIC SURGERY

## 2022-04-18 RX ORDER — LIDOCAINE HYDROCHLORIDE 20 MG/ML
5 INJECTION, SOLUTION INFILTRATION; PERINEURAL
Status: COMPLETED | OUTPATIENT
Start: 2022-04-18 | End: 2022-04-18

## 2022-04-18 RX ADMIN — LIDOCAINE HYDROCHLORIDE 5 ML: 20 INJECTION, SOLUTION INFILTRATION; PERINEURAL at 21:55

## 2022-04-18 NOTE — PROGRESS NOTES
Follow-up Visit         Patient: Jimmy Dawson Jr.  YOB: 1970  Date of Encounter: 04/18/2022      Chief  Complaint:   Chief Complaint   Patient presents with   • Left Knee - Pain, Follow-up   • MRI Review         HPI:  Jimmy Dawson Jr., 51 y.o. male presents in follow-up left knee pain.  His primary complaint is inability to squat.  He also has pain with ambulation.  Did he presented with pain medial aspect of his knee with findings concerning for meniscus tear.  He presents today having MRI completed.  His knee is currently doing reasonably well.        Medical History:  Patient Active Problem List   Diagnosis   • Gastroesophageal reflux disease without esophagitis   • Depression   • Essential hypertension   • Palpitations   • SVT (supraventricular tachycardia), s/p RF ablation, in 2000 x2   • WPW (Brayan-Parkinson-White syndrome), s/p RF ablation 2000.   • Chronic rhinitis   • TAMIKO (obstructive sleep apnea)   • Typical atrial flutter (HCC)   • History of 2019 novel coronavirus disease (COVID-19)   • Anxiety   • Chronic folliculitis     Past Medical History:   Diagnosis Date   • Acid reflux    • Allergic    • Anxiety    • Arthritis    • Depression    • GERD (gastroesophageal reflux disease)    • Hypertension    • Infectious mononucleosis    • TAMIKO (obstructive sleep apnea)     NO OXYGEN USE   • PONV (postoperative nausea and vomiting)    • Seasonal allergies    • Sinusitis    • Sleep apnea    • Urinary tract infection    • WPW (Brayan-Parkinson-White syndrome)          Social History:  Social History     Socioeconomic History   • Marital status:    Tobacco Use   • Smoking status: Never Smoker   • Smokeless tobacco: Never Used   Vaping Use   • Vaping Use: Never used   Substance and Sexual Activity   • Alcohol use: Yes     Comment: 4-5 BEERS, TWICE A WEEK   • Drug use: No   • Sexual activity: Defer         Current Medications:    Current Outpatient Medications:   •  amLODIPine (NORVASC)  5 MG tablet, Take 1 tablet by mouth Daily. as directed, Disp: 90 tablet, Rfl: 4  •  busPIRone (BUSPAR) 7.5 MG tablet, TAKE ONE TABLET BY MOUTH TWO TIMES A DAY AS DIRECTED, Disp: 60 tablet, Rfl: 6  •  diclofenac (CATAFLAM) 50 MG tablet, TAKE ONE TABLET BY MOUTH THREE TIMES A DAY AS DIRECTED, Disp: 90 tablet, Rfl: 1  •  fexofenadine (ALLEGRA) 180 MG tablet, TAKE ONE TABLET BY MOUTH EVERY DAY FOR ALLERGIES, Disp: 30 tablet, Rfl: 6  •  fluticasone (FLONASE) 50 MCG/ACT nasal spray, 2 sprays into the nostril(s) as directed by provider Daily., Disp: 16 g, Rfl: 6  •  ipratropium (ATROVENT) 0.02 % nebulizer solution, Take 2.5 mL by nebulization 4 (Four) Times a Day., Disp: 300 mL, Rfl: 12  •  losartan (COZAAR) 50 MG tablet, Take 1.5 tablets by mouth Daily. for blood pressure, Disp: 45 tablet, Rfl: 6  •  omeprazole (priLOSEC) 40 MG capsule, TAKE ONE CAPSULE BY MOUTH EVERY DAY FOR THE STOMACH, Disp: 30 capsule, Rfl: 0  •  sulfamethoxazole-trimethoprim (BACTRIM DS,SEPTRA DS) 800-160 MG per tablet, Take 1 tablet by mouth Daily., Disp: 30 tablet, Rfl: 5  •  venlafaxine XR (EFFEXOR-XR) 150 MG 24 hr capsule, TAKE ONE CAPSULE BY MOUTH EVERY DAY FOR MOOD, Disp: 30 capsule, Rfl: 6      Allergies:  Allergies   Allergen Reactions   • Aleve [Naproxen] Hives         Family History:  Family History   Problem Relation Age of Onset   • Hypertension Mother    • Hypertension Father    • Hyperlipidemia Father    • Diabetes Father    • Heart disease Father    • Diabetes Brother    • Diabetes Maternal Grandfather    • Heart attack Maternal Grandfather    • Diabetes Paternal Grandfather    • Diabetes Maternal Grandmother    • Cancer Maternal Grandmother          Surgical History:  Past Surgical History:   Procedure Laterality Date   • ABLATION OF DYSRHYTHMIC FOCUS  1999 and 2000    Dr. Carlin   • CARDIAC ELECTROPHYSIOLOGY PROCEDURE N/A 6/4/2019    Procedure: Flutter;  Surgeon: Juan Carlin MD;  [place] [facility];   Service: Cardiology   • CARDIAC SURGERY     • WISDOM TOOTH EXTRACTION           Radiology:   MRI Knee Left Without Contrast    Result Date: 4/8/2022  Mild osteoarthritis in the knee. There are type I or degenerative signal changes in the medial meniscus. No tears were identified. There is effusion in the knee joint.  This report was finalized on 4/8/2022 8:47 AM by Dr. David Lujan II, MD.          MRI reviewed of left knee by report grade 1 signal within the posterior horn medial meniscus my review confirms he has irregularity of the medial femoral condyle consistent with osteoarthritis.  No evidence of meniscal tear present.      Examination:   Examination left knee demonstrates minimal effusion moderate medial joint line tenderness full flexion extension with no gross instability.        Assessment & Plan:   51 y.o. male presents follow-up left knee pain with activity especially squatting without evidence of medial meniscus tear we discussed his options he was then provided Monovisc intra-articular with lidocaine block left knee he will follow-up as needed.         Diagnosis Plan   1. Primary osteoarthritis of left knee           Large Joint Arthrocentesis: L knee  Date/Time: 4/18/2022 9:55 PM  Consent given by: patient  Timeout: Immediately prior to procedure a time out was called to verify the correct patient, procedure, equipment, support staff and site/side marked as required   Supporting Documentation  Indications: pain   Procedure Details  Location: knee - L knee  Preparation: Patient was prepped and draped in the usual sterile fashion  Needle size: 20 G  Approach: anterolateral  Medications administered: 5 mL lidocaine 2%; 88 mg Hyaluronan 88 MG/4ML  Patient tolerance: patient tolerated the procedure well with no immediate complications              Cc:  Miles Garvin MD              This document has been electronically signed by Huan Corrales MD   April 18, 2022 21:55 EDT

## 2022-04-25 ENCOUNTER — APPOINTMENT (OUTPATIENT)
Dept: MRI IMAGING | Facility: HOSPITAL | Age: 52
End: 2022-04-25

## 2022-05-05 DIAGNOSIS — F32.4 MAJOR DEPRESSIVE DISORDER WITH SINGLE EPISODE, IN PARTIAL REMISSION: ICD-10-CM

## 2022-05-05 DIAGNOSIS — F41.9 ANXIETY: ICD-10-CM

## 2022-05-06 RX ORDER — OMEPRAZOLE 40 MG/1
CAPSULE, DELAYED RELEASE ORAL
Qty: 30 CAPSULE | Refills: 0 | Status: SHIPPED | OUTPATIENT
Start: 2022-05-06 | End: 2022-06-17

## 2022-05-06 RX ORDER — VENLAFAXINE HYDROCHLORIDE 150 MG/1
CAPSULE, EXTENDED RELEASE ORAL
Qty: 30 CAPSULE | Refills: 6 | Status: SHIPPED | OUTPATIENT
Start: 2022-05-06 | End: 2022-09-13 | Stop reason: SDUPTHER

## 2022-05-27 ENCOUNTER — APPOINTMENT (OUTPATIENT)
Dept: MRI IMAGING | Facility: HOSPITAL | Age: 52
End: 2022-05-27

## 2022-06-16 ENCOUNTER — TELEPHONE (OUTPATIENT)
Dept: FAMILY MEDICINE CLINIC | Facility: CLINIC | Age: 52
End: 2022-06-16

## 2022-06-16 NOTE — TELEPHONE ENCOUNTER
Caller: LIZBET ALLRED    Relationship: Emergency Contact    Best call back number: [phone]    Caller requesting test results: YES    What test was performed: MRI     When was the test performed: CAN NOT REMEMBER     Where was the test performed:     Additional notes: PLEASE CALL WITH RESULTS

## 2022-06-16 NOTE — TELEPHONE ENCOUNTER
It reports as showing some possible blockages of some very small blood vessels. Otherwise, nothing abnormal. A statin would be useful to help prevent a stroke.

## 2022-06-17 ENCOUNTER — OFFICE VISIT (OUTPATIENT)
Dept: FAMILY MEDICINE CLINIC | Facility: CLINIC | Age: 52
End: 2022-06-17

## 2022-06-17 VITALS
HEIGHT: 76 IN | TEMPERATURE: 98 F | HEART RATE: 93 BPM | SYSTOLIC BLOOD PRESSURE: 129 MMHG | BODY MASS INDEX: 38.36 KG/M2 | DIASTOLIC BLOOD PRESSURE: 87 MMHG | OXYGEN SATURATION: 97 % | WEIGHT: 315 LBS

## 2022-06-17 DIAGNOSIS — I70.90 ARTERIOSCLEROSIS: Primary | ICD-10-CM

## 2022-06-17 PROCEDURE — 99213 OFFICE O/P EST LOW 20 MIN: CPT | Performed by: NURSE PRACTITIONER

## 2022-06-17 RX ORDER — OMEPRAZOLE 40 MG/1
CAPSULE, DELAYED RELEASE ORAL
Qty: 30 CAPSULE | Refills: 0 | Status: SHIPPED | OUTPATIENT
Start: 2022-06-17 | End: 2022-08-08

## 2022-06-17 RX ORDER — ATORVASTATIN CALCIUM 20 MG/1
20 TABLET, FILM COATED ORAL DAILY
Qty: 90 TABLET | Refills: 1 | Status: SHIPPED | OUTPATIENT
Start: 2022-06-17 | End: 2022-09-13 | Stop reason: SDUPTHER

## 2022-06-20 NOTE — PROGRESS NOTES
"Subjective   Jimmy Dawson Jr. is a 51 y.o. male.     Chief Complaint   Patient presents with   • imaging follow up    • Dizziness       He presents for follow up of dizziness and slightly abnormal MRI of the brain. MRI reports as showing early small vessel ischemic gliotic change. He would like to know what he should do.       The following portions of the patient's history were reviewed and updated as appropriate: allergies, current medications, past family history, past medical history, past social history, past surgical history and problem list.    Review of Systems   Constitutional: Negative for fever and unexpected weight change.   HENT: Negative for ear pain, rhinorrhea, sore throat and trouble swallowing.    Eyes: Negative for visual disturbance.   Respiratory: Negative for cough, shortness of breath and wheezing.    Cardiovascular: Negative for chest pain and palpitations.   Gastrointestinal: Negative for abdominal pain, blood in stool, constipation, diarrhea, nausea and vomiting.   Genitourinary: Negative for dysuria.   Musculoskeletal: Negative for arthralgias and myalgias.   Skin: Negative for color change.   Allergic/Immunologic: Negative for environmental allergies.   Neurological: Positive for dizziness.   Hematological: Negative for adenopathy.   Psychiatric/Behavioral: Negative for sleep disturbance and suicidal ideas. The patient is not nervous/anxious.        Objective     /87 (BP Location: Left arm, Patient Position: Sitting, Cuff Size: Adult)   Pulse 93   Temp 98 °F (36.7 °C) (Temporal)   Ht 193 cm (75.98\")   Wt (!) 154 kg (339 lb 9.6 oz)   SpO2 97%   BMI 41.35 kg/m²     Physical Exam  Vitals reviewed.   Constitutional:       General: He is not in acute distress.     Appearance: He is well-developed. He is not diaphoretic.   HENT:      Head: Normocephalic and atraumatic.   Cardiovascular:      Rate and Rhythm: Normal rate and regular rhythm.      Heart sounds: Normal heart sounds. " No murmur heard.    No friction rub. No gallop.   Pulmonary:      Effort: Pulmonary effort is normal. No respiratory distress.      Breath sounds: Normal breath sounds.   Abdominal:      General: Bowel sounds are normal. There is no distension.      Palpations: Abdomen is soft.      Tenderness: There is no abdominal tenderness.   Skin:     General: Skin is warm and dry.   Neurological:      Mental Status: He is alert and oriented to person, place, and time.   Psychiatric:         Behavior: Behavior normal.         Thought Content: Thought content normal.         Judgment: Judgment normal.         Current Outpatient Medications   Medication Sig Dispense Refill   • amLODIPine (NORVASC) 5 MG tablet Take 1 tablet by mouth Daily. as directed 90 tablet 4   • busPIRone (BUSPAR) 7.5 MG tablet TAKE ONE TABLET BY MOUTH TWO TIMES A DAY AS DIRECTED 60 tablet 6   • diclofenac (CATAFLAM) 50 MG tablet TAKE ONE TABLET BY MOUTH THREE TIMES A DAY AS DIRECTED 90 tablet 1   • fexofenadine (ALLEGRA) 180 MG tablet TAKE ONE TABLET BY MOUTH EVERY DAY FOR ALLERGIES 30 tablet 6   • fluticasone (FLONASE) 50 MCG/ACT nasal spray 2 sprays into the nostril(s) as directed by provider Daily. 16 g 6   • ipratropium (ATROVENT) 0.02 % nebulizer solution Take 2.5 mL by nebulization 4 (Four) Times a Day. 300 mL 12   • losartan (COZAAR) 50 MG tablet Take 1.5 tablets by mouth Daily. for blood pressure 45 tablet 6   • omeprazole (priLOSEC) 40 MG capsule TAKE ONE CAPSULE BY MOUTH EVERY DAY FOR THE STOMACH 30 capsule 0   • venlafaxine XR (EFFEXOR-XR) 150 MG 24 hr capsule TAKE ONE CAPSULE BY MOUTH EVERY DAY FOR MOOD 30 capsule 6   • atorvastatin (Lipitor) 20 MG tablet Take 1 tablet by mouth Daily. 90 tablet 1     No current facility-administered medications for this visit.            Assessment & Plan     Problem List Items Addressed This Visit    None     Visit Diagnoses     Arteriosclerosis    -  Primary    Relevant Medications    atorvastatin (Lipitor) 20  MG tablet    Other Relevant Orders    Stress test with myocardial perfusion            ICD-10-CM ICD-9-CM   1. Arteriosclerosis  I70.90 440.9       Plan: I explained that small vessel ischemic disease can be treated with a statin to help prevent a stroke in the future. He is agreeable. Atorvastatin started. He is concerned about cardiovascular disease. Stress test ordered. Discussed low fat diet and exercise. Activity as tolerated. Keep scheduled follow up and follow up as needed.    @Body mass index is 41.35 kg/m².              Understands disease processes and need for medications.  Understands reasons for urgent and emergent care.  Patient (& family) verbalized agreement for treatment plan.   Emotional support and active listening provided.  Patient provided time to verbalize feelings.           Class 3 Severe Obesity (BMI >=40). Obesity-related health conditions include the following: hypertension. Obesity is unchanged. BMI is is above average; BMI management plan is completed. We discussed portion control and increasing exercise.      This document has been electronically signed by LIONEL Grider   June 20, 2022 11:34 EDT

## 2022-07-19 ENCOUNTER — APPOINTMENT (OUTPATIENT)
Dept: NUCLEAR MEDICINE | Facility: HOSPITAL | Age: 52
End: 2022-07-19

## 2022-07-19 ENCOUNTER — APPOINTMENT (OUTPATIENT)
Dept: CARDIOLOGY | Facility: HOSPITAL | Age: 52
End: 2022-07-19

## 2022-08-08 RX ORDER — OMEPRAZOLE 40 MG/1
CAPSULE, DELAYED RELEASE ORAL
Qty: 30 CAPSULE | Refills: 0 | Status: SHIPPED | OUTPATIENT
Start: 2022-08-08 | End: 2022-09-13 | Stop reason: SDUPTHER

## 2022-09-13 ENCOUNTER — OFFICE VISIT (OUTPATIENT)
Dept: FAMILY MEDICINE CLINIC | Facility: CLINIC | Age: 52
End: 2022-09-13

## 2022-09-13 VITALS
TEMPERATURE: 98.2 F | WEIGHT: 315 LBS | SYSTOLIC BLOOD PRESSURE: 134 MMHG | HEIGHT: 76 IN | OXYGEN SATURATION: 97 % | DIASTOLIC BLOOD PRESSURE: 78 MMHG | RESPIRATION RATE: 16 BRPM | HEART RATE: 97 BPM | BODY MASS INDEX: 38.36 KG/M2

## 2022-09-13 DIAGNOSIS — J01.10 ACUTE FRONTAL SINUSITIS, RECURRENCE NOT SPECIFIED: ICD-10-CM

## 2022-09-13 DIAGNOSIS — I70.90 ARTERIOSCLEROSIS: ICD-10-CM

## 2022-09-13 DIAGNOSIS — I10 ESSENTIAL HYPERTENSION: Primary | Chronic | ICD-10-CM

## 2022-09-13 DIAGNOSIS — J20.9 ACUTE BRONCHITIS, UNSPECIFIED ORGANISM: ICD-10-CM

## 2022-09-13 DIAGNOSIS — F41.9 ANXIETY: ICD-10-CM

## 2022-09-13 DIAGNOSIS — M25.50 ARTHRALGIA, UNSPECIFIED JOINT: ICD-10-CM

## 2022-09-13 DIAGNOSIS — F32.4 MAJOR DEPRESSIVE DISORDER WITH SINGLE EPISODE, IN PARTIAL REMISSION: ICD-10-CM

## 2022-09-13 DIAGNOSIS — K21.9 GASTROESOPHAGEAL REFLUX DISEASE WITHOUT ESOPHAGITIS: Chronic | ICD-10-CM

## 2022-09-13 DIAGNOSIS — Z12.5 SCREENING PSA (PROSTATE SPECIFIC ANTIGEN): ICD-10-CM

## 2022-09-13 DIAGNOSIS — J31.0 CHRONIC RHINITIS: Chronic | ICD-10-CM

## 2022-09-13 PROCEDURE — 80061 LIPID PANEL: CPT | Performed by: NURSE PRACTITIONER

## 2022-09-13 PROCEDURE — 80053 COMPREHEN METABOLIC PANEL: CPT | Performed by: NURSE PRACTITIONER

## 2022-09-13 PROCEDURE — G0103 PSA SCREENING: HCPCS | Performed by: NURSE PRACTITIONER

## 2022-09-13 PROCEDURE — 99214 OFFICE O/P EST MOD 30 MIN: CPT | Performed by: NURSE PRACTITIONER

## 2022-09-13 PROCEDURE — 85025 COMPLETE CBC W/AUTO DIFF WBC: CPT | Performed by: NURSE PRACTITIONER

## 2022-09-13 RX ORDER — FEXOFENADINE HCL 180 MG/1
180 TABLET ORAL DAILY
Qty: 30 TABLET | Refills: 6 | Status: SHIPPED | OUTPATIENT
Start: 2022-09-13 | End: 2023-03-13 | Stop reason: SDUPTHER

## 2022-09-13 RX ORDER — DICLOFENAC POTASSIUM 50 MG/1
50 TABLET, FILM COATED ORAL 3 TIMES DAILY
Qty: 90 TABLET | Refills: 1 | Status: SHIPPED | OUTPATIENT
Start: 2022-09-13 | End: 2022-11-21 | Stop reason: SDUPTHER

## 2022-09-13 RX ORDER — LOSARTAN POTASSIUM 50 MG/1
75 TABLET ORAL DAILY
Qty: 45 TABLET | Refills: 6 | Status: SHIPPED | OUTPATIENT
Start: 2022-09-13

## 2022-09-13 RX ORDER — FLUTICASONE PROPIONATE 50 MCG
2 SPRAY, SUSPENSION (ML) NASAL DAILY
Qty: 16 G | Refills: 6 | Status: SHIPPED | OUTPATIENT
Start: 2022-09-13 | End: 2022-11-21 | Stop reason: SDUPTHER

## 2022-09-13 RX ORDER — ATORVASTATIN CALCIUM 20 MG/1
20 TABLET, FILM COATED ORAL DAILY
Qty: 30 TABLET | Refills: 5 | Status: SHIPPED | OUTPATIENT
Start: 2022-09-13 | End: 2022-11-21 | Stop reason: SDUPTHER

## 2022-09-13 RX ORDER — VENLAFAXINE HYDROCHLORIDE 150 MG/1
150 CAPSULE, EXTENDED RELEASE ORAL DAILY
Qty: 30 CAPSULE | Refills: 6 | Status: SHIPPED | OUTPATIENT
Start: 2022-09-13 | End: 2022-12-16 | Stop reason: SDUPTHER

## 2022-09-13 RX ORDER — BUSPIRONE HYDROCHLORIDE 7.5 MG/1
7.5 TABLET ORAL 2 TIMES DAILY
Qty: 60 TABLET | Refills: 6 | Status: SHIPPED | OUTPATIENT
Start: 2022-09-13 | End: 2022-12-27 | Stop reason: SDUPTHER

## 2022-09-13 RX ORDER — OMEPRAZOLE 40 MG/1
40 CAPSULE, DELAYED RELEASE ORAL DAILY
Qty: 30 CAPSULE | Refills: 5 | Status: SHIPPED | OUTPATIENT
Start: 2022-09-13

## 2022-09-13 NOTE — PROGRESS NOTES
"Subjective   Jimmy Dawson Jr. is a 51 y.o. male.     Chief Complaint   Patient presents with   • Annual Exam       He presents for follow up of essential hypertension. He reports good blood pressure control. He reports he is taking the atorvastatin. He states he had a colonoscopy within the year.        The following portions of the patient's history were reviewed and updated as appropriate: allergies, current medications, past family history, past medical history, past social history, past surgical history and problem list.    Review of Systems   Constitutional: Negative for fever and unexpected weight change.   HENT: Negative for ear pain, rhinorrhea, sore throat and trouble swallowing.    Eyes: Negative for visual disturbance.   Respiratory: Negative for cough, shortness of breath and wheezing.    Cardiovascular: Negative for chest pain and palpitations.   Gastrointestinal: Negative for abdominal pain, blood in stool, constipation, diarrhea, nausea and vomiting.   Genitourinary: Negative for dysuria.   Musculoskeletal: Negative for arthralgias and myalgias.   Skin: Negative for color change.   Allergic/Immunologic: Negative for environmental allergies.   Neurological: Negative for dizziness.   Hematological: Negative for adenopathy.   Psychiatric/Behavioral: Negative for sleep disturbance and suicidal ideas. The patient is not nervous/anxious.        Objective     /78 (BP Location: Left arm, Patient Position: Sitting, Cuff Size: Adult)   Pulse 97   Temp 98.2 °F (36.8 °C) (Temporal)   Resp 16   Ht 193 cm (75.98\")   Wt (!) 155 kg (342 lb)   SpO2 97%   BMI 41.65 kg/m²     Physical Exam  Vitals reviewed.   Constitutional:       General: He is not in acute distress.     Appearance: He is well-developed. He is not diaphoretic.   HENT:      Head: Normocephalic and atraumatic.   Cardiovascular:      Rate and Rhythm: Normal rate and regular rhythm.      Heart sounds: Normal heart sounds. No murmur " heard.    No friction rub. No gallop.   Pulmonary:      Effort: Pulmonary effort is normal. No respiratory distress.      Breath sounds: Normal breath sounds.   Abdominal:      General: Bowel sounds are normal. There is no distension.      Palpations: Abdomen is soft.      Tenderness: There is no abdominal tenderness.   Skin:     General: Skin is warm and dry.   Neurological:      Mental Status: He is alert and oriented to person, place, and time.   Psychiatric:         Behavior: Behavior normal.         Thought Content: Thought content normal.         Judgment: Judgment normal.         Current Outpatient Medications   Medication Sig Dispense Refill   • amLODIPine (NORVASC) 5 MG tablet Take 1 tablet by mouth Daily. as directed 90 tablet 4   • atorvastatin (Lipitor) 20 MG tablet Take 1 tablet by mouth Daily. 30 tablet 5   • busPIRone (BUSPAR) 7.5 MG tablet Take 1 tablet by mouth 2 (Two) Times a Day. as directed 60 tablet 6   • diclofenac (CATAFLAM) 50 MG tablet Take 1 tablet by mouth 3 (Three) Times a Day. 90 tablet 1   • fexofenadine (ALLEGRA) 180 MG tablet Take 1 tablet by mouth Daily. For allergies 30 tablet 6   • fluticasone (FLONASE) 50 MCG/ACT nasal spray 2 sprays into the nostril(s) as directed by provider Daily. 16 g 6   • ipratropium (ATROVENT) 0.02 % nebulizer solution Take 2.5 mL by nebulization 4 (Four) Times a Day. 300 mL 12   • losartan (COZAAR) 50 MG tablet Take 1.5 tablets by mouth Daily. for blood pressure 45 tablet 6   • omeprazole (priLOSEC) 40 MG capsule Take 1 capsule by mouth Daily. 30 capsule 5   • venlafaxine XR (EFFEXOR-XR) 150 MG 24 hr capsule Take 1 capsule by mouth Daily. 30 capsule 6     No current facility-administered medications for this visit.            Assessment & Plan     Problem List Items Addressed This Visit        Cardiac and Vasculature    Essential hypertension (Chronic)    Relevant Medications    losartan (COZAAR) 50 MG tablet    Other Relevant Orders    CBC & Differential     Comprehensive Metabolic Panel    Lipid Panel       ENT    Chronic rhinitis (Chronic)    Relevant Medications    fluticasone (FLONASE) 50 MCG/ACT nasal spray       Gastrointestinal Abdominal     Gastroesophageal reflux disease without esophagitis (Chronic)    Relevant Medications    omeprazole (priLOSEC) 40 MG capsule       Mental Health    Anxiety (Chronic)    Relevant Medications    busPIRone (BUSPAR) 7.5 MG tablet    venlafaxine XR (EFFEXOR-XR) 150 MG 24 hr capsule    Depression    Relevant Medications    busPIRone (BUSPAR) 7.5 MG tablet    venlafaxine XR (EFFEXOR-XR) 150 MG 24 hr capsule      Other Visit Diagnoses     Arthralgia, unspecified joint    -  Primary    Relevant Medications    venlafaxine XR (EFFEXOR-XR) 150 MG 24 hr capsule    Arteriosclerosis        Relevant Medications    atorvastatin (Lipitor) 20 MG tablet    Acute frontal sinusitis, recurrence not specified        Relevant Medications    fexofenadine (ALLEGRA) 180 MG tablet    Acute bronchitis, unspecified organism        Relevant Medications    fexofenadine (ALLEGRA) 180 MG tablet    fluticasone (FLONASE) 50 MCG/ACT nasal spray    ipratropium (ATROVENT) 0.02 % nebulizer solution    Screening PSA (prostate specific antigen)        Relevant Orders    PSA Screen            ICD-10-CM ICD-9-CM   1. Arthralgia, unspecified joint  M25.50 719.40   2. Arteriosclerosis  I70.90 440.9   3. Anxiety  F41.9 300.00   4. Acute frontal sinusitis, recurrence not specified  J01.10 461.1   5. Acute bronchitis, unspecified organism  J20.9 466.0   6. Chronic rhinitis  J31.0 472.0   7. Essential hypertension  I10 401.9   8. Major depressive disorder with single episode, in partial remission ([facility])  F32.4 296.25   9. Gastroesophageal reflux disease without esophagitis  K21.9 530.81   10. Screening PSA (prostate specific antigen)  Z12.5 V76.44     Plan: Get labs today. Get colonoscopy report. Continue current meds. Follow up in 6 months and as needed.     @Body mass  index is 41.65 kg/m².         Understands disease processes and need for medications.  Understands reasons for urgent and emergent care.  Patient (& family) verbalized agreement for treatment plan.   Emotional support and active listening provided.  Patient provided time to verbalize feelings.           Class 3 Severe Obesity (BMI >=40). Obesity-related health conditions include the following: hypertension. Obesity is unchanged. BMI is is above average; BMI management plan is completed. We discussed info included in summary.      This document has been electronically signed by LIONEL Grider   September 13, 2022 16:28 EDT

## 2022-09-14 LAB
ALBUMIN SERPL-MCNC: 4.4 G/DL (ref 3.5–5.2)
ALBUMIN/GLOB SERPL: 1.5 G/DL
ALP SERPL-CCNC: 81 U/L (ref 39–117)
ALT SERPL W P-5'-P-CCNC: 38 U/L (ref 1–41)
ANION GAP SERPL CALCULATED.3IONS-SCNC: 11.3 MMOL/L (ref 5–15)
AST SERPL-CCNC: 22 U/L (ref 1–40)
BASOPHILS # BLD AUTO: 0.08 10*3/MM3 (ref 0–0.2)
BASOPHILS NFR BLD AUTO: 0.9 % (ref 0–1.5)
BILIRUB SERPL-MCNC: 0.3 MG/DL (ref 0–1.2)
BUN SERPL-MCNC: 17 MG/DL (ref 6–20)
BUN/CREAT SERPL: 21.3 (ref 7–25)
CALCIUM SPEC-SCNC: 9.7 MG/DL (ref 8.6–10.5)
CHLORIDE SERPL-SCNC: 103 MMOL/L (ref 98–107)
CHOLEST SERPL-MCNC: 126 MG/DL (ref 0–200)
CO2 SERPL-SCNC: 24.7 MMOL/L (ref 22–29)
CREAT SERPL-MCNC: 0.8 MG/DL (ref 0.76–1.27)
DEPRECATED RDW RBC AUTO: 42.8 FL (ref 37–54)
EGFRCR SERPLBLD CKD-EPI 2021: 105.8 ML/MIN/1.73
EOSINOPHIL # BLD AUTO: 0.12 10*3/MM3 (ref 0–0.4)
EOSINOPHIL NFR BLD AUTO: 1.3 % (ref 0.3–6.2)
ERYTHROCYTE [DISTWIDTH] IN BLOOD BY AUTOMATED COUNT: 13.1 % (ref 12.3–15.4)
GLOBULIN UR ELPH-MCNC: 2.9 GM/DL
GLUCOSE SERPL-MCNC: 107 MG/DL (ref 65–99)
HCT VFR BLD AUTO: 40.3 % (ref 37.5–51)
HDLC SERPL-MCNC: 31 MG/DL (ref 40–60)
HGB BLD-MCNC: 13.3 G/DL (ref 13–17.7)
IMM GRANULOCYTES # BLD AUTO: 0.04 10*3/MM3 (ref 0–0.05)
IMM GRANULOCYTES NFR BLD AUTO: 0.4 % (ref 0–0.5)
LDLC SERPL CALC-MCNC: 58 MG/DL (ref 0–100)
LDLC/HDLC SERPL: 1.61 {RATIO}
LYMPHOCYTES # BLD AUTO: 3.12 10*3/MM3 (ref 0.7–3.1)
LYMPHOCYTES NFR BLD AUTO: 34 % (ref 19.6–45.3)
MCH RBC QN AUTO: 29.3 PG (ref 26.6–33)
MCHC RBC AUTO-ENTMCNC: 33 G/DL (ref 31.5–35.7)
MCV RBC AUTO: 88.8 FL (ref 79–97)
MONOCYTES # BLD AUTO: 0.73 10*3/MM3 (ref 0.1–0.9)
MONOCYTES NFR BLD AUTO: 8 % (ref 5–12)
NEUTROPHILS NFR BLD AUTO: 5.08 10*3/MM3 (ref 1.7–7)
NEUTROPHILS NFR BLD AUTO: 55.4 % (ref 42.7–76)
NRBC BLD AUTO-RTO: 0 /100 WBC (ref 0–0.2)
PLATELET # BLD AUTO: 312 10*3/MM3 (ref 140–450)
PMV BLD AUTO: 10.4 FL (ref 6–12)
POTASSIUM SERPL-SCNC: 4.2 MMOL/L (ref 3.5–5.2)
PROT SERPL-MCNC: 7.3 G/DL (ref 6–8.5)
PSA SERPL-MCNC: 1.03 NG/ML (ref 0–4)
RBC # BLD AUTO: 4.54 10*6/MM3 (ref 4.14–5.8)
SODIUM SERPL-SCNC: 139 MMOL/L (ref 136–145)
TRIGL SERPL-MCNC: 226 MG/DL (ref 0–150)
VLDLC SERPL-MCNC: 37 MG/DL (ref 5–40)
WBC NRBC COR # BLD: 9.17 10*3/MM3 (ref 3.4–10.8)

## 2022-10-06 ENCOUNTER — TELEPHONE (OUTPATIENT)
Dept: FAMILY MEDICINE CLINIC | Facility: CLINIC | Age: 52
End: 2022-10-06

## 2022-10-06 NOTE — TELEPHONE ENCOUNTER
Incoming Refill Request      Medication requested (name and dose): BACTRIM 800-160 MG    Pharmacy where request should be sent: [facility]    Additional details provided by patient: PATIENT HAS TWO PILLS LEFT    Best call back number: [phone]     Does the patient have less than a 3 day supply:  [x] Yes  [] No    Pam Garcia Rep  10/06/22, 10:53 EDT

## 2022-10-07 ENCOUNTER — TELEPHONE (OUTPATIENT)
Dept: FAMILY MEDICINE CLINIC | Facility: CLINIC | Age: 52
End: 2022-10-07

## 2022-10-07 DIAGNOSIS — L73.9 CHRONIC FOLLICULITIS: Primary | ICD-10-CM

## 2022-10-07 RX ORDER — SULFAMETHOXAZOLE AND TRIMETHOPRIM 800; 160 MG/1; MG/1
1 TABLET ORAL 2 TIMES DAILY
Qty: 20 TABLET | Refills: 0 | Status: SHIPPED | OUTPATIENT
Start: 2022-10-07 | End: 2022-11-01 | Stop reason: SDUPTHER

## 2022-11-01 DIAGNOSIS — L73.9 CHRONIC FOLLICULITIS: ICD-10-CM

## 2022-11-01 RX ORDER — SULFAMETHOXAZOLE AND TRIMETHOPRIM 800; 160 MG/1; MG/1
1 TABLET ORAL 2 TIMES DAILY
Qty: 20 TABLET | Refills: 0 | Status: SHIPPED | OUTPATIENT
Start: 2022-11-01 | End: 2022-11-21 | Stop reason: SDUPTHER

## 2022-11-21 DIAGNOSIS — L73.9 CHRONIC FOLLICULITIS: ICD-10-CM

## 2022-11-21 DIAGNOSIS — I70.90 ARTERIOSCLEROSIS: ICD-10-CM

## 2022-11-21 DIAGNOSIS — J31.0 CHRONIC RHINITIS: Chronic | ICD-10-CM

## 2022-11-21 RX ORDER — FLUTICASONE PROPIONATE 50 MCG
2 SPRAY, SUSPENSION (ML) NASAL DAILY
Qty: 16 G | Refills: 6 | Status: SHIPPED | OUTPATIENT
Start: 2022-11-21 | End: 2023-03-13 | Stop reason: SDUPTHER

## 2022-11-21 RX ORDER — SULFAMETHOXAZOLE AND TRIMETHOPRIM 800; 160 MG/1; MG/1
1 TABLET ORAL 2 TIMES DAILY
Qty: 20 TABLET | Refills: 0 | Status: SHIPPED | OUTPATIENT
Start: 2022-11-21 | End: 2022-12-16 | Stop reason: SDUPTHER

## 2022-11-21 RX ORDER — ATORVASTATIN CALCIUM 20 MG/1
20 TABLET, FILM COATED ORAL DAILY
Qty: 30 TABLET | Refills: 5 | Status: SHIPPED | OUTPATIENT
Start: 2022-11-21

## 2022-11-21 RX ORDER — DICLOFENAC POTASSIUM 50 MG/1
50 TABLET, FILM COATED ORAL 3 TIMES DAILY
Qty: 90 TABLET | Refills: 1 | Status: SHIPPED | OUTPATIENT
Start: 2022-11-21

## 2022-12-16 DIAGNOSIS — F32.4 MAJOR DEPRESSIVE DISORDER WITH SINGLE EPISODE, IN PARTIAL REMISSION: ICD-10-CM

## 2022-12-16 DIAGNOSIS — F41.9 ANXIETY: ICD-10-CM

## 2022-12-16 DIAGNOSIS — M25.50 ARTHRALGIA, UNSPECIFIED JOINT: ICD-10-CM

## 2022-12-16 DIAGNOSIS — L73.9 CHRONIC FOLLICULITIS: ICD-10-CM

## 2022-12-16 RX ORDER — SULFAMETHOXAZOLE AND TRIMETHOPRIM 800; 160 MG/1; MG/1
1 TABLET ORAL 2 TIMES DAILY
Qty: 20 TABLET | Refills: 2 | Status: SHIPPED | OUTPATIENT
Start: 2022-12-16 | End: 2023-03-06 | Stop reason: SDUPTHER

## 2022-12-16 RX ORDER — VENLAFAXINE HYDROCHLORIDE 150 MG/1
150 CAPSULE, EXTENDED RELEASE ORAL DAILY
Qty: 30 CAPSULE | Refills: 6 | Status: SHIPPED | OUTPATIENT
Start: 2022-12-16 | End: 2022-12-27 | Stop reason: SDUPTHER

## 2022-12-27 DIAGNOSIS — F41.9 ANXIETY: ICD-10-CM

## 2022-12-27 DIAGNOSIS — M25.50 ARTHRALGIA, UNSPECIFIED JOINT: ICD-10-CM

## 2022-12-27 DIAGNOSIS — F32.4 MAJOR DEPRESSIVE DISORDER WITH SINGLE EPISODE, IN PARTIAL REMISSION: ICD-10-CM

## 2022-12-27 RX ORDER — BUSPIRONE HYDROCHLORIDE 7.5 MG/1
7.5 TABLET ORAL 2 TIMES DAILY
Qty: 60 TABLET | Refills: 6 | Status: SHIPPED | OUTPATIENT
Start: 2022-12-27 | End: 2023-03-13 | Stop reason: SDUPTHER

## 2022-12-27 RX ORDER — VENLAFAXINE HYDROCHLORIDE 150 MG/1
150 CAPSULE, EXTENDED RELEASE ORAL DAILY
Qty: 30 CAPSULE | Refills: 6 | Status: SHIPPED | OUTPATIENT
Start: 2022-12-27 | End: 2023-03-13 | Stop reason: SDUPTHER

## 2023-01-20 ENCOUNTER — TELEPHONE (OUTPATIENT)
Dept: ORTHOPEDIC SURGERY | Facility: CLINIC | Age: 53
End: 2023-01-20

## 2023-01-20 NOTE — TELEPHONE ENCOUNTER
SPOKE WITH LIZBET. PATIENT IS WANTING THE MONOVISC INJECTION IN BOTH KNEES.     LOOKS LIKE [place] AUTHORIZATION IS  FOR ORTHOPEDICS. NEW [facility] AUTH WILL HAVE TO BE OBTAINED. VERBALIZED UNDERSTANDING.

## 2023-01-20 NOTE — TELEPHONE ENCOUNTER
Caller:  LIZBET ALLRED    Relationship to patient: [employer]( [employer])    Best call back number: [phone]    Chief complaint: BILATERAL KNEE PAIN    Type of visit: REQUEST INJECTION FOR BOTH KNEES    Requested date: ASAP         Additional notes:PATIENT HAD LEFT KNEE INJ ON PREVIOUS VISIT- IS NOW REQ INJ FOR BOTH KNEES IF POSSIBLE

## 2023-01-30 RX ORDER — AMLODIPINE BESYLATE 5 MG/1
5 TABLET ORAL DAILY
Qty: 30 TABLET | Refills: 0 | Status: SHIPPED | OUTPATIENT
Start: 2023-01-30 | End: 2023-03-13

## 2023-02-27 RX ORDER — AMLODIPINE BESYLATE 5 MG/1
5 TABLET ORAL DAILY
Qty: 30 TABLET | Refills: 0 | Status: CANCELLED | OUTPATIENT
Start: 2023-02-27

## 2023-02-27 NOTE — TELEPHONE ENCOUNTER
DELETE AFTER REVIEWING: Send the encounter HIGH priority, If patient has less than a 3 day supply. If the patient will run out of medication over the weekend add that information to the additional details line. Send this encounter to the clinical pool.    Caller: LIZBET ALLRED    Relationship: Emergency Contact     Best call back number: [phone]  Requested Prescriptions:   Requested Prescriptions     Pending Prescriptions Disp Refills   • amLODIPine (NORVASC) 5 MG tablet 30 tablet 0     Sig: Take 1 tablet by mouth Daily. as directed        Pharmacy where request should be sent:      Additional details provided by patient: PT HAS ABOUT 12 DAYS REMAINING. WILL NEED MORE BEFORE FU APPT.    Does the patient have less than a 3 day supply:  [] Yes  [x] No    Would you like a call back once the refill request has been completed: [] Yes [x] No    If the office needs to give you a call back, can they leave a voicemail: [] Yes [x] No    Pam Rutledge Rep   02/27/23 11:38 EST

## 2023-03-06 DIAGNOSIS — L73.9 CHRONIC FOLLICULITIS: ICD-10-CM

## 2023-03-06 RX ORDER — SULFAMETHOXAZOLE AND TRIMETHOPRIM 800; 160 MG/1; MG/1
1 TABLET ORAL 2 TIMES DAILY
Qty: 20 TABLET | Refills: 2 | Status: SHIPPED | OUTPATIENT
Start: 2023-03-06 | End: 2023-03-13 | Stop reason: SDUPTHER

## 2023-03-13 ENCOUNTER — OFFICE VISIT (OUTPATIENT)
Dept: FAMILY MEDICINE CLINIC | Facility: CLINIC | Age: 53
End: 2023-03-13
Payer: OTHER GOVERNMENT

## 2023-03-13 VITALS
OXYGEN SATURATION: 96 % | WEIGHT: 315 LBS | HEIGHT: 76 IN | DIASTOLIC BLOOD PRESSURE: 94 MMHG | HEART RATE: 85 BPM | SYSTOLIC BLOOD PRESSURE: 146 MMHG | TEMPERATURE: 97.7 F | BODY MASS INDEX: 38.36 KG/M2

## 2023-03-13 DIAGNOSIS — L73.9 CHRONIC FOLLICULITIS: ICD-10-CM

## 2023-03-13 DIAGNOSIS — L60.0 INGROWN LEFT GREATER TOENAIL: ICD-10-CM

## 2023-03-13 DIAGNOSIS — J31.0 CHRONIC RHINITIS: Chronic | ICD-10-CM

## 2023-03-13 DIAGNOSIS — E78.2 MIXED HYPERLIPIDEMIA: Chronic | ICD-10-CM

## 2023-03-13 DIAGNOSIS — F41.9 ANXIETY: ICD-10-CM

## 2023-03-13 DIAGNOSIS — F32.4 MAJOR DEPRESSIVE DISORDER WITH SINGLE EPISODE, IN PARTIAL REMISSION: ICD-10-CM

## 2023-03-13 DIAGNOSIS — I10 ESSENTIAL HYPERTENSION: Primary | Chronic | ICD-10-CM

## 2023-03-13 PROCEDURE — 85025 COMPLETE CBC W/AUTO DIFF WBC: CPT | Performed by: NURSE PRACTITIONER

## 2023-03-13 PROCEDURE — 99214 OFFICE O/P EST MOD 30 MIN: CPT | Performed by: NURSE PRACTITIONER

## 2023-03-13 PROCEDURE — 80061 LIPID PANEL: CPT | Performed by: NURSE PRACTITIONER

## 2023-03-13 PROCEDURE — 80053 COMPREHEN METABOLIC PANEL: CPT | Performed by: NURSE PRACTITIONER

## 2023-03-13 PROCEDURE — 36415 COLL VENOUS BLD VENIPUNCTURE: CPT | Performed by: NURSE PRACTITIONER

## 2023-03-13 RX ORDER — AMLODIPINE BESYLATE 5 MG/1
TABLET ORAL
Qty: 30 TABLET | Refills: 0 | Status: SHIPPED | OUTPATIENT
Start: 2023-03-13

## 2023-03-13 RX ORDER — FLUTICASONE PROPIONATE 50 MCG
2 SPRAY, SUSPENSION (ML) NASAL DAILY
Qty: 16 G | Refills: 6 | Status: SHIPPED | OUTPATIENT
Start: 2023-03-13

## 2023-03-13 RX ORDER — BUSPIRONE HYDROCHLORIDE 7.5 MG/1
7.5 TABLET ORAL 2 TIMES DAILY
Qty: 60 TABLET | Refills: 6 | Status: SHIPPED | OUTPATIENT
Start: 2023-03-13 | End: 2023-03-14

## 2023-03-13 RX ORDER — FEXOFENADINE HCL 180 MG/1
180 TABLET ORAL DAILY
Qty: 30 TABLET | Refills: 6 | Status: SHIPPED | OUTPATIENT
Start: 2023-03-13 | End: 2023-03-14

## 2023-03-13 RX ORDER — VENLAFAXINE HYDROCHLORIDE 150 MG/1
150 CAPSULE, EXTENDED RELEASE ORAL DAILY
Qty: 30 CAPSULE | Refills: 6 | Status: SHIPPED | OUTPATIENT
Start: 2023-03-13 | End: 2023-03-14

## 2023-03-13 RX ORDER — SULFAMETHOXAZOLE AND TRIMETHOPRIM 800; 160 MG/1; MG/1
1 TABLET ORAL 2 TIMES DAILY
Qty: 20 TABLET | Refills: 2 | Status: SHIPPED | OUTPATIENT
Start: 2023-03-13

## 2023-03-13 NOTE — PROGRESS NOTES
"Subjective   Jimmy Dawson Jr. is a 52 y.o. male.     Chief Complaint   Patient presents with   • Hypertension   • Nail Problem       History of Present Illness  He presents for follow up of essential hypertension and mixed hyperlipidemia. He reports good blood pressure control. He states his blood pressure is high today because he is adjusting to the time change. He reports good compliance with his statin. He c/o an ingrown left great toenail. He states it has pus coming out yesterday. His wife tried to remove the ingrown toenail. He is taking bactrim daily for folliculitis.        The following portions of the patient's history were reviewed and updated as appropriate: allergies, current medications, past family history, past medical history, past social history, past surgical history and problem list.    Review of Systems   Constitutional: Negative for fever and unexpected weight change.   Respiratory: Negative for cough, shortness of breath and wheezing.    Cardiovascular: Negative for chest pain and palpitations.   Gastrointestinal: Negative for abdominal pain, blood in stool, constipation, diarrhea, nausea and vomiting.   Genitourinary: Negative for dysuria and hematuria.   Skin: Positive for wound.   Allergic/Immunologic: Negative for environmental allergies.   Neurological: Negative for dizziness.   Hematological: Negative for adenopathy.   Psychiatric/Behavioral: Negative for sleep disturbance and suicidal ideas. The patient is not nervous/anxious.        Objective     /94 (BP Location: Left arm, Patient Position: Sitting, Cuff Size: Adult)   Pulse 85   Temp 97.7 °F (36.5 °C) (Temporal)   Ht 193 cm (75.98\")   Wt (!) 150 kg (331 lb 3.2 oz)   SpO2 96%   BMI 40.33 kg/m²     Physical Exam  Vitals reviewed.   Constitutional:       General: He is not in acute distress.     Appearance: He is well-developed. He is not diaphoretic.   HENT:      Head: Normocephalic and atraumatic.   Cardiovascular:     "  Rate and Rhythm: Normal rate and regular rhythm.      Heart sounds: Normal heart sounds. No murmur heard.    No friction rub. No gallop.   Pulmonary:      Effort: Pulmonary effort is normal. No respiratory distress.      Breath sounds: Normal breath sounds.   Abdominal:      General: Bowel sounds are normal. There is no distension.      Palpations: Abdomen is soft.      Tenderness: There is no abdominal tenderness.   Skin:     General: Skin is warm and dry.      Comments: Slight erythema left lateral tissue adjacent to great toenail. No drainage noted. Toenail is curled inward on both sides.    Neurological:      Mental Status: He is alert and oriented to person, place, and time.   Psychiatric:         Behavior: Behavior normal.         Thought Content: Thought content normal.         Judgment: Judgment normal.         Current Outpatient Medications   Medication Sig Dispense Refill   • atorvastatin (Lipitor) 20 MG tablet Take 1 tablet by mouth Daily. 30 tablet 5   • busPIRone (BUSPAR) 7.5 MG tablet Take 1 tablet by mouth 2 (Two) Times a Day. as directed 180 tablet 3   • diclofenac (CATAFLAM) 50 MG tablet Take 1 tablet by mouth 3 (Three) Times a Day. 90 tablet 1   • fexofenadine (ALLEGRA) 180 MG tablet Take 1 tablet by mouth Daily. For allergies 90 tablet 3   • fluticasone (FLONASE) 50 MCG/ACT nasal spray 2 sprays into the nostril(s) as directed by provider Daily. 16 g 6   • losartan (COZAAR) 50 MG tablet Take 1.5 tablets by mouth Daily. for blood pressure 45 tablet 6   • omeprazole (priLOSEC) 40 MG capsule Take 1 capsule by mouth Daily. 30 capsule 5   • sulfamethoxazole-trimethoprim (Bactrim DS) 800-160 MG per tablet Take 1 tablet by mouth 2 (Two) Times a Day. 20 tablet 2   • venlafaxine XR (EFFEXOR-XR) 150 MG 24 hr capsule Take 1 capsule by mouth Daily. 90 capsule 3   • amLODIPine (NORVASC) 5 MG tablet TAKE ONE TABLET BY MOUTH EVERY DAY FOR BLOOD PRESSURE 30 tablet 0     No current facility-administered  medications for this visit.            Assessment & Plan     Problem List Items Addressed This Visit        Cardiac and Vasculature    Essential hypertension - Primary (Chronic)    Relevant Orders    CBC & Differential    Comprehensive Metabolic Panel       ENT    Chronic rhinitis (Chronic)    Relevant Medications    fluticasone (FLONASE) 50 MCG/ACT nasal spray    fexofenadine (ALLEGRA) 180 MG tablet       Mental Health    Anxiety (Chronic)    Relevant Medications    venlafaxine XR (EFFEXOR-XR) 150 MG 24 hr capsule    busPIRone (BUSPAR) 7.5 MG tablet    Depression    Relevant Medications    venlafaxine XR (EFFEXOR-XR) 150 MG 24 hr capsule    busPIRone (BUSPAR) 7.5 MG tablet       Skin    Chronic folliculitis    Relevant Medications    sulfamethoxazole-trimethoprim (Bactrim DS) 800-160 MG per tablet   Other Visit Diagnoses     Ingrown left greater toenail        Relevant Orders    Ambulatory Referral to Podiatry (Completed)    Mixed hyperlipidemia  (Chronic)       Relevant Orders    Lipid Panel            ICD-10-CM ICD-9-CM   1. Essential hypertension  I10 401.9   2. Anxiety  F41.9 300.00   3. Major depressive disorder with single episode, in partial remission ([facility])  F32.4 296.25   4. Chronic rhinitis  J31.0 472.0   5. Ingrown left greater toenail  L60.0 703.0   6. Mixed hyperlipidemia  E78.2 272.2   7. Chronic folliculitis  L73.9 704.8       Plan: Continue current meds. Get labs today. Monitor blood pressure. Report if consistently high. Refer to podiatry for ingrown toenail and increase bactrim to bid x10 days as it had drainage and pus. Follow up in 6 months and as needed.     @Body mass index is 40.33 kg/m².                Understands disease processes and need for medications.  Understands reasons for urgent and emergent care.  Patient (& family) verbalized agreement for treatment plan.   Emotional support and active listening provided.  Patient provided time to verbalize feelings.           Class 3 Severe  Obesity (BMI >=40). Obesity-related health conditions include the following: hypertension. Obesity is unchanged. BMI is is above average; BMI management plan is completed. We discussed included info in summary.      This document has been electronically signed by LIONEL Grider   March 14, 2023 08:48 EDT

## 2023-03-14 RX ORDER — FEXOFENADINE HCL 180 MG/1
180 TABLET ORAL DAILY
Qty: 90 TABLET | Refills: 3 | Status: SHIPPED | OUTPATIENT
Start: 2023-03-14

## 2023-03-14 RX ORDER — FEXOFENADINE HCL 180 MG/1
180 TABLET ORAL DAILY
Qty: 30 TABLET | Refills: 6 | Status: SHIPPED | OUTPATIENT
Start: 2023-03-14 | End: 2023-03-14

## 2023-03-14 RX ORDER — BUSPIRONE HYDROCHLORIDE 7.5 MG/1
7.5 TABLET ORAL 2 TIMES DAILY
Qty: 180 TABLET | Refills: 3 | Status: SHIPPED | OUTPATIENT
Start: 2023-03-14

## 2023-03-14 RX ORDER — VENLAFAXINE HYDROCHLORIDE 150 MG/1
150 CAPSULE, EXTENDED RELEASE ORAL DAILY
Qty: 90 CAPSULE | Refills: 3 | Status: SHIPPED | OUTPATIENT
Start: 2023-03-14

## 2023-03-15 LAB
ALBUMIN SERPL-MCNC: 4.6 G/DL (ref 3.5–5.2)
ALBUMIN/GLOB SERPL: 1.4 G/DL
ALP SERPL-CCNC: 81 U/L (ref 39–117)
ALT SERPL W P-5'-P-CCNC: 37 U/L (ref 1–41)
ANION GAP SERPL CALCULATED.3IONS-SCNC: 13.9 MMOL/L (ref 5–15)
AST SERPL-CCNC: 15 U/L (ref 1–40)
BASOPHILS # BLD AUTO: 0.1 10*3/MM3 (ref 0–0.2)
BASOPHILS NFR BLD AUTO: 1.1 % (ref 0–1.5)
BILIRUB SERPL-MCNC: <0.2 MG/DL (ref 0–1.2)
BUN SERPL-MCNC: 16 MG/DL (ref 6–20)
BUN/CREAT SERPL: 17.2 (ref 7–25)
CALCIUM SPEC-SCNC: 10.3 MG/DL (ref 8.6–10.5)
CHLORIDE SERPL-SCNC: 104 MMOL/L (ref 98–107)
CHOLEST SERPL-MCNC: 138 MG/DL (ref 0–200)
CO2 SERPL-SCNC: 25.1 MMOL/L (ref 22–29)
CREAT SERPL-MCNC: 0.93 MG/DL (ref 0.76–1.27)
DEPRECATED RDW RBC AUTO: 43.4 FL (ref 37–54)
EGFRCR SERPLBLD CKD-EPI 2021: 97.6 ML/MIN/1.73
EOSINOPHIL # BLD AUTO: 0.09 10*3/MM3 (ref 0–0.4)
EOSINOPHIL NFR BLD AUTO: 1 % (ref 0.3–6.2)
ERYTHROCYTE [DISTWIDTH] IN BLOOD BY AUTOMATED COUNT: 13.3 % (ref 12.3–15.4)
GLOBULIN UR ELPH-MCNC: 3.4 GM/DL
GLUCOSE SERPL-MCNC: 76 MG/DL (ref 65–99)
HCT VFR BLD AUTO: 41.3 % (ref 37.5–51)
HDLC SERPL-MCNC: 31 MG/DL (ref 40–60)
HGB BLD-MCNC: 13.7 G/DL (ref 13–17.7)
IMM GRANULOCYTES # BLD AUTO: 0.01 10*3/MM3 (ref 0–0.05)
IMM GRANULOCYTES NFR BLD AUTO: 0.1 % (ref 0–0.5)
LDLC SERPL CALC-MCNC: 75 MG/DL (ref 0–100)
LDLC/HDLC SERPL: 2.25 {RATIO}
LYMPHOCYTES # BLD AUTO: 3.28 10*3/MM3 (ref 0.7–3.1)
LYMPHOCYTES NFR BLD AUTO: 35.5 % (ref 19.6–45.3)
MCH RBC QN AUTO: 29.1 PG (ref 26.6–33)
MCHC RBC AUTO-ENTMCNC: 33.2 G/DL (ref 31.5–35.7)
MCV RBC AUTO: 87.9 FL (ref 79–97)
MONOCYTES # BLD AUTO: 0.73 10*3/MM3 (ref 0.1–0.9)
MONOCYTES NFR BLD AUTO: 7.9 % (ref 5–12)
NEUTROPHILS NFR BLD AUTO: 5.03 10*3/MM3 (ref 1.7–7)
NEUTROPHILS NFR BLD AUTO: 54.4 % (ref 42.7–76)
NRBC BLD AUTO-RTO: 0 /100 WBC (ref 0–0.2)
PLATELET # BLD AUTO: 323 10*3/MM3 (ref 140–450)
PMV BLD AUTO: 10.9 FL (ref 6–12)
POTASSIUM SERPL-SCNC: 5.1 MMOL/L (ref 3.5–5.2)
PROT SERPL-MCNC: 8 G/DL (ref 6–8.5)
RBC # BLD AUTO: 4.7 10*6/MM3 (ref 4.14–5.8)
SODIUM SERPL-SCNC: 143 MMOL/L (ref 136–145)
TRIGL SERPL-MCNC: 186 MG/DL (ref 0–150)
VLDLC SERPL-MCNC: 32 MG/DL (ref 5–40)
WBC NRBC COR # BLD: 9.24 10*3/MM3 (ref 3.4–10.8)

## 2023-03-20 ENCOUNTER — OFFICE VISIT (OUTPATIENT)
Dept: CARDIOLOGY | Facility: CLINIC | Age: 53
End: 2023-03-20
Payer: OTHER GOVERNMENT

## 2023-03-20 VITALS
BODY MASS INDEX: 38.36 KG/M2 | HEIGHT: 76 IN | DIASTOLIC BLOOD PRESSURE: 82 MMHG | HEART RATE: 98 BPM | OXYGEN SATURATION: 97 % | SYSTOLIC BLOOD PRESSURE: 134 MMHG | WEIGHT: 315 LBS

## 2023-03-20 DIAGNOSIS — I45.6 WPW (WOLFF-PARKINSON-WHITE SYNDROME): Primary | Chronic | ICD-10-CM

## 2023-03-20 DIAGNOSIS — I10 ESSENTIAL HYPERTENSION: Chronic | ICD-10-CM

## 2023-03-20 PROCEDURE — 99213 OFFICE O/P EST LOW 20 MIN: CPT | Performed by: PHYSICIAN ASSISTANT

## 2023-03-20 PROCEDURE — 93000 ELECTROCARDIOGRAM COMPLETE: CPT | Performed by: PHYSICIAN ASSISTANT

## 2023-03-20 NOTE — PROGRESS NOTES
Nena Pike, LIONEL  Jimmy Dawson   1970 03/20/2023    Patient Active Problem List   Diagnosis   • Gastroesophageal reflux disease without esophagitis   • Depression   • Essential hypertension   • Palpitations   • SVT (supraventricular tachycardia), s/p RF ablation, in 2000 x2   • WPW (Brayan-Parkinson-White syndrome), s/p RF ablation 2000.   • Chronic rhinitis   • TAMIKO (obstructive sleep apnea)   • Typical atrial flutter (HCC)   • History of 2019 novel coronavirus disease (COVID-19)   • Anxiety   • Chronic folliculitis       Dear Nena Pike, LIONEL:    Subjective     History of Present Illness:    Chief Complaint   Patient presents with   • Follow-up       Jimmy Dawson Jr. is a pleasant 52 y.o. male with a past medical history significant for    Allergies   Allergen Reactions   • Aleve [Naproxen] Hives   :      Current Outpatient Medications:   •  amLODIPine (NORVASC) 5 MG tablet, TAKE ONE TABLET BY MOUTH EVERY DAY FOR BLOOD PRESSURE, Disp: 30 tablet, Rfl: 0  •  atorvastatin (Lipitor) 20 MG tablet, Take 1 tablet by mouth Daily., Disp: 30 tablet, Rfl: 5  •  busPIRone (BUSPAR) 7.5 MG tablet, Take 1 tablet by mouth 2 (Two) Times a Day. as directed, Disp: 180 tablet, Rfl: 3  •  diclofenac (CATAFLAM) 50 MG tablet, Take 1 tablet by mouth 3 (Three) Times a Day., Disp: 90 tablet, Rfl: 1  •  fexofenadine (ALLEGRA) 180 MG tablet, Take 1 tablet by mouth Daily. For allergies, Disp: 90 tablet, Rfl: 3  •  fluticasone (FLONASE) 50 MCG/ACT nasal spray, 2 sprays into the nostril(s) as directed by provider Daily., Disp: 16 g, Rfl: 6  •  losartan (COZAAR) 50 MG tablet, Take 1.5 tablets by mouth Daily. for blood pressure, Disp: 45 tablet, Rfl: 6  •  omeprazole (priLOSEC) 40 MG capsule, Take 1 capsule by mouth Daily., Disp: 30 capsule, Rfl: 5  •  sulfamethoxazole-trimethoprim (Bactrim DS) 800-160 MG per tablet, Take 1 tablet by mouth 2 (Two) Times a Day., Disp: 20 tablet, Rfl: 2  •  venlafaxine XR  "(EFFEXOR-XR) 150 MG 24 hr capsule, Take 1 capsule by mouth Daily., Disp: 90 capsule, Rfl: 3    The following portions of the patient's history were reviewed and updated as appropriate: allergies, current medications, past family history, past medical history, past social history, past surgical history and problem list.    Social History     Tobacco Use   • Smoking status: Never   • Smokeless tobacco: Never   Vaping Use   • Vaping Use: Never used   Substance Use Topics   • Alcohol use: Yes     Comment: 4-5 BEERS, TWICE A WEEK   • Drug use: No         Objective   Vitals:    03/20/23 0931   BP: 134/82   BP Location: Right arm   Patient Position: Sitting   Cuff Size: Adult   Pulse: 98   SpO2: 97%   Weight: (!) 152 kg (334 lb)   Height: 193 cm (76\")     Body mass index is 40.66 kg/m².    Physical Exam    Lab Results   Component Value Date     03/13/2023    K 5.1 03/13/2023     03/13/2023    CO2 25.1 03/13/2023    BUN 16 03/13/2023    CREATININE 0.93 03/13/2023    GLUCOSE 76 03/13/2023    CALCIUM 10.3 03/13/2023    AST 15 03/13/2023    ALT 37 03/13/2023    ALKPHOS 81 03/13/2023    LABIL2 1.3 (L) 01/25/2016     Lab Results   Component Value Date    CKTOTAL 147 11/06/2020     Lab Results   Component Value Date    WBC 9.24 03/13/2023    HGB 13.7 03/13/2023    HCT 41.3 03/13/2023     03/13/2023     Lab Results   Component Value Date    INR 1.08 06/03/2019     Lab Results   Component Value Date    MG 2.0 11/06/2020     Lab Results   Component Value Date    TSH 1.780 11/06/2020    PSA 1.030 09/13/2022    TRIG 186 (H) 03/13/2023    HDL 31 (L) 03/13/2023    LDL 75 03/13/2023      No results found for: BNP    During this visit the following were done:  Labs Reviewed []    Labs Ordered []    Radiology Reports Reviewed []    Radiology Ordered []    PCP Records Reviewed []    Referring Provider Records Reviewed []    ER Records Reviewed []    Hospital Records Reviewed []    History Obtained From Family []  "   Radiology Images Reviewed []    Other Reviewed []    Records Requested []         ECG 12 Lead    Date/Time: 3/20/2023 9:44 AM  Performed by: Anthony Ruff PA-C  Authorized by: Anthony Ruff PA-C               Assessment & Plan   No diagnosis found.         Recommendations:  1.     No follow-ups on file.    As always, I appreciate very much the opportunity to participate in the cardiovascular care of your patients.      With Best Regards,    Anthony Ruff PA-C

## 2023-03-20 NOTE — PROGRESS NOTES
Nena Pike, LIONEL  Jimmy Dawson   1970 03/20/2023    Patient Active Problem List   Diagnosis   • Gastroesophageal reflux disease without esophagitis   • Depression   • Essential hypertension   • Palpitations   • SVT (supraventricular tachycardia), s/p RF ablation, in 2000 x2   • WPW (Brayan-Parkinson-White syndrome), s/p RF ablation 2000.   • Chronic rhinitis   • TAMIKO (obstructive sleep apnea)   • Typical atrial flutter (HCC)   • History of 2019 novel coronavirus disease (COVID-19)   • Anxiety   • Chronic folliculitis       Dear Nena Pike, LIONEL:    Subjective     History of Present Illness:    Chief Complaint   Patient presents with   • Follow-up       Jimmy Dawson Jr. is a pleasant 52 y.o. male with a past medical history significant for    Allergies   Allergen Reactions   • Aleve [Naproxen] Hives   :      Current Outpatient Medications:   •  amLODIPine (NORVASC) 5 MG tablet, TAKE ONE TABLET BY MOUTH EVERY DAY FOR BLOOD PRESSURE, Disp: 30 tablet, Rfl: 0  •  atorvastatin (Lipitor) 20 MG tablet, Take 1 tablet by mouth Daily., Disp: 30 tablet, Rfl: 5  •  busPIRone (BUSPAR) 7.5 MG tablet, Take 1 tablet by mouth 2 (Two) Times a Day. as directed, Disp: 180 tablet, Rfl: 3  •  diclofenac (CATAFLAM) 50 MG tablet, Take 1 tablet by mouth 3 (Three) Times a Day., Disp: 90 tablet, Rfl: 1  •  fexofenadine (ALLEGRA) 180 MG tablet, Take 1 tablet by mouth Daily. For allergies, Disp: 90 tablet, Rfl: 3  •  fluticasone (FLONASE) 50 MCG/ACT nasal spray, 2 sprays into the nostril(s) as directed by provider Daily., Disp: 16 g, Rfl: 6  •  losartan (COZAAR) 50 MG tablet, Take 1.5 tablets by mouth Daily. for blood pressure, Disp: 45 tablet, Rfl: 6  •  omeprazole (priLOSEC) 40 MG capsule, Take 1 capsule by mouth Daily., Disp: 30 capsule, Rfl: 5  •  sulfamethoxazole-trimethoprim (Bactrim DS) 800-160 MG per tablet, Take 1 tablet by mouth 2 (Two) Times a Day., Disp: 20 tablet, Rfl: 2  •  venlafaxine XR  "(EFFEXOR-XR) 150 MG 24 hr capsule, Take 1 capsule by mouth Daily., Disp: 90 capsule, Rfl: 3    The following portions of the patient's history were reviewed and updated as appropriate: allergies, current medications, past family history, past medical history, past social history, past surgical history and problem list.    Social History     Tobacco Use   • Smoking status: Never   • Smokeless tobacco: Never   Vaping Use   • Vaping Use: Never used   Substance Use Topics   • Alcohol use: Yes     Comment: 4-5 BEERS, TWICE A WEEK   • Drug use: No         Objective   Vitals:    03/20/23 0931   BP: 134/82   BP Location: Right arm   Patient Position: Sitting   Cuff Size: Adult   Pulse: 98   SpO2: 97%   Weight: (!) 152 kg (334 lb)   Height: 193 cm (76\")     Body mass index is 40.66 kg/m².    Physical Exam    Lab Results   Component Value Date     03/13/2023    K 5.1 03/13/2023     03/13/2023    CO2 25.1 03/13/2023    BUN 16 03/13/2023    CREATININE 0.93 03/13/2023    GLUCOSE 76 03/13/2023    CALCIUM 10.3 03/13/2023    AST 15 03/13/2023    ALT 37 03/13/2023    ALKPHOS 81 03/13/2023    LABIL2 1.3 (L) 01/25/2016     Lab Results   Component Value Date    CKTOTAL 147 11/06/2020     Lab Results   Component Value Date    WBC 9.24 03/13/2023    HGB 13.7 03/13/2023    HCT 41.3 03/13/2023     03/13/2023     Lab Results   Component Value Date    INR 1.08 06/03/2019     Lab Results   Component Value Date    MG 2.0 11/06/2020     Lab Results   Component Value Date    TSH 1.780 11/06/2020    PSA 1.030 09/13/2022    TRIG 186 (H) 03/13/2023    HDL 31 (L) 03/13/2023    LDL 75 03/13/2023      No results found for: BNP    During this visit the following were done:  Labs Reviewed []    Labs Ordered []    Radiology Reports Reviewed []    Radiology Ordered []    PCP Records Reviewed []    Referring Provider Records Reviewed []    ER Records Reviewed []    Hospital Records Reviewed []    History Obtained From Family []  "   Radiology Images Reviewed []    Other Reviewed []    Records Requested []       Procedures    Assessment & Plan   No diagnosis found.         Recommendations:  1.     No follow-ups on file.    As always, I appreciate very much the opportunity to participate in the cardiovascular care of your patients.      With Best Regards,    Anthony Ruff PA-C

## 2023-03-20 NOTE — PROGRESS NOTES
Nena Pike APRN  Jimmy Dawson Jr.  1970 03/20/2023    Patient Active Problem List   Diagnosis   • Gastroesophageal reflux disease without esophagitis   • Depression   • Essential hypertension   • Palpitations   • SVT (supraventricular tachycardia), s/p RF ablation, in 2000 x2   • WPW (Brayan-Parkinson-White syndrome), s/p RF ablation 2000.   • Chronic rhinitis   • TAMIKO (obstructive sleep apnea)   • Typical atrial flutter (HCC)   • History of 2019 novel coronavirus disease (COVID-19)   • Anxiety   • Chronic folliculitis       Dear Nena Pike APRN:    Subjective     History of Present Illness:    Chief Complaint   Patient presents with   • Follow-up       Jimmy Dawson Jr. is a pleasant 52 y.o. male with a past medical history significant for WPW as well as atrial flutter status post ablation with no recurrence since.  Patient comes in for routine follow-up.    Patient denies any chest pain, shortness of breath, palpitations, dizziness, syncope or near syncope. His blood pressure is well controlled, managed by his pcp. He also denies any breakthrough episdoes of SVT.       Allergies   Allergen Reactions   • Aleve [Naproxen] Hives   :      Current Outpatient Medications:   •  amLODIPine (NORVASC) 5 MG tablet, TAKE ONE TABLET BY MOUTH EVERY DAY FOR BLOOD PRESSURE, Disp: 30 tablet, Rfl: 0  •  atorvastatin (Lipitor) 20 MG tablet, Take 1 tablet by mouth Daily., Disp: 30 tablet, Rfl: 5  •  busPIRone (BUSPAR) 7.5 MG tablet, Take 1 tablet by mouth 2 (Two) Times a Day. as directed, Disp: 180 tablet, Rfl: 3  •  diclofenac (CATAFLAM) 50 MG tablet, Take 1 tablet by mouth 3 (Three) Times a Day., Disp: 90 tablet, Rfl: 1  •  fexofenadine (ALLEGRA) 180 MG tablet, Take 1 tablet by mouth Daily. For allergies, Disp: 90 tablet, Rfl: 3  •  fluticasone (FLONASE) 50 MCG/ACT nasal spray, 2 sprays into the nostril(s) as directed by provider Daily., Disp: 16 g, Rfl: 6  •  losartan (COZAAR) 50 MG tablet, Take 1.5  "tablets by mouth Daily. for blood pressure, Disp: 45 tablet, Rfl: 6  •  omeprazole (priLOSEC) 40 MG capsule, Take 1 capsule by mouth Daily., Disp: 30 capsule, Rfl: 5  •  sulfamethoxazole-trimethoprim (Bactrim DS) 800-160 MG per tablet, Take 1 tablet by mouth 2 (Two) Times a Day., Disp: 20 tablet, Rfl: 2  •  venlafaxine XR (EFFEXOR-XR) 150 MG 24 hr capsule, Take 1 capsule by mouth Daily., Disp: 90 capsule, Rfl: 3    The following portions of the patient's history were reviewed and updated as appropriate: allergies, current medications, past family history, past medical history, past social history, past surgical history and problem list.    Social History     Tobacco Use   • Smoking status: Never   • Smokeless tobacco: Never   Vaping Use   • Vaping Use: Never used   Substance Use Topics   • Alcohol use: Yes     Comment: 4-5 BEERS, TWICE A WEEK   • Drug use: No         Objective   Vitals:    03/20/23 0931   BP: 134/82   BP Location: Right arm   Patient Position: Sitting   Cuff Size: Adult   Pulse: 98   SpO2: 97%   Weight: (!) 152 kg (334 lb)   Height: 193 cm (76\")     Body mass index is 40.66 kg/m².    Constitutional:       General: Not in acute distress.     Appearance: Healthy appearance. Well-developed and not in distress. Not diaphoretic.   Eyes:      Conjunctiva/sclera: Conjunctivae normal.      Pupils: Pupils are equal, round, and reactive to light.   HENT:      Head: Normocephalic and atraumatic.   Neck:      Vascular: No carotid bruit or JVD.   Pulmonary:      Effort: Pulmonary effort is normal. No respiratory distress.      Breath sounds: Normal breath sounds.   Cardiovascular:      Normal rate. Regular rhythm.   Skin:     General: Skin is cool.   Neurological:      Mental Status: Alert, oriented to person, place, and time and oriented to person, place and time.         Lab Results   Component Value Date     03/13/2023    K 5.1 03/13/2023     03/13/2023    CO2 25.1 03/13/2023    BUN 16 03/13/2023 "    CREATININE 0.93 03/13/2023    GLUCOSE 76 03/13/2023    CALCIUM 10.3 03/13/2023    AST 15 03/13/2023    ALT 37 03/13/2023    ALKPHOS 81 03/13/2023    LABIL2 1.3 (L) 01/25/2016     Lab Results   Component Value Date    CKTOTAL 147 11/06/2020     Lab Results   Component Value Date    WBC 9.24 03/13/2023    HGB 13.7 03/13/2023    HCT 41.3 03/13/2023     03/13/2023     Lab Results   Component Value Date    INR 1.08 06/03/2019     Lab Results   Component Value Date    MG 2.0 11/06/2020     Lab Results   Component Value Date    TSH 1.780 11/06/2020    PSA 1.030 09/13/2022    TRIG 186 (H) 03/13/2023    HDL 31 (L) 03/13/2023    LDL 75 03/13/2023      No results found for: BNP    During this visit the following were done:  Labs Reviewed []    Labs Ordered []    Radiology Reports Reviewed []    Radiology Ordered []    PCP Records Reviewed []    Referring Provider Records Reviewed []    ER Records Reviewed []    Hospital Records Reviewed []    History Obtained From Family []    Radiology Images Reviewed []    Other Reviewed []    Records Requested []         ECG 12 Lead    Date/Time: 3/20/2023 9:48 AM  Performed by: Anthony Ruff PA-C  Authorized by: Anthony Ruff PA-C   Comparison: compared with previous ECG   Similar to previous ECG  Rhythm: sinus rhythm  Conduction: conduction normal  ST Segments: ST segments normal    Clinical impression: normal ECG            Assessment & Plan    Diagnosis Plan   1. WPW (Brayan-Parkinson-White syndrome), s/p RF ablation 2000.  ECG 12 Lead      2. Essential hypertension  ECG 12 Lead               Recommendations:  1. WPW  a. Currently asymptomatic denies any breakthrough episodes of palpitations.  No further work-up indicated at this time.  2. Essential hypertension  a. Controlled managed by PCP.    No follow-ups on file.    As always, I appreciate very much the opportunity to participate in the cardiovascular care of your patients.      With Best Regards,    Anthony Ruff  JACINTA

## 2023-03-29 ENCOUNTER — TELEPHONE (OUTPATIENT)
Dept: ORTHOPEDIC SURGERY | Facility: CLINIC | Age: 53
End: 2023-03-29
Payer: OTHER GOVERNMENT

## 2023-03-29 NOTE — TELEPHONE ENCOUNTER
Caller: LIZBET ALLRED    Relationship to patient: Emergency Contact    Best call back number: [phone]    Chief complaint: BILATERAL KNEE PAIN    Type of visit: INJECTIONS    Requested date: ASAP    Additional notes: PATIENT'S WIFE WOULD LIKE TO KNOW THE STATUS OF THE [facility] APPROVAL FOR THE KNEE INJECTIONS.

## 2023-04-10 DIAGNOSIS — K21.9 GASTROESOPHAGEAL REFLUX DISEASE WITHOUT ESOPHAGITIS: Chronic | ICD-10-CM

## 2023-04-10 RX ORDER — OMEPRAZOLE 40 MG/1
CAPSULE, DELAYED RELEASE ORAL
Qty: 30 CAPSULE | Refills: 5 | Status: SHIPPED | OUTPATIENT
Start: 2023-04-10

## 2023-04-24 ENCOUNTER — TELEPHONE (OUTPATIENT)
Dept: FAMILY MEDICINE CLINIC | Facility: CLINIC | Age: 53
End: 2023-04-24
Payer: OTHER GOVERNMENT

## 2023-04-24 ENCOUNTER — OFFICE VISIT (OUTPATIENT)
Dept: ORTHOPEDIC SURGERY | Facility: CLINIC | Age: 53
End: 2023-04-24
Payer: OTHER GOVERNMENT

## 2023-04-24 VITALS — WEIGHT: 315 LBS | BODY MASS INDEX: 38.36 KG/M2 | HEIGHT: 76 IN

## 2023-04-24 DIAGNOSIS — M17.12 PRIMARY OSTEOARTHRITIS OF LEFT KNEE: Primary | ICD-10-CM

## 2023-04-24 DIAGNOSIS — R20.0 FINGER NUMBNESS: ICD-10-CM

## 2023-04-24 DIAGNOSIS — M25.561 ACUTE PAIN OF RIGHT KNEE: Primary | ICD-10-CM

## 2023-04-24 RX ADMIN — LIDOCAINE HYDROCHLORIDE 3 ML: 10 INJECTION, SOLUTION EPIDURAL; INFILTRATION; INTRACAUDAL; PERINEURAL at 14:00

## 2023-04-24 NOTE — TELEPHONE ENCOUNTER
LIZBET SAID DR BRYAN WOULD LIKE X-RAYS ORDERED BY TERRENCE'S PCP.     -R KNEE XRAY FOR HIS INJECTIONS    -L ELBOW AND NECK BECAUSE HE IS EXPERIENCING NUMBNESS IN HIS PINKY

## 2023-04-24 NOTE — PROGRESS NOTES
Follow-up Visit         Patient: Jimmy Dawson Jr.  YOB: 1970  Date of Encounter: 04/24/2023      Chief  Complaint:   Chief Complaint   Patient presents with   • Left Knee - Osteoarthritis, Follow-up         HPI:  Jimmy Dawson Jr., 52 y.o. male presents in follow-up left knee osteoarthritis he has received Monovisc on 2 previous occasions most recently provided April 18, 2022 he experienced approximately 8 months of relief and presents today requesting repeat injection.        Medical History:  Patient Active Problem List   Diagnosis   • Gastroesophageal reflux disease without esophagitis   • Depression   • Essential hypertension   • Palpitations   • SVT (supraventricular tachycardia), s/p RF ablation, in 2000 x2   • WPW (Brayan-Parkinson-White syndrome), s/p RF ablation 2000.   • Chronic rhinitis   • TAMIKO (obstructive sleep apnea)   • Typical atrial flutter   • History of 2019 novel coronavirus disease (COVID-19)   • Anxiety   • Chronic folliculitis   • Primary osteoarthritis of left knee     Past Medical History:   Diagnosis Date   • Acid reflux    • Allergic    • Anxiety    • Arthritis    • Depression    • GERD (gastroesophageal reflux disease)    • Hypertension    • Infectious mononucleosis    • TAMIKO (obstructive sleep apnea)     NO OXYGEN USE   • PONV (postoperative nausea and vomiting)    • Seasonal allergies    • Sinusitis    • Sleep apnea    • Urinary tract infection    • WPW (Brayan-Parkinson-White syndrome)          Social History:  Social History     Socioeconomic History   • Marital status:    Tobacco Use   • Smoking status: Never   • Smokeless tobacco: Never   Vaping Use   • Vaping Use: Never used   Substance and Sexual Activity   • Alcohol use: Yes     Comment: 4-5 BEERS, TWICE A WEEK   • Drug use: No   • Sexual activity: Defer         Current Medications:    Current Outpatient Medications:   •  amLODIPine (NORVASC) 5 MG tablet, TAKE ONE TABLET BY MOUTH EVERY DAY FOR  BLOOD PRESSURE, Disp: 30 tablet, Rfl: 0  •  atorvastatin (Lipitor) 20 MG tablet, Take 1 tablet by mouth Daily., Disp: 30 tablet, Rfl: 5  •  busPIRone (BUSPAR) 7.5 MG tablet, Take 1 tablet by mouth 2 (Two) Times a Day. as directed, Disp: 180 tablet, Rfl: 3  •  diclofenac (CATAFLAM) 50 MG tablet, Take 1 tablet by mouth 3 (Three) Times a Day., Disp: 90 tablet, Rfl: 1  •  fexofenadine (ALLEGRA) 180 MG tablet, Take 1 tablet by mouth Daily. For allergies, Disp: 90 tablet, Rfl: 3  •  fluticasone (FLONASE) 50 MCG/ACT nasal spray, 2 sprays into the nostril(s) as directed by provider Daily., Disp: 16 g, Rfl: 6  •  losartan (COZAAR) 50 MG tablet, Take 1.5 tablets by mouth Daily. for blood pressure, Disp: 45 tablet, Rfl: 6  •  omeprazole (priLOSEC) 40 MG capsule, TAKE ONE CAPSULE BY MOUTH EVERY DAY FOR THE STOMACH, Disp: 30 capsule, Rfl: 5  •  sulfamethoxazole-trimethoprim (Bactrim DS) 800-160 MG per tablet, Take 1 tablet by mouth 2 (Two) Times a Day., Disp: 20 tablet, Rfl: 2  •  venlafaxine XR (EFFEXOR-XR) 150 MG 24 hr capsule, Take 1 capsule by mouth Daily., Disp: 90 capsule, Rfl: 3      Allergies:  Allergies   Allergen Reactions   • Aleve [Naproxen] Hives         Family History:  Family History   Problem Relation Age of Onset   • Hypertension Mother    • Hypertension Father    • Hyperlipidemia Father    • Diabetes Father    • Heart disease Father    • Diabetes Brother    • Diabetes Maternal Grandfather    • Heart attack Maternal Grandfather    • Diabetes Paternal Grandfather    • Diabetes Maternal Grandmother    • Cancer Maternal Grandmother          Surgical History:  Past Surgical History:   Procedure Laterality Date   • ABLATION OF DYSRHYTHMIC FOCUS  1999 and 2000    Dr. Carlin   • CARDIAC ELECTROPHYSIOLOGY PROCEDURE N/A 6/4/2019    Procedure: Flutter;  Surgeon: Juan Carlin MD;  Location: [facility] INVASIVE LOCATION;  Service: Cardiology   • CARDIAC SURGERY     • WISDOM TOOTH EXTRACTION           Orthopedic  Examination:  Left knee demonstrates minimal effusion with full flexion extension, no instability normal neurovascular status.      Assessment & Plan:   52 y.o. male presents follow-up osteoarthritis left knee today he is provided Monovisc intra-articular with lidocaine block left knee.  He will return as needed.         Diagnosis Plan   1. Primary osteoarthritis of left knee  Large Joint Arthrocentesis: L knee            Large Joint Arthrocentesis: L knee  Date/Time: 4/24/2023 2:00 PM  Consent given by: patient  Site marked: site marked  Timeout: Immediately prior to procedure a time out was called to verify the correct patient, procedure, equipment, support staff and site/side marked as required   Supporting Documentation  Indications: pain   Procedure Details  Location: knee - L knee  Preparation: Patient was prepped and draped in the usual sterile fashion  Needle size: 25 G  Approach: anterolateral  Medications administered: 3 mL lidocaine PF 1% 1 %; 88 mg Hyaluronan 88 MG/4ML  Patient tolerance: patient tolerated the procedure well with no immediate complications              Cc:  Nena Pike APRN              This document has been electronically signed by Huan Corrales MD   April 25, 2023 07:46 EDT

## 2023-04-26 ENCOUNTER — HOSPITAL ENCOUNTER (OUTPATIENT)
Dept: GENERAL RADIOLOGY | Facility: HOSPITAL | Age: 53
Discharge: HOME | End: 2023-04-26
Payer: OTHER GOVERNMENT

## 2023-04-26 DIAGNOSIS — M25.561 ACUTE PAIN OF RIGHT KNEE: ICD-10-CM

## 2023-04-26 DIAGNOSIS — R20.0 FINGER NUMBNESS: ICD-10-CM

## 2023-04-26 PROCEDURE — 73562 X-RAY EXAM OF KNEE 3: CPT | Performed by: RADIOLOGY

## 2023-04-26 PROCEDURE — 73562 X-RAY EXAM OF KNEE 3: CPT

## 2023-04-26 PROCEDURE — 73080 X-RAY EXAM OF ELBOW: CPT

## 2023-04-26 PROCEDURE — 72040 X-RAY EXAM NECK SPINE 2-3 VW: CPT

## 2023-04-27 ENCOUNTER — TELEPHONE (OUTPATIENT)
Dept: FAMILY MEDICINE CLINIC | Facility: CLINIC | Age: 53
End: 2023-04-27

## 2023-04-27 NOTE — TELEPHONE ENCOUNTER
Caller: CHALINOLIZBET    Relationship: Emergency Contact    Best call back number: [phone]    What is the medical concern/diagnosis: LEFT ELBOW, NECK AND RT KNEE.    What specialty or service is being requested: NEEDS [facility] TO SEE DR RIOS    What is the provider, practice [facility] service name[facility]    What is the office location:  [place]    What is the office phone number:      Any additional details: [facility] NEEDS A REFERRAL TO SEE AN ORTHOPEDIC DOCTOR BLANCA VILLARREAL  [facility] [place] [facility].  PLEASE LIST ALL THREE ISSUE IN THE REFERRAL TO THE [facility].

## 2023-04-28 DIAGNOSIS — M25.522 LEFT ELBOW PAIN: ICD-10-CM

## 2023-04-28 DIAGNOSIS — M25.561 ACUTE PAIN OF RIGHT KNEE: Primary | ICD-10-CM

## 2023-04-28 DIAGNOSIS — M54.2 CERVICALGIA: ICD-10-CM

## 2023-04-28 RX ORDER — LIDOCAINE HYDROCHLORIDE 10 MG/ML
3 INJECTION, SOLUTION EPIDURAL; INFILTRATION; INTRACAUDAL; PERINEURAL
Status: COMPLETED | OUTPATIENT
Start: 2023-04-24 | End: 2023-04-24

## 2023-05-01 ENCOUNTER — TELEPHONE (OUTPATIENT)
Dept: FAMILY MEDICINE CLINIC | Facility: CLINIC | Age: 53
End: 2023-05-01
Payer: OTHER GOVERNMENT

## 2023-05-01 NOTE — TELEPHONE ENCOUNTER
LIZBET CAME IN TO SEE IF TERRENCE COULD GET PAIN MEDICINE FOR HIS INGROWN TOENAIL REMOVAL SURGERY. I INFORMED HER THAT WHOEVER DID THE PROCEDURE WOULD HAVE TO GIVE THE PAIN MEDICINE.     SHE THEN WANTED TO KNOW IF TERRENCE COULD TAKE 2-3 MOTRINS WITH A TYLENOL AT THE SAME TIME FOR PAIN. I TOLD HER I WOULD SEND A MESSAGE BACK TO THE NURSES.

## 2023-05-02 NOTE — TELEPHONE ENCOUNTER
Called [phone] spoke with Vero she stated its a 800 mg motrin and wants to know if its okay to take extra strength 500 mg tylenol with the motrin.

## 2023-05-08 DIAGNOSIS — L73.9 CHRONIC FOLLICULITIS: ICD-10-CM

## 2023-05-08 RX ORDER — AMLODIPINE BESYLATE 5 MG/1
5 TABLET ORAL DAILY
Qty: 30 TABLET | Refills: 0 | Status: SHIPPED | OUTPATIENT
Start: 2023-05-08

## 2023-05-08 RX ORDER — DICLOFENAC POTASSIUM 50 MG/1
50 TABLET, FILM COATED ORAL 3 TIMES DAILY
Qty: 90 TABLET | Refills: 1 | Status: SHIPPED | OUTPATIENT
Start: 2023-05-08

## 2023-05-08 RX ORDER — SULFAMETHOXAZOLE AND TRIMETHOPRIM 800; 160 MG/1; MG/1
1 TABLET ORAL 2 TIMES DAILY
Qty: 20 TABLET | Refills: 2 | Status: SHIPPED | OUTPATIENT
Start: 2023-05-08

## 2023-05-16 DIAGNOSIS — I10 ESSENTIAL HYPERTENSION: Chronic | ICD-10-CM

## 2023-05-16 RX ORDER — LOSARTAN POTASSIUM 50 MG/1
TABLET ORAL
Qty: 45 TABLET | Refills: 6 | Status: SHIPPED | OUTPATIENT
Start: 2023-05-16

## 2023-05-18 DIAGNOSIS — I70.90 ARTERIOSCLEROSIS: ICD-10-CM

## 2023-05-18 RX ORDER — ATORVASTATIN CALCIUM 20 MG/1
20 TABLET, FILM COATED ORAL DAILY
Qty: 30 TABLET | Refills: 5 | Status: SHIPPED | OUTPATIENT
Start: 2023-05-18

## 2023-06-08 RX ORDER — AMLODIPINE BESYLATE 5 MG/1
TABLET ORAL
Qty: 30 TABLET | Refills: 6 | Status: SHIPPED | OUTPATIENT
Start: 2023-06-08

## 2023-08-07 ENCOUNTER — OFFICE VISIT (OUTPATIENT)
Dept: SURGERY | Facility: CLINIC | Age: 53
End: 2023-08-07
Payer: OTHER GOVERNMENT

## 2023-08-07 ENCOUNTER — APPOINTMENT (OUTPATIENT)
Dept: CT IMAGING | Facility: HOSPITAL | Age: 53
End: 2023-08-07
Payer: OTHER GOVERNMENT

## 2023-08-07 ENCOUNTER — HOSPITAL ENCOUNTER (EMERGENCY)
Facility: HOSPITAL | Age: 53
Discharge: HOME | End: 2023-08-07
Attending: EMERGENCY MEDICINE | Admitting: EMERGENCY MEDICINE
Payer: OTHER GOVERNMENT

## 2023-08-07 ENCOUNTER — APPOINTMENT (OUTPATIENT)
Dept: ULTRASOUND IMAGING | Facility: HOSPITAL | Age: 53
End: 2023-08-07
Payer: OTHER GOVERNMENT

## 2023-08-07 VITALS
SYSTOLIC BLOOD PRESSURE: 157 MMHG | BODY MASS INDEX: 40.43 KG/M2 | TEMPERATURE: 98.2 F | OXYGEN SATURATION: 97 % | HEIGHT: 74 IN | WEIGHT: 315 LBS | RESPIRATION RATE: 18 BRPM | HEART RATE: 91 BPM | DIASTOLIC BLOOD PRESSURE: 90 MMHG

## 2023-08-07 VITALS
BODY MASS INDEX: 40.43 KG/M2 | HEART RATE: 91 BPM | DIASTOLIC BLOOD PRESSURE: 90 MMHG | WEIGHT: 315 LBS | HEIGHT: 74 IN | SYSTOLIC BLOOD PRESSURE: 157 MMHG

## 2023-08-07 DIAGNOSIS — K80.20 SYMPTOMATIC CHOLELITHIASIS: Primary | ICD-10-CM

## 2023-08-07 DIAGNOSIS — K80.20 CALCULUS OF GALLBLADDER WITHOUT CHOLECYSTITIS WITHOUT OBSTRUCTION: Primary | ICD-10-CM

## 2023-08-07 LAB
ALBUMIN SERPL-MCNC: 4.4 G/DL (ref 3.5–5.2)
ALBUMIN/GLOB SERPL: 1.4 G/DL
ALP SERPL-CCNC: 87 U/L (ref 39–117)
ALT SERPL W P-5'-P-CCNC: 30 U/L (ref 1–41)
AMYLASE SERPL-CCNC: 49 U/L (ref 28–100)
ANION GAP SERPL CALCULATED.3IONS-SCNC: 10.8 MMOL/L (ref 5–15)
AST SERPL-CCNC: 19 U/L (ref 1–40)
BASOPHILS # BLD AUTO: 0.09 10*3/MM3 (ref 0–0.2)
BASOPHILS NFR BLD AUTO: 0.7 % (ref 0–1.5)
BILIRUB SERPL-MCNC: 0.2 MG/DL (ref 0–1.2)
BILIRUB UR QL STRIP: NEGATIVE
BUN SERPL-MCNC: 22 MG/DL (ref 6–20)
BUN/CREAT SERPL: 22.2 (ref 7–25)
CALCIUM SPEC-SCNC: 9.4 MG/DL (ref 8.6–10.5)
CHLORIDE SERPL-SCNC: 104 MMOL/L (ref 98–107)
CLARITY UR: CLEAR
CO2 SERPL-SCNC: 23.2 MMOL/L (ref 22–29)
COLOR UR: YELLOW
CREAT SERPL-MCNC: 0.99 MG/DL (ref 0.76–1.27)
CRP SERPL-MCNC: <0.3 MG/DL (ref 0–0.5)
DEPRECATED RDW RBC AUTO: 43.5 FL (ref 37–54)
EGFRCR SERPLBLD CKD-EPI 2021: 90.5 ML/MIN/1.73
EOSINOPHIL # BLD AUTO: 0.05 10*3/MM3 (ref 0–0.4)
EOSINOPHIL NFR BLD AUTO: 0.4 % (ref 0.3–6.2)
ERYTHROCYTE [DISTWIDTH] IN BLOOD BY AUTOMATED COUNT: 13.3 % (ref 12.3–15.4)
GLOBULIN UR ELPH-MCNC: 3.1 GM/DL
GLUCOSE SERPL-MCNC: 206 MG/DL (ref 65–99)
GLUCOSE UR STRIP-MCNC: ABNORMAL MG/DL
HCT VFR BLD AUTO: 41.3 % (ref 37.5–51)
HGB BLD-MCNC: 13.5 G/DL (ref 13–17.7)
HGB UR QL STRIP.AUTO: NEGATIVE
HOLD SPECIMEN: NORMAL
HOLD SPECIMEN: NORMAL
IMM GRANULOCYTES # BLD AUTO: 0.05 10*3/MM3 (ref 0–0.05)
IMM GRANULOCYTES NFR BLD AUTO: 0.4 % (ref 0–0.5)
KETONES UR QL STRIP: ABNORMAL
LEUKOCYTE ESTERASE UR QL STRIP.AUTO: NEGATIVE
LIPASE SERPL-CCNC: 29 U/L (ref 13–60)
LYMPHOCYTES # BLD AUTO: 1.86 10*3/MM3 (ref 0.7–3.1)
LYMPHOCYTES NFR BLD AUTO: 14.1 % (ref 19.6–45.3)
MCH RBC QN AUTO: 28.8 PG (ref 26.6–33)
MCHC RBC AUTO-ENTMCNC: 32.7 G/DL (ref 31.5–35.7)
MCV RBC AUTO: 88.2 FL (ref 79–97)
MONOCYTES # BLD AUTO: 0.74 10*3/MM3 (ref 0.1–0.9)
MONOCYTES NFR BLD AUTO: 5.6 % (ref 5–12)
NEUTROPHILS NFR BLD AUTO: 10.44 10*3/MM3 (ref 1.7–7)
NEUTROPHILS NFR BLD AUTO: 78.8 % (ref 42.7–76)
NITRITE UR QL STRIP: NEGATIVE
NRBC BLD AUTO-RTO: 0 /100 WBC (ref 0–0.2)
PH UR STRIP.AUTO: 7 [PH] (ref 5–8)
PLATELET # BLD AUTO: 289 10*3/MM3 (ref 140–450)
PMV BLD AUTO: 9.5 FL (ref 6–12)
POTASSIUM SERPL-SCNC: 4.2 MMOL/L (ref 3.5–5.2)
PROT SERPL-MCNC: 7.5 G/DL (ref 6–8.5)
PROT UR QL STRIP: NEGATIVE
RBC # BLD AUTO: 4.68 10*6/MM3 (ref 4.14–5.8)
SODIUM SERPL-SCNC: 138 MMOL/L (ref 136–145)
SP GR UR STRIP: 1.03 (ref 1–1.03)
UROBILINOGEN UR QL STRIP: ABNORMAL
WBC NRBC COR # BLD: 13.23 10*3/MM3 (ref 3.4–10.8)
WHOLE BLOOD HOLD COAG: NORMAL
WHOLE BLOOD HOLD SPECIMEN: NORMAL

## 2023-08-07 PROCEDURE — 82150 ASSAY OF AMYLASE: CPT | Performed by: EMERGENCY MEDICINE

## 2023-08-07 PROCEDURE — 96376 TX/PRO/DX INJ SAME DRUG ADON: CPT

## 2023-08-07 PROCEDURE — 74176 CT ABD & PELVIS W/O CONTRAST: CPT | Performed by: RADIOLOGY

## 2023-08-07 PROCEDURE — 25010000002 MORPHINE PER 10 MG: Performed by: EMERGENCY MEDICINE

## 2023-08-07 PROCEDURE — 80053 COMPREHEN METABOLIC PANEL: CPT | Performed by: PHYSICIAN ASSISTANT

## 2023-08-07 PROCEDURE — 36415 COLL VENOUS BLD VENIPUNCTURE: CPT

## 2023-08-07 PROCEDURE — 83690 ASSAY OF LIPASE: CPT | Performed by: PHYSICIAN ASSISTANT

## 2023-08-07 PROCEDURE — 96374 THER/PROPH/DIAG INJ IV PUSH: CPT

## 2023-08-07 PROCEDURE — 76705 ECHO EXAM OF ABDOMEN: CPT

## 2023-08-07 PROCEDURE — 74176 CT ABD & PELVIS W/O CONTRAST: CPT

## 2023-08-07 PROCEDURE — 96375 TX/PRO/DX INJ NEW DRUG ADDON: CPT

## 2023-08-07 PROCEDURE — 86140 C-REACTIVE PROTEIN: CPT | Performed by: PHYSICIAN ASSISTANT

## 2023-08-07 PROCEDURE — 99214 OFFICE O/P EST MOD 30 MIN: CPT

## 2023-08-07 PROCEDURE — 81003 URINALYSIS AUTO W/O SCOPE: CPT | Performed by: PHYSICIAN ASSISTANT

## 2023-08-07 PROCEDURE — 76705 ECHO EXAM OF ABDOMEN: CPT | Performed by: RADIOLOGY

## 2023-08-07 PROCEDURE — 25010000002 ONDANSETRON PER 1 MG: Performed by: EMERGENCY MEDICINE

## 2023-08-07 PROCEDURE — 85025 COMPLETE CBC W/AUTO DIFF WBC: CPT | Performed by: PHYSICIAN ASSISTANT

## 2023-08-07 PROCEDURE — 99284 EMERGENCY DEPT VISIT MOD MDM: CPT

## 2023-08-07 RX ORDER — SODIUM CHLORIDE 9 MG/ML
40 INJECTION, SOLUTION INTRAVENOUS AS NEEDED
Status: CANCELLED | OUTPATIENT
Start: 2023-08-07

## 2023-08-07 RX ORDER — ONDANSETRON 2 MG/ML
4 INJECTION INTRAMUSCULAR; INTRAVENOUS ONCE
Status: COMPLETED | OUTPATIENT
Start: 2023-08-07 | End: 2023-08-07

## 2023-08-07 RX ORDER — TRAMADOL HYDROCHLORIDE 50 MG/1
50 TABLET ORAL EVERY 6 HOURS PRN
Qty: 3 TABLET | Refills: 0 | Status: ON HOLD | OUTPATIENT
Start: 2023-08-07 | End: 2023-08-12

## 2023-08-07 RX ORDER — ACETAMINOPHEN 500 MG
1000 TABLET ORAL EVERY 6 HOURS PRN
Qty: 12 TABLET | Refills: 0 | Status: ON HOLD | OUTPATIENT
Start: 2023-08-07 | End: 2023-08-12

## 2023-08-07 RX ORDER — SODIUM CHLORIDE 0.9 % (FLUSH) 0.9 %
10 SYRINGE (ML) INJECTION AS NEEDED
Status: CANCELLED | OUTPATIENT
Start: 2023-08-07

## 2023-08-07 RX ORDER — SODIUM CHLORIDE 0.9 % (FLUSH) 0.9 %
10 SYRINGE (ML) INJECTION EVERY 12 HOURS SCHEDULED
Status: CANCELLED | OUTPATIENT
Start: 2023-08-07

## 2023-08-07 RX ORDER — SODIUM CHLORIDE 0.9 % (FLUSH) 0.9 %
10 SYRINGE (ML) INJECTION AS NEEDED
Status: DISCONTINUED | OUTPATIENT
Start: 2023-08-07 | End: 2023-08-07 | Stop reason: HOSPADM

## 2023-08-07 RX ORDER — ONDANSETRON 4 MG/1
4 TABLET, FILM COATED ORAL EVERY 8 HOURS PRN
Qty: 9 TABLET | Refills: 0 | Status: ON HOLD | OUTPATIENT
Start: 2023-08-07 | End: 2023-08-12

## 2023-08-07 RX ADMIN — SODIUM CHLORIDE 1000 ML: 9 INJECTION, SOLUTION INTRAVENOUS at 08:20

## 2023-08-07 RX ADMIN — MORPHINE SULFATE 4 MG: 4 INJECTION, SOLUTION INTRAMUSCULAR; INTRAVENOUS at 13:41

## 2023-08-07 RX ADMIN — MORPHINE SULFATE 4 MG: 4 INJECTION, SOLUTION INTRAMUSCULAR; INTRAVENOUS at 09:55

## 2023-08-07 RX ADMIN — ONDANSETRON 4 MG: 2 INJECTION INTRAMUSCULAR; INTRAVENOUS at 13:41

## 2023-08-07 RX ADMIN — MORPHINE SULFATE 4 MG: 4 INJECTION, SOLUTION INTRAMUSCULAR; INTRAVENOUS at 08:21

## 2023-08-07 RX ADMIN — ONDANSETRON HYDROCHLORIDE 4 MG: 2 INJECTION, SOLUTION INTRAMUSCULAR; INTRAVENOUS at 08:20

## 2023-08-07 NOTE — PROGRESS NOTES
Subjective   Jimmy Dawson Jr. is a 52 y.o. male who presents today for Initial Evaluation    Chief Complaint:    Chief Complaint   Patient presents with    Abdominal Pain        History of Present Illness:    History of Present Illness Jimmy is a 52-year-old male who presents with symptomatic cholelithiasis.  Patient was seen in the emergency department prior to his office visit today where he was discharged for outpatient follow-up.  Patient presents with complaints of abdominal pain.  He reports that around 12 AM he woke up with pain.  States that he ate [employer] earlier last night.  Reports that his pain intensified in his epigastric area, radiated to his right upper quadrant and then radiated to his back.  He does not take any blood thinning medications.  He denies any past abdominal surgeries.  Patient reports that his pain is currently an 8 out of 10.  Reports that he was never fully out of pain in the emergency department.  Patient reports nothing to eat or drink today.    The following portions of the patient's history were reviewed and updated as appropriate: allergies, current medications, past family history, past medical history, past social history, past surgical history and problem list.    Past Medical History:  Past Medical History:   Diagnosis Date    Acid reflux     Allergic     Anxiety     Arthritis     Depression     GERD (gastroesophageal reflux disease)     Hypertension     Infectious mononucleosis     TAMIKO (obstructive sleep apnea)     NO OXYGEN USE    PONV (postoperative nausea and vomiting)     Seasonal allergies     Sinusitis     Sleep apnea     Urinary tract infection     WPW (Brayan-Parkinson-White syndrome)        Social History:  Social History     Socioeconomic History    Marital status:    Tobacco Use    Smoking status: Never    Smokeless tobacco: Never   Vaping Use    Vaping Use: Never used   Substance and Sexual Activity    Alcohol use: Yes     Comment: 4-5 BEERS, TWICE A  WEEK    Drug use: No    Sexual activity: Defer       Family History:  Family History   Problem Relation Age of Onset    Hypertension Mother     Hypertension Father     Hyperlipidemia Father     Diabetes Father     Heart disease Father     Diabetes Brother     Diabetes Maternal Grandfather     Heart attack Maternal Grandfather     Diabetes Paternal Grandfather     Diabetes Maternal Grandmother     Cancer Maternal Grandmother        Past Surgical History:  Past Surgical History:   Procedure Laterality Date    ABLATION OF DYSRHYTHMIC FOCUS  1999 and 2000    Dr. Carlin    CARDIAC ELECTROPHYSIOLOGY PROCEDURE N/A 6/4/2019    Procedure: Flutter;  Surgeon: Juan Carlin MD;  Location: [facility] INVASIVE LOCATION;  Service: Cardiology    CARDIAC SURGERY      WISDOM TOOTH EXTRACTION         Problem List:  Patient Active Problem List   Diagnosis    Gastroesophageal reflux disease without esophagitis    Depression    Essential hypertension    Palpitations    SVT (supraventricular tachycardia), s/p RF ablation, in 2000 x2    WPW (Brayan-Parkinson-White syndrome), s/p RF ablation 2000.    Chronic rhinitis    TAMIKO (obstructive sleep apnea)    Typical atrial flutter    History of 2019 novel coronavirus disease (COVID-19)    Anxiety    Chronic folliculitis    Primary osteoarthritis of left knee       Allergy:   Allergies   Allergen Reactions    Aleve [Naproxen] Hives        Current Medications:   Current Outpatient Medications   Medication Sig Dispense Refill    amLODIPine (NORVASC) 5 MG tablet TAKE ONE TABLET BY MOUTH EVERY DAY FOR BLOOD PRESSURE 30 tablet 6    atorvastatin (Lipitor) 20 MG tablet Take 1 tablet by mouth Daily. 30 tablet 5    busPIRone (BUSPAR) 7.5 MG tablet Take 1 tablet by mouth 2 (Two) Times a Day. as directed 180 tablet 3    diclofenac (CATAFLAM) 50 MG tablet Take 1 tablet by mouth 3 (Three) Times a Day. 90 tablet 1    fexofenadine (ALLEGRA) 180 MG tablet Take 1 tablet by mouth Daily. For allergies 90  tablet 3    fluticasone (FLONASE) 50 MCG/ACT nasal spray 2 sprays into the nostril(s) as directed by provider Daily. 16 g 6    losartan (COZAAR) 50 MG tablet TAKE 1&1/2 TABLET BY MOUTH EVERY DAY FOR BLOOD PRESSURE 45 tablet 6    omeprazole (priLOSEC) 40 MG capsule TAKE ONE CAPSULE BY MOUTH EVERY DAY FOR THE STOMACH 30 capsule 5    sulfamethoxazole-trimethoprim (Bactrim DS) 800-160 MG per tablet Take 1 tablet by mouth 2 (Two) Times a Day. 20 tablet 2    venlafaxine XR (EFFEXOR-XR) 150 MG 24 hr capsule Take 1 capsule by mouth Daily. 90 capsule 3    acetaminophen (TYLENOL) 500 MG tablet Take 2 tablets by mouth Every 6 (Six) Hours As Needed for Moderate Pain for up to 3 days. 12 tablet 0    ondansetron (Zofran) 4 MG tablet Take 1 tablet by mouth Every 8 (Eight) Hours As Needed for Nausea or Vomiting for up to 3 days. 9 tablet 0    traMADol (Ultram) 50 MG tablet Take 1 tablet by mouth Every 6 (Six) Hours As Needed for Moderate Pain for up to 3 doses. 3 tablet 0     No current facility-administered medications for this visit.       Review of Systems:    Review of Systems   Constitutional:  Positive for diaphoresis. Negative for activity change.   HENT:  Negative for congestion.    Eyes:  Negative for blurred vision.   Respiratory:  Negative for shortness of breath.    Cardiovascular:  Negative for chest pain.   Gastrointestinal:  Positive for abdominal pain.   Endocrine: Negative for cold intolerance.   Genitourinary:  Negative for flank pain.   Musculoskeletal:  Negative for arthralgias.   Skin:  Negative for bruise.   Allergic/Immunologic: Negative for environmental allergies.   Neurological:  Negative for confusion.   Hematological:  Negative for adenopathy.   Psychiatric/Behavioral:  Negative for agitation.        Physical Exam:   Physical Exam  Constitutional:       Appearance: Normal appearance.   HENT:      Head: Normocephalic and atraumatic.      Right Ear: External ear normal.      Left Ear: External ear normal.  "  Eyes:      Conjunctiva/sclera: Conjunctivae normal.      Pupils: Pupils are equal, round, and reactive to light.   Cardiovascular:      Rate and Rhythm: Normal rate.      Pulses: Normal pulses.   Pulmonary:      Effort: Pulmonary effort is normal.   Abdominal:      General: Abdomen is flat.      Palpations: Abdomen is soft.      Tenderness: There is abdominal tenderness (Epigastric and right upper quadrant).   Musculoskeletal:         General: Normal range of motion.      Cervical back: Normal range of motion and neck supple.   Skin:     General: Skin is warm and dry.      Capillary Refill: Capillary refill takes less than 2 seconds.   Neurological:      General: No focal deficit present.      Mental Status: He is alert and oriented to person, place, and time.   Psychiatric:         Mood and Affect: Mood normal.         Behavior: Behavior normal.       Vitals:  Blood pressure 157/90, pulse 91, height 188 cm (74.02\"), weight (!) 150 kg (330 lb).   Body mass index is 42.35 kg/mý.     Imaging:   CT Abdomen Pelvis Without Contrast  Narrative: EXAM:    CT Abdomen and Pelvis Without Intravenous Contrast     EXAM DATE:    8/7/2023 7:55 AM     CLINICAL HISTORY:    epigastric pain, consider pancreatitis     TECHNIQUE:    Axial computed tomography images of the abdomen and pelvis without  intravenous contrast.  Sagittal and coronal reformatted images were  created and reviewed.  This CT exam was performed using one or more of  the following dose reduction techniques:  automated exposure control,  adjustment of the mA and/or kV according to patient size, and/or use of  iterative reconstruction technique.     COMPARISON:    No relevant prior studies available.     FINDINGS:    Lung bases:  5.5 mm pulmonary nodule right lower lobe subpleural in  location, image #8.  Lung bases otherwise clear.    Heart:  Coronary calcifications.      ABDOMEN:    Liver:  Diffuse fatty infiltration of liver.    Gallbladder and bile ducts:  " Distended gallbladder with luminal stones  noted.  No ductal dilation.    Pancreas:  Unremarkable.  No ductal dilation.    Spleen:  Unremarkable.  No splenomegaly.    Adrenals:  Unremarkable.  No mass.    Kidneys and ureters:  Unremarkable.  No obstructing stones.  No  hydronephrosis.    Stomach and bowel:  Moderate constipation. No bowel obstruction.  No  mucosal thickening.      PELVIS:    Appendix:  Normal appendix.    Bladder:  Incomplete distention of urinary bladder.  No stones.    Reproductive:  Unremarkable as visualized.      ABDOMEN and PELVIS:    Intraperitoneal space:  No pneumoperitoneum.  No significant fluid  collection.    Bones/joints:  Degenerative changes throughout the lumbar spine with  multilevel central canal and neural foraminal stenosis noted. No acute  bony findings.  No dislocation.    Soft tissues:  Unremarkable.    Vasculature:  Mild atherosclerosis aorta. No evidence of aneurysm.    Lymph nodes:  Unremarkable.  No enlarged lymph nodes.     Impression: 1.  Cholelithiasis and distended gallbladder.  2.  Diffuse fatty infiltration of liver.  3.  Moderate constipation. No bowel obstruction.  4.  5.5 mm pulmonary nodule right lower lobe. Recommend follow-up chest  CT in 6 months.  5.  Other incidental/nonacute findings detailed above.     This report was finalized on 8/7/2023 9:16 AM by Dr. Wilver Aly MD.     US Gallbladder  Narrative: EXAM:    US Abdomen Limited, Gallbladder     EXAM DATE:    8/7/2023 8:08 AM     CLINICAL HISTORY:    epigastric pain/RUQ pain     TECHNIQUE:    Real-time ultrasound of the right upper quadrant with image  documentation.     COMPARISON:    No relevant prior studies available.     FINDINGS:    Liver:  Moderate diffuse fatty infiltration of liver.    Gallbladder:  Distended gallbladder with luminal sludge and small  nonshadowing stones.  Borderline gallbladder wall thickening but without  pericholecystic fluid identified on ultrasound.    Common bile duct:   Common bile duct is 4.4 m and is within normal  limits.  No stones.  No dilation.    Pancreas:  Unremarkable as visualized.     Impression: 1.  Distended gallbladder with luminal sludge and small nonshadowing  stones.  2.  Borderline gallbladder wall thickening but without pericholecystic  fluid identified on ultrasound.  3.  Common bile duct is 4.4 m and is within normal limits.  4.  Moderate diffuse fatty infiltration of liver.     This report was finalized on 8/7/2023 9:13 AM by Dr. Wilver Aly MD.           Assessment & Plan   Diagnoses and all orders for this visit:    1. Symptomatic cholelithiasis (Primary)  -     traMADol (Ultram) 50 MG tablet; Take 1 tablet by mouth Every 6 (Six) Hours As Needed for Moderate Pain for up to 3 doses.  Dispense: 3 tablet; Refill: 0  -     Case Request; Standing  -     sodium chloride 0.9 % flush 10 mL  -     sodium chloride 0.9 % flush 10 mL  -     sodium chloride 0.9 % infusion 40 mL  -     ampicillin-sulbactam 3 g/100 mL 0.9% NS IVPB  -     Case Request    Other orders  -     ondansetron (Zofran) 4 MG tablet; Take 1 tablet by mouth Every 8 (Eight) Hours As Needed for Nausea or Vomiting for up to 3 days.  Dispense: 9 tablet; Refill: 0  -     acetaminophen (TYLENOL) 500 MG tablet; Take 2 tablets by mouth Every 6 (Six) Hours As Needed for Moderate Pain for up to 3 days.  Dispense: 12 tablet; Refill: 0  -     Follow Anesthesia Guidelines / Protocol; Future  -     Follow Anesthesia Guidelines / Protocol; Standing  -     Verify / Perform Chlorhexidine Skin Prep; Standing  -     Verify / Perform Chlorhexidine Skin Prep if Indicated (If Not Already Completed); Standing  -     Obtain Informed Consent; Future  -     Provide NPO Instructions to Patient; Future  -     Chlorhexidine Skin Prep; Future  -     Instructions on coughing, deep breathing, and incentive spirometry.; Standing  -     Insert Peripheral IV; Standing  -     Saline Lock & Maintain IV Access; Standing      Jimmy  is a 52-year-old male who presents with symptomatic cholelithiasis.  He was seen in the emergency department prior to his office visit with me today.  I did discuss his case with Dr. Sanders. Dr. Sanders will take patient for laparoscopic cholecystectomy tomorrow.  I seen and patient Tylenol, tramadol and Zofran to get him.  Tonight.  I recommended a bland diet.  I also recommended to patient that if anything changes or worsens that he should represent to the emergency department.    Visit Diagnoses:    ICD-10-CM ICD-9-CM   1. Symptomatic cholelithiasis  K80.20 574.20         MEDS ORDERED DURING VISIT:  New Medications Ordered This Visit   Medications    ondansetron (Zofran) 4 MG tablet     Sig: Take 1 tablet by mouth Every 8 (Eight) Hours As Needed for Nausea or Vomiting for up to 3 days.     Dispense:  9 tablet     Refill:  0    traMADol (Ultram) 50 MG tablet     Sig: Take 1 tablet by mouth Every 6 (Six) Hours As Needed for Moderate Pain for up to 3 doses.     Dispense:  3 tablet     Refill:  0    acetaminophen (TYLENOL) 500 MG tablet     Sig: Take 2 tablets by mouth Every 6 (Six) Hours As Needed for Moderate Pain for up to 3 days.     Dispense:  12 tablet     Refill:  0       Return for Follow-up postop.             This document has been electronically signed by LIONEL Jaquez  August 7, 2023 14:49 EDT    Please note that portions of this note were completed with a voice recognition program.

## 2023-08-07 NOTE — H&P (VIEW-ONLY)
Subjective   Jimmy Dawson Jr. is a 52 y.o. male who presents today for Initial Evaluation    Chief Complaint:    Chief Complaint   Patient presents with    Abdominal Pain        History of Present Illness:    History of Present Illness Jimmy is a 52-year-old male who presents with symptomatic cholelithiasis.  Patient was seen in the emergency department prior to his office visit today where he was discharged for outpatient follow-up.  Patient presents with complaints of abdominal pain.  He reports that around 12 AM he woke up with pain.  States that he ate [employer] earlier last night.  Reports that his pain intensified in his epigastric area, radiated to his right upper quadrant and then radiated to his back.  He does not take any blood thinning medications.  He denies any past abdominal surgeries.  Patient reports that his pain is currently an 8 out of 10.  Reports that he was never fully out of pain in the emergency department.  Patient reports nothing to eat or drink today.    The following portions of the patient's history were reviewed and updated as appropriate: allergies, current medications, past family history, past medical history, past social history, past surgical history and problem list.    Past Medical History:  Past Medical History:   Diagnosis Date    Acid reflux     Allergic     Anxiety     Arthritis     Depression     GERD (gastroesophageal reflux disease)     Hypertension     Infectious mononucleosis     TAMIKO (obstructive sleep apnea)     NO OXYGEN USE    PONV (postoperative nausea and vomiting)     Seasonal allergies     Sinusitis     Sleep apnea     Urinary tract infection     WPW (Brayan-Parkinson-White syndrome)        Social History:  Social History     Socioeconomic History    Marital status:    Tobacco Use    Smoking status: Never    Smokeless tobacco: Never   Vaping Use    Vaping Use: Never used   Substance and Sexual Activity    Alcohol use: Yes     Comment: 4-5 BEERS, TWICE A  WEEK    Drug use: No    Sexual activity: Defer       Family History:  Family History   Problem Relation Age of Onset    Hypertension Mother     Hypertension Father     Hyperlipidemia Father     Diabetes Father     Heart disease Father     Diabetes Brother     Diabetes Maternal Grandfather     Heart attack Maternal Grandfather     Diabetes Paternal Grandfather     Diabetes Maternal Grandmother     Cancer Maternal Grandmother        Past Surgical History:  Past Surgical History:   Procedure Laterality Date    ABLATION OF DYSRHYTHMIC FOCUS  1999 and 2000    Dr. Carlin    CARDIAC ELECTROPHYSIOLOGY PROCEDURE N/A 6/4/2019    Procedure: Flutter;  Surgeon: Juan Carlin MD;  Location: [facility] INVASIVE LOCATION;  Service: Cardiology    CARDIAC SURGERY      WISDOM TOOTH EXTRACTION         Problem List:  Patient Active Problem List   Diagnosis    Gastroesophageal reflux disease without esophagitis    Depression    Essential hypertension    Palpitations    SVT (supraventricular tachycardia), s/p RF ablation, in 2000 x2    WPW (Brayan-Parkinson-White syndrome), s/p RF ablation 2000.    Chronic rhinitis    TAMIKO (obstructive sleep apnea)    Typical atrial flutter    History of 2019 novel coronavirus disease (COVID-19)    Anxiety    Chronic folliculitis    Primary osteoarthritis of left knee       Allergy:   Allergies   Allergen Reactions    Aleve [Naproxen] Hives        Current Medications:   Current Outpatient Medications   Medication Sig Dispense Refill    amLODIPine (NORVASC) 5 MG tablet TAKE ONE TABLET BY MOUTH EVERY DAY FOR BLOOD PRESSURE 30 tablet 6    atorvastatin (Lipitor) 20 MG tablet Take 1 tablet by mouth Daily. 30 tablet 5    busPIRone (BUSPAR) 7.5 MG tablet Take 1 tablet by mouth 2 (Two) Times a Day. as directed 180 tablet 3    diclofenac (CATAFLAM) 50 MG tablet Take 1 tablet by mouth 3 (Three) Times a Day. 90 tablet 1    fexofenadine (ALLEGRA) 180 MG tablet Take 1 tablet by mouth Daily. For allergies 90  tablet 3    fluticasone (FLONASE) 50 MCG/ACT nasal spray 2 sprays into the nostril(s) as directed by provider Daily. 16 g 6    losartan (COZAAR) 50 MG tablet TAKE 1&1/2 TABLET BY MOUTH EVERY DAY FOR BLOOD PRESSURE 45 tablet 6    omeprazole (priLOSEC) 40 MG capsule TAKE ONE CAPSULE BY MOUTH EVERY DAY FOR THE STOMACH 30 capsule 5    sulfamethoxazole-trimethoprim (Bactrim DS) 800-160 MG per tablet Take 1 tablet by mouth 2 (Two) Times a Day. 20 tablet 2    venlafaxine XR (EFFEXOR-XR) 150 MG 24 hr capsule Take 1 capsule by mouth Daily. 90 capsule 3    acetaminophen (TYLENOL) 500 MG tablet Take 2 tablets by mouth Every 6 (Six) Hours As Needed for Moderate Pain for up to 3 days. 12 tablet 0    ondansetron (Zofran) 4 MG tablet Take 1 tablet by mouth Every 8 (Eight) Hours As Needed for Nausea or Vomiting for up to 3 days. 9 tablet 0    traMADol (Ultram) 50 MG tablet Take 1 tablet by mouth Every 6 (Six) Hours As Needed for Moderate Pain for up to 3 doses. 3 tablet 0     No current facility-administered medications for this visit.       Review of Systems:    Review of Systems   Constitutional:  Positive for diaphoresis. Negative for activity change.   HENT:  Negative for congestion.    Eyes:  Negative for blurred vision.   Respiratory:  Negative for shortness of breath.    Cardiovascular:  Negative for chest pain.   Gastrointestinal:  Positive for abdominal pain.   Endocrine: Negative for cold intolerance.   Genitourinary:  Negative for flank pain.   Musculoskeletal:  Negative for arthralgias.   Skin:  Negative for bruise.   Allergic/Immunologic: Negative for environmental allergies.   Neurological:  Negative for confusion.   Hematological:  Negative for adenopathy.   Psychiatric/Behavioral:  Negative for agitation.        Physical Exam:   Physical Exam  Constitutional:       Appearance: Normal appearance.   HENT:      Head: Normocephalic and atraumatic.      Right Ear: External ear normal.      Left Ear: External ear normal.  "  Eyes:      Conjunctiva/sclera: Conjunctivae normal.      Pupils: Pupils are equal, round, and reactive to light.   Cardiovascular:      Rate and Rhythm: Normal rate.      Pulses: Normal pulses.   Pulmonary:      Effort: Pulmonary effort is normal.   Abdominal:      General: Abdomen is flat.      Palpations: Abdomen is soft.      Tenderness: There is abdominal tenderness (Epigastric and right upper quadrant).   Musculoskeletal:         General: Normal range of motion.      Cervical back: Normal range of motion and neck supple.   Skin:     General: Skin is warm and dry.      Capillary Refill: Capillary refill takes less than 2 seconds.   Neurological:      General: No focal deficit present.      Mental Status: He is alert and oriented to person, place, and time.   Psychiatric:         Mood and Affect: Mood normal.         Behavior: Behavior normal.       Vitals:  Blood pressure 157/90, pulse 91, height 188 cm (74.02\"), weight (!) 150 kg (330 lb).   Body mass index is 42.35 kg/mý.     Imaging:   CT Abdomen Pelvis Without Contrast  Narrative: EXAM:    CT Abdomen and Pelvis Without Intravenous Contrast     EXAM DATE:    8/7/2023 7:55 AM     CLINICAL HISTORY:    epigastric pain, consider pancreatitis     TECHNIQUE:    Axial computed tomography images of the abdomen and pelvis without  intravenous contrast.  Sagittal and coronal reformatted images were  created and reviewed.  This CT exam was performed using one or more of  the following dose reduction techniques:  automated exposure control,  adjustment of the mA and/or kV according to patient size, and/or use of  iterative reconstruction technique.     COMPARISON:    No relevant prior studies available.     FINDINGS:    Lung bases:  5.5 mm pulmonary nodule right lower lobe subpleural in  location, image #8.  Lung bases otherwise clear.    Heart:  Coronary calcifications.      ABDOMEN:    Liver:  Diffuse fatty infiltration of liver.    Gallbladder and bile ducts:  " Distended gallbladder with luminal stones  noted.  No ductal dilation.    Pancreas:  Unremarkable.  No ductal dilation.    Spleen:  Unremarkable.  No splenomegaly.    Adrenals:  Unremarkable.  No mass.    Kidneys and ureters:  Unremarkable.  No obstructing stones.  No  hydronephrosis.    Stomach and bowel:  Moderate constipation. No bowel obstruction.  No  mucosal thickening.      PELVIS:    Appendix:  Normal appendix.    Bladder:  Incomplete distention of urinary bladder.  No stones.    Reproductive:  Unremarkable as visualized.      ABDOMEN and PELVIS:    Intraperitoneal space:  No pneumoperitoneum.  No significant fluid  collection.    Bones/joints:  Degenerative changes throughout the lumbar spine with  multilevel central canal and neural foraminal stenosis noted. No acute  bony findings.  No dislocation.    Soft tissues:  Unremarkable.    Vasculature:  Mild atherosclerosis aorta. No evidence of aneurysm.    Lymph nodes:  Unremarkable.  No enlarged lymph nodes.     Impression: 1.  Cholelithiasis and distended gallbladder.  2.  Diffuse fatty infiltration of liver.  3.  Moderate constipation. No bowel obstruction.  4.  5.5 mm pulmonary nodule right lower lobe. Recommend follow-up chest  CT in 6 months.  5.  Other incidental/nonacute findings detailed above.     This report was finalized on 8/7/2023 9:16 AM by Dr. Wilver Aly MD.     US Gallbladder  Narrative: EXAM:    US Abdomen Limited, Gallbladder     EXAM DATE:    8/7/2023 8:08 AM     CLINICAL HISTORY:    epigastric pain/RUQ pain     TECHNIQUE:    Real-time ultrasound of the right upper quadrant with image  documentation.     COMPARISON:    No relevant prior studies available.     FINDINGS:    Liver:  Moderate diffuse fatty infiltration of liver.    Gallbladder:  Distended gallbladder with luminal sludge and small  nonshadowing stones.  Borderline gallbladder wall thickening but without  pericholecystic fluid identified on ultrasound.    Common bile duct:   Common bile duct is 4.4 m and is within normal  limits.  No stones.  No dilation.    Pancreas:  Unremarkable as visualized.     Impression: 1.  Distended gallbladder with luminal sludge and small nonshadowing  stones.  2.  Borderline gallbladder wall thickening but without pericholecystic  fluid identified on ultrasound.  3.  Common bile duct is 4.4 m and is within normal limits.  4.  Moderate diffuse fatty infiltration of liver.     This report was finalized on 8/7/2023 9:13 AM by Dr. Wilver Aly MD.           Assessment & Plan   Diagnoses and all orders for this visit:    1. Symptomatic cholelithiasis (Primary)  -     traMADol (Ultram) 50 MG tablet; Take 1 tablet by mouth Every 6 (Six) Hours As Needed for Moderate Pain for up to 3 doses.  Dispense: 3 tablet; Refill: 0  -     Case Request; Standing  -     sodium chloride 0.9 % flush 10 mL  -     sodium chloride 0.9 % flush 10 mL  -     sodium chloride 0.9 % infusion 40 mL  -     ampicillin-sulbactam 3 g/100 mL 0.9% NS IVPB  -     Case Request    Other orders  -     ondansetron (Zofran) 4 MG tablet; Take 1 tablet by mouth Every 8 (Eight) Hours As Needed for Nausea or Vomiting for up to 3 days.  Dispense: 9 tablet; Refill: 0  -     acetaminophen (TYLENOL) 500 MG tablet; Take 2 tablets by mouth Every 6 (Six) Hours As Needed for Moderate Pain for up to 3 days.  Dispense: 12 tablet; Refill: 0  -     Follow Anesthesia Guidelines / Protocol; Future  -     Follow Anesthesia Guidelines / Protocol; Standing  -     Verify / Perform Chlorhexidine Skin Prep; Standing  -     Verify / Perform Chlorhexidine Skin Prep if Indicated (If Not Already Completed); Standing  -     Obtain Informed Consent; Future  -     Provide NPO Instructions to Patient; Future  -     Chlorhexidine Skin Prep; Future  -     Instructions on coughing, deep breathing, and incentive spirometry.; Standing  -     Insert Peripheral IV; Standing  -     Saline Lock & Maintain IV Access; Standing      Jimmy  is a 52-year-old male who presents with symptomatic cholelithiasis.  He was seen in the emergency department prior to his office visit with me today.  I did discuss his case with Dr. Sanders. Dr. Sanders will take patient for laparoscopic cholecystectomy tomorrow.  I seen and patient Tylenol, tramadol and Zofran to get him.  Tonight.  I recommended a bland diet.  I also recommended to patient that if anything changes or worsens that he should represent to the emergency department.    Visit Diagnoses:    ICD-10-CM ICD-9-CM   1. Symptomatic cholelithiasis  K80.20 574.20         MEDS ORDERED DURING VISIT:  New Medications Ordered This Visit   Medications    ondansetron (Zofran) 4 MG tablet     Sig: Take 1 tablet by mouth Every 8 (Eight) Hours As Needed for Nausea or Vomiting for up to 3 days.     Dispense:  9 tablet     Refill:  0    traMADol (Ultram) 50 MG tablet     Sig: Take 1 tablet by mouth Every 6 (Six) Hours As Needed for Moderate Pain for up to 3 doses.     Dispense:  3 tablet     Refill:  0    acetaminophen (TYLENOL) 500 MG tablet     Sig: Take 2 tablets by mouth Every 6 (Six) Hours As Needed for Moderate Pain for up to 3 days.     Dispense:  12 tablet     Refill:  0       Return for Follow-up postop.             This document has been electronically signed by LIONEL Jaquez  August 7, 2023 14:49 EDT    Please note that portions of this note were completed with a voice recognition program.

## 2023-08-08 ENCOUNTER — HOSPITAL ENCOUNTER (OUTPATIENT)
Facility: HOSPITAL | Age: 53
Setting detail: HOSPITAL OUTPATIENT SURGERY
Discharge: HOME | End: 2023-08-08
Attending: SURGERY | Admitting: SURGERY
Payer: OTHER GOVERNMENT

## 2023-08-08 ENCOUNTER — ANESTHESIA (OUTPATIENT)
Dept: PERIOP | Facility: HOSPITAL | Age: 53
End: 2023-08-08
Payer: OTHER GOVERNMENT

## 2023-08-08 ENCOUNTER — ANESTHESIA EVENT (OUTPATIENT)
Dept: PERIOP | Facility: HOSPITAL | Age: 53
End: 2023-08-08
Payer: OTHER GOVERNMENT

## 2023-08-08 VITALS
RESPIRATION RATE: 16 BRPM | OXYGEN SATURATION: 94 % | BODY MASS INDEX: 38.36 KG/M2 | HEIGHT: 76 IN | WEIGHT: 315 LBS | SYSTOLIC BLOOD PRESSURE: 141 MMHG | DIASTOLIC BLOOD PRESSURE: 87 MMHG | HEART RATE: 101 BPM | TEMPERATURE: 98 F

## 2023-08-08 DIAGNOSIS — K80.20 SYMPTOMATIC CHOLELITHIASIS: ICD-10-CM

## 2023-08-08 PROCEDURE — 25010000002 PROPOFOL 10 MG/ML EMULSION: Performed by: NURSE ANESTHETIST, CERTIFIED REGISTERED

## 2023-08-08 PROCEDURE — 94799 UNLISTED PULMONARY SVC/PX: CPT

## 2023-08-08 PROCEDURE — 25010000002 ONDANSETRON PER 1 MG: Performed by: NURSE ANESTHETIST, CERTIFIED REGISTERED

## 2023-08-08 PROCEDURE — 25010000002 HYDROMORPHONE 1 MG/ML SOLUTION: Performed by: NURSE ANESTHETIST, CERTIFIED REGISTERED

## 2023-08-08 PROCEDURE — 88304 TISSUE EXAM BY PATHOLOGIST: CPT

## 2023-08-08 PROCEDURE — 25010000002 MIDAZOLAM PER 1 MG: Performed by: NURSE ANESTHETIST, CERTIFIED REGISTERED

## 2023-08-08 PROCEDURE — 25010000002 FENTANYL CITRATE (PF) 50 MCG/ML SOLUTION: Performed by: NURSE ANESTHETIST, CERTIFIED REGISTERED

## 2023-08-08 PROCEDURE — 25010000002 AMPICILLIN-SULBACTAM PER 1.5 G

## 2023-08-08 PROCEDURE — 47562 LAPAROSCOPIC CHOLECYSTECTOMY: CPT | Performed by: SURGERY

## 2023-08-08 PROCEDURE — 94640 AIRWAY INHALATION TREATMENT: CPT

## 2023-08-08 PROCEDURE — 25010000002 NEOSTIGMINE 10 MG/10ML SOLUTION: Performed by: NURSE ANESTHETIST, CERTIFIED REGISTERED

## 2023-08-08 PROCEDURE — 25010000002 LABETALOL 5 MG/ML SOLUTION: Performed by: ANESTHESIOLOGY

## 2023-08-08 DEVICE — LIGACLIP 10-M/L, 10MM ENDOSCOPIC ROTATING MULTIPLE CLIP APPLIERS
Type: IMPLANTABLE DEVICE | Site: ABDOMEN | Status: FUNCTIONAL
Brand: LIGACLIP

## 2023-08-08 RX ORDER — SODIUM CHLORIDE 9 MG/ML
40 INJECTION, SOLUTION INTRAVENOUS AS NEEDED
Status: DISCONTINUED | OUTPATIENT
Start: 2023-08-08 | End: 2023-08-08 | Stop reason: HOSPADM

## 2023-08-08 RX ORDER — MEPERIDINE HYDROCHLORIDE 25 MG/ML
12.5 INJECTION INTRAMUSCULAR; INTRAVENOUS; SUBCUTANEOUS
Status: DISCONTINUED | OUTPATIENT
Start: 2023-08-08 | End: 2023-08-08 | Stop reason: HOSPADM

## 2023-08-08 RX ORDER — SODIUM CHLORIDE, SODIUM LACTATE, POTASSIUM CHLORIDE, CALCIUM CHLORIDE 600; 310; 30; 20 MG/100ML; MG/100ML; MG/100ML; MG/100ML
125 INJECTION, SOLUTION INTRAVENOUS ONCE
Status: COMPLETED | OUTPATIENT
Start: 2023-08-08 | End: 2023-08-08

## 2023-08-08 RX ORDER — GLYCOPYRROLATE 0.2 MG/ML
INJECTION INTRAMUSCULAR; INTRAVENOUS AS NEEDED
Status: DISCONTINUED | OUTPATIENT
Start: 2023-08-08 | End: 2023-08-08 | Stop reason: SURG

## 2023-08-08 RX ORDER — OXYCODONE AND ACETAMINOPHEN 10; 325 MG/1; MG/1
1 TABLET ORAL EVERY 6 HOURS PRN
Qty: 15 TABLET | Refills: 0 | Status: ON HOLD | OUTPATIENT
Start: 2023-08-08 | End: 2023-08-12

## 2023-08-08 RX ORDER — SODIUM CHLORIDE, SODIUM LACTATE, POTASSIUM CHLORIDE, CALCIUM CHLORIDE 600; 310; 30; 20 MG/100ML; MG/100ML; MG/100ML; MG/100ML
100 INJECTION, SOLUTION INTRAVENOUS ONCE AS NEEDED
Status: DISCONTINUED | OUTPATIENT
Start: 2023-08-08 | End: 2023-08-08 | Stop reason: HOSPADM

## 2023-08-08 RX ORDER — NEOSTIGMINE METHYLSULFATE 1 MG/ML
INJECTION, SOLUTION INTRAVENOUS AS NEEDED
Status: DISCONTINUED | OUTPATIENT
Start: 2023-08-08 | End: 2023-08-08 | Stop reason: SURG

## 2023-08-08 RX ORDER — LIDOCAINE HYDROCHLORIDE 20 MG/ML
INJECTION, SOLUTION EPIDURAL; INFILTRATION; INTRACAUDAL; PERINEURAL AS NEEDED
Status: DISCONTINUED | OUTPATIENT
Start: 2023-08-08 | End: 2023-08-08 | Stop reason: SURG

## 2023-08-08 RX ORDER — OXYCODONE HYDROCHLORIDE AND ACETAMINOPHEN 5; 325 MG/1; MG/1
1 TABLET ORAL ONCE AS NEEDED
Status: DISCONTINUED | OUTPATIENT
Start: 2023-08-08 | End: 2023-08-08 | Stop reason: HOSPADM

## 2023-08-08 RX ORDER — SODIUM CHLORIDE 0.9 % (FLUSH) 0.9 %
10 SYRINGE (ML) INJECTION AS NEEDED
Status: DISCONTINUED | OUTPATIENT
Start: 2023-08-08 | End: 2023-08-08 | Stop reason: HOSPADM

## 2023-08-08 RX ORDER — SODIUM CHLORIDE 0.9 % (FLUSH) 0.9 %
10 SYRINGE (ML) INJECTION EVERY 12 HOURS SCHEDULED
Status: DISCONTINUED | OUTPATIENT
Start: 2023-08-08 | End: 2023-08-08 | Stop reason: HOSPADM

## 2023-08-08 RX ORDER — MIDAZOLAM HYDROCHLORIDE 1 MG/ML
INJECTION INTRAMUSCULAR; INTRAVENOUS AS NEEDED
Status: DISCONTINUED | OUTPATIENT
Start: 2023-08-08 | End: 2023-08-08 | Stop reason: SURG

## 2023-08-08 RX ORDER — FAMOTIDINE 10 MG/ML
INJECTION, SOLUTION INTRAVENOUS AS NEEDED
Status: DISCONTINUED | OUTPATIENT
Start: 2023-08-08 | End: 2023-08-08 | Stop reason: SURG

## 2023-08-08 RX ORDER — MIDAZOLAM HYDROCHLORIDE 1 MG/ML
1 INJECTION INTRAMUSCULAR; INTRAVENOUS
Status: DISCONTINUED | OUTPATIENT
Start: 2023-08-08 | End: 2023-08-08 | Stop reason: HOSPADM

## 2023-08-08 RX ORDER — ONDANSETRON 2 MG/ML
4 INJECTION INTRAMUSCULAR; INTRAVENOUS AS NEEDED
Status: DISCONTINUED | OUTPATIENT
Start: 2023-08-08 | End: 2023-08-08 | Stop reason: HOSPADM

## 2023-08-08 RX ORDER — PHENYLEPHRINE HCL IN 0.9% NACL 1 MG/10 ML
SYRINGE (ML) INTRAVENOUS AS NEEDED
Status: DISCONTINUED | OUTPATIENT
Start: 2023-08-08 | End: 2023-08-08 | Stop reason: SURG

## 2023-08-08 RX ORDER — ONDANSETRON 2 MG/ML
INJECTION INTRAMUSCULAR; INTRAVENOUS AS NEEDED
Status: DISCONTINUED | OUTPATIENT
Start: 2023-08-08 | End: 2023-08-08 | Stop reason: SURG

## 2023-08-08 RX ORDER — PROPOFOL 10 MG/ML
VIAL (ML) INTRAVENOUS AS NEEDED
Status: DISCONTINUED | OUTPATIENT
Start: 2023-08-08 | End: 2023-08-08 | Stop reason: SURG

## 2023-08-08 RX ORDER — SODIUM CHLORIDE, SODIUM LACTATE, POTASSIUM CHLORIDE, CALCIUM CHLORIDE 600; 310; 30; 20 MG/100ML; MG/100ML; MG/100ML; MG/100ML
INJECTION, SOLUTION INTRAVENOUS CONTINUOUS PRN
Status: DISCONTINUED | OUTPATIENT
Start: 2023-08-08 | End: 2023-08-08 | Stop reason: SURG

## 2023-08-08 RX ORDER — BUPIVACAINE HYDROCHLORIDE AND EPINEPHRINE 5; 5 MG/ML; UG/ML
INJECTION, SOLUTION PERINEURAL AS NEEDED
Status: DISCONTINUED | OUTPATIENT
Start: 2023-08-08 | End: 2023-08-08 | Stop reason: HOSPADM

## 2023-08-08 RX ORDER — FENTANYL CITRATE 50 UG/ML
50 INJECTION, SOLUTION INTRAMUSCULAR; INTRAVENOUS
Status: DISCONTINUED | OUTPATIENT
Start: 2023-08-08 | End: 2023-08-08 | Stop reason: HOSPADM

## 2023-08-08 RX ORDER — LABETALOL HYDROCHLORIDE 5 MG/ML
15 INJECTION, SOLUTION INTRAVENOUS ONCE
Status: COMPLETED | OUTPATIENT
Start: 2023-08-08 | End: 2023-08-08

## 2023-08-08 RX ORDER — FENTANYL CITRATE 50 UG/ML
INJECTION, SOLUTION INTRAMUSCULAR; INTRAVENOUS AS NEEDED
Status: DISCONTINUED | OUTPATIENT
Start: 2023-08-08 | End: 2023-08-08 | Stop reason: SURG

## 2023-08-08 RX ORDER — MAGNESIUM HYDROXIDE 1200 MG/15ML
LIQUID ORAL AS NEEDED
Status: DISCONTINUED | OUTPATIENT
Start: 2023-08-08 | End: 2023-08-08 | Stop reason: HOSPADM

## 2023-08-08 RX ORDER — SODIUM CHLORIDE 9 MG/ML
INJECTION, SOLUTION INTRAVENOUS AS NEEDED
Status: DISCONTINUED | OUTPATIENT
Start: 2023-08-08 | End: 2023-08-08 | Stop reason: HOSPADM

## 2023-08-08 RX ORDER — ROCURONIUM BROMIDE 10 MG/ML
INJECTION, SOLUTION INTRAVENOUS AS NEEDED
Status: DISCONTINUED | OUTPATIENT
Start: 2023-08-08 | End: 2023-08-08 | Stop reason: SURG

## 2023-08-08 RX ORDER — IPRATROPIUM BROMIDE AND ALBUTEROL SULFATE 2.5; .5 MG/3ML; MG/3ML
3 SOLUTION RESPIRATORY (INHALATION) ONCE AS NEEDED
Status: COMPLETED | OUTPATIENT
Start: 2023-08-08 | End: 2023-08-08

## 2023-08-08 RX ADMIN — AMPICILLIN SODIUM AND SULBACTAM SODIUM 3 G: 2; 1 INJECTION, POWDER, FOR SOLUTION INTRAMUSCULAR; INTRAVENOUS at 12:57

## 2023-08-08 RX ADMIN — HYDROMORPHONE HYDROCHLORIDE 1 MG: 1 INJECTION, SOLUTION INTRAMUSCULAR; INTRAVENOUS; SUBCUTANEOUS at 14:40

## 2023-08-08 RX ADMIN — LIDOCAINE HYDROCHLORIDE 100 MG: 20 INJECTION, SOLUTION EPIDURAL; INFILTRATION; INTRACAUDAL; PERINEURAL at 12:50

## 2023-08-08 RX ADMIN — FENTANYL CITRATE 50 MCG: 50 INJECTION INTRAMUSCULAR; INTRAVENOUS at 13:34

## 2023-08-08 RX ADMIN — GLYCOPYRROLATE 0.4 MG: 0.4 INJECTION INTRAMUSCULAR; INTRAVENOUS at 13:43

## 2023-08-08 RX ADMIN — FAMOTIDINE 20 MG: 10 INJECTION, SOLUTION INTRAVENOUS at 12:42

## 2023-08-08 RX ADMIN — MIDAZOLAM HYDROCHLORIDE 2 MG: 1 INJECTION, SOLUTION INTRAMUSCULAR; INTRAVENOUS at 12:42

## 2023-08-08 RX ADMIN — LABETALOL HYDROCHLORIDE 15 MG: 5 INJECTION, SOLUTION INTRAVENOUS at 14:33

## 2023-08-08 RX ADMIN — Medication 200 MCG: at 13:38

## 2023-08-08 RX ADMIN — FENTANYL CITRATE 50 MCG: 50 INJECTION INTRAMUSCULAR; INTRAVENOUS at 13:05

## 2023-08-08 RX ADMIN — SODIUM CHLORIDE, POTASSIUM CHLORIDE, SODIUM LACTATE AND CALCIUM CHLORIDE 125 ML/HR: 600; 310; 30; 20 INJECTION, SOLUTION INTRAVENOUS at 11:46

## 2023-08-08 RX ADMIN — PROPOFOL 180 MG: 10 INJECTION, EMULSION INTRAVENOUS at 12:50

## 2023-08-08 RX ADMIN — FENTANYL CITRATE 100 MCG: 50 INJECTION INTRAMUSCULAR; INTRAVENOUS at 12:42

## 2023-08-08 RX ADMIN — IPRATROPIUM BROMIDE AND ALBUTEROL SULFATE 3 ML: 2.5; .5 SOLUTION RESPIRATORY (INHALATION) at 14:19

## 2023-08-08 RX ADMIN — SODIUM CHLORIDE, POTASSIUM CHLORIDE, SODIUM LACTATE AND CALCIUM CHLORIDE: 600; 310; 30; 20 INJECTION, SOLUTION INTRAVENOUS at 12:42

## 2023-08-08 RX ADMIN — ROCURONIUM BROMIDE 30 MG: 10 SOLUTION INTRAVENOUS at 12:50

## 2023-08-08 RX ADMIN — Medication 200 MCG: at 13:32

## 2023-08-08 RX ADMIN — FENTANYL CITRATE 100 MCG: 50 INJECTION INTRAMUSCULAR; INTRAVENOUS at 13:48

## 2023-08-08 RX ADMIN — NEOSTIGMINE METHYLSULFATE 2.5 MG: 0.5 INJECTION INTRAVENOUS at 13:43

## 2023-08-08 RX ADMIN — FENTANYL CITRATE 50 MCG: 50 INJECTION, SOLUTION INTRAMUSCULAR; INTRAVENOUS at 14:26

## 2023-08-08 RX ADMIN — HYDROMORPHONE HYDROCHLORIDE 1 MG: 1 INJECTION, SOLUTION INTRAMUSCULAR; INTRAVENOUS; SUBCUTANEOUS at 14:22

## 2023-08-08 RX ADMIN — ONDANSETRON 4 MG: 2 INJECTION INTRAMUSCULAR; INTRAVENOUS at 12:42

## 2023-08-08 NOTE — NURSING NOTE
Pt states feels like he needs to void but unable to. Bladder scan done with 3 ml result. Dr. Sanders called states as long as pt is drinking fluids he can be d/c'ed.

## 2023-08-08 NOTE — OP NOTE
CHOLECYSTECTOMY LAPAROSCOPIC  Procedure Note    Jimmy Dawson Jr.  8/8/2023    Pre-op Diagnosis:   Symptomatic cholelithiasis [K80.20]    Post-op Diagnosis: acute cholecystitis        Procedure(s):  CHOLECYSTECTOMY LAPAROSCOPIC    Surgeon(s):  Miles Sanders MD    Anesthesia: General    Staff:   Circulator: Yessi Suarez RN  Scrub Person: Karolyn Dave  Assistant: Imelda Villareal    Estimated Blood Loss: 200ml    Specimens:                Order Name Source Comment Collection Info Order Time   TISSUE EXAM, [facility]) Gallbladder  Collected By: Miles Sanders MD 8/8/2023  1:05 PM     How many specimens?   1              Drains: * No LDAs found *    Procedure: The abdomen was prepped and draped. Two 5 mm and one 11 mm port placed. The gallbladder was very large had a lot of edema as well as a chronically thickened wall with what appeared to be a large round stone stuck in the neck. There were omental adhesions that had to be pealed off the neck of the gallbladder and oozed. The gallbladder was decompressed some with a large needle but the bile was very thick and kept stopping up the suction. The neck was eventually cleared off and the cystic duct and artery were dissected out, clearly identified, clipped and divided. The gallbladder was mobilized off the liver with cautery and brought up to the subxiphoid port site. The incision had to be enlarged because the gallbladder wall was so thick.  Eventually the thick bile was suctioned out and the large round stone was broken up and removed so the gallbladder eventually could be removed. The abdomen was irrigated and suctioned. A HANS drain was placed out the RUQ port site down in to the liver bed area. The gas was allowed to escape and the ports removed. The local was injected and the port sites closed with vicryl. The drain was sutured in with a silk suture.     Findings: acute and chronic cholecystitis         Complications:     Grafts /  Implants N/A    Miles Sanders MD     Date: 8/8/2023  Time: 13:48 EDT

## 2023-08-08 NOTE — ANESTHESIA PROCEDURE NOTES
Airway  Urgency: elective    Date/Time: 8/8/2023 12:55 PM  Airway not difficult    General Information and Staff    Patient location during procedure: OR  CRNA/CAA: Teresa Brasher CRNA    Indications and Patient Condition  Indications for airway management: airway protection    Preoxygenated: yes  MILS maintained throughout  Mask difficulty assessment: 3 - difficult mask (inadequate, unstable or two providers) +/- NMBA    Final Airway Details  Final airway type: endotracheal airway      Successful airway: ETT  Cuffed: yes   Successful intubation technique: direct laryngoscopy and video laryngoscopy  Facilitating devices/methods: intubating stylet  Endotracheal tube insertion site: oral  Blade: Demetra  Blade size: 4  ETT size (mm): 7.5  Cormack-Lehane Classification: grade I - full view of glottis  Placement verified by: chest auscultation and capnometry   Measured from: lips  ETT/EBT  to lips (cm): 24  Number of attempts at approach: 2  Assessment: lips, teeth, and gum same as pre-op and atraumatic intubation

## 2023-08-08 NOTE — ANESTHESIA POSTPROCEDURE EVALUATION
Patient: Jimmy Dawson Jr.    Procedure Summary       Date: 08/08/23 Room / Location:  [facility] OR 01 /  [facility] OR    Anesthesia Start: 1242 Anesthesia Stop: 1356    Procedure: CHOLECYSTECTOMY LAPAROSCOPIC (Abdomen) Diagnosis:       Symptomatic cholelithiasis      (Symptomatic cholelithiasis [K80.20])    Surgeons: Miles Sanders MD Provider: Tucker Pan MD    Anesthesia Type: general ASA Status: 3            Anesthesia Type: general    Vitals  Vitals Value Taken Time   /82 08/08/23 1441   Temp 97.7 øF (36.5 øC) 08/08/23 1356   Pulse 108 08/08/23 1441   Resp 20 08/08/23 1441   SpO2 98 % 08/08/23 1441           Post Anesthesia Care and Evaluation    Patient location during evaluation: PACU  Patient participation: complete - patient participated  Level of consciousness: responsive to painful stimuli and awake  Pain score: 6  Pain management: inadequate    Airway patency: patent  Anesthetic complications: No anesthetic complications  PONV Status: none  Cardiovascular status: hypertensive  Respiratory status: nasal cannula  Hydration status: acceptable    Comments: Patient with extreme pain even before surgery. Moaning and groaning preop.  Said his abdomen suddenly started hurting today.  More pain meds to be given as well as anti-hypertensives.

## 2023-08-08 NOTE — ANESTHESIA PREPROCEDURE EVALUATION
Anesthesia Evaluation     Patient summary reviewed and Nursing notes reviewed   history of anesthetic complications:  PONV  NPO Solid Status: > 8 hours  NPO Liquid Status: > 8 hours           Airway   Mallampati: II  TM distance: >3 FB  Neck ROM: full  Dental - normal exam         Pulmonary     breath sounds clear to auscultation  (+) ,sleep apnea on CPAP  Cardiovascular   Exercise tolerance: good (4-7 METS)    ECG reviewed  Rhythm: regular  Rate: normal    (+) hypertensiondysrhythmias      Neuro/Psych  (+) psychiatric history  GI/Hepatic/Renal/Endo    (+) obesity, morbid obesity, GERD    Musculoskeletal     Abdominal   (+) obese    Abdomen: soft.   Substance History - negative use     OB/GYN          Other   arthritis,                     Anesthesia Plan    ASA 3     general     intravenous induction     Anesthetic plan, risks, benefits, and alternatives have been provided, discussed and informed consent has been obtained with: patient.  Pre-procedure education provided  Use of blood products discussed with  Consented to blood products.    Plan discussed with CRNA.      CODE STATUS:

## 2023-08-10 DIAGNOSIS — K80.20 SYMPTOMATIC CHOLELITHIASIS: Primary | ICD-10-CM

## 2023-08-10 LAB — REF LAB TEST METHOD: NORMAL

## 2023-08-10 RX ORDER — PROMETHAZINE HYDROCHLORIDE 25 MG/1
25 TABLET ORAL EVERY 6 HOURS PRN
Qty: 10 TABLET | Refills: 0 | Status: ON HOLD | OUTPATIENT
Start: 2023-08-10 | End: 2023-08-12

## 2023-08-11 ENCOUNTER — APPOINTMENT (OUTPATIENT)
Dept: GENERAL RADIOLOGY | Facility: HOSPITAL | Age: 53
DRG: 871 | End: 2023-08-11
Payer: OTHER GOVERNMENT

## 2023-08-11 ENCOUNTER — LAB (OUTPATIENT)
Dept: LAB | Facility: HOSPITAL | Age: 53
End: 2023-08-11
Payer: OTHER GOVERNMENT

## 2023-08-11 ENCOUNTER — APPOINTMENT (OUTPATIENT)
Dept: CT IMAGING | Facility: HOSPITAL | Age: 53
DRG: 871 | End: 2023-08-11
Payer: OTHER GOVERNMENT

## 2023-08-11 ENCOUNTER — HOSPITAL ENCOUNTER (INPATIENT)
Facility: HOSPITAL | Age: 53
LOS: 2 days | Discharge: HOME | DRG: 871 | End: 2023-08-13
Attending: STUDENT IN AN ORGANIZED HEALTH CARE EDUCATION/TRAINING PROGRAM | Admitting: STUDENT IN AN ORGANIZED HEALTH CARE EDUCATION/TRAINING PROGRAM
Payer: OTHER GOVERNMENT

## 2023-08-11 DIAGNOSIS — K80.20 SYMPTOMATIC CHOLELITHIASIS: Primary | ICD-10-CM

## 2023-08-11 DIAGNOSIS — J18.9 COMMUNITY ACQUIRED PNEUMONIA OF LEFT LOWER LOBE OF LUNG: ICD-10-CM

## 2023-08-11 DIAGNOSIS — K80.20 SYMPTOMATIC CHOLELITHIASIS: ICD-10-CM

## 2023-08-11 DIAGNOSIS — J18.9 PNEUMONIA DUE TO INFECTIOUS ORGANISM, UNSPECIFIED LATERALITY, UNSPECIFIED PART OF LUNG: ICD-10-CM

## 2023-08-11 DIAGNOSIS — R10.9 ABDOMINAL PAIN, UNSPECIFIED ABDOMINAL LOCATION: ICD-10-CM

## 2023-08-11 DIAGNOSIS — A41.9 SEPSIS, DUE TO UNSPECIFIED ORGANISM, UNSPECIFIED WHETHER ACUTE ORGAN DYSFUNCTION PRESENT: Primary | ICD-10-CM

## 2023-08-11 LAB
027 TOXIN: NORMAL
A-A DO2: 42.2 MMHG (ref 0–300)
ADV 40+41 DNA STL QL NAA+NON-PROBE: NOT DETECTED
ALBUMIN SERPL-MCNC: 4 G/DL (ref 3.5–5.2)
ALBUMIN SERPL-MCNC: 4.2 G/DL (ref 3.5–5.2)
ALBUMIN/GLOB SERPL: 0.9 G/DL
ALBUMIN/GLOB SERPL: 1 G/DL
ALP SERPL-CCNC: 101 U/L (ref 39–117)
ALP SERPL-CCNC: 96 U/L (ref 39–117)
ALT SERPL W P-5'-P-CCNC: 50 U/L (ref 1–41)
ALT SERPL W P-5'-P-CCNC: 58 U/L (ref 1–41)
ANION GAP SERPL CALCULATED.3IONS-SCNC: 16.7 MMOL/L (ref 5–15)
ANION GAP SERPL CALCULATED.3IONS-SCNC: 17.3 MMOL/L (ref 5–15)
APTT PPP: 26.7 SECONDS (ref 26.5–34.5)
ARTERIAL PATENCY WRIST A: POSITIVE
AST SERPL-CCNC: 33 U/L (ref 1–40)
AST SERPL-CCNC: 41 U/L (ref 1–40)
ASTRO TYP 1-8 RNA STL QL NAA+NON-PROBE: NOT DETECTED
ATMOSPHERIC PRESS: 724 MMHG
BACTERIA UR QL AUTO: ABNORMAL /HPF
BASE EXCESS BLDA CALC-SCNC: 3.8 MMOL/L (ref 0–2)
BASOPHILS # BLD AUTO: 0.07 10*3/MM3 (ref 0–0.2)
BASOPHILS # BLD AUTO: 0.07 10*3/MM3 (ref 0–0.2)
BASOPHILS NFR BLD AUTO: 0.3 % (ref 0–1.5)
BASOPHILS NFR BLD AUTO: 0.4 % (ref 0–1.5)
BDY SITE: ABNORMAL
BILIRUB SERPL-MCNC: 0.9 MG/DL (ref 0–1.2)
BILIRUB SERPL-MCNC: 1 MG/DL (ref 0–1.2)
BILIRUB UR QL STRIP: ABNORMAL
BUN SERPL-MCNC: 36 MG/DL (ref 6–20)
BUN SERPL-MCNC: 38 MG/DL (ref 6–20)
BUN/CREAT SERPL: 29 (ref 7–25)
BUN/CREAT SERPL: 30.8 (ref 7–25)
C CAYETANENSIS DNA STL QL NAA+NON-PROBE: NOT DETECTED
C COLI+JEJ+UPSA DNA STL QL NAA+NON-PROBE: NOT DETECTED
C DIFF TOX GENS STL QL NAA+PROBE: NEGATIVE
CALCIUM SPEC-SCNC: 10.3 MG/DL (ref 8.6–10.5)
CALCIUM SPEC-SCNC: 9.9 MG/DL (ref 8.6–10.5)
CHLORIDE SERPL-SCNC: 95 MMOL/L (ref 98–107)
CHLORIDE SERPL-SCNC: 96 MMOL/L (ref 98–107)
CLARITY UR: CLEAR
CO2 BLDA-SCNC: 28.6 MMOL/L (ref 22–33)
CO2 SERPL-SCNC: 25.3 MMOL/L (ref 22–29)
CO2 SERPL-SCNC: 28.7 MMOL/L (ref 22–29)
COHGB MFR BLD: 1.1 % (ref 0–5)
COLOR UR: ABNORMAL
CREAT SERPL-MCNC: 1.17 MG/DL (ref 0.76–1.27)
CREAT SERPL-MCNC: 1.31 MG/DL (ref 0.76–1.27)
CRP SERPL-MCNC: 10.37 MG/DL (ref 0–0.5)
CRYPTOSP DNA STL QL NAA+NON-PROBE: NOT DETECTED
D-LACTATE SERPL-SCNC: 1.4 MMOL/L (ref 0.5–2)
D-LACTATE SERPL-SCNC: 2.3 MMOL/L (ref 0.5–2)
D-LACTATE SERPL-SCNC: 2.7 MMOL/L (ref 0.5–2)
D-LACTATE SERPL-SCNC: 3 MMOL/L (ref 0.5–2)
DEPRECATED RDW RBC AUTO: 42.9 FL (ref 37–54)
DEPRECATED RDW RBC AUTO: 43.9 FL (ref 37–54)
E HISTOLYT DNA STL QL NAA+NON-PROBE: NOT DETECTED
EAEC PAA PLAS AGGR+AATA ST NAA+NON-PRB: NOT DETECTED
EC STX1+STX2 GENES STL QL NAA+NON-PROBE: NOT DETECTED
EGFRCR SERPLBLD CKD-EPI 2021: 64.7 ML/MIN/1.73
EGFRCR SERPLBLD CKD-EPI 2021: 74.1 ML/MIN/1.73
EOSINOPHIL # BLD AUTO: 0.07 10*3/MM3 (ref 0–0.4)
EOSINOPHIL # BLD AUTO: 0.09 10*3/MM3 (ref 0–0.4)
EOSINOPHIL NFR BLD AUTO: 0.4 % (ref 0.3–6.2)
EOSINOPHIL NFR BLD AUTO: 0.4 % (ref 0.3–6.2)
EPEC EAE GENE STL QL NAA+NON-PROBE: NOT DETECTED
ERYTHROCYTE [DISTWIDTH] IN BLOOD BY AUTOMATED COUNT: 13.6 % (ref 12.3–15.4)
ERYTHROCYTE [DISTWIDTH] IN BLOOD BY AUTOMATED COUNT: 13.6 % (ref 12.3–15.4)
ETEC LTA+ST1A+ST1B TOX ST NAA+NON-PROBE: NOT DETECTED
FLUAV RNA RESP QL NAA+PROBE: NOT DETECTED
FLUBV RNA ISLT QL NAA+PROBE: NOT DETECTED
G LAMBLIA DNA STL QL NAA+NON-PROBE: NOT DETECTED
GEN 5 2HR TROPONIN T REFLEX: 16 NG/L
GLOBULIN UR ELPH-MCNC: 4.1 GM/DL
GLOBULIN UR ELPH-MCNC: 4.4 GM/DL
GLUCOSE SERPL-MCNC: 186 MG/DL (ref 65–99)
GLUCOSE SERPL-MCNC: 192 MG/DL (ref 65–99)
GLUCOSE UR STRIP-MCNC: NEGATIVE MG/DL
HCO3 BLDA-SCNC: 27.4 MMOL/L (ref 20–26)
HCT VFR BLD AUTO: 46.1 % (ref 37.5–51)
HCT VFR BLD AUTO: 46.3 % (ref 37.5–51)
HCT VFR BLD CALC: 44.1 % (ref 38–51)
HGB BLD-MCNC: 15.2 G/DL (ref 13–17.7)
HGB BLD-MCNC: 15.2 G/DL (ref 13–17.7)
HGB BLDA-MCNC: 14.4 G/DL (ref 14–18)
HGB UR QL STRIP.AUTO: NEGATIVE
HOLD SPECIMEN: NORMAL
HOLD SPECIMEN: NORMAL
HYALINE CASTS UR QL AUTO: ABNORMAL /LPF
IMM GRANULOCYTES # BLD AUTO: 0.08 10*3/MM3 (ref 0–0.05)
IMM GRANULOCYTES # BLD AUTO: 0.1 10*3/MM3 (ref 0–0.05)
IMM GRANULOCYTES NFR BLD AUTO: 0.4 % (ref 0–0.5)
IMM GRANULOCYTES NFR BLD AUTO: 0.5 % (ref 0–0.5)
INHALED O2 CONCENTRATION: 21 %
INR PPP: 1.02 (ref 0.9–1.1)
KETONES UR QL STRIP: ABNORMAL
LEUKOCYTE ESTERASE UR QL STRIP.AUTO: ABNORMAL
LYMPHOCYTES # BLD AUTO: 2 10*3/MM3 (ref 0.7–3.1)
LYMPHOCYTES # BLD AUTO: 2.44 10*3/MM3 (ref 0.7–3.1)
LYMPHOCYTES NFR BLD AUTO: 10.9 % (ref 19.6–45.3)
LYMPHOCYTES NFR BLD AUTO: 11.8 % (ref 19.6–45.3)
Lab: ABNORMAL
MCH RBC QN AUTO: 28.6 PG (ref 26.6–33)
MCH RBC QN AUTO: 28.9 PG (ref 26.6–33)
MCHC RBC AUTO-ENTMCNC: 32.8 G/DL (ref 31.5–35.7)
MCHC RBC AUTO-ENTMCNC: 33 G/DL (ref 31.5–35.7)
MCV RBC AUTO: 86.8 FL (ref 79–97)
MCV RBC AUTO: 88 FL (ref 79–97)
METHGB BLD QL: 0.1 % (ref 0–3)
MODALITY: ABNORMAL
MONOCYTES # BLD AUTO: 1.45 10*3/MM3 (ref 0.1–0.9)
MONOCYTES # BLD AUTO: 1.46 10*3/MM3 (ref 0.1–0.9)
MONOCYTES NFR BLD AUTO: 7.1 % (ref 5–12)
MONOCYTES NFR BLD AUTO: 7.9 % (ref 5–12)
MRSA DNA SPEC QL NAA+PROBE: NORMAL
NEUTROPHILS NFR BLD AUTO: 14.66 10*3/MM3 (ref 1.7–7)
NEUTROPHILS NFR BLD AUTO: 16.49 10*3/MM3 (ref 1.7–7)
NEUTROPHILS NFR BLD AUTO: 79.9 % (ref 42.7–76)
NEUTROPHILS NFR BLD AUTO: 80 % (ref 42.7–76)
NITRITE UR QL STRIP: NEGATIVE
NOROVIRUS GI+II RNA STL QL NAA+NON-PROBE: NOT DETECTED
NOTE: ABNORMAL
NRBC BLD AUTO-RTO: 0 /100 WBC (ref 0–0.2)
NRBC BLD AUTO-RTO: 0 /100 WBC (ref 0–0.2)
OXYHGB MFR BLDV: 89 % (ref 94–99)
P SHIGELLOIDES DNA STL QL NAA+NON-PROBE: NOT DETECTED
PCO2 BLDA: 37.4 MM HG (ref 35–45)
PCO2 TEMP ADJ BLD: ABNORMAL MM[HG]
PH BLDA: 7.47 PH UNITS (ref 7.35–7.45)
PH UR STRIP.AUTO: 5.5 [PH] (ref 5–8)
PH, TEMP CORRECTED: ABNORMAL
PLATELET # BLD AUTO: 505 10*3/MM3 (ref 140–450)
PLATELET # BLD AUTO: 521 10*3/MM3 (ref 140–450)
PMV BLD AUTO: 9.3 FL (ref 6–12)
PMV BLD AUTO: 9.4 FL (ref 6–12)
PO2 BLDA: 57.7 MM HG (ref 83–108)
PO2 TEMP ADJ BLD: ABNORMAL MM[HG]
POTASSIUM SERPL-SCNC: 3.4 MMOL/L (ref 3.5–5.2)
POTASSIUM SERPL-SCNC: 3.6 MMOL/L (ref 3.5–5.2)
PROCALCITONIN SERPL-MCNC: 0.2 NG/ML (ref 0–0.25)
PROT SERPL-MCNC: 8.3 G/DL (ref 6–8.5)
PROT SERPL-MCNC: 8.4 G/DL (ref 6–8.5)
PROT UR QL STRIP: ABNORMAL
PROTHROMBIN TIME: 13.9 SECONDS (ref 12.1–14.7)
QT INTERVAL: 300 MS
QT INTERVAL: 340 MS
QTC INTERVAL: 448 MS
QTC INTERVAL: 468 MS
RBC # BLD AUTO: 5.26 10*6/MM3 (ref 4.14–5.8)
RBC # BLD AUTO: 5.31 10*6/MM3 (ref 4.14–5.8)
RBC # UR STRIP: ABNORMAL /HPF
REF LAB TEST METHOD: ABNORMAL
RVA RNA STL QL NAA+NON-PROBE: NOT DETECTED
S ENT+BONG DNA STL QL NAA+NON-PROBE: NOT DETECTED
SAO2 % BLDCOA: 90.1 % (ref 94–99)
SAPO I+II+IV+V RNA STL QL NAA+NON-PROBE: NOT DETECTED
SARS-COV-2 RNA RESP QL NAA+PROBE: NOT DETECTED
SHIGELLA SP+EIEC IPAH ST NAA+NON-PROBE: NOT DETECTED
SODIUM SERPL-SCNC: 138 MMOL/L (ref 136–145)
SODIUM SERPL-SCNC: 141 MMOL/L (ref 136–145)
SP GR UR STRIP: >1.03 (ref 1–1.03)
SQUAMOUS #/AREA URNS HPF: ABNORMAL /HPF
TROPONIN T DELTA: 0 NG/L
TROPONIN T SERPL HS-MCNC: 16 NG/L
UROBILINOGEN UR QL STRIP: ABNORMAL
V CHOL+PARA+VUL DNA STL QL NAA+NON-PROBE: NOT DETECTED
V CHOLERAE DNA STL QL NAA+NON-PROBE: NOT DETECTED
VENTILATOR MODE: ABNORMAL
WBC # UR STRIP: ABNORMAL /HPF
WBC NRBC COR # BLD: 18.33 10*3/MM3 (ref 3.4–10.8)
WBC NRBC COR # BLD: 20.65 10*3/MM3 (ref 3.4–10.8)
WHOLE BLOOD HOLD COAG: NORMAL
WHOLE BLOOD HOLD SPECIMEN: NORMAL
Y ENTEROCOL DNA STL QL NAA+NON-PROBE: NOT DETECTED

## 2023-08-11 PROCEDURE — P9612 CATHETERIZE FOR URINE SPEC: HCPCS

## 2023-08-11 PROCEDURE — 83050 HGB METHEMOGLOBIN QUAN: CPT

## 2023-08-11 PROCEDURE — 87507 IADNA-DNA/RNA PROBE TQ 12-25: CPT | Performed by: PHYSICIAN ASSISTANT

## 2023-08-11 PROCEDURE — 84484 ASSAY OF TROPONIN QUANT: CPT | Performed by: STUDENT IN AN ORGANIZED HEALTH CARE EDUCATION/TRAINING PROGRAM

## 2023-08-11 PROCEDURE — 25010000002 PIPERACILLIN SOD-TAZOBACTAM PER 1 G: Performed by: STUDENT IN AN ORGANIZED HEALTH CARE EDUCATION/TRAINING PROGRAM

## 2023-08-11 PROCEDURE — 93005 ELECTROCARDIOGRAM TRACING: CPT | Performed by: STUDENT IN AN ORGANIZED HEALTH CARE EDUCATION/TRAINING PROGRAM

## 2023-08-11 PROCEDURE — 84145 PROCALCITONIN (PCT): CPT | Performed by: STUDENT IN AN ORGANIZED HEALTH CARE EDUCATION/TRAINING PROGRAM

## 2023-08-11 PROCEDURE — 25010000002 ENOXAPARIN PER 10 MG: Performed by: STUDENT IN AN ORGANIZED HEALTH CARE EDUCATION/TRAINING PROGRAM

## 2023-08-11 PROCEDURE — 93010 ELECTROCARDIOGRAM REPORT: CPT | Performed by: INTERNAL MEDICINE

## 2023-08-11 PROCEDURE — 85025 COMPLETE CBC W/AUTO DIFF WBC: CPT

## 2023-08-11 PROCEDURE — 80053 COMPREHEN METABOLIC PANEL: CPT

## 2023-08-11 PROCEDURE — 85025 COMPLETE CBC W/AUTO DIFF WBC: CPT | Performed by: STUDENT IN AN ORGANIZED HEALTH CARE EDUCATION/TRAINING PROGRAM

## 2023-08-11 PROCEDURE — 87070 CULTURE OTHR SPECIMN AEROBIC: CPT | Performed by: STUDENT IN AN ORGANIZED HEALTH CARE EDUCATION/TRAINING PROGRAM

## 2023-08-11 PROCEDURE — 71045 X-RAY EXAM CHEST 1 VIEW: CPT | Performed by: RADIOLOGY

## 2023-08-11 PROCEDURE — 99285 EMERGENCY DEPT VISIT HI MDM: CPT

## 2023-08-11 PROCEDURE — 87636 SARSCOV2 & INF A&B AMP PRB: CPT | Performed by: STUDENT IN AN ORGANIZED HEALTH CARE EDUCATION/TRAINING PROGRAM

## 2023-08-11 PROCEDURE — 99223 1ST HOSP IP/OBS HIGH 75: CPT

## 2023-08-11 PROCEDURE — 87493 C DIFF AMPLIFIED PROBE: CPT | Performed by: PHYSICIAN ASSISTANT

## 2023-08-11 PROCEDURE — 74177 CT ABD & PELVIS W/CONTRAST: CPT

## 2023-08-11 PROCEDURE — 86140 C-REACTIVE PROTEIN: CPT | Performed by: STUDENT IN AN ORGANIZED HEALTH CARE EDUCATION/TRAINING PROGRAM

## 2023-08-11 PROCEDURE — 87040 BLOOD CULTURE FOR BACTERIA: CPT | Performed by: STUDENT IN AN ORGANIZED HEALTH CARE EDUCATION/TRAINING PROGRAM

## 2023-08-11 PROCEDURE — 85730 THROMBOPLASTIN TIME PARTIAL: CPT | Performed by: STUDENT IN AN ORGANIZED HEALTH CARE EDUCATION/TRAINING PROGRAM

## 2023-08-11 PROCEDURE — 94799 UNLISTED PULMONARY SVC/PX: CPT

## 2023-08-11 PROCEDURE — 36415 COLL VENOUS BLD VENIPUNCTURE: CPT

## 2023-08-11 PROCEDURE — 81001 URINALYSIS AUTO W/SCOPE: CPT | Performed by: STUDENT IN AN ORGANIZED HEALTH CARE EDUCATION/TRAINING PROGRAM

## 2023-08-11 PROCEDURE — 36600 WITHDRAWAL OF ARTERIAL BLOOD: CPT

## 2023-08-11 PROCEDURE — 0 METHYLPREDNISOLONE PER 125 MG: Performed by: STUDENT IN AN ORGANIZED HEALTH CARE EDUCATION/TRAINING PROGRAM

## 2023-08-11 PROCEDURE — 74177 CT ABD & PELVIS W/CONTRAST: CPT | Performed by: RADIOLOGY

## 2023-08-11 PROCEDURE — 94640 AIRWAY INHALATION TREATMENT: CPT

## 2023-08-11 PROCEDURE — 80053 COMPREHEN METABOLIC PANEL: CPT | Performed by: STUDENT IN AN ORGANIZED HEALTH CARE EDUCATION/TRAINING PROGRAM

## 2023-08-11 PROCEDURE — 87641 MR-STAPH DNA AMP PROBE: CPT | Performed by: STUDENT IN AN ORGANIZED HEALTH CARE EDUCATION/TRAINING PROGRAM

## 2023-08-11 PROCEDURE — 70450 CT HEAD/BRAIN W/O DYE: CPT | Performed by: RADIOLOGY

## 2023-08-11 PROCEDURE — 82805 BLOOD GASES W/O2 SATURATION: CPT

## 2023-08-11 PROCEDURE — 87205 SMEAR GRAM STAIN: CPT | Performed by: STUDENT IN AN ORGANIZED HEALTH CARE EDUCATION/TRAINING PROGRAM

## 2023-08-11 PROCEDURE — 83605 ASSAY OF LACTIC ACID: CPT | Performed by: STUDENT IN AN ORGANIZED HEALTH CARE EDUCATION/TRAINING PROGRAM

## 2023-08-11 PROCEDURE — 70450 CT HEAD/BRAIN W/O DYE: CPT

## 2023-08-11 PROCEDURE — 85610 PROTHROMBIN TIME: CPT | Performed by: STUDENT IN AN ORGANIZED HEALTH CARE EDUCATION/TRAINING PROGRAM

## 2023-08-11 PROCEDURE — 71045 X-RAY EXAM CHEST 1 VIEW: CPT

## 2023-08-11 PROCEDURE — 25510000001 IOPAMIDOL 61 % SOLUTION: Performed by: STUDENT IN AN ORGANIZED HEALTH CARE EDUCATION/TRAINING PROGRAM

## 2023-08-11 PROCEDURE — 25010000002 METOCLOPRAMIDE PER 10 MG: Performed by: STUDENT IN AN ORGANIZED HEALTH CARE EDUCATION/TRAINING PROGRAM

## 2023-08-11 PROCEDURE — 25010000002 VANCOMYCIN 5 G RECONSTITUTED SOLUTION: Performed by: STUDENT IN AN ORGANIZED HEALTH CARE EDUCATION/TRAINING PROGRAM

## 2023-08-11 PROCEDURE — 25010000002 METOCLOPRAMIDE PER 10 MG

## 2023-08-11 PROCEDURE — 82375 ASSAY CARBOXYHB QUANT: CPT

## 2023-08-11 RX ORDER — ONDANSETRON 2 MG/ML
4 INJECTION INTRAMUSCULAR; INTRAVENOUS EVERY 6 HOURS PRN
Status: DISCONTINUED | OUTPATIENT
Start: 2023-08-11 | End: 2023-08-13 | Stop reason: HOSPADM

## 2023-08-11 RX ORDER — GUAIFENESIN 600 MG/1
1200 TABLET, EXTENDED RELEASE ORAL EVERY 12 HOURS SCHEDULED
Status: DISCONTINUED | OUTPATIENT
Start: 2023-08-11 | End: 2023-08-13 | Stop reason: HOSPADM

## 2023-08-11 RX ORDER — AMOXICILLIN 250 MG
2 CAPSULE ORAL 2 TIMES DAILY
Status: DISCONTINUED | OUTPATIENT
Start: 2023-08-11 | End: 2023-08-12

## 2023-08-11 RX ORDER — SODIUM CHLORIDE 9 MG/ML
40 INJECTION, SOLUTION INTRAVENOUS AS NEEDED
Status: DISCONTINUED | OUTPATIENT
Start: 2023-08-11 | End: 2023-08-13 | Stop reason: HOSPADM

## 2023-08-11 RX ORDER — SODIUM CHLORIDE 0.9 % (FLUSH) 0.9 %
10 SYRINGE (ML) INJECTION EVERY 12 HOURS SCHEDULED
Status: DISCONTINUED | OUTPATIENT
Start: 2023-08-11 | End: 2023-08-13 | Stop reason: HOSPADM

## 2023-08-11 RX ORDER — METHYLPREDNISOLONE SODIUM SUCCINATE 40 MG/ML
40 INJECTION, POWDER, LYOPHILIZED, FOR SOLUTION INTRAMUSCULAR; INTRAVENOUS EVERY 12 HOURS
Status: DISCONTINUED | OUTPATIENT
Start: 2023-08-11 | End: 2023-08-12

## 2023-08-11 RX ORDER — POTASSIUM CHLORIDE 20 MEQ/1
40 TABLET, EXTENDED RELEASE ORAL EVERY 4 HOURS
Status: COMPLETED | OUTPATIENT
Start: 2023-08-11 | End: 2023-08-12

## 2023-08-11 RX ORDER — METRONIDAZOLE 500 MG/1
500 TABLET ORAL 2 TIMES DAILY
Qty: 20 TABLET | Refills: 0 | Status: ON HOLD | OUTPATIENT
Start: 2023-08-11 | End: 2023-08-12

## 2023-08-11 RX ORDER — ENOXAPARIN SODIUM 100 MG/ML
40 INJECTION SUBCUTANEOUS EVERY 12 HOURS SCHEDULED
Status: DISCONTINUED | OUTPATIENT
Start: 2023-08-11 | End: 2023-08-13 | Stop reason: HOSPADM

## 2023-08-11 RX ORDER — CIPROFLOXACIN 500 MG/1
500 TABLET, FILM COATED ORAL 2 TIMES DAILY
Qty: 20 TABLET | Refills: 0 | Status: ON HOLD | OUTPATIENT
Start: 2023-08-11 | End: 2023-08-12

## 2023-08-11 RX ORDER — SODIUM CHLORIDE 0.9 % (FLUSH) 0.9 %
10 SYRINGE (ML) INJECTION AS NEEDED
Status: DISCONTINUED | OUTPATIENT
Start: 2023-08-11 | End: 2023-08-13 | Stop reason: HOSPADM

## 2023-08-11 RX ORDER — DOXYCYCLINE 100 MG/1
100 CAPSULE ORAL EVERY 12 HOURS SCHEDULED
Status: DISCONTINUED | OUTPATIENT
Start: 2023-08-11 | End: 2023-08-13 | Stop reason: HOSPADM

## 2023-08-11 RX ORDER — POLYETHYLENE GLYCOL 3350 17 G/17G
17 POWDER, FOR SOLUTION ORAL DAILY PRN
Status: DISCONTINUED | OUTPATIENT
Start: 2023-08-11 | End: 2023-08-12

## 2023-08-11 RX ORDER — BISACODYL 10 MG
10 SUPPOSITORY, RECTAL RECTAL DAILY PRN
Status: DISCONTINUED | OUTPATIENT
Start: 2023-08-11 | End: 2023-08-12

## 2023-08-11 RX ORDER — NITROGLYCERIN 0.4 MG/1
0.4 TABLET SUBLINGUAL
Status: DISCONTINUED | OUTPATIENT
Start: 2023-08-11 | End: 2023-08-13 | Stop reason: HOSPADM

## 2023-08-11 RX ORDER — METOCLOPRAMIDE HYDROCHLORIDE 5 MG/ML
5 INJECTION INTRAMUSCULAR; INTRAVENOUS ONCE
Status: COMPLETED | OUTPATIENT
Start: 2023-08-11 | End: 2023-08-11

## 2023-08-11 RX ORDER — BISACODYL 5 MG/1
5 TABLET, DELAYED RELEASE ORAL DAILY PRN
Status: DISCONTINUED | OUTPATIENT
Start: 2023-08-11 | End: 2023-08-12

## 2023-08-11 RX ORDER — ACETAMINOPHEN 325 MG/1
650 TABLET ORAL EVERY 4 HOURS PRN
Status: DISCONTINUED | OUTPATIENT
Start: 2023-08-11 | End: 2023-08-13 | Stop reason: HOSPADM

## 2023-08-11 RX ORDER — IPRATROPIUM BROMIDE AND ALBUTEROL SULFATE 2.5; .5 MG/3ML; MG/3ML
3 SOLUTION RESPIRATORY (INHALATION)
Status: DISCONTINUED | OUTPATIENT
Start: 2023-08-11 | End: 2023-08-13 | Stop reason: HOSPADM

## 2023-08-11 RX ORDER — METOCLOPRAMIDE HYDROCHLORIDE 5 MG/ML
10 INJECTION INTRAMUSCULAR; INTRAVENOUS EVERY 6 HOURS PRN
Status: DISCONTINUED | OUTPATIENT
Start: 2023-08-11 | End: 2023-08-12

## 2023-08-11 RX ADMIN — METOCLOPRAMIDE 5 MG: 5 INJECTION, SOLUTION INTRAMUSCULAR; INTRAVENOUS at 14:42

## 2023-08-11 RX ADMIN — DOXYCYCLINE 100 MG: 100 CAPSULE ORAL at 21:41

## 2023-08-11 RX ADMIN — METHYLPREDNISOLONE SODIUM SUCCINATE 40 MG: 1 INJECTION, POWDER, FOR SOLUTION INTRAMUSCULAR; INTRAVENOUS at 22:00

## 2023-08-11 RX ADMIN — GUAIFENESIN 1200 MG: 600 TABLET, EXTENDED RELEASE ORAL at 21:41

## 2023-08-11 RX ADMIN — PIPERACILLIN SODIUM AND TAZOBACTAM SODIUM 3.38 G: 3; .375 INJECTION, POWDER, LYOPHILIZED, FOR SOLUTION INTRAVENOUS at 14:46

## 2023-08-11 RX ADMIN — IPRATROPIUM BROMIDE AND ALBUTEROL SULFATE 3 ML: 2.5; .5 SOLUTION RESPIRATORY (INHALATION) at 21:28

## 2023-08-11 RX ADMIN — METOCLOPRAMIDE 10 MG: 5 INJECTION, SOLUTION INTRAMUSCULAR; INTRAVENOUS at 21:43

## 2023-08-11 RX ADMIN — IOPAMIDOL 80 ML: 612 INJECTION, SOLUTION INTRAVENOUS at 15:47

## 2023-08-11 RX ADMIN — POTASSIUM CHLORIDE 40 MEQ: 1500 TABLET, EXTENDED RELEASE ORAL at 21:41

## 2023-08-11 RX ADMIN — PIPERACILLIN SODIUM AND TAZOBACTAM SODIUM 4.5 G: 4; .5 INJECTION, POWDER, LYOPHILIZED, FOR SOLUTION INTRAVENOUS at 21:41

## 2023-08-11 RX ADMIN — SODIUM CHLORIDE 2604 ML: 9 INJECTION, SOLUTION INTRAVENOUS at 14:35

## 2023-08-11 RX ADMIN — Medication 10 ML: at 21:43

## 2023-08-11 RX ADMIN — ENOXAPARIN SODIUM 40 MG: 40 INJECTION SUBCUTANEOUS at 21:41

## 2023-08-11 RX ADMIN — VANCOMYCIN HYDROCHLORIDE 2500 MG: 5 INJECTION, POWDER, LYOPHILIZED, FOR SOLUTION INTRAVENOUS at 21:57

## 2023-08-11 NOTE — H&P
[facility] Medicine Services  History & Physical    Patient Identification:  Name:  Jimmy Dawson Jr.  Age:  52 y.o.  Sex:  male  :  1970  MRN:  [id number]   Visit Number:  [id number]  Admit Date: 2023   Primary Care Physician:  Nena Pike APRN    Subjective     Chief complaint: Shortness of Breath, Abdominal Pain    History of presenting illness:      Jimmy Dawson Jr. is a 52 y.o. male with past medical history significant for Brayan-Parkinson-White syndrome and supraventricular tachycardia status post RF ablation, history of atrial flutter, anxiety, depression, obstructive sleep apnea, essential hypertension, GERD, and obesity by BMI.  Patient presents to [facility] emergency department today with complaints of shortness of breath and abdominal pain. Patient underwent laparoscopic cholecystectomy by Dr. Sanders on 2023. He has a pericholecystic drain in place to the right upper quadrant with serosanguineous fluid. Dressing clean, dry, intact to drain insertion site. No surrounding erythema, edema, or discoloration. Abdomen appears moderately distended with hypoactive bowel sounds. Patient reported generalized tenderness to palpation. He states that since his surgery, he has been extremely nauseous with occasional dizzy spells. States that he has been unable to tolerate much oral intake. Reports only small bowel movements since surgery. No vomiting or diarrhea. He states that he has been diaphoretic, but denies fever or chills. Endorses productive cough with thick sputum. Imaging of the chest obtained in the ED noted findings consistent with left-sided pneumonia. Patient was also hypoxic on room air (baseline) on arrival and was placed on 2L nasal cannula. ABG was obtained on room air noting hypoxemia. Mr. Dawson currently denies any chest pain or palpitations. He states that Reglan helped to alleviate nausea in the ED; previously Zofran has not been  helpful. Discussed admission with patient and spouse. They voiced agreement and understanding with no further questions, concerns, or needs at this time. Patient seen and evaluated alongside Dr. Garvin.     Upon arrival to the ED, vital signs were temperature 97.9, pulse 136, respirations 16, blood pressure 127/82 sitting, SPO2 saturation 91% on room air.  ABG with pH 7.474, PO2 57.7, HCO3 27.4, O2 saturation of 90.1% on room air.  High-sensitivity troponin T 16 with 0 delta.  CMP with chloride 95, anion gap 17.3, BUN 38, creatinine 1.31, BUN/creatinine ratio 29.0, glucose 186, AST 41, ALT 58, otherwise unremarkable.  Lactate 3.0, then 2.7.  Procalcitonin 0.20.  PT/INR within normal limits.  CBC with WBCs 18.33, platelets 505, otherwise unremarkable.  Urinalysis with trace ketones, trace leukocytes, 1+ protein, 1+ bilirubin, 3-5 WBCs, and 1+ bacteria.  Peripheral blood cultures x2 pending.  COVID-19 and flu A/B swab negative.  Chest x-ray noted left mid and lower lung opacities which may represent atelectasis and/or pneumonia.  CT of the abdomen and pelvis with contrast noted interval changes of cholecystectomy.  Surgical drain in the right upper quadrant cholecystectomy bed with no loculated collections identified.  Generalized ileus.  No findings to suggest high-grade bowel obstruction.  Bibasilar airspace disease which may represent atelectasis and/or pneumonia.  Background changes of constipation.  CT of the head without contrast unremarkable.    Known Emergency Department medications received prior to my evaluation included 5 mg IV Reglan, 3.375 g IV Zosyn, 2604 mL normal saline bolus. Emergency Department Room location at the time of my evaluation was 115.  Discussed with admitting physician, Dr. Garvin, Jose Guadalupe CRABTREE DO.    ---------------------------------------------------------------------------------------------------------------------   Review of Systems   Constitutional:  Positive for diaphoresis and  fatigue. Negative for chills and fever.   HENT:  Negative for congestion, sore throat and trouble swallowing.    Respiratory:  Positive for cough and shortness of breath. Negative for choking and wheezing.    Cardiovascular:  Negative for chest pain, palpitations and leg swelling.   Gastrointestinal:  Positive for abdominal distention, abdominal pain, constipation and nausea. Negative for blood in stool, diarrhea and vomiting.   Genitourinary:  Negative for difficulty urinating, flank pain, frequency and urgency.   Musculoskeletal:  Negative for back pain, gait problem, neck pain and neck stiffness.   Neurological:  Positive for dizziness. Negative for tremors, seizures, syncope, facial asymmetry, speech difficulty, weakness, light-headedness, numbness and headaches.    ---------------------------------------------------------------------------------------------------------------------   Past Medical History:   Diagnosis Date    Acid reflux     Allergic     Anxiety     Arthritis     Depression     GERD (gastroesophageal reflux disease)     Hypertension     Infectious mononucleosis     TAMIKO (obstructive sleep apnea)     NO OXYGEN USE    PONV (postoperative nausea and vomiting)     Seasonal allergies     Sinusitis     Sleep apnea     c pap    Urinary tract infection     WPW (Brayan-Parkinson-White syndrome)      Past Surgical History:   Procedure Laterality Date    ABLATION OF DYSRHYTHMIC FOCUS  1999 and 2000    Dr. Carlin    CARDIAC CATHETERIZATION      CARDIAC ELECTROPHYSIOLOGY PROCEDURE N/A 06/04/2019    Procedure: Flutter;  Surgeon: Juan Carlin MD;  Location: [facility] INVASIVE LOCATION;  Service: Cardiology    CARDIAC SURGERY      COLONOSCOPY      WISDOM TOOTH EXTRACTION       Family History   Problem Relation Age of Onset    Hypertension Mother     Hypertension Father     Hyperlipidemia Father     Diabetes Father     Heart disease Father     Diabetes Brother     Diabetes Maternal Grandfather     Heart  attack Maternal Grandfather     Diabetes Paternal Grandfather     Diabetes Maternal Grandmother     Cancer Maternal Grandmother      Social History     Socioeconomic History    Marital status:    Tobacco Use    Smoking status: Never    Smokeless tobacco: Never   Vaping Use    Vaping Use: Never used   Substance and Sexual Activity    Alcohol use: Yes     Comment: 4-5 BEERS, TWICE A WEEK    Drug use: No    Sexual activity: Defer     ---------------------------------------------------------------------------------------------------------------------   Allergies:  Aleve [naproxen]  ---------------------------------------------------------------------------------------------------------------------   Home medications:    Medications below are reported home medications pulling from within the system; at this time, these medications have not been reconciled unless otherwise specified and are in the verification process for further verifcation as current home medications.  (Not in a hospital admission)      Hospital Scheduled Meds:          Current listed hospital scheduled medications may not yet reflect those currently placed in orders that are signed and held awaiting patient's arrival to floor.   ---------------------------------------------------------------------------------------------------------------------     Objective     Vital Signs:  Temp:  [97.9 øF (36.6 øC)-98.7 øF (37.1 øC)] 98.2 øF (36.8 øC)  Heart Rate:  [108-136] 120  Resp:  [16-20] 20  BP: (127-152)/() 135/91      08/11/23  1319   Weight: (!) 150 kg (330 lb)     Body mass index is 40.17 kg/mý.  ---------------------------------------------------------------------------------------------------------------------       Physical Exam  Vitals reviewed.   Constitutional:       General: He is awake. He is not in acute distress.     Appearance: He is obese. He is ill-appearing and diaphoretic.      Interventions: Nasal cannula in place.       Comments: Currently on 2L nasal cannula.   HENT:      Head: Normocephalic and atraumatic.      Mouth/Throat:      Mouth: Mucous membranes are moist.      Pharynx: Oropharynx is clear.   Eyes:      Extraocular Movements: Extraocular movements intact.      Pupils: Pupils are equal, round, and reactive to light.   Cardiovascular:      Rate and Rhythm: Regular rhythm. Tachycardia present.      Pulses: Normal pulses.           Dorsalis pedis pulses are 2+ on the right side and 2+ on the left side.      Heart sounds: Normal heart sounds. No murmur heard.    No friction rub.   Pulmonary:      Effort: Pulmonary effort is normal. No accessory muscle usage, respiratory distress or retractions.      Breath sounds: Decreased breath sounds present. No wheezing, rhonchi or rales.   Abdominal:      General: Bowel sounds are decreased. There is distension.      Palpations: Abdomen is soft.      Tenderness: There is abdominal tenderness. There is no guarding.   Musculoskeletal:      Cervical back: Neck supple. No rigidity.      Right lower leg: No edema.      Left lower leg: No edema.   Skin:     General: Skin is warm and dry.      Capillary Refill: Capillary refill takes 2 to 3 seconds.      Coloration: Skin is pale.   Neurological:      General: No focal deficit present.      Mental Status: He is alert and oriented to person, place, and time. Mental status is at baseline.      Cranial Nerves: No dysarthria or facial asymmetry.      Sensory: Sensation is intact. No sensory deficit.      Motor: No weakness, tremor or seizure activity.   Psychiatric:         Attention and Perception: Attention and perception normal.         Mood and Affect: Mood and affect normal.         Speech: Speech normal.         Behavior: Behavior normal. Behavior is cooperative.         Thought Content: Thought content normal.         Cognition and Memory: Cognition normal.         Judgment: Judgment normal.      ---------------------------------------------------------------------------------------------------------------------  EKG: Appearing sinus tachycardia 110s with no evidence of acute ischemia.  Confirmed by cardiology, Dr. De La O.    Telemetry:  Appearing sinus tachycardia 120s on my exam.   I have personally looked at both the EKG and the telemetry strips.  ---------------------------------------------------------------------------------------------------------------------   Results from last 7 days   Lab Units 08/11/23  1748 08/11/23  1436 08/11/23  1240 08/07/23  0735   CRP mg/dL  --   --   --  <0.30   LACTATE mmol/L 2.7* 3.0*  --   --    WBC 10*3/mm3  --  18.33* 20.65* 13.23*   HEMOGLOBIN g/dL  --  15.2 15.2 13.5   HEMATOCRIT %  --  46.3 46.1 41.3   MCV fL  --  88.0 86.8 88.2   MCHC g/dL  --  32.8 33.0 32.7   PLATELETS 10*3/mm3  --  505* 521* 289   INR   --  1.02  --   --      Results from last 7 days   Lab Units 08/11/23  1820   PH, ARTERIAL pH units 7.474*   PO2 ART mm Hg 57.7*   PCO2, ARTERIAL mm Hg 37.4   HCO3 ART mmol/L 27.4*     Results from last 7 days   Lab Units 08/11/23  1436 08/11/23  1240 08/07/23  0735   SODIUM mmol/L 141 138 138   POTASSIUM mmol/L 3.6 3.4* 4.2   CHLORIDE mmol/L 95* 96* 104   CO2 mmol/L 28.7 25.3 23.2   BUN mg/dL 38* 36* 22*   CREATININE mg/dL 1.31* 1.17 0.99   CALCIUM mg/dL 10.3 9.9 9.4   GLUCOSE mg/dL 186* 192* 206*   ALBUMIN g/dL 4.2 4.0 4.4   BILIRUBIN mg/dL 1.0 0.9 0.2   ALK PHOS U/L 96 101 87   AST (SGOT) U/L 41* 33 19   ALT (SGPT) U/L 58* 50* 30   Estimated Creatinine Clearance: 102.1 mL/min (A) (by C-G formula based on SCr of 1.31 mg/dL (H)).  No results found for: AMMONIA  Results from last 7 days   Lab Units 08/11/23  1436 08/11/23  1240   HSTROP T ng/L 16* 16*         Lab Results   Component Value Date    HGBA1C 5.90 (H) 09/23/2019     Lab Results   Component Value Date    TSH 1.780 11/06/2020    FREET4 0.73 (L) 11/06/2020     No results found for: PREGTESTUR,  PREGSERUM, HCG, HCGQUANT  Pain Management Panel          Latest Ref Rng & Units 11/6/2020   Pain Management Panel   Amphetamine, Urine Qual Negative Negative    Barbiturates Screen, Urine Negative Negative    Benzodiazepine Screen, Urine Negative Negative    Buprenorphine, Screen, Urine Negative Negative    Cocaine Screen, Urine Negative Negative    Methadone Screen , Urine Negative Negative          ---------------------------------------------------------------------------------------------------------------------  Imaging Results (Last 7 Days)       Procedure Component Value Units Date/Time    CT Abdomen Pelvis With Contrast [[id number]] Collected: 08/11/23 1610     Updated: 08/11/23 1614    Narrative:      EXAM:    CT Abdomen and Pelvis With Intravenous Contrast     EXAM DATE:    8/11/2023 3:15 PM     CLINICAL HISTORY:    Abdominal pain, acute, nonlocalized     TECHNIQUE:    Axial computed tomography images of the abdomen and pelvis with  intravenous contrast.  Sagittal and coronal reformatted images were  created and reviewed.  This CT exam was performed using one or more of  the following dose reduction techniques:  automated exposure control,  adjustment of the mA and/or kV according to patient size, and/or use of  iterative reconstruction technique.     COMPARISON:    08/07/2023     FINDINGS:    Lung bases:  Bibasilar airspace disease which may represent  atelectasis and/or pneumonia.      ABDOMEN:    Liver:  Diffuse fatty infiltration of liver is noted.    Gallbladder and bile ducts:  Unremarkable.  No calcified stones.  No  ductal dilation.    Pancreas:  Unremarkable.  No mass.  No ductal dilation.    Spleen:  Unremarkable.  No splenomegaly.    Adrenals:  Unremarkable.  No mass.    Kidneys and ureters:  Unremarkable.  No solid mass.  No  hydronephrosis.    Stomach and bowel:  Gastric distention.  Air-fluid levels throughout  prominent mildly dilated loops of small bowel in a generalized pattern  most  consistent with ileus.  Mild degree of constipation is noted.  No  mucosal thickening.      PELVIS:    Appendix:  Appendix is within normal limits.    Bladder:  Unremarkable.  No mass.    Reproductive:  Unremarkable as visualized.      ABDOMEN and PELVIS:    Intraperitoneal space:  Unremarkable.  No free air.  No significant  fluid collection.    Bones/joints:  No acute fracture.  No dislocation.    Soft tissues:  Unremarkable.    Vasculature:  Unremarkable.  No abdominal aortic aneurysm.    Lymph nodes:  No retroperitoneal or iliac adenopathy.    Tubes, lines and devices:  Surgical drain is noted in the right upper  quadrant gallbladder fossa with no loculated collections in the surgical  bed.       Impression:      1.  Interval changes of cholecystectomy.  2.  Surgical drain in the right upper quadrant cholecystectomy bed with  no loculated collections identified.  3.  Generalized ileus. No findings to suggest high-grade bowel  obstruction.  4.  Bibasilar airspace disease which may represent atelectasis and/or  pneumonia.  5.  Background changes of constipation.  6.  Other incidental and nonacute findings detailed above which are  stable from previous.     This report was finalized on 8/11/2023 4:12 PM by Dr. Wilver Aly MD.       CT Head Without Contrast [[id number]] Collected: 08/11/23 1607     Updated: 08/11/23 1610    Narrative:      EXAM:    CT Head Without Intravenous Contrast     EXAM DATE:    8/11/2023 3:16 PM     CLINICAL HISTORY:    Dizziness, persistent/recurrent, cardiac or vascular cause suspected     TECHNIQUE:    Axial computed tomography images of the head/brain without intravenous  contrast.  Sagittal and coronal reformatted images were created and  reviewed.  This CT exam was performed using one or more of the following  dose reduction techniques:  automated exposure control, adjustment of  the mA and/or kV according to patient size, and/or use of iterative  reconstruction technique.      COMPARISON:    No relevant prior studies available.     FINDINGS:    Brain and extra-axial spaces:  Unremarkable.  No hemorrhage.  No  significant white matter disease.  No edema.  No ventriculomegaly.    Bones/joints:  Unremarkable.  No acute fracture.    Soft tissues:  Unremarkable.    Sinuses:  Unremarkable as visualized.  No acute sinusitis.    Mastoid air cells:  Unremarkable as visualized.  No mastoid effusion.       Impression:        Unremarkable exam demonstrating no CT evidence of acute intracranial  findings.     This report was finalized on 8/11/2023 4:08 PM by Dr. Wilver Aly MD.       XR Chest 1 View [[id number]] Collected: 08/11/23 1501     Updated: 08/11/23 1503    Narrative:      EXAM:    XR Chest, 1 View     EXAM DATE:    8/11/2023 2:18 PM     CLINICAL HISTORY:    Severe Sepsis Protocol     TECHNIQUE:    Frontal view of the chest.     COMPARISON:    No relevant prior studies available.     FINDINGS:    Lungs and pleural spaces:  Linear opacity left mid lung may represent  atelectasis or focal pneumonia.  No pneumothorax.    Heart:  Unremarkable.  No cardiomegaly.    Mediastinum:  Unremarkable.    Bones/joints:  Unremarkable.       Impression:        Left mid and lower lung opacities which may represent atelectasis  and/or pneumonia.     This report was finalized on 8/11/2023 3:01 PM by Dr. Wilver Aly MD.               Last echocardiogram: No previous echo on file.    I have personally reviewed the above radiology images and read the final radiology report on 08/11/23  ---------------------------------------------------------------------------------------------------------------------  Assessment / Plan     Active Hospital Problems    Diagnosis  POA    **CAP (community acquired pneumonia) [J18.9]  Yes       ASSESSMENT/PLAN:  -Severe sepsis due to left-sided pneumonia, present on admission  -Acute hypoxemic respiratory failure, present on admission, due to above  -Recent laparoscopic  cholecystectomy (8/8/2023) by Dr. Sanders  -Chest x-ray obtained on arrival noted left mid and lower lung opacities which may represent atelectasis and/or pneumonia.  -Met sepsis criteria with heart rate greater than 90, WBCs greater than 12, and lactate greater than 2.  -Received sepsis fluid bolus in the ED and lactate has improved.  -Continue to trend lactate until normalized.  -Peripheral blood cultures x2 collected, pending  -Patient has been afebrile throughout ED course; closely monitor temperature curve.  -Received IV Zosyn in the ED. Placed on vancomycin, Zosyn, and doxycycline on admission per attending provider.  -Obtain MRSA PCR.  If negative, plan to discontinue vancomycin.  -Obtain sputum culture.  -De-escalate antibiotic regimen pending further work-up and finalization of culture data.  -Scheduled IV Solu-Medrol, DuoNebs.  -Encourage incentive spirometer use and turn, cough, and deep breathing exercises.  -Continuous cardiac monitoring and pulse oximetry.  -Titrate supplemental O2 to maintain SPO2 saturations greater than 90%.  Patient currently on 2 L nasal cannula.  Baseline is room air.  -CT of the abdomen and pelvis with contrast noted surgical drain in the right upper quadrant cholecystectomy bed with no loculated collections identified.  Continue to measure drain output.  -Imaging also noted background changes of constipation and generalized ileus.  No findings to suggest high-grade bowel obstruction.  -Initiate bowel regimen.  -General surgery consult placed for further assistance.  Greatly appreciate their input.    -Mild acute renal insufficiency, present on admission  -Baseline creatinine 0.9-1.1 with creatinine on admission of 1.3.  -Received fluid bolus in the ED.   -Monitor urine output and renal function closely.  -Avoid nephrotoxins as able.  -Repeat chemistry panel in AM.     -Brayan-Parkinson-White syndrome and SVT status post RF ablation  -History of atrial flutter status post ablation  (2019)  -EKG obtained in the ED noted sinus tachycardia 110s without evidence of acute ischemia.  -Appearing sinus tachycardia 120s during my exam.  -Continuous cardiac monitoring ordered.  -Monitor and replace electrolytes per protocol.  -[id number] 1 (hypertension). Per current med list, appears that patient is not on chronic oral anticoagulant.     -Essential hypertension  -BP appears stable at this time.  -Plan to resume home antihypertensive regimen once reconciled per pharmacy with appropriate holding parameters to prevent hypotension and/or bradycardia.   -Closely monitor BP per hospital protocol, titrate medications as necessary.    -Anxiety and depression  -Supportive care.  -Review home medications once reconciled with plans to continue any antidepressant or anxiolytic agents.    -GERD  -Reflux precautions.    -TAMIKO compliant with CPAP  -May utilize home CPAP while hospitalized.  -Continuous pulse oximetry ordered, as noted above.        ----------  -DVT prophylaxis: Lovenox.  -Activity: As tolerated. Fall precautions.   -Expected length of stay:   INPATIENT status due to the need for care which can only be reasonably provided in an hospital setting such as aggressive/expedited ancillary services and/or consultation services, the necessity for IV medications, close physician monitoring and/or the possible need for procedures.  In such, I feel patient's risk for adverse outcomes and need for care warrant INPATIENT evaluation and predict the patient's care encounter to likely last beyond 2 midnights.   -Disposition plans to return home on discharge once medically stable.    High risk secondary to severe sepsis 2/2 left-sided pneumonia status post recent laparoscopic cholecystectomy.       CODE STATUS: FULL CODE      Caroline WallerLIONEL   08/11/23  18:28 EDT  Pager #[phone]  ---------------------------------------------------------------------------------------------------------------------

## 2023-08-11 NOTE — ED NOTES
Report given to NICOLA Murillo on 3N. Pt is clean and dry. IV's are clean, dry and intact. ER tech to transport pt to the floor.

## 2023-08-11 NOTE — ED PROVIDER NOTES
Subjective   History of Present Illness  52-year-old male with a past medical history of anxiety, depression, and sleep apnea with Brayan-Parkinson-White syndrome presents to the ER due to concerns for increasing shortness of breath and diffuse abdominal pain.  Patient received a laparoscopic cholecystectomy within the last week.  Patient has a pericholecystic drain in place.  Serosanguineous fluid noted.  Patient noted worsening abdominal pain over the last 1 to 2 days.  Increasing shortness of breath.  No obvious fever.  No chest pain.  No obvious alleviating factors.  Vital signs stable    Review of Systems   Respiratory:  Positive for shortness of breath.    Gastrointestinal:  Positive for abdominal pain.   All other systems reviewed and are negative.    Past Medical History:   Diagnosis Date    Acid reflux     Allergic     Anxiety     Arthritis     Depression     GERD (gastroesophageal reflux disease)     Hypertension     Infectious mononucleosis     TAMIKO (obstructive sleep apnea)     NO OXYGEN USE    PONV (postoperative nausea and vomiting)     Seasonal allergies     Sinusitis     Sleep apnea     c pap    Urinary tract infection     WPW (Brayan-Parkinson-White syndrome)        Allergies   Allergen Reactions    Aleve [Naproxen] Hives       Past Surgical History:   Procedure Laterality Date    ABLATION OF DYSRHYTHMIC FOCUS  1999 and 2000    Dr. Carlin    CARDIAC CATHETERIZATION      CARDIAC ELECTROPHYSIOLOGY PROCEDURE N/A 06/04/2019    Procedure: Flutter;  Surgeon: Juan Carlin MD;  Location: [facility] INVASIVE LOCATION;  Service: Cardiology    CARDIAC SURGERY      COLONOSCOPY      WISDOM TOOTH EXTRACTION         Family History   Problem Relation Age of Onset    Hypertension Mother     Hypertension Father     Hyperlipidemia Father     Diabetes Father     Heart disease Father     Diabetes Brother     Diabetes Maternal Grandfather     Heart attack Maternal Grandfather     Diabetes Paternal Grandfather      Diabetes Maternal Grandmother     Cancer Maternal Grandmother        Social History     Socioeconomic History    Marital status:    Tobacco Use    Smoking status: Never    Smokeless tobacco: Never   Vaping Use    Vaping Use: Never used   Substance and Sexual Activity    Alcohol use: Yes     Comment: 4-5 BEERS, TWICE A WEEK    Drug use: No    Sexual activity: Defer           Objective   Physical Exam  Constitutional:       General: He is not in acute distress.     Appearance: He is well-developed. He is not ill-appearing.   HENT:      Head: Normocephalic and atraumatic.   Eyes:      Extraocular Movements: Extraocular movements intact.      Pupils: Pupils are equal, round, and reactive to light.   Neck:      Vascular: No JVD.   Cardiovascular:      Rate and Rhythm: Normal rate and regular rhythm.      Heart sounds: Normal heart sounds. No murmur heard.  Pulmonary:      Effort: No tachypnea, accessory muscle usage or respiratory distress.      Breath sounds: Normal breath sounds. No stridor. No decreased breath sounds, wheezing, rhonchi or rales.   Chest:      Chest wall: No deformity, tenderness or crepitus.   Abdominal:      General: Abdomen is protuberant. Bowel sounds are normal.      Palpations: Abdomen is soft.      Tenderness: There is generalized no abdominal tenderness. There is no guarding or rebound.      Comments: Cholecystic drain in place.  Serosanguineous fluid noted.  No exudative drainage.   Musculoskeletal:         General: Normal range of motion.      Cervical back: Normal range of motion and neck supple.      Right lower leg: No tenderness. No edema.      Left lower leg: No tenderness. No edema.   Lymphadenopathy:      Cervical: No cervical adenopathy.   Skin:     General: Skin is warm and dry.      Coloration: Skin is not cyanotic.      Findings: No ecchymosis or erythema.   Neurological:      General: No focal deficit present.      Mental Status: He is alert and oriented to person, place,  and time.      Cranial Nerves: No cranial nerve deficit.      Motor: No weakness.   Psychiatric:         Mood and Affect: Mood normal. Mood is not anxious.         Behavior: Behavior normal. Behavior is not agitated.       Procedures           ED Course  ED Course as of 08/11/23 1800   Fri Aug 11, 2023   1345 EKG done sinus tachycardia.  134 bpm.  No acute ST elevation.  QTc 448.  Electronically signed by Izaiah Roberts DO, 08/11/23, 1:45 PM EDT.    [SF]      ED Course User Index  [SF] Izaiah Roberts DO      CT Head Without Contrast    Result Date: 8/11/2023    Unremarkable exam demonstrating no CT evidence of acute intracranial findings.  This report was finalized on 8/11/2023 4:08 PM by Dr. Wilver Aly MD.      CT Abdomen Pelvis With Contrast    Result Date: 8/11/2023  1.  Interval changes of cholecystectomy. 2.  Surgical drain in the right upper quadrant cholecystectomy bed with no loculated collections identified. 3.  Generalized ileus. No findings to suggest high-grade bowel obstruction. 4.  Bibasilar airspace disease which may represent atelectasis and/or pneumonia. 5.  Background changes of constipation. 6.  Other incidental and nonacute findings detailed above which are stable from previous.  This report was finalized on 8/11/2023 4:12 PM by Dr. Wilver Aly MD.      XR Chest 1 View    Result Date: 8/11/2023    Left mid and lower lung opacities which may represent atelectasis and/or pneumonia.  This report was finalized on 8/11/2023 3:01 PM by Dr. Wilver Aly MD.       Results for orders placed or performed during the hospital encounter of 08/11/23   Comprehensive Metabolic Panel    Specimen: Arm, Right; Blood   Result Value Ref Range    Glucose 186 (H) 65 - 99 mg/dL    BUN 38 (H) 6 - 20 mg/dL    Creatinine 1.31 (H) 0.76 - 1.27 mg/dL    Sodium 141 136 - 145 mmol/L    Potassium 3.6 3.5 - 5.2 mmol/L    Chloride 95 (L) 98 - 107 mmol/L    CO2 28.7 22.0 - 29.0 mmol/L    Calcium 10.3 8.6 - 10.5 mg/dL     Total Protein 8.3 6.0 - 8.5 g/dL    Albumin 4.2 3.5 - 5.2 g/dL    ALT (SGPT) 58 (H) 1 - 41 U/L    AST (SGOT) 41 (H) 1 - 40 U/L    Alkaline Phosphatase 96 39 - 117 U/L    Total Bilirubin 1.0 0.0 - 1.2 mg/dL    Globulin 4.1 gm/dL    A/G Ratio 1.0 g/dL    BUN/Creatinine Ratio 29.0 (H) 7.0 - 25.0    Anion Gap 17.3 (H) 5.0 - 15.0 mmol/L    eGFR 64.7 >60.0 mL/min/1.73   Lactic Acid, Plasma    Specimen: Arm, Right; Blood   Result Value Ref Range    Lactate 3.0 (C) 0.5 - 2.0 mmol/L   CBC Auto Differential    Specimen: Arm, Right; Blood   Result Value Ref Range    WBC 18.33 (H) 3.40 - 10.80 10*3/mm3    RBC 5.26 4.14 - 5.80 10*6/mm3    Hemoglobin 15.2 13.0 - 17.7 g/dL    Hematocrit 46.3 37.5 - 51.0 %    MCV 88.0 79.0 - 97.0 fL    MCH 28.9 26.6 - 33.0 pg    MCHC 32.8 31.5 - 35.7 g/dL    RDW 13.6 12.3 - 15.4 %    RDW-SD 43.9 37.0 - 54.0 fl    MPV 9.3 6.0 - 12.0 fL    Platelets 505 (H) 140 - 450 10*3/mm3    Neutrophil % 80.0 (H) 42.7 - 76.0 %    Lymphocyte % 10.9 (L) 19.6 - 45.3 %    Monocyte % 7.9 5.0 - 12.0 %    Eosinophil % 0.4 0.3 - 6.2 %    Basophil % 0.4 0.0 - 1.5 %    Immature Grans % 0.4 0.0 - 0.5 %    Neutrophils, Absolute 14.66 (H) 1.70 - 7.00 10*3/mm3    Lymphocytes, Absolute 2.00 0.70 - 3.10 10*3/mm3    Monocytes, Absolute 1.45 (H) 0.10 - 0.90 10*3/mm3    Eosinophils, Absolute 0.07 0.00 - 0.40 10*3/mm3    Basophils, Absolute 0.07 0.00 - 0.20 10*3/mm3    Immature Grans, Absolute 0.08 (H) 0.00 - 0.05 10*3/mm3    nRBC 0.0 0.0 - 0.2 /100 WBC   Protime-INR    Specimen: Arm, Right; Blood   Result Value Ref Range    Protime 13.9 12.1 - 14.7 Seconds    INR 1.02 0.90 - 1.10   aPTT    Specimen: Arm, Right; Blood   Result Value Ref Range    PTT 26.7 26.5 - 34.5 seconds   Procalcitonin    Specimen: Arm, Right; Blood   Result Value Ref Range    Procalcitonin 0.20 0.00 - 0.25 ng/mL   High Sensitivity Troponin T    Specimen: Blood   Result Value Ref Range    HS Troponin T 16 (H) <15 ng/L   High Sensitivity Troponin T 2Hr     Specimen: Arm, Right; Blood   Result Value Ref Range    HS Troponin T 16 (H) <15 ng/L    Troponin T Delta 0 >=-4 - <+4 ng/L   ECG 12 Lead Other; Sepsis   Result Value Ref Range    QT Interval 300 ms    QTC Interval 448 ms   ECG 12 Lead Tachycardia   Result Value Ref Range    QT Interval 340 ms    QTC Interval 468 ms   Green Top (Gel)   Result Value Ref Range    Extra Tube Hold for add-ons.    Lavender Top   Result Value Ref Range    Extra Tube hold for add-on    Gold Top - SST   Result Value Ref Range    Extra Tube Hold for add-ons.    Light Blue Top   Result Value Ref Range    Extra Tube Hold for add-ons.                                           Medical Decision Making  CBC notes leukocytosis.  Elevated heart rate.  Sepsis screening positive.  IV fluids given.  Antibiotics given.  Chest x-ray notes concerns for pneumonia.  CT imaging of the abdomen pelvis no concerns for bilateral lower lobe pneumonia.  No acute intra-abdominal abnormalities.  Postoperative changes noted.  Recommend admission for further work up and treatment.  Hospitalist team consulted and made aware of the patient.  Consults and orders placed per hospitalist request.  Patient was agreeable to admission plan.  Vitals stable on admission.    Problems Addressed:  Abdominal pain, unspecified abdominal location: complicated acute illness or injury  Pneumonia due to infectious organism, unspecified laterality, unspecified part of lung: complicated acute illness or injury  Sepsis, due to unspecified organism, unspecified whether acute organ dysfunction present: complicated acute illness or injury    Amount and/or Complexity of Data Reviewed  Labs: ordered. Decision-making details documented in ED Course.  Radiology: ordered. Decision-making details documented in ED Course.  ECG/medicine tests: ordered.    Risk  Prescription drug management.        Final diagnoses:   Sepsis, due to unspecified organism, unspecified whether acute organ dysfunction  present   Pneumonia due to infectious organism, unspecified laterality, unspecified part of lung   Abdominal pain, unspecified abdominal location       ED Disposition  ED Disposition       ED Disposition   Intended Admit    Condition   --    Comment   --               No follow-up provider specified.       Medication List        New Prescriptions      ciprofloxacin 500 MG tablet  Commonly known as: Cipro  Take 1 tablet by mouth 2 (Two) Times a Day.     metroNIDAZOLE 500 MG tablet  Commonly known as: FLAGYL  Take 1 tablet by mouth 2 (Two) Times a Day for 10 days.            ASK your doctor about these medications      acetaminophen 500 MG tablet  Commonly known as: TYLENOL  Take 2 tablets by mouth Every 6 (Six) Hours As Needed for Moderate Pain for up to 3 days.  Ask about: Should I take this medication?     ondansetron 4 MG tablet  Commonly known as: Zofran  Take 1 tablet by mouth Every 8 (Eight) Hours As Needed for Nausea or Vomiting for up to 3 days.  Ask about: Should I take this medication?               Where to Get Your Medications        These medications were sent to [facility], [place] - [address] [facility] - [phone] [facility] [phone] [place] [address] [facility] [place] [address]      Phone: [phone]   ciprofloxacin 500 MG tablet  metroNIDAZOLE 500 MG tablet            Izaiah Roberts DO  08/11/23 1800

## 2023-08-11 NOTE — ED NOTES
DAT davis changed the pt after he had a bowel movement on himself. During the rolling and turning the be pt has become short of breath. Pt to be placed on 2L of O2 to maintain sat and for comfort.

## 2023-08-11 NOTE — ED NOTES
Pt has his gallbladder removed on Tuesday 8/8/23. Pt has a HANS drain in the LUQ placed by Dr. Sanders. Pt states he doesn't have any more abdominal pain, but has been nauseous and feeling generally ill since his procedure. Pt is tachycardic, pale, and diaphoretic. Family is at bedside and pt is A&O x4. [facility]

## 2023-08-11 NOTE — ED NOTES
MEDICAL SCREENING:    Reason for Visit: Weakness, post op pain. Recent cholecystectomy.     Patient initially seen in triage.  The patient was advised further evaluation and diagnostic testing will be needed, some of the treatment and testing will be initiated in the lobby in order to begin the process.  The patient will be returned to the waiting area for the time being and possibly be re-assessed by a subsequent ED provider.  The patient will be brought back to the treatment area in as timely manner as possible.      Fabricio Bocanegra PA-C  08/11/23 [id number]

## 2023-08-12 ENCOUNTER — APPOINTMENT (OUTPATIENT)
Dept: CT IMAGING | Facility: HOSPITAL | Age: 53
DRG: 871 | End: 2023-08-12
Payer: OTHER GOVERNMENT

## 2023-08-12 LAB
L PNEUMO1 AG UR QL IA: NEGATIVE
NT-PROBNP SERPL-MCNC: <36 PG/ML (ref 0–900)
POTASSIUM SERPL-SCNC: 3.7 MMOL/L (ref 3.5–5.2)
S PNEUM AG SPEC QL LA: NEGATIVE

## 2023-08-12 PROCEDURE — 94799 UNLISTED PULMONARY SVC/PX: CPT

## 2023-08-12 PROCEDURE — 94761 N-INVAS EAR/PLS OXIMETRY MLT: CPT

## 2023-08-12 PROCEDURE — 99233 SBSQ HOSP IP/OBS HIGH 50: CPT | Performed by: STUDENT IN AN ORGANIZED HEALTH CARE EDUCATION/TRAINING PROGRAM

## 2023-08-12 PROCEDURE — 71275 CT ANGIOGRAPHY CHEST: CPT

## 2023-08-12 PROCEDURE — 87449 NOS EACH ORGANISM AG IA: CPT | Performed by: STUDENT IN AN ORGANIZED HEALTH CARE EDUCATION/TRAINING PROGRAM

## 2023-08-12 PROCEDURE — 25010000002 ENOXAPARIN PER 10 MG: Performed by: STUDENT IN AN ORGANIZED HEALTH CARE EDUCATION/TRAINING PROGRAM

## 2023-08-12 PROCEDURE — 84132 ASSAY OF SERUM POTASSIUM: CPT | Performed by: STUDENT IN AN ORGANIZED HEALTH CARE EDUCATION/TRAINING PROGRAM

## 2023-08-12 PROCEDURE — 83880 ASSAY OF NATRIURETIC PEPTIDE: CPT | Performed by: SURGERY

## 2023-08-12 PROCEDURE — 25510000001 IOPAMIDOL PER 1 ML: Performed by: STUDENT IN AN ORGANIZED HEALTH CARE EDUCATION/TRAINING PROGRAM

## 2023-08-12 PROCEDURE — 25010000002 PROCHLORPERAZINE 10 MG/2ML SOLUTION: Performed by: STUDENT IN AN ORGANIZED HEALTH CARE EDUCATION/TRAINING PROGRAM

## 2023-08-12 PROCEDURE — 25010000002 VANCOMYCIN 1 G RECONSTITUTED SOLUTION 1 EACH VIAL: Performed by: STUDENT IN AN ORGANIZED HEALTH CARE EDUCATION/TRAINING PROGRAM

## 2023-08-12 PROCEDURE — 94664 DEMO&/EVAL PT USE INHALER: CPT

## 2023-08-12 PROCEDURE — 87899 AGENT NOS ASSAY W/OPTIC: CPT | Performed by: STUDENT IN AN ORGANIZED HEALTH CARE EDUCATION/TRAINING PROGRAM

## 2023-08-12 PROCEDURE — 25010000002 PIPERACILLIN SOD-TAZOBACTAM PER 1 G: Performed by: STUDENT IN AN ORGANIZED HEALTH CARE EDUCATION/TRAINING PROGRAM

## 2023-08-12 PROCEDURE — 99253 IP/OBS CNSLTJ NEW/EST LOW 45: CPT | Performed by: SURGERY

## 2023-08-12 PROCEDURE — 0 METHYLPREDNISOLONE PER 125 MG: Performed by: STUDENT IN AN ORGANIZED HEALTH CARE EDUCATION/TRAINING PROGRAM

## 2023-08-12 PROCEDURE — 71275 CT ANGIOGRAPHY CHEST: CPT | Performed by: RADIOLOGY

## 2023-08-12 RX ORDER — ASCORBIC ACID 500 MG
500 TABLET ORAL DAILY
Status: DISCONTINUED | OUTPATIENT
Start: 2023-08-12 | End: 2023-08-13 | Stop reason: HOSPADM

## 2023-08-12 RX ORDER — BUSPIRONE HYDROCHLORIDE 7.5 MG/1
7.5 TABLET ORAL NIGHTLY
COMMUNITY

## 2023-08-12 RX ORDER — SULFAMETHOXAZOLE AND TRIMETHOPRIM 800; 160 MG/1; MG/1
1 TABLET ORAL DAILY
Status: CANCELLED | OUTPATIENT
Start: 2023-08-13 | End: 2024-08-12

## 2023-08-12 RX ORDER — BUSPIRONE HYDROCHLORIDE 5 MG/1
7.5 TABLET ORAL NIGHTLY
Status: CANCELLED | OUTPATIENT
Start: 2023-08-12

## 2023-08-12 RX ORDER — SODIUM CHLORIDE, SODIUM LACTATE, POTASSIUM CHLORIDE, CALCIUM CHLORIDE 600; 310; 30; 20 MG/100ML; MG/100ML; MG/100ML; MG/100ML
50 INJECTION, SOLUTION INTRAVENOUS CONTINUOUS
Status: DISCONTINUED | OUTPATIENT
Start: 2023-08-12 | End: 2023-08-12

## 2023-08-12 RX ORDER — ERGOCALCIFEROL (VITAMIN D2) 10 MCG
400 TABLET ORAL DAILY
COMMUNITY

## 2023-08-12 RX ORDER — METHYLPREDNISOLONE SODIUM SUCCINATE 40 MG/ML
40 INJECTION, POWDER, LYOPHILIZED, FOR SOLUTION INTRAMUSCULAR; INTRAVENOUS EVERY 24 HOURS
Status: DISCONTINUED | OUTPATIENT
Start: 2023-08-12 | End: 2023-08-13

## 2023-08-12 RX ORDER — NAPROXEN 250 MG/1
500 TABLET ORAL 2 TIMES DAILY PRN
Status: CANCELLED | OUTPATIENT
Start: 2023-08-12

## 2023-08-12 RX ORDER — PANTOPRAZOLE SODIUM 40 MG/1
40 TABLET, DELAYED RELEASE ORAL
Status: DISCONTINUED | OUTPATIENT
Start: 2023-08-12 | End: 2023-08-13 | Stop reason: HOSPADM

## 2023-08-12 RX ORDER — DICLOFENAC POTASSIUM 50 MG/1
50 TABLET, FILM COATED ORAL 3 TIMES DAILY PRN
COMMUNITY
End: 2023-08-13 | Stop reason: HOSPADM

## 2023-08-12 RX ORDER — SULFAMETHOXAZOLE AND TRIMETHOPRIM 800; 160 MG/1; MG/1
1 TABLET ORAL DAILY
COMMUNITY
End: 2023-08-13 | Stop reason: HOSPADM

## 2023-08-12 RX ORDER — ASCORBIC ACID 500 MG
500 TABLET ORAL DAILY
COMMUNITY

## 2023-08-12 RX ORDER — LOSARTAN POTASSIUM 50 MG/1
50 TABLET ORAL DAILY
Status: CANCELLED | OUTPATIENT
Start: 2023-08-13

## 2023-08-12 RX ORDER — PROCHLORPERAZINE EDISYLATE 5 MG/ML
10 INJECTION INTRAMUSCULAR; INTRAVENOUS EVERY 6 HOURS PRN
Status: DISCONTINUED | OUTPATIENT
Start: 2023-08-12 | End: 2023-08-13 | Stop reason: HOSPADM

## 2023-08-12 RX ORDER — LOPERAMIDE HYDROCHLORIDE 2 MG/1
2 CAPSULE ORAL 4 TIMES DAILY PRN
Status: DISCONTINUED | OUTPATIENT
Start: 2023-08-12 | End: 2023-08-12

## 2023-08-12 RX ORDER — VENLAFAXINE HYDROCHLORIDE 150 MG/1
150 CAPSULE, EXTENDED RELEASE ORAL DAILY
Status: DISCONTINUED | OUTPATIENT
Start: 2023-08-12 | End: 2023-08-13 | Stop reason: HOSPADM

## 2023-08-12 RX ORDER — CHOLECALCIFEROL (VITAMIN D3) 125 MCG
5 CAPSULE ORAL NIGHTLY
COMMUNITY

## 2023-08-12 RX ORDER — LOSARTAN POTASSIUM 50 MG/1
50 TABLET ORAL DAILY
COMMUNITY
End: 2023-08-13 | Stop reason: HOSPADM

## 2023-08-12 RX ORDER — OXYCODONE AND ACETAMINOPHEN 10; 325 MG/1; MG/1
1 TABLET ORAL EVERY 6 HOURS PRN
Status: DISCONTINUED | OUTPATIENT
Start: 2023-08-12 | End: 2023-08-13 | Stop reason: HOSPADM

## 2023-08-12 RX ORDER — BUSPIRONE HYDROCHLORIDE 5 MG/1
7.5 TABLET ORAL 2 TIMES DAILY
Status: DISCONTINUED | OUTPATIENT
Start: 2023-08-12 | End: 2023-08-13 | Stop reason: HOSPADM

## 2023-08-12 RX ORDER — TRAMADOL HYDROCHLORIDE 50 MG/1
50 TABLET ORAL EVERY 6 HOURS PRN
Status: DISCONTINUED | OUTPATIENT
Start: 2023-08-12 | End: 2023-08-13 | Stop reason: HOSPADM

## 2023-08-12 RX ORDER — CHOLECALCIFEROL (VITAMIN D3) 125 MCG
5 CAPSULE ORAL NIGHTLY
Status: DISCONTINUED | OUTPATIENT
Start: 2023-08-12 | End: 2023-08-13 | Stop reason: HOSPADM

## 2023-08-12 RX ORDER — FEXOFENADINE HCL 180 MG/1
180 TABLET ORAL NIGHTLY
COMMUNITY

## 2023-08-12 RX ADMIN — ENOXAPARIN SODIUM 40 MG: 40 INJECTION SUBCUTANEOUS at 21:45

## 2023-08-12 RX ADMIN — Medication 5 MG: at 21:44

## 2023-08-12 RX ADMIN — LOPERAMIDE HYDROCHLORIDE 2 MG: 2 CAPSULE ORAL at 06:27

## 2023-08-12 RX ADMIN — BUSPIRONE HYDROCHLORIDE 7.5 MG: 5 TABLET ORAL at 21:44

## 2023-08-12 RX ADMIN — IPRATROPIUM BROMIDE AND ALBUTEROL SULFATE 3 ML: 2.5; .5 SOLUTION RESPIRATORY (INHALATION) at 12:02

## 2023-08-12 RX ADMIN — DOXYCYCLINE 100 MG: 100 CAPSULE ORAL at 21:44

## 2023-08-12 RX ADMIN — IPRATROPIUM BROMIDE AND ALBUTEROL SULFATE 3 ML: 2.5; .5 SOLUTION RESPIRATORY (INHALATION) at 06:56

## 2023-08-12 RX ADMIN — OXYCODONE HYDROCHLORIDE AND ACETAMINOPHEN 500 MG: 500 TABLET ORAL at 09:19

## 2023-08-12 RX ADMIN — PROCHLORPERAZINE EDISYLATE 10 MG: 5 INJECTION, SOLUTION INTRAMUSCULAR; INTRAVENOUS at 13:44

## 2023-08-12 RX ADMIN — DOXYCYCLINE 100 MG: 100 CAPSULE ORAL at 08:25

## 2023-08-12 RX ADMIN — Medication 10 ML: at 08:26

## 2023-08-12 RX ADMIN — POTASSIUM CHLORIDE 40 MEQ: 1500 TABLET, EXTENDED RELEASE ORAL at 02:04

## 2023-08-12 RX ADMIN — GUAIFENESIN 1200 MG: 600 TABLET, EXTENDED RELEASE ORAL at 21:44

## 2023-08-12 RX ADMIN — VANCOMYCIN HYDROCHLORIDE 1000 MG: 1 INJECTION, POWDER, LYOPHILIZED, FOR SOLUTION INTRAVENOUS at 09:19

## 2023-08-12 RX ADMIN — Medication 10 ML: at 21:45

## 2023-08-12 RX ADMIN — IOPAMIDOL 78 ML: 755 INJECTION, SOLUTION INTRAVENOUS at 11:45

## 2023-08-12 RX ADMIN — GUAIFENESIN 1200 MG: 600 TABLET, EXTENDED RELEASE ORAL at 08:25

## 2023-08-12 RX ADMIN — VENLAFAXINE HYDROCHLORIDE 150 MG: 150 CAPSULE, EXTENDED RELEASE ORAL at 09:19

## 2023-08-12 RX ADMIN — PIPERACILLIN SODIUM AND TAZOBACTAM SODIUM 4.5 G: 4; .5 INJECTION, POWDER, LYOPHILIZED, FOR SOLUTION INTRAVENOUS at 13:34

## 2023-08-12 RX ADMIN — PANTOPRAZOLE SODIUM 40 MG: 40 TABLET, DELAYED RELEASE ORAL at 08:28

## 2023-08-12 RX ADMIN — IPRATROPIUM BROMIDE AND ALBUTEROL SULFATE 3 ML: 2.5; .5 SOLUTION RESPIRATORY (INHALATION) at 19:04

## 2023-08-12 RX ADMIN — VANCOMYCIN HYDROCHLORIDE 1000 MG: 1 INJECTION, POWDER, LYOPHILIZED, FOR SOLUTION INTRAVENOUS at 21:43

## 2023-08-12 RX ADMIN — OXYCODONE AND ACETAMINOPHEN 1 TABLET: 10; 325 TABLET ORAL at 13:44

## 2023-08-12 RX ADMIN — PIPERACILLIN SODIUM AND TAZOBACTAM SODIUM 4.5 G: 4; .5 INJECTION, POWDER, LYOPHILIZED, FOR SOLUTION INTRAVENOUS at 21:42

## 2023-08-12 RX ADMIN — PIPERACILLIN SODIUM AND TAZOBACTAM SODIUM 4.5 G: 4; .5 INJECTION, POWDER, LYOPHILIZED, FOR SOLUTION INTRAVENOUS at 05:50

## 2023-08-12 RX ADMIN — ENOXAPARIN SODIUM 40 MG: 40 INJECTION SUBCUTANEOUS at 08:25

## 2023-08-12 RX ADMIN — SODIUM CHLORIDE, POTASSIUM CHLORIDE, SODIUM LACTATE AND CALCIUM CHLORIDE 50 ML/HR: 600; 310; 30; 20 INJECTION, SOLUTION INTRAVENOUS at 10:59

## 2023-08-12 RX ADMIN — METHYLPREDNISOLONE SODIUM SUCCINATE 40 MG: 1 INJECTION, POWDER, FOR SOLUTION INTRAMUSCULAR; INTRAVENOUS at 21:43

## 2023-08-12 RX ADMIN — PSYLLIUM HUSK 1 PACKET: 3.4 POWDER ORAL at 11:00

## 2023-08-12 NOTE — PROGRESS NOTES
[facility] HOSPITALIST PROGRESS NOTE     Patient Identification:  Name:  Jimmy Dawson Jr.  Age:  52 y.o.  Sex:  male  :  1970  MRN:  [id number]  Visit Number:  [id number]  ROOM: [address] [place] [address]     Primary Care Provider:  Nena Pike APRN    Length of stay in inpatient status:  1    Subjective     Chief Compliant:    Chief Complaint   Patient presents with    Post-op Problem    Weakness - Generalized       History of Presenting Illness:    Patient seen in follow-up for community-acquired pneumonia.  This morning he is on room air and says he feels significantly better.  He denies feeling short of breath, productive sputum or any type of discomfort.  Says he rested well last night no longer nauseous or in pain.  Has no acute complaints this morning.  No adverse events noted overnight.    Objective     Current Hospital Meds:ascorbic acid, 500 mg, Oral, Daily  busPIRone, 7.5 mg, Oral, BID  doxycycline, 100 mg, Oral, Q12H  enoxaparin, 40 mg, Subcutaneous, Q12H  guaiFENesin, 1,200 mg, Oral, Q12H  ipratropium-albuterol, 3 mL, Nebulization, 4x Daily - RT  melatonin, 5 mg, Oral, Nightly  methylPREDNISolone sodium succinate, 40 mg, Intravenous, Q24H  pantoprazole, 40 mg, Oral, Q AM  piperacillin-tazobactam, 4.5 g, Intravenous, Q8H  psyllium, 1 packet, Oral, Daily  sodium chloride, 10 mL, Intravenous, Q12H  vancomycin, 1,000 mg, Intravenous, Q12H  venlafaxine XR, 150 mg, Oral, Daily    lactated ringers, 50 mL/hr, Last Rate: 50 mL/hr (23 1059)  Pharmacy to dose vancomycin,         Current Antimicrobial Therapy:  Anti-Infectives (From admission, onward)      Ordered     Dose/Rate Route Frequency Start Stop    23  vancomycin (VANCOCIN) 1,000 mg in sodium chloride 0.9 % 250 mL IVPB-VTB        Ordering Provider: Jose Guadalupe Garvin,     1,000 mg  250 mL/hr over 60 Minutes Intravenous Every 12 Hours 23 1000 23 0959    23  vancomycin 2500 mg/500 mL 0.9% NS  IVPB ([facility])        Ordering Provider: Jose Guadalupe Garvin DO    2,500 mg  over 150 Minutes Intravenous Once 08/11/23 2215 08/12/23 0027    08/11/23 1950  piperacillin-tazobactam (ZOSYN) IVPB 4.5 g in 100 mL NS VTB        Ordering Provider: Jose Guadalupe Garvin DO    4.5 g  over 30 Minutes Intravenous Every 8 Hours 08/11/23 2100 08/18/23 2059 08/11/23 1950  doxycycline (MONODOX) capsule 100 mg        Ordering Provider: Jose Guadalupe Garvin DO    100 mg Oral Every 12 Hours Scheduled 08/11/23 2100 08/16/23 2059 08/11/23 1950  Pharmacy to dose vancomycin        Ordering Provider: Jose Guadaluep Garvin DO     Does not apply Continuous PRN 08/11/23 1950 08/18/23 1949 08/11/23 1407  piperacillin-tazobactam (ZOSYN) IVPB 3.375 g in 100 mL NS VTB        Ordering Provider: Izaiah Roberts DO    3.375 g  over 30 Minutes Intravenous Once 08/11/23 1423 08/11/23 1516          Current Diuretic Therapy:  Diuretics (From admission, onward)      None          ----------------------------------------------------------------------------------------------------------------------  Vital Signs:  Temp:  [96.6 øF (35.9 øC)-98.7 øF (37.1 øC)] 96.6 øF (35.9 øC)  Heart Rate:  [] 106  Resp:  [16-20] 20  BP: ()/() 90/57  SpO2:  [91 %-96 %] 94 %  on  Flow (L/min):  [2] 2;   Device (Oxygen Therapy): room air  Body mass index is 40.25 kg/mý.    Wt Readings from Last 3 Encounters:   08/11/23 (!) 150 kg (330 lb 11 oz)   08/08/23 (!) 150 kg (330 lb)   08/07/23 (!) 150 kg (330 lb)     Intake & Output (last 3 days)         08/09 0701  08/10 0700 08/10 0701  08/11 0700 08/11 0701 08/12 0700 08/12 0701 08/13 0700    P.O.   480     IV Piggyback   2704     Total Intake(mL/kg)   3184 (21.2)     Urine (mL/kg/hr)   0     Drains   20     Stool   0     Total Output   20     Net   +3164             Urine Unmeasured Occurrence   2 x     Stool Unmeasured Occurrence   1 x 1 x          Diet: Diabetic Diets; Consistent  Carbohydrate; Texture: Regular Texture (IDDSI 7); Fluid Consistency: Thin (IDDSI 0)  ----------------------------------------------------------------------------------------------------------------------  Physical exam:  Constitutional: Middle-age male, nontoxic, speaking clearly in full sentences, Well-developed and well-nourished, resting comfortably in bed, no acute distress.      HENT:  Head:  Normocephalic and atraumatic.  Mouth:  Moist mucous membranes.    Eyes:  Conjunctivae and EOM are normal. No scleral icterus.   Cardiovascular: Tachycardic, regular rhythm and normal heart sounds with no murmur. No JVD.   Pulmonary/Chest:  No respiratory distress, no wheezes, no crackles, with normal breath sounds and good air movement. Unlabored. No accessory muscle use.  Abdominal:  Soft. No distension and no tenderness.  Bowel sounds present. No rebound or guarding.  HANS drain with serosanguineous output in RUQ  Musculoskeletal:  No tenderness, and no deformity.  No red or swollen joints anywhere.    Neurological:  Alert and oriented to person, place, and time.  No cranial nerve deficit.   Nonfocal.   Skin:  Skin is warm and dry. No rash noted. No pallor.   Peripheral vascular:  No clubbing, no cyanosis, no edema. Pedal and tibial pulses 2 out of 4 bilaterally.   ----------------------------------------------------------------------------------------------------------------------  Results from last 7 days   Lab Units 08/11/23  2320 08/11/23 2016 08/11/23  1748 08/11/23  1436 08/11/23  1436 08/11/23  1240 08/07/23  0735   CRP mg/dL  --  10.37*  --   --   --   --  <0.30   LACTATE mmol/L 1.4 2.3* 2.7*   < > 3.0*  --   --    WBC 10*3/mm3  --   --   --   --  18.33* 20.65* 13.23*   HEMOGLOBIN g/dL  --   --   --   --  15.2 15.2 13.5   HEMATOCRIT %  --   --   --   --  46.3 46.1 41.3   MCV fL  --   --   --   --  88.0 86.8 88.2   MCHC g/dL  --   --   --   --  32.8 33.0 32.7   PLATELETS 10*3/mm3  --   --   --   --  505* 521* 289    INR   --   --   --   --  1.02  --   --     < > = values in this interval not displayed.     Results from last 7 days   Lab Units 08/11/23  1820   PH, ARTERIAL pH units 7.474*   PO2 ART mm Hg 57.7*   PCO2, ARTERIAL mm Hg 37.4   HCO3 ART mmol/L 27.4*     Results from last 7 days   Lab Units 08/12/23  0454 08/11/23  1436 08/11/23  1240 08/07/23  0735   SODIUM mmol/L  --  141 138 138   POTASSIUM mmol/L 3.7 3.6 3.4* 4.2   CHLORIDE mmol/L  --  95* 96* 104   CO2 mmol/L  --  28.7 25.3 23.2   BUN mg/dL  --  38* 36* 22*   CREATININE mg/dL  --  1.31* 1.17 0.99   CALCIUM mg/dL  --  10.3 9.9 9.4   GLUCOSE mg/dL  --  186* 192* 206*   ALBUMIN g/dL  --  4.2 4.0 4.4   BILIRUBIN mg/dL  --  1.0 0.9 0.2   ALK PHOS U/L  --  96 101 87   AST (SGOT) U/L  --  41* 33 19   ALT (SGPT) U/L  --  58* 50* 30   Estimated Creatinine Clearance: 102.1 mL/min (A) (by C-G formula based on SCr of 1.31 mg/dL (H)).  No results found for: AMMONIA  Results from last 7 days   Lab Units 08/11/23  1436 08/11/23  1240   HSTROP T ng/L 16* 16*     Results from last 7 days   Lab Units 08/12/23  0454   PROBNP pg/mL <36.0         No results found for: HGBA1C, POCGLU  Lab Results   Component Value Date    TSH 1.780 11/06/2020    FREET4 0.73 (L) 11/06/2020     No results found for: PREGTESTUR, PREGSERUM, HCG, HCGQUANT  Pain Management Panel          Latest Ref Rng & Units 11/6/2020   Pain Management Panel   Amphetamine, Urine Qual Negative Negative    Barbiturates Screen, Urine Negative Negative    Benzodiazepine Screen, Urine Negative Negative    Buprenorphine, Screen, Urine Negative Negative    Cocaine Screen, Urine Negative Negative    Methadone Screen , Urine Negative Negative      Brief Urine Lab Results  (Last result in the past 365 days)        Color   Clarity   Blood   Leuk Est   Nitrite   Protein   CREAT   Urine HCG        08/11/23 1804 Dark Yellow   Clear   Negative   Trace   Negative   30 mg/dL (1+)                 No results found for: BLOODCX  No results  found for: URINECX  No results found for: WOUNDCX  No results found for: STOOLCX  No results found for: RESPCX  No results found for: AFBCX  Results from last 7 days   Lab Units 08/11/23  2320 08/11/23 2016 08/11/23  1748 08/11/23  1436 08/07/23  0735   PROCALCITONIN ng/mL  --   --   --  0.20  --    LACTATE mmol/L 1.4 2.3* 2.7* 3.0*  --    CRP mg/dL  --  10.37*  --   --  <0.30       I have personally looked at the labs and they are summarized above.  ----------------------------------------------------------------------------------------------------------------------  Detailed radiology reports for the last 24 hours:  Imaging Results (Last 24 Hours)       Procedure Component Value Units Date/Time    CT Abdomen Pelvis With Contrast [[id number]] Collected: 08/11/23 1610     Updated: 08/11/23 1614    Narrative:      EXAM:    CT Abdomen and Pelvis With Intravenous Contrast     EXAM DATE:    8/11/2023 3:15 PM     CLINICAL HISTORY:    Abdominal pain, acute, nonlocalized     TECHNIQUE:    Axial computed tomography images of the abdomen and pelvis with  intravenous contrast.  Sagittal and coronal reformatted images were  created and reviewed.  This CT exam was performed using one or more of  the following dose reduction techniques:  automated exposure control,  adjustment of the mA and/or kV according to patient size, and/or use of  iterative reconstruction technique.     COMPARISON:    08/07/2023     FINDINGS:    Lung bases:  Bibasilar airspace disease which may represent  atelectasis and/or pneumonia.      ABDOMEN:    Liver:  Diffuse fatty infiltration of liver is noted.    Gallbladder and bile ducts:  Unremarkable.  No calcified stones.  No  ductal dilation.    Pancreas:  Unremarkable.  No mass.  No ductal dilation.    Spleen:  Unremarkable.  No splenomegaly.    Adrenals:  Unremarkable.  No mass.    Kidneys and ureters:  Unremarkable.  No solid mass.  No  hydronephrosis.    Stomach and bowel:  Gastric distention.   Air-fluid levels throughout  prominent mildly dilated loops of small bowel in a generalized pattern  most consistent with ileus.  Mild degree of constipation is noted.  No  mucosal thickening.      PELVIS:    Appendix:  Appendix is within normal limits.    Bladder:  Unremarkable.  No mass.    Reproductive:  Unremarkable as visualized.      ABDOMEN and PELVIS:    Intraperitoneal space:  Unremarkable.  No free air.  No significant  fluid collection.    Bones/joints:  No acute fracture.  No dislocation.    Soft tissues:  Unremarkable.    Vasculature:  Unremarkable.  No abdominal aortic aneurysm.    Lymph nodes:  No retroperitoneal or iliac adenopathy.    Tubes, lines and devices:  Surgical drain is noted in the right upper  quadrant gallbladder fossa with no loculated collections in the surgical  bed.       Impression:      1.  Interval changes of cholecystectomy.  2.  Surgical drain in the right upper quadrant cholecystectomy bed with  no loculated collections identified.  3.  Generalized ileus. No findings to suggest high-grade bowel  obstruction.  4.  Bibasilar airspace disease which may represent atelectasis and/or  pneumonia.  5.  Background changes of constipation.  6.  Other incidental and nonacute findings detailed above which are  stable from previous.     This report was finalized on 8/11/2023 4:12 PM by Dr. Wilver Aly MD.       CT Head Without Contrast [[id number]] Collected: 08/11/23 1607     Updated: 08/11/23 1610    Narrative:      EXAM:    CT Head Without Intravenous Contrast     EXAM DATE:    8/11/2023 3:16 PM     CLINICAL HISTORY:    Dizziness, persistent/recurrent, cardiac or vascular cause suspected     TECHNIQUE:    Axial computed tomography images of the head/brain without intravenous  contrast.  Sagittal and coronal reformatted images were created and  reviewed.  This CT exam was performed using one or more of the following  dose reduction techniques:  automated exposure control, adjustment  of  the mA and/or kV according to patient size, and/or use of iterative  reconstruction technique.     COMPARISON:    No relevant prior studies available.     FINDINGS:    Brain and extra-axial spaces:  Unremarkable.  No hemorrhage.  No  significant white matter disease.  No edema.  No ventriculomegaly.    Bones/joints:  Unremarkable.  No acute fracture.    Soft tissues:  Unremarkable.    Sinuses:  Unremarkable as visualized.  No acute sinusitis.    Mastoid air cells:  Unremarkable as visualized.  No mastoid effusion.       Impression:        Unremarkable exam demonstrating no CT evidence of acute intracranial  findings.     This report was finalized on 8/11/2023 4:08 PM by Dr. Wilver Aly MD.       XR Chest 1 View [[id number]] Collected: 08/11/23 1501     Updated: 08/11/23 1503    Narrative:      EXAM:    XR Chest, 1 View     EXAM DATE:    8/11/2023 2:18 PM     CLINICAL HISTORY:    Severe Sepsis Protocol     TECHNIQUE:    Frontal view of the chest.     COMPARISON:    No relevant prior studies available.     FINDINGS:    Lungs and pleural spaces:  Linear opacity left mid lung may represent  atelectasis or focal pneumonia.  No pneumothorax.    Heart:  Unremarkable.  No cardiomegaly.    Mediastinum:  Unremarkable.    Bones/joints:  Unremarkable.       Impression:        Left mid and lower lung opacities which may represent atelectasis  and/or pneumonia.     This report was finalized on 8/11/2023 3:01 PM by Dr. Wilver Aly MD.             Assessment & Plan      Patient is a 52-year-old male with history significant for WPW status post ablation, TAMIKO on CPAP and recent laparoscopic cholecystectomy with HANS drain who presented to the ER with complaints of shortness of breath, fatigue and cough.    #Sepsis due to community-acquired pneumonia with MDR risk factors  #Acute hypoxemic respiratory failure  --Patient presented to the ER with shortness of breath, fatigue and cough.  He does not have a history of COPD, does  not wear home O2.  ABG noted acute hypoxemia with PaO2 less than 60, placed on 2 L  --Admit labs WBC 18 K with left shift, anion gap 17, BUN/creatinine 38/1.3, CRP 10  --Chest x-ray noted left mid and lower lobe opacities consistent with pneumonia  --Blood cultures NGTD, sputum culture pending, MRSA nasal swab negative  --Incentive spirometer, DuoNebs, Symbicort  --Given hypoxemia and tachycardia with recent surgery, will order CT angio to take pulmonary embolism off the [id number] would be unhelpful in this situation given recent postsurgical status  --Continue Solu-Medrol, will switch to daily, mucolytic's  --Continue vancomycin, Zosyn and doxycycline through today, if cultures remain negative, will plan to de-escalate tomorrow morning  --Continue to monitor on MedSurg, if he continues to improve, likely discharge in the a.m.    #Status post laparoscopic cholecystectomy  --Patient underwent laparoscopic cholecystectomy on 8/8 with general surgery, HANS drain with serosanguineous output  --Labs otherwise unremarkable  --CT abdomen/pelvis noted surgical drain in the right upper quadrant, no loculated collections  --Pain control, supportive care  --gen surg courtesy consult    #WPW status post radiofrequency ablation  #History of atrial flutter status post ablation  --EKG noted sinus tachycardia, no acute ischemic changes  --Keep outpatient follow-up    #Asymptomatic bacteriuria  #Elevated troponin due to the above, flat trend  #Lactic acidosis, clinically significant, resolved  #Post cholecystectomy diarrhea, stool studies and C. difficile negative, supportive care  #Essential hypertension, hold BP meds  #Generalized anxiety/depression, reconcile home meds  #TAMIKO, compliant with CPAP  #GERD, PPI    CHECKLIST:  Abx: Vancomycin, Zosyn, Doxy  Steroids: Solu-Medrol  VTE: Lovenox  GI ppx: Protonix  Diet: Regular  Code: CPR, full  Dispo: Patient is high risk due to community-acquired pneumonia with MDR risk factors given  recent surgical intervention.  Anticipate 24 to 48-hour stay pending clinical course.  Anticipate discharge home when medically stable.    Jose Guadalupe Garvin DO  [facility]  08/12/23  11:06 EDT

## 2023-08-12 NOTE — PAYOR COMM NOTE
"[facility]  GERARDO TAYLOR  PHONE  [phone]  FAX  [phone]  NPI:  [id number]    ADMISSION NOTIFICATION    Jimmy Allred Jr. (52 y.o. Male)       Date of Birth   1970    Social Security Number       Address   [address] [address]    Home Phone   [phone]    MRN   [id number]       Sabianism   [facility]    Marital Status                               Admission Date   8/11/23    Admission Type   Emergency    Admitting Provider   Jose Guadalupe Garvin DO    Attending Provider   Jose Guadalupe Garvin DO    Department, Room/Bed   [address], [address]/       Discharge Date       Discharge Disposition       Discharge Destination                                 Attending Provider: Jose Guadalupe Garvin DO    Allergies: Aleve [Naproxen]    Isolation: None   Infection: C.difficile (rule out) (08/11/23)   Code Status: CPR    Ht: 193 cm (76\")   Wt: 150 kg (330 lb 11 oz)    Admission Cmt: None   Principal Problem: CAP (community acquired pneumonia) [J18.9]                   Active Insurance as of 8/11/2023       Primary Coverage       Payor Plan Insurance Group Employer/Plan Group    [facility] CCN OPTUM        Payor Plan Address Payor Plan Phone Number Payor Plan Fax Number Effective Dates    [address] [phone]  1/3/2020 - None Entered    [facility] [id number]         Subscriber Name Subscriber Birth Date Member ID       JIMMY ALLRED JR. 1970 [id number]               Secondary Coverage       Payor Plan Insurance Group Employer/Plan Group    [facility] DEPT [id number]        Payor Plan Address Payor Plan Phone Number Payor Plan Fax Number Effective Dates    [facility] OFFICE OF COMMUNITY CARE [phone]  3/7/2022 - None Entered    [address] [address]       [place] [address]         Subscriber Name Subscriber Birth Date Member ID       JIMMY ALLRED JR. 1970 [id number]                     Emergency Contacts  "       (Rel.) Home Phone Work Phone Mobile Phone    LIZBET ALLRED (Spouse) [phone] -- --                 History & Physical        Caroline Waller APRN at 23       Attestation signed by Jose Guadalupe Garvin DO at 23 191    I have evaluated the patient independently, I have reviewed H&P, all labs and images from today and evaluated the patient at bedside.  I have discussed hermila/ LIONEL Diamond and agree.  52-year-old male postop from [facility] presented with shortness of breath.  Hypoxemic on ABG.  Imaging noted bibasilar pneumonia, recent procedure with MDR risk factors.  Abdominal imaging unremarkable, serous drainage from HANS drain.  Sinus tach on EKG.  Empiric antibiotics, broad-spectrum, mucolytic's, steroids, follow-up on culture data.  Admit to [facility]  History & Physical    Patient Identification:  Name:  Jimmy Allred Jr.  Age:  52 y.o.  Sex:  male  :  1970  MRN:  [id number]   Visit Number:  [id number]  Admit Date: 2023   Primary Care Physician:  Nena Pike APRN    Subjective     Chief complaint: Shortness of Breath, Abdominal Pain    History of presenting illness:      Jimmy Allred Jr. is a 52 y.o. male with past medical history significant for Brayan-Parkinson-White syndrome and supraventricular tachycardia status post RF ablation, history of atrial flutter, anxiety, depression, obstructive sleep apnea, essential hypertension, GERD, and obesity by BMI.  Patient presents to [facility] emergency department today with complaints of shortness of breath and abdominal pain. Patient underwent laparoscopic cholecystectomy by Dr. Sanders on 2023. He has a pericholecystic drain in place to the right upper quadrant with serosanguineous fluid. Dressing clean, dry, intact to drain insertion site. No surrounding erythema, edema, or discoloration. Abdomen appears  moderately distended with hypoactive bowel sounds. Patient reported generalized tenderness to palpation. He states that since his surgery, he has been extremely nauseous with occasional dizzy spells. States that he has been unable to tolerate much oral intake. Reports only small bowel movements since surgery. No vomiting or diarrhea. He states that he has been diaphoretic, but denies fever or chills. Endorses productive cough with thick sputum. Imaging of the chest obtained in the ED noted findings consistent with left-sided pneumonia. Patient was also hypoxic on room air (baseline) on arrival and was placed on 2L nasal cannula. ABG was obtained on room air noting hypoxemia. Mr. Dawson currently denies any chest pain or palpitations. He states that Reglan helped to alleviate nausea in the ED; previously Zofran has not been helpful. Discussed admission with patient and spouse. They voiced agreement and understanding with no further questions, concerns, or needs at this time. Patient seen and evaluated alongside Dr. Garvin.     Upon arrival to the ED, vital signs were temperature 97.9, pulse 136, respirations 16, blood pressure 127/82 sitting, SPO2 saturation 91% on room air.  ABG with pH 7.474, PO2 57.7, HCO3 27.4, O2 saturation of 90.1% on room air.  High-sensitivity troponin T 16 with 0 delta.  CMP with chloride 95, anion gap 17.3, BUN 38, creatinine 1.31, BUN/creatinine ratio 29.0, glucose 186, AST 41, ALT 58, otherwise unremarkable.  Lactate 3.0, then 2.7.  Procalcitonin 0.20.  PT/INR within normal limits.  CBC with WBCs 18.33, platelets 505, otherwise unremarkable.  Urinalysis with trace ketones, trace leukocytes, 1+ protein, 1+ bilirubin, 3-5 WBCs, and 1+ bacteria.  Peripheral blood cultures x2 pending.  COVID-19 and flu A/B swab negative.  Chest x-ray noted left mid and lower lung opacities which may represent atelectasis and/or pneumonia.  CT of the abdomen and pelvis with contrast noted interval changes of  cholecystectomy.  Surgical drain in the right upper quadrant cholecystectomy bed with no loculated collections identified.  Generalized ileus.  No findings to suggest high-grade bowel obstruction.  Bibasilar airspace disease which may represent atelectasis and/or pneumonia.  Background changes of constipation.  CT of the head without contrast unremarkable.    Known Emergency Department medications received prior to my evaluation included 5 mg IV Reglan, 3.375 g IV Zosyn, 2604 mL normal saline bolus. Emergency Department Room location at the time of my evaluation was 115.  Discussed with admitting physician, Jose Guadalupe Gray DO.    ---------------------------------------------------------------------------------------------------------------------   Review of Systems   Constitutional:  Positive for diaphoresis and fatigue. Negative for chills and fever.   HENT:  Negative for congestion, sore throat and trouble swallowing.    Respiratory:  Positive for cough and shortness of breath. Negative for choking and wheezing.    Cardiovascular:  Negative for chest pain, palpitations and leg swelling.   Gastrointestinal:  Positive for abdominal distention, abdominal pain, constipation and nausea. Negative for blood in stool, diarrhea and vomiting.   Genitourinary:  Negative for difficulty urinating, flank pain, frequency and urgency.   Musculoskeletal:  Negative for back pain, gait problem, neck pain and neck stiffness.   Neurological:  Positive for dizziness. Negative for tremors, seizures, syncope, facial asymmetry, speech difficulty, weakness, light-headedness, numbness and headaches.    ---------------------------------------------------------------------------------------------------------------------   Past Medical History:   Diagnosis Date    Acid reflux     Allergic     Anxiety     Arthritis     Depression     GERD (gastroesophageal reflux disease)     Hypertension     Infectious mononucleosis     TAMIKO  (obstructive sleep apnea)     NO OXYGEN USE    PONV (postoperative nausea and vomiting)     Seasonal allergies     Sinusitis     Sleep apnea     c pap    Urinary tract infection     WPW (Brayan-Parkinson-White syndrome)      Past Surgical History:   Procedure Laterality Date    ABLATION OF DYSRHYTHMIC FOCUS  1999 and 2000    Dr. Carlin    CARDIAC CATHETERIZATION      CARDIAC ELECTROPHYSIOLOGY PROCEDURE N/A 06/04/2019    Procedure: Flutter;  Surgeon: Juan Carlin MD;  Location: [facility] INVASIVE LOCATION;  Service: Cardiology    CARDIAC SURGERY      COLONOSCOPY      WISDOM TOOTH EXTRACTION       Family History   Problem Relation Age of Onset    Hypertension Mother     Hypertension Father     Hyperlipidemia Father     Diabetes Father     Heart disease Father     Diabetes Brother     Diabetes Maternal Grandfather     Heart attack Maternal Grandfather     Diabetes Paternal Grandfather     Diabetes Maternal Grandmother     Cancer Maternal Grandmother      Social History     Socioeconomic History    Marital status:    Tobacco Use    Smoking status: Never    Smokeless tobacco: Never   Vaping Use    Vaping Use: Never used   Substance and Sexual Activity    Alcohol use: Yes     Comment: 4-5 BEERS, TWICE A WEEK    Drug use: No    Sexual activity: Defer     ---------------------------------------------------------------------------------------------------------------------   Allergies:  Aleve [naproxen]  ---------------------------------------------------------------------------------------------------------------------   Home medications:    Medications below are reported home medications pulling from within the system; at this time, these medications have not been reconciled unless otherwise specified and are in the verification process for further verifcation as current home medications.  (Not in a hospital admission)      Hospital Scheduled Meds:          Current listed hospital scheduled medications may  not yet reflect those currently placed in orders that are signed and held awaiting patient's arrival to floor.   ---------------------------------------------------------------------------------------------------------------------     Objective     Vital Signs:  Temp:  [97.9 øF (36.6 øC)-98.7 øF (37.1 øC)] 98.2 øF (36.8 øC)  Heart Rate:  [108-136] 120  Resp:  [16-20] 20  BP: (127-152)/() 135/91      08/11/23  1319   Weight: (!) 150 kg (330 lb)     Body mass index is 40.17 kg/mý.  ---------------------------------------------------------------------------------------------------------------------       Physical Exam  Vitals reviewed.   Constitutional:       General: He is awake. He is not in acute distress.     Appearance: He is obese. He is ill-appearing and diaphoretic.      Interventions: Nasal cannula in place.      Comments: Currently on 2L nasal cannula.   HENT:      Head: Normocephalic and atraumatic.      Mouth/Throat:      Mouth: Mucous membranes are moist.      Pharynx: Oropharynx is clear.   Eyes:      Extraocular Movements: Extraocular movements intact.      Pupils: Pupils are equal, round, and reactive to light.   Cardiovascular:      Rate and Rhythm: Regular rhythm. Tachycardia present.      Pulses: Normal pulses.           Dorsalis pedis pulses are 2+ on the right side and 2+ on the left side.      Heart sounds: Normal heart sounds. No murmur heard.    No friction rub.   Pulmonary:      Effort: Pulmonary effort is normal. No accessory muscle usage, respiratory distress or retractions.      Breath sounds: Decreased breath sounds present. No wheezing, rhonchi or rales.   Abdominal:      General: Bowel sounds are decreased. There is distension.      Palpations: Abdomen is soft.      Tenderness: There is abdominal tenderness. There is no guarding.   Musculoskeletal:      Cervical back: Neck supple. No rigidity.      Right lower leg: No edema.      Left lower leg: No edema.   Skin:     General: Skin  is warm and dry.      Capillary Refill: Capillary refill takes 2 to 3 seconds.      Coloration: Skin is pale.   Neurological:      General: No focal deficit present.      Mental Status: He is alert and oriented to person, place, and time. Mental status is at baseline.      Cranial Nerves: No dysarthria or facial asymmetry.      Sensory: Sensation is intact. No sensory deficit.      Motor: No weakness, tremor or seizure activity.   Psychiatric:         Attention and Perception: Attention and perception normal.         Mood and Affect: Mood and affect normal.         Speech: Speech normal.         Behavior: Behavior normal. Behavior is cooperative.         Thought Content: Thought content normal.         Cognition and Memory: Cognition normal.         Judgment: Judgment normal.     ---------------------------------------------------------------------------------------------------------------------  EKG: Appearing sinus tachycardia 110s with no evidence of acute ischemia.  Confirmed by cardiology, Dr. De La O.    Telemetry:  Appearing sinus tachycardia 120s on my exam.   I have personally looked at both the EKG and the telemetry strips.  ---------------------------------------------------------------------------------------------------------------------   Results from last 7 days   Lab Units 08/11/23  1748 08/11/23  1436 08/11/23  1240 08/07/23  0735   CRP mg/dL  --   --   --  <0.30   LACTATE mmol/L 2.7* 3.0*  --   --    WBC 10*3/mm3  --  18.33* 20.65* 13.23*   HEMOGLOBIN g/dL  --  15.2 15.2 13.5   HEMATOCRIT %  --  46.3 46.1 41.3   MCV fL  --  88.0 86.8 88.2   MCHC g/dL  --  32.8 33.0 32.7   PLATELETS 10*3/mm3  --  505* 521* 289   INR   --  1.02  --   --      Results from last 7 days   Lab Units 08/11/23  1820   PH, ARTERIAL pH units 7.474*   PO2 ART mm Hg 57.7*   PCO2, ARTERIAL mm Hg 37.4   HCO3 ART mmol/L 27.4*     Results from last 7 days   Lab Units 08/11/23  1436 08/11/23  1240 08/07/23  0735   SODIUM mmol/L 141  138 138   POTASSIUM mmol/L 3.6 3.4* 4.2   CHLORIDE mmol/L 95* 96* 104   CO2 mmol/L 28.7 25.3 23.2   BUN mg/dL 38* 36* 22*   CREATININE mg/dL 1.31* 1.17 0.99   CALCIUM mg/dL 10.3 9.9 9.4   GLUCOSE mg/dL 186* 192* 206*   ALBUMIN g/dL 4.2 4.0 4.4   BILIRUBIN mg/dL 1.0 0.9 0.2   ALK PHOS U/L 96 101 87   AST (SGOT) U/L 41* 33 19   ALT (SGPT) U/L 58* 50* 30   Estimated Creatinine Clearance: 102.1 mL/min (A) (by C-G formula based on SCr of 1.31 mg/dL (H)).  No results found for: AMMONIA  Results from last 7 days   Lab Units 08/11/23  1436 08/11/23  1240   HSTROP T ng/L 16* 16*         Lab Results   Component Value Date    HGBA1C 5.90 (H) 09/23/2019     Lab Results   Component Value Date    TSH 1.780 11/06/2020    FREET4 0.73 (L) 11/06/2020     No results found for: PREGTESTUR, PREGSERUM, HCG, HCGQUANT  Pain Management Panel          Latest Ref Rng & Units 11/6/2020   Pain Management Panel   Amphetamine, Urine Qual Negative Negative    Barbiturates Screen, Urine Negative Negative    Benzodiazepine Screen, Urine Negative Negative    Buprenorphine, Screen, Urine Negative Negative    Cocaine Screen, Urine Negative Negative    Methadone Screen , Urine Negative Negative          ---------------------------------------------------------------------------------------------------------------------  Imaging Results (Last 7 Days)       Procedure Component Value Units Date/Time    CT Abdomen Pelvis With Contrast [[id number]] Collected: 08/11/23 1610     Updated: 08/11/23 1614    Narrative:      EXAM:    CT Abdomen and Pelvis With Intravenous Contrast     EXAM DATE:    8/11/2023 3:15 PM     CLINICAL HISTORY:    Abdominal pain, acute, nonlocalized     TECHNIQUE:    Axial computed tomography images of the abdomen and pelvis with  intravenous contrast.  Sagittal and coronal reformatted images were  created and reviewed.  This CT exam was performed using one or more of  the following dose reduction techniques:  automated exposure  control,  adjustment of the mA and/or kV according to patient size, and/or use of  iterative reconstruction technique.     COMPARISON:    08/07/2023     FINDINGS:    Lung bases:  Bibasilar airspace disease which may represent  atelectasis and/or pneumonia.      ABDOMEN:    Liver:  Diffuse fatty infiltration of liver is noted.    Gallbladder and bile ducts:  Unremarkable.  No calcified stones.  No  ductal dilation.    Pancreas:  Unremarkable.  No mass.  No ductal dilation.    Spleen:  Unremarkable.  No splenomegaly.    Adrenals:  Unremarkable.  No mass.    Kidneys and ureters:  Unremarkable.  No solid mass.  No  hydronephrosis.    Stomach and bowel:  Gastric distention.  Air-fluid levels throughout  prominent mildly dilated loops of small bowel in a generalized pattern  most consistent with ileus.  Mild degree of constipation is noted.  No  mucosal thickening.      PELVIS:    Appendix:  Appendix is within normal limits.    Bladder:  Unremarkable.  No mass.    Reproductive:  Unremarkable as visualized.      ABDOMEN and PELVIS:    Intraperitoneal space:  Unremarkable.  No free air.  No significant  fluid collection.    Bones/joints:  No acute fracture.  No dislocation.    Soft tissues:  Unremarkable.    Vasculature:  Unremarkable.  No abdominal aortic aneurysm.    Lymph nodes:  No retroperitoneal or iliac adenopathy.    Tubes, lines and devices:  Surgical drain is noted in the right upper  quadrant gallbladder fossa with no loculated collections in the surgical  bed.       Impression:      1.  Interval changes of cholecystectomy.  2.  Surgical drain in the right upper quadrant cholecystectomy bed with  no loculated collections identified.  3.  Generalized ileus. No findings to suggest high-grade bowel  obstruction.  4.  Bibasilar airspace disease which may represent atelectasis and/or  pneumonia.  5.  Background changes of constipation.  6.  Other incidental and nonacute findings detailed above which are  stable from  previous.     This report was finalized on 8/11/2023 4:12 PM by Dr. Wilver Aly MD.       CT Head Without Contrast [[id number]] Collected: 08/11/23 1607     Updated: 08/11/23 1610    Narrative:      EXAM:    CT Head Without Intravenous Contrast     EXAM DATE:    8/11/2023 3:16 PM     CLINICAL HISTORY:    Dizziness, persistent/recurrent, cardiac or vascular cause suspected     TECHNIQUE:    Axial computed tomography images of the head/brain without intravenous  contrast.  Sagittal and coronal reformatted images were created and  reviewed.  This CT exam was performed using one or more of the following  dose reduction techniques:  automated exposure control, adjustment of  the mA and/or kV according to patient size, and/or use of iterative  reconstruction technique.     COMPARISON:    No relevant prior studies available.     FINDINGS:    Brain and extra-axial spaces:  Unremarkable.  No hemorrhage.  No  significant white matter disease.  No edema.  No ventriculomegaly.    Bones/joints:  Unremarkable.  No acute fracture.    Soft tissues:  Unremarkable.    Sinuses:  Unremarkable as visualized.  No acute sinusitis.    Mastoid air cells:  Unremarkable as visualized.  No mastoid effusion.       Impression:        Unremarkable exam demonstrating no CT evidence of acute intracranial  findings.     This report was finalized on 8/11/2023 4:08 PM by Dr. Wilver Aly MD.       XR Chest 1 View [[id number]] Collected: 08/11/23 1501     Updated: 08/11/23 1503    Narrative:      EXAM:    XR Chest, 1 View     EXAM DATE:    8/11/2023 2:18 PM     CLINICAL HISTORY:    Severe Sepsis Protocol     TECHNIQUE:    Frontal view of the chest.     COMPARISON:    No relevant prior studies available.     FINDINGS:    Lungs and pleural spaces:  Linear opacity left mid lung may represent  atelectasis or focal pneumonia.  No pneumothorax.    Heart:  Unremarkable.  No cardiomegaly.    Mediastinum:  Unremarkable.    Bones/joints:  Unremarkable.        Impression:        Left mid and lower lung opacities which may represent atelectasis  and/or pneumonia.     This report was finalized on 8/11/2023 3:01 PM by Dr. Wilver Aly MD.               Last echocardiogram: No previous echo on file.    I have personally reviewed the above radiology images and read the final radiology report on 08/11/23  ---------------------------------------------------------------------------------------------------------------------  Assessment / Plan     Active Hospital Problems    Diagnosis  POA    **CAP (community acquired pneumonia) [J18.9]  Yes       ASSESSMENT/PLAN:  -Severe sepsis due to left-sided pneumonia, present on admission  -Acute hypoxemic respiratory failure, present on admission, due to above  -Recent laparoscopic cholecystectomy (8/8/2023) by Dr. Sanders  -Chest x-ray obtained on arrival noted left mid and lower lung opacities which may represent atelectasis and/or pneumonia.  -Met sepsis criteria with heart rate greater than 90, WBCs greater than 12, and lactate greater than 2.  -Received sepsis fluid bolus in the ED and lactate has improved.  -Continue to trend lactate until normalized.  -Peripheral blood cultures x2 collected, pending  -Patient has been afebrile throughout ED course; closely monitor temperature curve.  -Received IV Zosyn in the ED. Placed on vancomycin, Zosyn, and doxycycline on admission per attending provider.  -Obtain MRSA PCR.  If negative, plan to discontinue vancomycin.  -Obtain sputum culture.  -De-escalate antibiotic regimen pending further work-up and finalization of culture data.  -Scheduled IV Solu-Medrol, DuoNebs.  -Encourage incentive spirometer use and turn, cough, and deep breathing exercises.  -Continuous cardiac monitoring and pulse oximetry.  -Titrate supplemental O2 to maintain SPO2 saturations greater than 90%.  Patient currently on 2 L nasal cannula.  Baseline is room air.  -CT of the abdomen and pelvis with contrast noted  surgical drain in the right upper quadrant cholecystectomy bed with no loculated collections identified.  Continue to measure drain output.  -Imaging also noted background changes of constipation and generalized ileus.  No findings to suggest high-grade bowel obstruction.  -Initiate bowel regimen.  -General surgery consult placed for further assistance.  Greatly appreciate their input.    -Mild acute renal insufficiency, present on admission  -Baseline creatinine 0.9-1.1 with creatinine on admission of 1.3.  -Received fluid bolus in the ED.   -Monitor urine output and renal function closely.  -Avoid nephrotoxins as able.  -Repeat chemistry panel in AM.     -Brayan-Parkinson-White syndrome and SVT status post RF ablation  -History of atrial flutter status post ablation (2019)  -EKG obtained in the ED noted sinus tachycardia 110s without evidence of acute ischemia.  -Appearing sinus tachycardia 120s during my exam.  -Continuous cardiac monitoring ordered.  -Monitor and replace electrolytes per protocol.  -[id number] 1 (hypertension). Per current med list, appears that patient is not on chronic oral anticoagulant.     -Essential hypertension  -BP appears stable at this time.  -Plan to resume home antihypertensive regimen once reconciled per pharmacy with appropriate holding parameters to prevent hypotension and/or bradycardia.   -Closely monitor BP per hospital protocol, titrate medications as necessary.    -Anxiety and depression  -Supportive care.  -Review home medications once reconciled with plans to continue any antidepressant or anxiolytic agents.    -GERD  -Reflux precautions.    -TAMIKO compliant with CPAP  -May utilize home CPAP while hospitalized.  -Continuous pulse oximetry ordered, as noted above.        ----------  -DVT prophylaxis: Lovenox.  -Activity: As tolerated. Fall precautions.   -Expected length of stay:   INPATIENT status due to the need for care which can only be reasonably provided in an hospital  setting such as aggressive/expedited ancillary services and/or consultation services, the necessity for IV medications, close physician monitoring and/or the possible need for procedures.  In such, I feel patient's risk for adverse outcomes and need for care warrant INPATIENT evaluation and predict the patient's care encounter to likely last beyond 2 midnights.   -Disposition plans to return home on discharge once medically stable.    High risk secondary to severe sepsis 2/2 left-sided pneumonia status post recent laparoscopic cholecystectomy.       CODE STATUS: FULL CODE      Caroline Waller APRJACK   08/11/23  18:28 EDT  Pager #[phone]  ---------------------------------------------------------------------------------------------------------------------        Electronically signed by Jose Guadalupe Garvin DO at 08/11/23 1915          Emergency Department Notes        Garrett Royal RN at 08/11/23 1856          Report given to NICOLA Murillo on 3N. Pt is clean and dry. IV's are clean, dry and intact. ER tech to transport pt to the floor.     Electronically signed by Garrett Royal RN at 08/11/23 1857       Garrett Royal RN at 08/11/23 1812          Called respiratory for ABG per Dr. Garvin    Electronically signed by Garrett Royal RN at 08/11/23 1812       Garrett Royal RN at 08/11/23 1720          ER techs changed the pt after he had a bowel movement on himself. During the rolling and turning the be pt has become short of breath. Pt to be placed on 2L of O2 to maintain sat and for comfort.     Electronically signed by Garrett Royal RN at 08/11/23 6230       Izaiah Roberts DO at 08/11/23 8447          Subjective   History of Present Illness  52-year-old male with a past medical history of anxiety, depression, and sleep apnea with Brayan-Parkinson-White syndrome presents to the ER due to concerns for increasing shortness of breath and diffuse abdominal pain.  Patient received a  laparoscopic cholecystectomy within the last week.  Patient has a pericholecystic drain in place.  Serosanguineous fluid noted.  Patient noted worsening abdominal pain over the last 1 to 2 days.  Increasing shortness of breath.  No obvious fever.  No chest pain.  No obvious alleviating factors.  Vital signs stable    Review of Systems   Respiratory:  Positive for shortness of breath.    Gastrointestinal:  Positive for abdominal pain.   All other systems reviewed and are negative.    Past Medical History:   Diagnosis Date    Acid reflux     Allergic     Anxiety     Arthritis     Depression     GERD (gastroesophageal reflux disease)     Hypertension     Infectious mononucleosis     TAMIKO (obstructive sleep apnea)     NO OXYGEN USE    PONV (postoperative nausea and vomiting)     Seasonal allergies     Sinusitis     Sleep apnea     c pap    Urinary tract infection     WPW (Brayan-Parkinson-White syndrome)        Allergies   Allergen Reactions    Aleve [Naproxen] Hives       Past Surgical History:   Procedure Laterality Date    ABLATION OF DYSRHYTHMIC FOCUS  1999 and 2000    Dr. Carlin    CARDIAC CATHETERIZATION      CARDIAC ELECTROPHYSIOLOGY PROCEDURE N/A 06/04/2019    Procedure: Flutter;  Surgeon: Juan Carlin MD;  Location: [facility] INVASIVE LOCATION;  Service: Cardiology    CARDIAC SURGERY      COLONOSCOPY      WISDOM TOOTH EXTRACTION         Family History   Problem Relation Age of Onset    Hypertension Mother     Hypertension Father     Hyperlipidemia Father     Diabetes Father     Heart disease Father     Diabetes Brother     Diabetes Maternal Grandfather     Heart attack Maternal Grandfather     Diabetes Paternal Grandfather     Diabetes Maternal Grandmother     Cancer Maternal Grandmother        Social History     Socioeconomic History    Marital status:    Tobacco Use    Smoking status: Never    Smokeless tobacco: Never   Vaping Use    Vaping Use: Never used   Substance and Sexual Activity     Alcohol use: Yes     Comment: 4-5 BEERS, TWICE A WEEK    Drug use: No    Sexual activity: Defer           Objective   Physical Exam  Constitutional:       General: He is not in acute distress.     Appearance: He is well-developed. He is not ill-appearing.   HENT:      Head: Normocephalic and atraumatic.   Eyes:      Extraocular Movements: Extraocular movements intact.      Pupils: Pupils are equal, round, and reactive to light.   Neck:      Vascular: No JVD.   Cardiovascular:      Rate and Rhythm: Normal rate and regular rhythm.      Heart sounds: Normal heart sounds. No murmur heard.  Pulmonary:      Effort: No tachypnea, accessory muscle usage or respiratory distress.      Breath sounds: Normal breath sounds. No stridor. No decreased breath sounds, wheezing, rhonchi or rales.   Chest:      Chest wall: No deformity, tenderness or crepitus.   Abdominal:      General: Abdomen is protuberant. Bowel sounds are normal.      Palpations: Abdomen is soft.      Tenderness: There is generalized no abdominal tenderness. There is no guarding or rebound.      Comments: Cholecystic drain in place.  Serosanguineous fluid noted.  No exudative drainage.   Musculoskeletal:         General: Normal range of motion.      Cervical back: Normal range of motion and neck supple.      Right lower leg: No tenderness. No edema.      Left lower leg: No tenderness. No edema.   Lymphadenopathy:      Cervical: No cervical adenopathy.   Skin:     General: Skin is warm and dry.      Coloration: Skin is not cyanotic.      Findings: No ecchymosis or erythema.   Neurological:      General: No focal deficit present.      Mental Status: He is alert and oriented to person, place, and time.      Cranial Nerves: No cranial nerve deficit.      Motor: No weakness.   Psychiatric:         Mood and Affect: Mood normal. Mood is not anxious.         Behavior: Behavior normal. Behavior is not agitated.       Procedures          ED Course  ED Course as of 08/11/23  1800   Fri Aug 11, 2023   1345 EKG done sinus tachycardia.  134 bpm.  No acute ST elevation.  QTc 448.  Electronically signed by Izaiah Roberts DO, 08/11/23, 1:45 PM EDT.    [SF]      ED Course User Index  [SF] Izaiah Roberts DO      CT Head Without Contrast    Result Date: 8/11/2023    Unremarkable exam demonstrating no CT evidence of acute intracranial findings.  This report was finalized on 8/11/2023 4:08 PM by Dr. Wilver Aly MD.      CT Abdomen Pelvis With Contrast    Result Date: 8/11/2023  1.  Interval changes of cholecystectomy. 2.  Surgical drain in the right upper quadrant cholecystectomy bed with no loculated collections identified. 3.  Generalized ileus. No findings to suggest high-grade bowel obstruction. 4.  Bibasilar airspace disease which may represent atelectasis and/or pneumonia. 5.  Background changes of constipation. 6.  Other incidental and nonacute findings detailed above which are stable from previous.  This report was finalized on 8/11/2023 4:12 PM by Dr. Wilver Aly MD.      XR Chest 1 View    Result Date: 8/11/2023    Left mid and lower lung opacities which may represent atelectasis and/or pneumonia.  This report was finalized on 8/11/2023 3:01 PM by Dr. Wilver Aly MD.       Results for orders placed or performed during the hospital encounter of 08/11/23   Comprehensive Metabolic Panel    Specimen: Arm, Right; Blood   Result Value Ref Range    Glucose 186 (H) 65 - 99 mg/dL    BUN 38 (H) 6 - 20 mg/dL    Creatinine 1.31 (H) 0.76 - 1.27 mg/dL    Sodium 141 136 - 145 mmol/L    Potassium 3.6 3.5 - 5.2 mmol/L    Chloride 95 (L) 98 - 107 mmol/L    CO2 28.7 22.0 - 29.0 mmol/L    Calcium 10.3 8.6 - 10.5 mg/dL    Total Protein 8.3 6.0 - 8.5 g/dL    Albumin 4.2 3.5 - 5.2 g/dL    ALT (SGPT) 58 (H) 1 - 41 U/L    AST (SGOT) 41 (H) 1 - 40 U/L    Alkaline Phosphatase 96 39 - 117 U/L    Total Bilirubin 1.0 0.0 - 1.2 mg/dL    Globulin 4.1 gm/dL    A/G Ratio 1.0 g/dL    BUN/Creatinine Ratio 29.0  (H) 7.0 - 25.0    Anion Gap 17.3 (H) 5.0 - 15.0 mmol/L    eGFR 64.7 >60.0 mL/min/1.73   Lactic Acid, Plasma    Specimen: Arm, Right; Blood   Result Value Ref Range    Lactate 3.0 (C) 0.5 - 2.0 mmol/L   CBC Auto Differential    Specimen: Arm, Right; Blood   Result Value Ref Range    WBC 18.33 (H) 3.40 - 10.80 10*3/mm3    RBC 5.26 4.14 - 5.80 10*6/mm3    Hemoglobin 15.2 13.0 - 17.7 g/dL    Hematocrit 46.3 37.5 - 51.0 %    MCV 88.0 79.0 - 97.0 fL    MCH 28.9 26.6 - 33.0 pg    MCHC 32.8 31.5 - 35.7 g/dL    RDW 13.6 12.3 - 15.4 %    RDW-SD 43.9 37.0 - 54.0 fl    MPV 9.3 6.0 - 12.0 fL    Platelets 505 (H) 140 - 450 10*3/mm3    Neutrophil % 80.0 (H) 42.7 - 76.0 %    Lymphocyte % 10.9 (L) 19.6 - 45.3 %    Monocyte % 7.9 5.0 - 12.0 %    Eosinophil % 0.4 0.3 - 6.2 %    Basophil % 0.4 0.0 - 1.5 %    Immature Grans % 0.4 0.0 - 0.5 %    Neutrophils, Absolute 14.66 (H) 1.70 - 7.00 10*3/mm3    Lymphocytes, Absolute 2.00 0.70 - 3.10 10*3/mm3    Monocytes, Absolute 1.45 (H) 0.10 - 0.90 10*3/mm3    Eosinophils, Absolute 0.07 0.00 - 0.40 10*3/mm3    Basophils, Absolute 0.07 0.00 - 0.20 10*3/mm3    Immature Grans, Absolute 0.08 (H) 0.00 - 0.05 10*3/mm3    nRBC 0.0 0.0 - 0.2 /100 WBC   Protime-INR    Specimen: Arm, Right; Blood   Result Value Ref Range    Protime 13.9 12.1 - 14.7 Seconds    INR 1.02 0.90 - 1.10   aPTT    Specimen: Arm, Right; Blood   Result Value Ref Range    PTT 26.7 26.5 - 34.5 seconds   Procalcitonin    Specimen: Arm, Right; Blood   Result Value Ref Range    Procalcitonin 0.20 0.00 - 0.25 ng/mL   High Sensitivity Troponin T    Specimen: Blood   Result Value Ref Range    HS Troponin T 16 (H) <15 ng/L   High Sensitivity Troponin T 2Hr    Specimen: Arm, Right; Blood   Result Value Ref Range    HS Troponin T 16 (H) <15 ng/L    Troponin T Delta 0 >=-4 - <+4 ng/L   ECG 12 Lead Other; Sepsis   Result Value Ref Range    QT Interval 300 ms    QTC Interval 448 ms   ECG 12 Lead Tachycardia   Result Value Ref Range    QT  Interval 340 ms    QTC Interval 468 ms   Green Top (Gel)   Result Value Ref Range    Extra Tube Hold for add-ons.    Lavender Top   Result Value Ref Range    Extra Tube hold for add-on    Gold Top - SST   Result Value Ref Range    Extra Tube Hold for add-ons.    Light Blue Top   Result Value Ref Range    Extra Tube Hold for add-ons.                                           Medical Decision Making  CBC notes leukocytosis.  Elevated heart rate.  Sepsis screening positive.  IV fluids given.  Antibiotics given.  Chest x-ray notes concerns for pneumonia.  CT imaging of the abdomen pelvis no concerns for bilateral lower lobe pneumonia.  No acute intra-abdominal abnormalities.  Postoperative changes noted.  Recommend admission for further work up and treatment.  Hospitalist team consulted and made aware of the patient.  Consults and orders placed per hospitalist request.  Patient was agreeable to admission plan.  Vitals stable on admission.    Problems Addressed:  Abdominal pain, unspecified abdominal location: complicated acute illness or injury  Pneumonia due to infectious organism, unspecified laterality, unspecified part of lung: complicated acute illness or injury  Sepsis, due to unspecified organism, unspecified whether acute organ dysfunction present: complicated acute illness or injury    Amount and/or Complexity of Data Reviewed  Labs: ordered. Decision-making details documented in ED Course.  Radiology: ordered. Decision-making details documented in ED Course.  ECG/medicine tests: ordered.    Risk  Prescription drug management.        Final diagnoses:   Sepsis, due to unspecified organism, unspecified whether acute organ dysfunction present   Pneumonia due to infectious organism, unspecified laterality, unspecified part of lung   Abdominal pain, unspecified abdominal location       ED Disposition  ED Disposition       ED Disposition   Intended Admit    Condition   --    Comment   --               No follow-up  provider specified.       Medication List        New Prescriptions      ciprofloxacin 500 MG tablet  Commonly known as: Cipro  Take 1 tablet by mouth 2 (Two) Times a Day.     metroNIDAZOLE 500 MG tablet  Commonly known as: FLAGYL  Take 1 tablet by mouth 2 (Two) Times a Day for 10 days.            ASK your doctor about these medications      acetaminophen 500 MG tablet  Commonly known as: TYLENOL  Take 2 tablets by mouth Every 6 (Six) Hours As Needed for Moderate Pain for up to 3 days.  Ask about: Should I take this medication?     ondansetron 4 MG tablet  Commonly known as: Zofran  Take 1 tablet by mouth Every 8 (Eight) Hours As Needed for Nausea or Vomiting for up to 3 days.  Ask about: Should I take this medication?               Where to Get Your Medications        These medications were sent to [facility] [place] - [address] [facility] [address] - [phone] [facility] [phone]   [address] [facility] [address] [facility] [address]      Phone: [phone]   ciprofloxacin 500 MG tablet  metroNIDAZOLE 500 MG tablet            Izaiah Roberts DO  08/11/23 1800      Electronically signed by Izaiah Roberts DO at 08/11/23 1800       Sterling Desai, PCT at 08/11/23 1501          ECG completed 1457; handed to Dr. Roberts    Electronically signed by Sterling Desai, PCT at 08/11/23 1501       Garrett Royal, RN at 08/11/23 1430          Pt states he has had very little urine output since his procedure.     Electronically signed by Garrett Royal RN at 08/11/23 1742       Garrett Royal RN at 08/11/23 1420          Pt has his gallbladder removed on Tuesday 8/8/23. Pt has a HANS drain in the LUQ placed by Dr. Sanders. Pt states he doesn't have any more abdominal pain, but has been nauseous and feeling generally ill since his procedure. Pt is tachycardic, pale, and diaphoretic. Family is at bedside and pt is A&O x4. WCTM     Electronically signed by Garrett Royal RN at 08/11/23 1631       Jonathon Rosario at 08/11/23 1343           EKG completed in triage at 1324 given to dr pizarro at 1325    Electronically signed by Jonathon Rosario at 23 1343       Fabricio Bocanegra PA-C at 23 1336                   MEDICAL SCREENING:    Reason for Visit: Weakness, post op pain. Recent cholecystectomy.     Patient initially seen in triage.  The patient was advised further evaluation and diagnostic testing will be needed, some of the treatment and testing will be initiated in the lobby in order to begin the process.  The patient will be returned to the waiting area for the time being and possibly be re-assessed by a subsequent ED provider.  The patient will be brought back to the treatment area in as timely manner as possible.      Fabricio Bocanegra PA-C  23 1337      Electronically signed by Fabricio Bocanegra PA-C at 23 1337          Physician Progress Notes (last 24 hours)        Jose Guadalupe Garvin DO at 23 1106              [facility]     Patient Identification:  Name:  Jimmy Dawson Jr.  Age:  52 y.o.  Sex:  male  :  1970  MRN:  [id number]  Visit Number:  [id number]  ROOM: [address] [place] [address]     Primary Care Provider:  Nena Pike APRN    Length of stay in inpatient status:  1    Subjective     Chief Compliant:    Chief Complaint   Patient presents with    Post-op Problem    Weakness - Generalized       History of Presenting Illness:    Patient seen in follow-up for community-acquired pneumonia.  This morning he is on room air and says he feels significantly better.  He denies feeling short of breath, productive sputum or any type of discomfort.  Says he rested well last night no longer nauseous or in pain.  Has no acute complaints this morning.  No adverse events noted overnight.    Objective     Current Hospital Meds:ascorbic acid, 500 mg, Oral, Daily  busPIRone, 7.5 mg, Oral, BID  doxycycline, 100 mg, Oral, Q12H  enoxaparin, 40 mg, Subcutaneous,  Q12H  guaiFENesin, 1,200 mg, Oral, Q12H  ipratropium-albuterol, 3 mL, Nebulization, 4x Daily - RT  melatonin, 5 mg, Oral, Nightly  methylPREDNISolone sodium succinate, 40 mg, Intravenous, Q24H  pantoprazole, 40 mg, Oral, Q AM  piperacillin-tazobactam, 4.5 g, Intravenous, Q8H  psyllium, 1 packet, Oral, Daily  sodium chloride, 10 mL, Intravenous, Q12H  vancomycin, 1,000 mg, Intravenous, Q12H  venlafaxine XR, 150 mg, Oral, Daily    lactated ringers, 50 mL/hr, Last Rate: 50 mL/hr (08/12/23 1059)  Pharmacy to dose vancomycin,         Current Antimicrobial Therapy:  Anti-Infectives (From admission, onward)      Ordered     Dose/Rate Route Frequency Start Stop    08/11/23 2116  vancomycin (VANCOCIN) 1,000 mg in sodium chloride 0.9 % 250 mL IVPB-VTB        Ordering Provider: Jose Guadalupe Garvin DO    1,000 mg  250 mL/hr over 60 Minutes Intravenous Every 12 Hours 08/12/23 1000 08/19/23 0959    08/11/23 2116  vancomycin 2500 mg/500 mL 0.9% NS IVPB ([facility])        Ordering Provider: Jose Guadalupe Garvin DO    2,500 mg  over 150 Minutes Intravenous Once 08/11/23 2215 08/12/23 0027    08/11/23 1950  piperacillin-tazobactam (ZOSYN) IVPB 4.5 g in 100 mL NS VTB        Ordering Provider: Jose Guadalupe Garvin DO    4.5 g  over 30 Minutes Intravenous Every 8 Hours 08/11/23 2100 08/18/23 2059 08/11/23 1950  doxycycline (MONODOX) capsule 100 mg        Ordering Provider: Jose Guadalupe Garvin DO    100 mg Oral Every 12 Hours Scheduled 08/11/23 2100 08/16/23 2059 08/11/23 1950  Pharmacy to dose vancomycin        Ordering Provider: Jose Guadalupe Garvin DO     Does not apply Continuous PRN 08/11/23 1950 08/18/23 1949    08/11/23 1407  piperacillin-tazobactam (ZOSYN) IVPB 3.375 g in 100 mL NS VTB        Ordering Provider: Izaiah Roberts DO    3.375 g  over 30 Minutes Intravenous Once 08/11/23 1423 08/11/23 1516          Current Diuretic Therapy:  Diuretics (From admission, onward)      None           ----------------------------------------------------------------------------------------------------------------------  Vital Signs:  Temp:  [96.6 øF (35.9 øC)-98.7 øF (37.1 øC)] 96.6 øF (35.9 øC)  Heart Rate:  [] 106  Resp:  [16-20] 20  BP: ()/() 90/57  SpO2:  [91 %-96 %] 94 %  on  Flow (L/min):  [2] 2;   Device (Oxygen Therapy): room air  Body mass index is 40.25 kg/mý.    Wt Readings from Last 3 Encounters:   08/11/23 (!) 150 kg (330 lb 11 oz)   08/08/23 (!) 150 kg (330 lb)   08/07/23 (!) 150 kg (330 lb)     Intake & Output (last 3 days)         08/09 0701  08/10 0700 08/10 0701  08/11 0700 08/11 0701  08/12 0700 08/12 0701  08/13 0700    P.O.   480     IV Piggyback   2704     Total Intake(mL/kg)   3184 (21.2)     Urine (mL/kg/hr)   0     Drains   20     Stool   0     Total Output   20     Net   +3164             Urine Unmeasured Occurrence   2 x     Stool Unmeasured Occurrence   1 x 1 x          Diet: Diabetic Diets; Consistent Carbohydrate; Texture: Regular Texture (IDDSI 7); Fluid Consistency: Thin (IDDSI 0)  ----------------------------------------------------------------------------------------------------------------------  Physical exam:  Constitutional: Middle-age male, nontoxic, speaking clearly in full sentences, Well-developed and well-nourished, resting comfortably in bed, no acute distress.      HENT:  Head:  Normocephalic and atraumatic.  Mouth:  Moist mucous membranes.    Eyes:  Conjunctivae and EOM are normal. No scleral icterus.   Cardiovascular: Tachycardic, regular rhythm and normal heart sounds with no murmur. No JVD.   Pulmonary/Chest:  No respiratory distress, no wheezes, no crackles, with normal breath sounds and good air movement. Unlabored. No accessory muscle use.  Abdominal:  Soft. No distension and no tenderness.  Bowel sounds present. No rebound or guarding.  HANS drain with serosanguineous output in RUQ  Musculoskeletal:  No tenderness, and no deformity.  No red  or swollen joints anywhere.    Neurological:  Alert and oriented to person, place, and time.  No cranial nerve deficit.   Nonfocal.   Skin:  Skin is warm and dry. No rash noted. No pallor.   Peripheral vascular:  No clubbing, no cyanosis, no edema. Pedal and tibial pulses 2 out of 4 bilaterally.   ----------------------------------------------------------------------------------------------------------------------  Results from last 7 days   Lab Units 08/11/23  2320 08/11/23 2016 08/11/23  1748 08/11/23  1436 08/11/23  1436 08/11/23  1240 08/07/23  0735   CRP mg/dL  --  10.37*  --   --   --   --  <0.30   LACTATE mmol/L 1.4 2.3* 2.7*   < > 3.0*  --   --    WBC 10*3/mm3  --   --   --   --  18.33* 20.65* 13.23*   HEMOGLOBIN g/dL  --   --   --   --  15.2 15.2 13.5   HEMATOCRIT %  --   --   --   --  46.3 46.1 41.3   MCV fL  --   --   --   --  88.0 86.8 88.2   MCHC g/dL  --   --   --   --  32.8 33.0 32.7   PLATELETS 10*3/mm3  --   --   --   --  505* 521* 289   INR   --   --   --   --  1.02  --   --     < > = values in this interval not displayed.     Results from last 7 days   Lab Units 08/11/23  1820   PH, ARTERIAL pH units 7.474*   PO2 ART mm Hg 57.7*   PCO2, ARTERIAL mm Hg 37.4   HCO3 ART mmol/L 27.4*     Results from last 7 days   Lab Units 08/12/23  0454 08/11/23  1436 08/11/23  1240 08/07/23  0735   SODIUM mmol/L  --  141 138 138   POTASSIUM mmol/L 3.7 3.6 3.4* 4.2   CHLORIDE mmol/L  --  95* 96* 104   CO2 mmol/L  --  28.7 25.3 23.2   BUN mg/dL  --  38* 36* 22*   CREATININE mg/dL  --  1.31* 1.17 0.99   CALCIUM mg/dL  --  10.3 9.9 9.4   GLUCOSE mg/dL  --  186* 192* 206*   ALBUMIN g/dL  --  4.2 4.0 4.4   BILIRUBIN mg/dL  --  1.0 0.9 0.2   ALK PHOS U/L  --  96 101 87   AST (SGOT) U/L  --  41* 33 19   ALT (SGPT) U/L  --  58* 50* 30   Estimated Creatinine Clearance: 102.1 mL/min (A) (by C-G formula based on SCr of 1.31 mg/dL (H)).  No results found for: AMMONIA  Results from last 7 days   Lab Units 08/11/23  1436  08/11/23  1240   HSTROP T ng/L 16* 16*     Results from last 7 days   Lab Units 08/12/23  0454   PROBNP pg/mL <36.0         No results found for: HGBA1C, POCGLU  Lab Results   Component Value Date    TSH 1.780 11/06/2020    FREET4 0.73 (L) 11/06/2020     No results found for: PREGTESTUR, PREGSERUM, HCG, HCGQUANT  Pain Management Panel          Latest Ref Rng & Units 11/6/2020   Pain Management Panel   Amphetamine, Urine Qual Negative Negative    Barbiturates Screen, Urine Negative Negative    Benzodiazepine Screen, Urine Negative Negative    Buprenorphine, Screen, Urine Negative Negative    Cocaine Screen, Urine Negative Negative    Methadone Screen , Urine Negative Negative      Brief Urine Lab Results  (Last result in the past 365 days)        Color   Clarity   Blood   Leuk Est   Nitrite   Protein   CREAT   Urine HCG        08/11/23 1804 Dark Yellow   Clear   Negative   Trace   Negative   30 mg/dL (1+)                 No results found for: BLOODCX  No results found for: URINECX  No results found for: WOUNDCX  No results found for: STOOLCX  No results found for: RESPCX  No results found for: AFBCX  Results from last 7 days   Lab Units 08/11/23  2320 08/11/23  2016 08/11/23  1748 08/11/23  1436 08/07/23  0735   PROCALCITONIN ng/mL  --   --   --  0.20  --    LACTATE mmol/L 1.4 2.3* 2.7* 3.0*  --    CRP mg/dL  --  10.37*  --   --  <0.30       I have personally looked at the labs and they are summarized above.  ----------------------------------------------------------------------------------------------------------------------  Detailed radiology reports for the last 24 hours:  Imaging Results (Last 24 Hours)       Procedure Component Value Units Date/Time    CT Abdomen Pelvis With Contrast [[id number]] Collected: 08/11/23 1610     Updated: 08/11/23 1614    Narrative:      EXAM:    CT Abdomen and Pelvis With Intravenous Contrast     EXAM DATE:    8/11/2023 3:15 PM     CLINICAL HISTORY:    Abdominal pain, acute,  nonlocalized     TECHNIQUE:    Axial computed tomography images of the abdomen and pelvis with  intravenous contrast.  Sagittal and coronal reformatted images were  created and reviewed.  This CT exam was performed using one or more of  the following dose reduction techniques:  automated exposure control,  adjustment of the mA and/or kV according to patient size, and/or use of  iterative reconstruction technique.     COMPARISON:    08/07/2023     FINDINGS:    Lung bases:  Bibasilar airspace disease which may represent  atelectasis and/or pneumonia.      ABDOMEN:    Liver:  Diffuse fatty infiltration of liver is noted.    Gallbladder and bile ducts:  Unremarkable.  No calcified stones.  No  ductal dilation.    Pancreas:  Unremarkable.  No mass.  No ductal dilation.    Spleen:  Unremarkable.  No splenomegaly.    Adrenals:  Unremarkable.  No mass.    Kidneys and ureters:  Unremarkable.  No solid mass.  No  hydronephrosis.    Stomach and bowel:  Gastric distention.  Air-fluid levels throughout  prominent mildly dilated loops of small bowel in a generalized pattern  most consistent with ileus.  Mild degree of constipation is noted.  No  mucosal thickening.      PELVIS:    Appendix:  Appendix is within normal limits.    Bladder:  Unremarkable.  No mass.    Reproductive:  Unremarkable as visualized.      ABDOMEN and PELVIS:    Intraperitoneal space:  Unremarkable.  No free air.  No significant  fluid collection.    Bones/joints:  No acute fracture.  No dislocation.    Soft tissues:  Unremarkable.    Vasculature:  Unremarkable.  No abdominal aortic aneurysm.    Lymph nodes:  No retroperitoneal or iliac adenopathy.    Tubes, lines and devices:  Surgical drain is noted in the right upper  quadrant gallbladder fossa with no loculated collections in the surgical  bed.       Impression:      1.  Interval changes of cholecystectomy.  2.  Surgical drain in the right upper quadrant cholecystectomy bed with  no loculated collections  identified.  3.  Generalized ileus. No findings to suggest high-grade bowel  obstruction.  4.  Bibasilar airspace disease which may represent atelectasis and/or  pneumonia.  5.  Background changes of constipation.  6.  Other incidental and nonacute findings detailed above which are  stable from previous.     This report was finalized on 8/11/2023 4:12 PM by Dr. Wilver Aly MD.       CT Head Without Contrast [[id number]] Collected: 08/11/23 1607     Updated: 08/11/23 1610    Narrative:      EXAM:    CT Head Without Intravenous Contrast     EXAM DATE:    8/11/2023 3:16 PM     CLINICAL HISTORY:    Dizziness, persistent/recurrent, cardiac or vascular cause suspected     TECHNIQUE:    Axial computed tomography images of the head/brain without intravenous  contrast.  Sagittal and coronal reformatted images were created and  reviewed.  This CT exam was performed using one or more of the following  dose reduction techniques:  automated exposure control, adjustment of  the mA and/or kV according to patient size, and/or use of iterative  reconstruction technique.     COMPARISON:    No relevant prior studies available.     FINDINGS:    Brain and extra-axial spaces:  Unremarkable.  No hemorrhage.  No  significant white matter disease.  No edema.  No ventriculomegaly.    Bones/joints:  Unremarkable.  No acute fracture.    Soft tissues:  Unremarkable.    Sinuses:  Unremarkable as visualized.  No acute sinusitis.    Mastoid air cells:  Unremarkable as visualized.  No mastoid effusion.       Impression:        Unremarkable exam demonstrating no CT evidence of acute intracranial  findings.     This report was finalized on 8/11/2023 4:08 PM by Dr. Wilver Aly MD.       XR Chest 1 View [[id number]] Collected: 08/11/23 1501     Updated: 08/11/23 1503    Narrative:      EXAM:    XR Chest, 1 View     EXAM DATE:    8/11/2023 2:18 PM     CLINICAL HISTORY:    Severe Sepsis Protocol     TECHNIQUE:    Frontal view of the chest.      COMPARISON:    No relevant prior studies available.     FINDINGS:    Lungs and pleural spaces:  Linear opacity left mid lung may represent  atelectasis or focal pneumonia.  No pneumothorax.    Heart:  Unremarkable.  No cardiomegaly.    Mediastinum:  Unremarkable.    Bones/joints:  Unremarkable.       Impression:        Left mid and lower lung opacities which may represent atelectasis  and/or pneumonia.     This report was finalized on 8/11/2023 3:01 PM by Dr. Wilver Aly MD.             Assessment & Plan      Patient is a 52-year-old male with history significant for WPW status post ablation, TAMIKO on CPAP and recent laparoscopic cholecystectomy with HANS drain who presented to the ER with complaints of shortness of breath, fatigue and cough.    #Sepsis due to community-acquired pneumonia with MDR risk factors  #Acute hypoxemic respiratory failure  --Patient presented to the ER with shortness of breath, fatigue and cough.  He does not have a history of COPD, does not wear home O2.  ABG noted acute hypoxemia with PaO2 less than 60, placed on 2 L  --Admit labs WBC 18 K with left shift, anion gap 17, BUN/creatinine 38/1.3, CRP 10  --Chest x-ray noted left mid and lower lobe opacities consistent with pneumonia  --Blood cultures NGTD, sputum culture pending, MRSA nasal swab negative  --Incentive spirometer, DuoNebs, Symbicort  --Given hypoxemia and tachycardia with recent surgery, will order CT angio to take pulmonary embolism off the [id number] would be unhelpful in this situation given recent postsurgical status  --Continue Solu-Medrol, will switch to daily, mucolytic's  --Continue vancomycin, Zosyn and doxycycline through today, if cultures remain negative, will plan to de-escalate tomorrow morning  --Continue to monitor on MedSurg, if he continues to improve, likely discharge in the a.m.    #Status post laparoscopic cholecystectomy  --Patient underwent laparoscopic cholecystectomy on 8/8 with general surgeryHANS  drain with serosanguineous output  --Labs otherwise unremarkable  --CT abdomen/pelvis noted surgical drain in the right upper quadrant, no loculated collections  --Pain control, supportive care  --gen surg courtesy consult    #WPW status post radiofrequency ablation  #History of atrial flutter status post ablation  --EKG noted sinus tachycardia, no acute ischemic changes  --Keep outpatient follow-up    #Asymptomatic bacteriuria  #Elevated troponin due to the above, flat trend  #Lactic acidosis, clinically significant, resolved  #Post cholecystectomy diarrhea, stool studies and C. difficile negative, supportive care  #Essential hypertension, hold BP meds  #Generalized anxiety/depression, reconcile home meds  #TAMIKO, compliant with CPAP  #GERD, PPI    CHECKLIST:  Abx: Vancomycin, Zosyn, Doxy  Steroids: Solu-Medrol  VTE: Lovenox  GI ppx: Protonix  Diet: Regular  Code: CPR, full  Dispo: Patient is high risk due to community-acquired pneumonia with MDR risk factors given recent surgical intervention.  Anticipate 24 to 48-hour stay pending clinical course.  Anticipate discharge home when medically stable.    Jose Guadalupe Garvin DO  [facility]  08/12/23  11:06 EDT      Electronically signed by Jose Guadalupe Garvin DO at 08/12/23 1117          Consult Notes (last 24 hours)        Anoop Red MD at 08/12/23 1121        Consult Orders    1. Inpatient General Surgery Consult [[id number]] ordered by Jose Guadalupe Garvin DO at 08/11/23 1809                 Patient Name:  Jimmy Dawson JrSoo  YOB: 1970  [id number]       Patient Care Team:  Nena Pike APRN as PCP - General (Family Medicine)      General Surgery Consult Note     Date of Consultation: 08/12/23    Consulting Physician - Jose Guadalupe Garvin*    Reason for Consult -recent lap uriel    Subjective     I have been asked to see  Jimmy Dawson  , a 52 y.o. morbidly obese male, who underwent recent  laparoscopic cholecystectomy for acute cholecystitis on 8/8 with Dr Sanders, who is admitted with several days of nausea, p.o. intolerance, dehydration and possible pneumonia.  The patient states he woke up the day after surgery with nausea, no vomiting.  He was unable to tolerate p.o. intake.  He states his HANS drain initially had a large amount of serosanguineous output but this is tapered off significantly after postop day 2.  His nausea was uncontrolled on Zofran, Dr. Sanders had sent in some Phenergan which did not help.  Outpatient labs were ordered but the patient reports presyncope/dizziness, severe nausea and feeling ill.  He was reportedly hypoxic.  He reports intermittent, mildly productive cough.  He was sent to the emergency department.    Patient received some Reglan which helped his nausea.  He received roughly 2.6 L of isotonic fluid.  He received antibiotics.  He had not had significant bowel function since surgery but overnight had profuse diarrhea.  This morning, he feels much better.  He was able to eat food this morning.  Pain is controlled.  He is on room air and about to get in the shower      Allergy:   Allergies   Allergen Reactions    Aleve [Naproxen] Hives       Medications:  ascorbic acid, 500 mg, Oral, Daily  busPIRone, 7.5 mg, Oral, BID  doxycycline, 100 mg, Oral, Q12H  enoxaparin, 40 mg, Subcutaneous, Q12H  guaiFENesin, 1,200 mg, Oral, Q12H  ipratropium-albuterol, 3 mL, Nebulization, 4x Daily - RT  melatonin, 5 mg, Oral, Nightly  methylPREDNISolone sodium succinate, 40 mg, Intravenous, Q24H  pantoprazole, 40 mg, Oral, Q AM  piperacillin-tazobactam, 4.5 g, Intravenous, Q8H  psyllium, 1 packet, Oral, Daily  sodium chloride, 10 mL, Intravenous, Q12H  vancomycin, 1,000 mg, Intravenous, Q12H  venlafaxine XR, 150 mg, Oral, Daily      Pharmacy to dose vancomycin,       No current facility-administered medications on file prior to encounter.     Current Outpatient Medications on File Prior to  Encounter   Medication Sig    amLODIPine (NORVASC) 5 MG tablet TAKE ONE TABLET BY MOUTH EVERY DAY FOR BLOOD PRESSURE    ascorbic acid (VITAMIN C) 500 MG tablet Take 1 tablet by mouth Daily.    atorvastatin (Lipitor) 20 MG tablet Take 1 tablet by mouth Daily.    busPIRone (BUSPAR) 7.5 MG tablet Take 1 tablet by mouth 2 (Two) Times a Day. as directed    diclofenac (CATAFLAM) 50 MG tablet Take 1 tablet by mouth 3 (Three) Times a Day.    fexofenadine (ALLEGRA) 180 MG tablet Take 1 tablet by mouth Daily. For allergies    fluticasone (FLONASE) 50 MCG/ACT nasal spray 2 sprays into the nostril(s) as directed by provider Daily.    losartan (COZAAR) 50 MG tablet TAKE 1&1/2 TABLET BY MOUTH EVERY DAY FOR BLOOD PRESSURE    melatonin 5 MG tablet tablet Take 1 tablet by mouth Every Night.    omeprazole (priLOSEC) 40 MG capsule TAKE ONE CAPSULE BY MOUTH EVERY DAY FOR THE STOMACH    oxyCODONE-acetaminophen (Percocet)  MG per tablet Take 1 tablet by mouth Every 6 (Six) Hours As Needed for Moderate Pain.    promethazine (PHENERGAN) 25 MG tablet Take 1 tablet by mouth Every 6 (Six) Hours As Needed for Nausea or Vomiting for up to 10 doses.    sulfamethoxazole-trimethoprim (Bactrim DS) 800-160 MG per tablet Take 1 tablet by mouth 2 (Two) Times a Day.    traMADol (Ultram) 50 MG tablet Take 1 tablet by mouth Every 6 (Six) Hours As Needed for Moderate Pain for up to 3 doses.    venlafaxine XR (EFFEXOR-XR) 150 MG 24 hr capsule Take 1 capsule by mouth Daily.    Vitamin D, Cholecalciferol, (CHOLECALCIFEROL) 10 MCG (400 UNIT) tablet Take 1 tablet by mouth Daily.    [DISCONTINUED] acetaminophen (TYLENOL) 500 MG tablet Take 2 tablets by mouth Every 6 (Six) Hours As Needed for Moderate Pain for up to 3 days.    [DISCONTINUED] ondansetron (Zofran) 4 MG tablet Take 1 tablet by mouth Every 8 (Eight) Hours As Needed for Nausea or Vomiting for up to 3 days.       PMHx:   Past Medical History:   Diagnosis Date    Acid reflux     Allergic      "Anxiety     Arthritis     Depression     GERD (gastroesophageal reflux disease)     Hypertension     Infectious mononucleosis     TAMIKO (obstructive sleep apnea)     NO OXYGEN USE    PONV (postoperative nausea and vomiting)     Seasonal allergies     Sinusitis     Sleep apnea     c pap    Urinary tract infection     WPW (Brayan-Parkinson-White syndrome)          Past Surgical History:  Cardiac ablation  Laparoscopic cholecystectomy    Family History: Hypertension, hyperlipidemia, coronary artery disease, diabetes    Social History: Non-smoker     Review of Systems   14 point review of systems is negative septa what is noted in HPI            Objective     Physical Exam:      Vital Signs  BP 90/57 (BP Location: Right arm, Patient Position: Lying)   Pulse 106   Temp 96.6 øF (35.9 øC) (Axillary)   Resp 20   Ht 193 cm (76\")   Wt (!) 150 kg (330 lb 11 oz)   SpO2 94%   BMI 40.25 kg/mý     Intake/Output Summary (Last 24 hours) at 8/12/2023 1121  Last data filed at 8/12/2023 0630  Gross per 24 hour   Intake 3184 ml   Output 20 ml   Net 3164 ml         Physical Exam:      08/11/23  1319 08/11/23  2114   Weight: (!) 150 kg (330 lb) (!) 150 kg (330 lb 11 oz)    Body mass index is 40.25 kg/mý.  Constitution: No acute distress.  Morbidly obese  Head: Normocephalic, atraumatic.   Eyes: Aligned without strabismus. Conjunctiva noninjected   Ears, Nose, Mouth, No lesions appreciated   CV: Rhythm  and rate regular   Respiratory: Symmetric chest expansion. No respiratory distress.   Gastrointestinal:  soft, nondistended, nontender.  Incisions have Steri-Strips with some mild dried blood.  HANS drain serosanguineous  Skin:  No cyanosis, clubbing or edema bilaterally    Lymphatics: No abnormal cervical or supraclavicular adenopathy  Neurologic: No gross deficits.  Alert and oriented x3  Psychiatric: Normal mood        Results Review: I have personally reviewed all of the recent lab and imaging results available at this time.     I " have personally reviewed the images of the CT abdomen and pelvis.  The patient has a mild diffuse ileus at that time.  Some stool in the colon.  Drain is in place.  No gallbladder fossa fluid collection.  Lungs are partially imaged, and I favor left lower lobe atelectasis    Lactate normalized  GI panel and C. difficile negative          Assessment and Plan:    52 y.o. morbidly obese male with resolving hypovolemia/dehydration and resolving ileus.  Possible mild pneumonia versus atelectasis    The patient appears clinically improved with fluid resuscitation and possibly with antibiotics  I have discontinued loperamide as this can very quickly cause severe constipation.  I feel the patient's diarrhea is likely a resolving ileus.  I have ordered some psyllium fiber  Discontinue HANS drain  Incentive spirometry  Defer clinical decision regarding atelectasis versus pneumonia to Dr. Garvin.  Based on partial imaging from CT abdomen pelvis, I favor atelectasis, though the patient has improved while on antibiotics.  CT PE protocol pending  Follow-up as scheduled          Anoop Red MD  [facility]  08/12/23  11:21 EDT          Electronically signed by Anoop Red MD at 08/12/23 7014

## 2023-08-12 NOTE — PLAN OF CARE
Goal Outcome Evaluation:              Outcome Evaluation: Pt is resting in bed at this time. Pt has ambulated independently throughout the shift. HANS drain removed per order. Pt complained of pain, PRN medication given per MAR. Pt is on RA at this time. Will continue plan of care.

## 2023-08-12 NOTE — CONSULTS
Patient Name:  Jmimy Dawson Jr.  YOB: 1970  [id number]       Patient Care Team:  Nena Pike APRN as PCP - General (Family Medicine)      General Surgery Consult Note     Date of Consultation: 08/12/23    Consulting Physician - Jose Guadalupe Garvin*    Reason for Consult -recent lap uriel    Subjective     I have been asked to see  Jimmy Dawson Jr. , a 52 y.o. morbidly obese male, who underwent recent laparoscopic cholecystectomy for acute cholecystitis on 8/8 with Dr Sanders, who is admitted with several days of nausea, p.o. intolerance, dehydration and possible pneumonia.  The patient states he woke up the day after surgery with nausea, no vomiting.  He was unable to tolerate p.o. intake.  He states his HANS drain initially had a large amount of serosanguineous output but this is tapered off significantly after postop day 2.  His nausea was uncontrolled on Zofran, Dr. Sanders had sent in some Phenergan which did not help.  Outpatient labs were ordered but the patient reports presyncope/dizziness, severe nausea and feeling ill.  He was reportedly hypoxic.  He reports intermittent, mildly productive cough.  He was sent to the emergency department.    Patient received some Reglan which helped his nausea.  He received roughly 2.6 L of isotonic fluid.  He received antibiotics.  He had not had significant bowel function since surgery but overnight had profuse diarrhea.  This morning, he feels much better.  He was able to eat food this morning.  Pain is controlled.  He is on room air and about to get in the shower      Allergy:   Allergies   Allergen Reactions    Aleve [Naproxen] Hives       Medications:  ascorbic acid, 500 mg, Oral, Daily  busPIRone, 7.5 mg, Oral, BID  doxycycline, 100 mg, Oral, Q12H  enoxaparin, 40 mg, Subcutaneous, Q12H  guaiFENesin, 1,200 mg, Oral, Q12H  ipratropium-albuterol, 3 mL, Nebulization, 4x Daily - RT  melatonin, 5 mg, Oral, Nightly  methylPREDNISolone  sodium succinate, 40 mg, Intravenous, Q24H  pantoprazole, 40 mg, Oral, Q AM  piperacillin-tazobactam, 4.5 g, Intravenous, Q8H  psyllium, 1 packet, Oral, Daily  sodium chloride, 10 mL, Intravenous, Q12H  vancomycin, 1,000 mg, Intravenous, Q12H  venlafaxine XR, 150 mg, Oral, Daily      Pharmacy to dose vancomycin,       No current facility-administered medications on file prior to encounter.     Current Outpatient Medications on File Prior to Encounter   Medication Sig    amLODIPine (NORVASC) 5 MG tablet TAKE ONE TABLET BY MOUTH EVERY DAY FOR BLOOD PRESSURE    ascorbic acid (VITAMIN C) 500 MG tablet Take 1 tablet by mouth Daily.    atorvastatin (Lipitor) 20 MG tablet Take 1 tablet by mouth Daily.    busPIRone (BUSPAR) 7.5 MG tablet Take 1 tablet by mouth 2 (Two) Times a Day. as directed    diclofenac (CATAFLAM) 50 MG tablet Take 1 tablet by mouth 3 (Three) Times a Day.    fexofenadine (ALLEGRA) 180 MG tablet Take 1 tablet by mouth Daily. For allergies    fluticasone (FLONASE) 50 MCG/ACT nasal spray 2 sprays into the nostril(s) as directed by provider Daily.    losartan (COZAAR) 50 MG tablet TAKE 1&1/2 TABLET BY MOUTH EVERY DAY FOR BLOOD PRESSURE    melatonin 5 MG tablet tablet Take 1 tablet by mouth Every Night.    omeprazole (priLOSEC) 40 MG capsule TAKE ONE CAPSULE BY MOUTH EVERY DAY FOR THE STOMACH    oxyCODONE-acetaminophen (Percocet)  MG per tablet Take 1 tablet by mouth Every 6 (Six) Hours As Needed for Moderate Pain.    promethazine (PHENERGAN) 25 MG tablet Take 1 tablet by mouth Every 6 (Six) Hours As Needed for Nausea or Vomiting for up to 10 doses.    sulfamethoxazole-trimethoprim (Bactrim DS) 800-160 MG per tablet Take 1 tablet by mouth 2 (Two) Times a Day.    traMADol (Ultram) 50 MG tablet Take 1 tablet by mouth Every 6 (Six) Hours As Needed for Moderate Pain for up to 3 doses.    venlafaxine XR (EFFEXOR-XR) 150 MG 24 hr capsule Take 1 capsule by mouth Daily.    Vitamin D, Cholecalciferol,  "(CHOLECALCIFEROL) 10 MCG (400 UNIT) tablet Take 1 tablet by mouth Daily.    [DISCONTINUED] acetaminophen (TYLENOL) 500 MG tablet Take 2 tablets by mouth Every 6 (Six) Hours As Needed for Moderate Pain for up to 3 days.    [DISCONTINUED] ondansetron (Zofran) 4 MG tablet Take 1 tablet by mouth Every 8 (Eight) Hours As Needed for Nausea or Vomiting for up to 3 days.       PMHx:   Past Medical History:   Diagnosis Date    Acid reflux     Allergic     Anxiety     Arthritis     Depression     GERD (gastroesophageal reflux disease)     Hypertension     Infectious mononucleosis     TAMIKO (obstructive sleep apnea)     NO OXYGEN USE    PONV (postoperative nausea and vomiting)     Seasonal allergies     Sinusitis     Sleep apnea     c pap    Urinary tract infection     WPW (Brayan-Parkinson-White syndrome)          Past Surgical History:  Cardiac ablation  Laparoscopic cholecystectomy    Family History: Hypertension, hyperlipidemia, coronary artery disease, diabetes    Social History: Non-smoker     Review of Systems   14 point review of systems is negative septa what is noted in HPI             Objective     Physical Exam:      Vital Signs  BP 90/57 (BP Location: Right arm, Patient Position: Lying)   Pulse 106   Temp 96.6 øF (35.9 øC) (Axillary)   Resp 20   Ht 193 cm (76\")   Wt (!) 150 kg (330 lb 11 oz)   SpO2 94%   BMI 40.25 kg/mý     Intake/Output Summary (Last 24 hours) at 8/12/2023 1121  Last data filed at 8/12/2023 0630  Gross per 24 hour   Intake 3184 ml   Output 20 ml   Net 3164 ml         Physical Exam:      08/11/23  1319 08/11/23  2114   Weight: (!) 150 kg (330 lb) (!) 150 kg (330 lb 11 oz)    Body mass index is 40.25 kg/mý.  Constitution: No acute distress.  Morbidly obese  Head: Normocephalic, atraumatic.   Eyes: Aligned without strabismus. Conjunctiva noninjected   Ears, Nose, Mouth, No lesions appreciated   CV: Rhythm  and rate regular   Respiratory: Symmetric chest expansion. No respiratory distress. "   Gastrointestinal:  soft, nondistended, nontender.  Incisions have Steri-Strips with some mild dried blood.  HANS drain serosanguineous  Skin:  No cyanosis, clubbing or edema bilaterally    Lymphatics: No abnormal cervical or supraclavicular adenopathy  Neurologic: No gross deficits.  Alert and oriented x3  Psychiatric: Normal mood        Results Review: I have personally reviewed all of the recent lab and imaging results available at this time.     I have personally reviewed the images of the CT abdomen and pelvis.  The patient has a mild diffuse ileus at that time.  Some stool in the colon.  Drain is in place.  No gallbladder fossa fluid collection.  Lungs are partially imaged, and I favor left lower lobe atelectasis    Lactate normalized  GI panel and C. difficile negative          Assessment and Plan:    52 y.o. morbidly obese male with resolving hypovolemia/dehydration and resolving ileus.  Possible mild pneumonia versus atelectasis    The patient appears clinically improved with fluid resuscitation and possibly with antibiotics  I have discontinued loperamide as this can very quickly cause severe constipation.  I feel the patient's diarrhea is likely a resolving ileus.  I have ordered some psyllium fiber  Discontinue HANS drain  Incentive spirometry  Defer clinical decision regarding atelectasis versus pneumonia to Dr. Garvin.  Based on partial imaging from CT abdomen pelvis, I favor atelectasis, though the patient has improved while on antibiotics.  CT PE protocol pending  Follow-up as scheduled          Anoop Red MD  [facility]  08/12/23  11:21 EDT

## 2023-08-12 NOTE — PLAN OF CARE
Goal Outcome Evaluation:                      Patient has not rested much during shift due to diarrhea episodes. C diff panel negative. Patient denies any pain. Patients spouse remains at bedside. Patient has HANS drain from gallbladder removal, serous drainage. No vomiting.

## 2023-08-13 ENCOUNTER — READMISSION MANAGEMENT (OUTPATIENT)
Dept: CALL CENTER | Facility: HOSPITAL | Age: 53
End: 2023-08-13
Payer: OTHER GOVERNMENT

## 2023-08-13 VITALS
HEIGHT: 76 IN | WEIGHT: 315 LBS | BODY MASS INDEX: 38.36 KG/M2 | RESPIRATION RATE: 18 BRPM | HEART RATE: 97 BPM | DIASTOLIC BLOOD PRESSURE: 82 MMHG | SYSTOLIC BLOOD PRESSURE: 137 MMHG | TEMPERATURE: 97.7 F | OXYGEN SATURATION: 99 %

## 2023-08-13 LAB
ANION GAP SERPL CALCULATED.3IONS-SCNC: 12.4 MMOL/L (ref 5–15)
BASOPHILS # BLD AUTO: 0.04 10*3/MM3 (ref 0–0.2)
BASOPHILS NFR BLD AUTO: 0.4 % (ref 0–1.5)
BUN SERPL-MCNC: 27 MG/DL (ref 6–20)
BUN/CREAT SERPL: 30 (ref 7–25)
CALCIUM SPEC-SCNC: 8.5 MG/DL (ref 8.6–10.5)
CHLORIDE SERPL-SCNC: 106 MMOL/L (ref 98–107)
CO2 SERPL-SCNC: 18.6 MMOL/L (ref 22–29)
CREAT SERPL-MCNC: 0.9 MG/DL (ref 0.76–1.27)
DEPRECATED RDW RBC AUTO: 43.9 FL (ref 37–54)
EGFRCR SERPLBLD CKD-EPI 2021: 101.5 ML/MIN/1.73
EOSINOPHIL # BLD AUTO: 0.01 10*3/MM3 (ref 0–0.4)
EOSINOPHIL NFR BLD AUTO: 0.1 % (ref 0.3–6.2)
ERYTHROCYTE [DISTWIDTH] IN BLOOD BY AUTOMATED COUNT: 13.6 % (ref 12.3–15.4)
GLUCOSE SERPL-MCNC: 188 MG/DL (ref 65–99)
HCT VFR BLD AUTO: 36.2 % (ref 37.5–51)
HGB BLD-MCNC: 12.1 G/DL (ref 13–17.7)
IMM GRANULOCYTES # BLD AUTO: 0.05 10*3/MM3 (ref 0–0.05)
IMM GRANULOCYTES NFR BLD AUTO: 0.5 % (ref 0–0.5)
LYMPHOCYTES # BLD AUTO: 1.06 10*3/MM3 (ref 0.7–3.1)
LYMPHOCYTES NFR BLD AUTO: 10 % (ref 19.6–45.3)
MCH RBC QN AUTO: 29.4 PG (ref 26.6–33)
MCHC RBC AUTO-ENTMCNC: 33.4 G/DL (ref 31.5–35.7)
MCV RBC AUTO: 88.1 FL (ref 79–97)
MONOCYTES # BLD AUTO: 0.28 10*3/MM3 (ref 0.1–0.9)
MONOCYTES NFR BLD AUTO: 2.6 % (ref 5–12)
NEUTROPHILS NFR BLD AUTO: 86.4 % (ref 42.7–76)
NEUTROPHILS NFR BLD AUTO: 9.18 10*3/MM3 (ref 1.7–7)
NRBC BLD AUTO-RTO: 0 /100 WBC (ref 0–0.2)
PLATELET # BLD AUTO: 360 10*3/MM3 (ref 140–450)
PMV BLD AUTO: 9.2 FL (ref 6–12)
POTASSIUM SERPL-SCNC: 4.1 MMOL/L (ref 3.5–5.2)
RBC # BLD AUTO: 4.11 10*6/MM3 (ref 4.14–5.8)
SODIUM SERPL-SCNC: 137 MMOL/L (ref 136–145)
VANCOMYCIN TROUGH SERPL-MCNC: 6.5 MCG/ML (ref 5–20)
WBC NRBC COR # BLD: 10.62 10*3/MM3 (ref 3.4–10.8)

## 2023-08-13 PROCEDURE — 80202 ASSAY OF VANCOMYCIN: CPT

## 2023-08-13 PROCEDURE — 85025 COMPLETE CBC W/AUTO DIFF WBC: CPT | Performed by: STUDENT IN AN ORGANIZED HEALTH CARE EDUCATION/TRAINING PROGRAM

## 2023-08-13 PROCEDURE — 63710000001 PREDNISONE PER 1 MG: Performed by: STUDENT IN AN ORGANIZED HEALTH CARE EDUCATION/TRAINING PROGRAM

## 2023-08-13 PROCEDURE — 94799 UNLISTED PULMONARY SVC/PX: CPT

## 2023-08-13 PROCEDURE — 80048 BASIC METABOLIC PNL TOTAL CA: CPT | Performed by: STUDENT IN AN ORGANIZED HEALTH CARE EDUCATION/TRAINING PROGRAM

## 2023-08-13 PROCEDURE — 25010000002 ENOXAPARIN PER 10 MG: Performed by: STUDENT IN AN ORGANIZED HEALTH CARE EDUCATION/TRAINING PROGRAM

## 2023-08-13 PROCEDURE — 25010000002 PIPERACILLIN SOD-TAZOBACTAM PER 1 G: Performed by: STUDENT IN AN ORGANIZED HEALTH CARE EDUCATION/TRAINING PROGRAM

## 2023-08-13 PROCEDURE — 94664 DEMO&/EVAL PT USE INHALER: CPT

## 2023-08-13 PROCEDURE — 94761 N-INVAS EAR/PLS OXIMETRY MLT: CPT

## 2023-08-13 RX ORDER — DOXYCYCLINE 100 MG/1
100 CAPSULE ORAL EVERY 12 HOURS SCHEDULED
Qty: 6 CAPSULE | Refills: 0 | Status: SHIPPED | OUTPATIENT
Start: 2023-08-13 | End: 2023-08-17

## 2023-08-13 RX ORDER — CETIRIZINE HYDROCHLORIDE 10 MG/1
10 TABLET ORAL DAILY
Status: DISCONTINUED | OUTPATIENT
Start: 2023-08-13 | End: 2023-08-13 | Stop reason: HOSPADM

## 2023-08-13 RX ORDER — PREDNISONE 20 MG/1
20 TABLET ORAL
Qty: 3 TABLET | Refills: 0 | Status: SHIPPED | OUTPATIENT
Start: 2023-08-13 | End: 2023-08-13 | Stop reason: SDUPTHER

## 2023-08-13 RX ORDER — IPRATROPIUM BROMIDE AND ALBUTEROL SULFATE 2.5; .5 MG/3ML; MG/3ML
3 SOLUTION RESPIRATORY (INHALATION) 4 TIMES DAILY PRN
Qty: 360 ML | Refills: 0 | Status: SHIPPED | OUTPATIENT
Start: 2023-08-13

## 2023-08-13 RX ORDER — DOXYCYCLINE 100 MG/1
100 CAPSULE ORAL EVERY 12 HOURS SCHEDULED
Qty: 6 CAPSULE | Refills: 0 | Status: SHIPPED | OUTPATIENT
Start: 2023-08-13 | End: 2023-08-13 | Stop reason: SDUPTHER

## 2023-08-13 RX ORDER — LEVOFLOXACIN 750 MG/1
750 TABLET ORAL EVERY 24 HOURS
Qty: 5 TABLET | Refills: 0 | Status: SHIPPED | OUTPATIENT
Start: 2023-08-13 | End: 2023-08-13 | Stop reason: SDUPTHER

## 2023-08-13 RX ORDER — LEVOFLOXACIN 750 MG/1
750 TABLET ORAL EVERY 24 HOURS
Status: DISCONTINUED | OUTPATIENT
Start: 2023-08-13 | End: 2023-08-13 | Stop reason: HOSPADM

## 2023-08-13 RX ORDER — GUAIFENESIN 600 MG/1
1200 TABLET, EXTENDED RELEASE ORAL EVERY 12 HOURS SCHEDULED
Qty: 20 TABLET | Refills: 0 | Status: SHIPPED | OUTPATIENT
Start: 2023-08-13 | End: 2023-08-13 | Stop reason: SDUPTHER

## 2023-08-13 RX ORDER — TRAMADOL HYDROCHLORIDE 50 MG/1
50 TABLET ORAL EVERY 6 HOURS PRN
Qty: 12 TABLET | Refills: 0 | Status: SHIPPED | OUTPATIENT
Start: 2023-08-13 | End: 2023-08-17

## 2023-08-13 RX ORDER — PREDNISONE 20 MG/1
40 TABLET ORAL
Status: DISCONTINUED | OUTPATIENT
Start: 2023-08-13 | End: 2023-08-13 | Stop reason: HOSPADM

## 2023-08-13 RX ORDER — GUAIFENESIN 600 MG/1
1200 TABLET, EXTENDED RELEASE ORAL EVERY 12 HOURS SCHEDULED
Qty: 20 TABLET | Refills: 0 | Status: SHIPPED | OUTPATIENT
Start: 2023-08-13 | End: 2023-08-18

## 2023-08-13 RX ORDER — PREDNISONE 20 MG/1
20 TABLET ORAL
Qty: 3 TABLET | Refills: 0 | Status: SHIPPED | OUTPATIENT
Start: 2023-08-13 | End: 2023-08-16

## 2023-08-13 RX ORDER — IPRATROPIUM BROMIDE AND ALBUTEROL SULFATE 2.5; .5 MG/3ML; MG/3ML
3 SOLUTION RESPIRATORY (INHALATION) 4 TIMES DAILY PRN
Qty: 360 ML | Refills: 0 | Status: SHIPPED | OUTPATIENT
Start: 2023-08-13 | End: 2023-08-13 | Stop reason: SDUPTHER

## 2023-08-13 RX ORDER — LEVOFLOXACIN 750 MG/1
750 TABLET ORAL EVERY 24 HOURS
Qty: 5 TABLET | Refills: 0 | Status: SHIPPED | OUTPATIENT
Start: 2023-08-13 | End: 2023-08-18

## 2023-08-13 RX ADMIN — DOXYCYCLINE 100 MG: 100 CAPSULE ORAL at 08:27

## 2023-08-13 RX ADMIN — OXYCODONE HYDROCHLORIDE AND ACETAMINOPHEN 500 MG: 500 TABLET ORAL at 08:27

## 2023-08-13 RX ADMIN — PANTOPRAZOLE SODIUM 40 MG: 40 TABLET, DELAYED RELEASE ORAL at 05:51

## 2023-08-13 RX ADMIN — ENOXAPARIN SODIUM 40 MG: 40 INJECTION SUBCUTANEOUS at 08:27

## 2023-08-13 RX ADMIN — IPRATROPIUM BROMIDE AND ALBUTEROL SULFATE 3 ML: 2.5; .5 SOLUTION RESPIRATORY (INHALATION) at 00:15

## 2023-08-13 RX ADMIN — PIPERACILLIN SODIUM AND TAZOBACTAM SODIUM 4.5 G: 4; .5 INJECTION, POWDER, LYOPHILIZED, FOR SOLUTION INTRAVENOUS at 05:51

## 2023-08-13 RX ADMIN — IPRATROPIUM BROMIDE AND ALBUTEROL SULFATE 3 ML: 2.5; .5 SOLUTION RESPIRATORY (INHALATION) at 06:51

## 2023-08-13 RX ADMIN — LEVOFLOXACIN 750 MG: 750 TABLET, FILM COATED ORAL at 10:16

## 2023-08-13 RX ADMIN — PSYLLIUM HUSK 1 PACKET: 3.4 POWDER ORAL at 08:27

## 2023-08-13 RX ADMIN — GUAIFENESIN 1200 MG: 600 TABLET, EXTENDED RELEASE ORAL at 08:27

## 2023-08-13 RX ADMIN — CETIRIZINE HYDROCHLORIDE 10 MG: 10 TABLET, FILM COATED ORAL at 08:27

## 2023-08-13 RX ADMIN — PREDNISONE 40 MG: 20 TABLET ORAL at 10:16

## 2023-08-13 RX ADMIN — VENLAFAXINE HYDROCHLORIDE 150 MG: 150 CAPSULE, EXTENDED RELEASE ORAL at 08:27

## 2023-08-13 NOTE — OUTREACH NOTE
Prep Survey      Flowsheet Row Responses   [facility] patient discharged from? [facility]   Is LACE score < 7 ? Yes   Eligibility [facility]   Date of Admission 08/11/23   Date of Discharge 08/13/23   Discharge Disposition Home or Self Care   Discharge diagnosis Sepsis due to community-acquired pneumonia with MDR risk factors   Does the patient have one of the following disease processes/diagnoses(primary or secondary)? Other   Does the patient have Home health ordered? No   Is there a DME ordered? No   Prep survey completed? Yes            SANNA DAVIES - Registered Nurse

## 2023-08-13 NOTE — NURSING NOTE
Pt is being discharged home at this time. Pt's wife is at bedside and will provide transportation.

## 2023-08-13 NOTE — DISCHARGE SUMMARY
[facility] HOSPITALISTS DISCHARGE SUMMARY    Patient Identification:  Name:  Jimmy Dawson Jr.  Age:  52 y.o.  Sex:  male  :  1970  MRN:  [id number]  Visit Number:  [id number]    Date of Admission: 2023  Date of Discharge:  2023     PCP: Nena Pike APRN    DISCHARGE DIAGNOSIS  #Sepsis due to community-acquired pneumonia with MDR risk factors  #Acute hypoxemic respiratory failure  #Status post laparoscopic cholecystectomy   #WPW status post radiofrequency ablation  #History of atrial flutter status post ablation  #Asymptomatic bacteriuria  #Elevated troponin due to the above  #Lactic acidosis, clinically significant  #Post cholecystectomy diarrhea  #Essential hypertension  #Generalized anxiety/depression  #TAMIKO, compliant with CPAP  #GERD, PPI     CONSULTS   General surgery    PROCEDURES PERFORMED  HANS drain removed    HOSPITAL COURSE  Patient is a 52 y.o. male presented to [facility] complaining of shortness of breath, fatigue and cough.  Please see the admitting history and physical for further details.  Patient's history was significant for recent cholecystectomy on .  Labs noted acute hypoxemia with a PO2 less than 60, required 2 L initially.  Labs also noted leukocytosis with left shift and elevated inflammatory markers.  Chest x-ray noted left mid and lower lobe opacities consistent with pneumonia.  Given hypoxemia and tachycardia, CT angiogram was ordered and PE study was negative.  He was initially placed on broad-spectrum antibiotics, de-escalated given culture data remained unremarkable.  Patient significantly improved and returned to baseline at the time of discharge.  Pain was well controlled, no longer feeling short of breath and felt back to baseline.  He did not require O2 at the time of discharge he was changed to p.o. antibiotics, steroids and discharged home.    In regards to recent lap uriel, CT abdomen pelvis was unremarkable.  General  surgery was consulted, HANS drain was removed and he was recommended to follow-up with general surgery next week as previously scheduled.    VITAL SIGNS:  Temp:  [97.7 øF (36.5 øC)-98.9 øF (37.2 øC)] 97.7 øF (36.5 øC)  Heart Rate:  [] 97  Resp:  [18-20] 18  BP: ()/(62-87) 137/82  SpO2:  [90 %-99 %] 99 %  on  Flow (L/min):  [2] 2;   Device (Oxygen Therapy): room air    Body mass index is 40.25 kg/mý.  Wt Readings from Last 3 Encounters:   08/11/23 (!) 150 kg (330 lb 11 oz)   08/08/23 (!) 150 kg (330 lb)   08/07/23 (!) 150 kg (330 lb)       PHYSICAL EXAM:  Constitutional:  Well-developed and well-nourished.  No respiratory distress.      HENT:  Head:  Normocephalic and atraumatic.  Mouth:  Moist mucous membranes.    Eyes:  Conjunctivae and EOM are normal.  No scleral icterus.    Neck:  Neck supple.  No JVD present.    Cardiovascular:  Normal rate, regular rhythm and normal heart sounds with no murmur.  Pulmonary/Chest:  No respiratory distress, no wheezes, no crackles, with normal breath sounds and good air movement.  Abdominal:  Soft. No distension and no tenderness.   Musculoskeletal:  No tenderness, and no deformity.  No red or swollen joints anywhere.    Neurological:  Alert and oriented to person, place, and time.  No cranial nerve deficit.    Skin:  Skin is warm and dry. No rash noted. No pallor.   Peripheral vascular: no clubbing, no cyanosis, no edema.    DISCHARGE DISPOSITION   Stable    DISCHARGE MEDICATIONS:     Discharge Medications        New Medications        Instructions Start Date   doxycycline 100 MG capsule  Commonly known as: MONODOX   Take 1 capsule by mouth every 12 hours for 6 doses      guaiFENesin 600 MG 12 hr tablet  Commonly known as: MUCINEX   Take 2 tablets by mouth Every 12 hours for 5 days.      ipratropium-albuterol 0.5-2.5 mg/3 ml nebulizer  Commonly known as: DUO-NEB   Take 3 mL by nebulization 4 times a day as needed for wheezing      levoFLOXacin 750 MG tablet  Commonly  known as: LEVAQUIN   Take 1 tablet by mouth daily for 5 doses.      predniSONE 20 MG tablet  Commonly known as: DELTASONE   Take 1 tablet by mouth daily with breakfast for 3 doses.      psyllium 58.12 % packet  Commonly known as: METAMUCIL MULTIHEALTH FIBER   Take 1 packet by mouth daily for 30 days.   Start Date: August 14, 2023            Continue These Medications        Instructions Start Date   ascorbic acid 500 MG tablet  Commonly known as: VITAMIN C   500 mg, Oral, Daily      busPIRone 7.5 MG tablet  Commonly known as: BUSPAR   7.5 mg, Oral, Nightly      fexofenadine 180 MG tablet  Commonly known as: ALLEGRA   180 mg, Oral, Nightly      melatonin 5 MG tablet tablet   5 mg, Oral, Nightly      omeprazole 40 MG capsule  Commonly known as: priLOSEC   TAKE ONE CAPSULE BY MOUTH EVERY DAY FOR THE STOMACH      traMADol 50 MG tablet  Commonly known as: Ultram   50 mg, Oral, Every 6 Hours PRN      venlafaxine  MG 24 hr capsule  Commonly known as: EFFEXOR-XR   150 mg, Oral, Daily      Vitamin D (Cholecalciferol) 10 MCG (400 UNIT) tablet  Commonly known as: CHOLECALCIFEROL   400 Units, Oral, Daily             Stop These Medications      amLODIPine 5 MG tablet  Commonly known as: NORVASC     atorvastatin 20 MG tablet  Commonly known as: Lipitor     diclofenac 50 MG tablet  Commonly known as: CATAFLAM     losartan 50 MG tablet  Commonly known as: COZAAR     sulfamethoxazole-trimethoprim 800-160 MG per tablet  Commonly known as: BACTRIM DS,SEPTRA DS              Diet Instructions    Regular diet           Activity Instructions    As tolerated           Additional Instructions for the Follow-ups that You Need to Schedule       Discharge Follow-up with PCP   As directed       Currently Documented PCP:    Nena Pike APRN    PCP Phone Number:    [phone]     Follow Up Details: 1wk hospital fu        Discharge Follow-up with Specialty: general surgery as scheduled next week   As directed      Specialty:  general surgery as scheduled next week               Follow-up Information       Nena Pike APRN .    Specialty: Family Medicine  Why: 1wk hospital fu  Contact information:  [address]  [place] [id number]  [phone]                              TEST  RESULTS PENDING AT DISCHARGE  Pending Labs       Order Current Status    Blood Culture - Blood, Arm, Right Preliminary result    Blood Culture - Blood, Hand, Left Preliminary result    Respiratory Culture - Sputum, Cough Preliminary result             CODE STATUS  Code Status and Medical Interventions:   Ordered at: 08/11/23 1800     Code Status (Patient has no pulse and is not breathing):    CPR (Attempt to Resuscitate)     Medical Interventions (Patient has pulse or is breathing):    Full Support       The 10-year ASCVD risk score (Belle DK, et al., 2019) is: 6.7%    Values used to calculate the score:      Age: 52 years      Sex: Male      Is Non- : No      Diabetic: No      Tobacco smoker: No      Systolic Blood Pressure: 137 mmHg      Is BP treated: Yes      HDL Cholesterol: 31 mg/dL      Total Cholesterol: 138 mg/dL     Jose Guadalupe Garvin DO  [facility]  08/13/23  11:47 EDT    Please note that this discharge summary required more than 30 minutes to complete.

## 2023-08-13 NOTE — PLAN OF CARE
Goal Outcome Evaluation:           Progress: no change  Outcome Evaluation: Patient resting in bed at this time. VSS. Patient has ambulated independently throughout shift. Patient reports only 1 BM so far this shift. No complaints or acute changes at this time. Will continue plan of care.

## 2023-08-13 NOTE — NURSING NOTE
Call received from Ayana Reyes, states that patient has veterans benefits and would have to pay $50. Spoke with patient and he states he will pay to get it today.  [facility] [employer] notified

## 2023-08-14 ENCOUNTER — TELEPHONE (OUTPATIENT)
Dept: MEDSURG UNIT | Facility: HOSPITAL | Age: 53
End: 2023-08-14
Payer: OTHER GOVERNMENT

## 2023-08-14 ENCOUNTER — TRANSITIONAL CARE MANAGEMENT TELEPHONE ENCOUNTER (OUTPATIENT)
Dept: CALL CENTER | Facility: HOSPITAL | Age: 53
End: 2023-08-14
Payer: OTHER GOVERNMENT

## 2023-08-14 LAB
BACTERIA SPEC RESP CULT: NORMAL
GRAM STN SPEC: NORMAL

## 2023-08-14 NOTE — OUTREACH NOTE
Call Center TCM Note      Flowsheet Row Responses   [facility] patient discharged from? [facility]   Does the patient have one of the following disease processes/diagnoses(primary or secondary)? Other   TCM attempt successful? Yes   Call start time 1159   Call end time 1202   Discharge diagnosis Sepsis due to community-acquired pneumonia with MDR risk factors   Person spoke with today (if not patient) and relationship patient   Meds reviewed with patient/caregiver? Yes   Is the patient having any side effects they believe may be caused by any medication additions or changes? No   Does the patient have all medications ordered at discharge? Yes   Is the patient taking all medications as directed (includes completed medication regime)? Yes   Comments Patient has a hospital followup on 8/18/2023 with PCP office. (Nena Pike, LIONEL )   Does the patient have an appointment with their PCP within 7-14 days of discharge? Yes   Psychosocial issues? No   Did the patient receive a copy of their discharge instructions? Yes   Nursing interventions Reviewed instructions with patient   What is the patient's perception of their health status since discharge? Improving   Is the patient/caregiver able to teach back signs and symptoms related to disease process for when to call PCP? Yes   Is the patient/caregiver able to teach back signs and symptoms related to disease process for when to call 911? Yes   Is the patient/caregiver able to teach back the hierarchy of who to call/visit for symptoms/problems? PCP, Specialist, Home health nurse, Urgent Care, ED, 911 Yes   If the patient is a current smoker, are they able to teach back resources for cessation? Not a smoker   TCM call completed? Yes   Wrap up additional comments No needs or questions.   Call end time 1202   Would this patient benefit from a Referral to Amb Social Work? No   Is the patient interested in additional calls from an ambulatory ? Tori CRABTREE  NICOLA Nieves    8/14/2023, 12:03 EDT

## 2023-08-16 LAB
BACTERIA SPEC AEROBE CULT: NORMAL
BACTERIA SPEC AEROBE CULT: NORMAL

## 2023-08-17 ENCOUNTER — PATIENT ROUNDING (BHMG ONLY) (OUTPATIENT)
Dept: SURGERY | Facility: CLINIC | Age: 53
End: 2023-08-17
Payer: OTHER GOVERNMENT

## 2023-08-17 ENCOUNTER — OFFICE VISIT (OUTPATIENT)
Dept: SURGERY | Facility: CLINIC | Age: 53
End: 2023-08-17
Payer: OTHER GOVERNMENT

## 2023-08-17 VITALS — BODY MASS INDEX: 38.36 KG/M2 | WEIGHT: 315 LBS | HEIGHT: 76 IN

## 2023-08-17 DIAGNOSIS — Z90.49 STATUS POST LAPAROSCOPIC CHOLECYSTECTOMY: Primary | ICD-10-CM

## 2023-08-17 PROCEDURE — 99024 POSTOP FOLLOW-UP VISIT: CPT | Performed by: SURGERY

## 2023-08-17 NOTE — PROGRESS NOTES
August 17, 2023    Hello, may I speak with Jimmy Dawson Jr.?    My name is Aliza Don    I am  with [facility]  [address], [place] [address]  [place] [address] [phone].    Before we get started may I verify your date of birth? 1970    I am calling to officially welcome you to our practice and ask about your recent visit. Is this a good time to talk? yes    Tell me about your visit with us. What things went well?  Patient stated that we have went above and beyond with his care.       We're always looking for ways to make our patients' experiences even better. Do you have recommendations on ways we may improve?  no    Overall were you satisfied with your first visit to our practice? yes       I appreciate you taking the time to speak with me today. Is there anything else I can do for you? no      Thank you, and have a great day.

## 2023-08-17 NOTE — PROGRESS NOTES
Subjective   Jimmy Dawson Jr. is a 52 y.o. male.     Chief Complaint: post lap uriel    History of Present Illness He is a obese 53 yo who had a lap uriel for a very inflamed gallbladder last week. He was in the hospital a few days later with fever and elevated WBC thought to be from his lungs as his CT looked ok.     The following portions of the patient's history were reviewed and updated as appropriate: current medications, past family history, past medical history, past social history, past surgical history and problem list.    Review of Systems    Objective   Physical Exam wounds ok, HANS site is healed    Past Medical History:   Diagnosis Date    Acid reflux     Allergic     Anxiety     Arthritis     Depression     GERD (gastroesophageal reflux disease)     Hypertension     Infectious mononucleosis     TAMIKO (obstructive sleep apnea)     NO OXYGEN USE    PONV (postoperative nausea and vomiting)     Seasonal allergies     Sinusitis     Sleep apnea     c pap    Urinary tract infection     WPW (Brayan-Parkinson-White syndrome)        Family History   Problem Relation Age of Onset    Hypertension Mother     Hypertension Father     Hyperlipidemia Father     Diabetes Father     Heart disease Father     Diabetes Brother     Diabetes Maternal Grandfather     Heart attack Maternal Grandfather     Diabetes Paternal Grandfather     Diabetes Maternal Grandmother     Cancer Maternal Grandmother        Social History     Tobacco Use    Smoking status: Never    Smokeless tobacco: Never   Vaping Use    Vaping Use: Never used   Substance Use Topics    Alcohol use: Yes     Comment: 4-5 BEERS, TWICE A WEEK    Drug use: No       Past Surgical History:   Procedure Laterality Date    ABLATION OF DYSRHYTHMIC FOCUS  1999 and 2000    Dr. Carlin    CARDIAC CATHETERIZATION      CARDIAC ELECTROPHYSIOLOGY PROCEDURE N/A 06/04/2019    Procedure: Fl[place]warner;  Surgeon: Juan Carlin MD;  Location: [facility] INVAS[facility] LOC[facility];  Service:  Cardiology    CARDIAC SURGERY      CHOLECYSTECTOMY N/A 8/8/2023    Procedure: CHOLECYSTECTOMY LAPAROSCOPIC;  Surgeon: Miles Sanders MD;  Location: [facility];  Service: General;  Laterality: N/A;    COLONOSCOPY      WISDOM TOOTH EXTRACTION         Current Outpatient Medications   Medication Instructions    ascorbic acid (VITAMIN C) 500 mg, Oral, Daily    busPIRone (BUSPAR) 7.5 mg, Oral, Nightly    fexofenadine (ALLEGRA) 180 mg, Oral, Nightly    guaiFENesin (MUCINEX) 600 MG 12 hr tablet Take 2 tablets by mouth Every 12 hours for 5 days.    ipratropium-albuterol (DUO-NEB) 0.5-2.5 mg/3 ml nebulizer Take 3 mL by nebulization 4 times a day as needed for wheezing    levoFLOXacin (LEVAQUIN) 750 MG tablet Take 1 tablet by mouth daily for 5 doses.    melatonin 5 mg, Oral, Nightly    omeprazole (priLOSEC) 40 MG capsule TAKE ONE CAPSULE BY MOUTH EVERY DAY FOR THE STOMACH    psyllium (METAMUCIL MULTIHEALTH FIBER) 58.12 % packet Take 1 packet by mouth daily for 30 days.    venlafaxine XR (EFFEXOR-XR) 150 mg, Oral, Daily    Vitamin D (Cholecalciferol) (CHOLECALCIFEROL) 400 Units, Oral, Daily         Assessment & Plan   Diagnoses and all orders for this visit:    1. Status post laparoscopic cholecystectomy (Primary)    Return prn

## 2023-08-18 ENCOUNTER — OFFICE VISIT (OUTPATIENT)
Dept: FAMILY MEDICINE CLINIC | Facility: CLINIC | Age: 53
End: 2023-08-18
Payer: OTHER GOVERNMENT

## 2023-08-18 VITALS
BODY MASS INDEX: 38.36 KG/M2 | HEIGHT: 76 IN | WEIGHT: 315 LBS | DIASTOLIC BLOOD PRESSURE: 72 MMHG | OXYGEN SATURATION: 96 % | TEMPERATURE: 97.8 F | RESPIRATION RATE: 16 BRPM | SYSTOLIC BLOOD PRESSURE: 112 MMHG | HEART RATE: 78 BPM

## 2023-08-18 DIAGNOSIS — J18.9 COMMUNITY ACQUIRED PNEUMONIA, UNSPECIFIED LATERALITY: Primary | ICD-10-CM

## 2023-08-18 DIAGNOSIS — I10 ESSENTIAL HYPERTENSION: Chronic | ICD-10-CM

## 2023-08-18 PROCEDURE — 80053 COMPREHEN METABOLIC PANEL: CPT | Performed by: NURSE PRACTITIONER

## 2023-08-18 PROCEDURE — 85025 COMPLETE CBC W/AUTO DIFF WBC: CPT | Performed by: NURSE PRACTITIONER

## 2023-08-18 NOTE — PROGRESS NOTES
"Subjective   Jimmy Dawson Jr. is a 52 y.o. male.     Chief Complaint   Patient presents with    Hospital Follow Up Visit       History of Present Illness     The following portions of the patient's history were reviewed and updated as appropriate: allergies, current medications, past family history, past medical history, past social history, past surgical history and problem list.    Review of Systems    Objective     /72 (BP Location: Right arm, Patient Position: Sitting, Cuff Size: Large Adult)   Pulse 78   Temp 97.8 øF (36.6 øC) (Temporal)   Resp 16   Ht 193 cm (75.98\")   Wt (!) 145 kg (320 lb)   SpO2 96%   BMI 38.97 kg/mý     Physical Exam    Current Outpatient Medications   Medication Sig Dispense Refill    ascorbic acid (VITAMIN C) 500 MG tablet Take 1 tablet by mouth Daily.      busPIRone (BUSPAR) 7.5 MG tablet Take 1 tablet by mouth Every Night.      fexofenadine (ALLEGRA) 180 MG tablet Take 1 tablet by mouth Every Night.      guaiFENesin (MUCINEX) 600 MG 12 hr tablet Take 2 tablets by mouth Every 12 hours for 5 days. 20 tablet 0    ipratropium-albuterol (DUO-NEB) 0.5-2.5 mg/3 ml nebulizer Take 3 mL by nebulization 4 times a day as needed for wheezing 360 mL 0    levoFLOXacin (LEVAQUIN) 750 MG tablet Take 1 tablet by mouth daily for 5 doses. 5 tablet 0    melatonin 5 MG tablet tablet Take 1 tablet by mouth Every Night.      omeprazole (priLOSEC) 40 MG capsule TAKE ONE CAPSULE BY MOUTH EVERY DAY FOR THE STOMACH 30 capsule 5    psyllium (METAMUCIL MULTIHEALTH FIBER) 58.12 % packet Take 1 packet by mouth daily for 30 days. 30 packet 0    venlafaxine XR (EFFEXOR-XR) 150 MG 24 hr capsule Take 1 capsule by mouth Daily. 90 capsule 3    Vitamin D, Cholecalciferol, (CHOLECALCIFEROL) 10 MCG (400 UNIT) tablet Take 1 tablet by mouth Daily.       No current facility-administered medications for this visit.            Assessment & Plan     Problem List Items Addressed This Visit  " "  None      No diagnosis found.    {PHQ-9 Score:64886:::1}    @Body mass index is 38.97 kg/mý.     Jimmy Dawson Jr.  reports that he has never smoked. He has never used smokeless tobacco.. I have educated him on the risk of diseases from using tobacco products such as {Tobacco Cessation Diseases:89986::\"cancer\",\"COPD\",\"heart disease\"}.     I advised him to quit and he is {Willing/Not Willing to Quit Tobacco Products:04404}.    I spent {Time Spent Tobacco :06509} minutes counseling the patient.           Understands disease processes and need for medications.  Understands reasons for urgent and emergent care.  Patient (& family) verbalized agreement for treatment plan.   Emotional support and active listening provided.  Patient provided time to verbalize feelings.                  This document has been electronically signed by LIONEL Grider   August 18, 2023 09:06 EDT  "

## 2023-08-18 NOTE — PROGRESS NOTES
Venipuncture Blood Specimen Collection  Venipuncture performed in right hand by Linh Camargo MA with good hemostasis. Patient tolerated the procedure well without complications.   08/18/23   Linh Camargo MA

## 2023-08-18 NOTE — PROGRESS NOTES
Transitional Care Follow Up Visit  Subjective     Jimmy Dawson Jr. is a 52 y.o. male who presents for a transitional care management visit.    Within 48 business hours after discharge our office contacted him via telephone to coordinate his care and needs.      I reviewed and discussed the details of that call along with the discharge summary, hospital problems, inpatient lab results, inpatient diagnostic studies, and consultation reports with Jimmy.     Current outpatient and discharge medications have been reconciled for the patient.  Reviewed by: LIONEL Peter          8/13/2023     1:38 PM   Date of TCM Phone Call   [facility]   Date of Admission 8/11/2023   Date of Discharge 8/13/2023   Discharge Disposition Home or Self Care     Risk for Readmission (LACE) Score: 6 (8/13/2023  6:00 AM)      History of Present Illness  He presents for hospital follow up of community acquired pneumonia. Discharge summary reviewed. He states he is feeling better. He finished the doxycycline. He has one dose of the other antibiotics left. He is coughing up clear sputum. He had a lap uriel on 8/8/23. He states his incisions are healing well. He states he saw surgery yesterday and was released. Note reviewed. He states they stopped his blood pressure medication, lipitor, and bactrim for now. His wife states his blood pressure has been 130/70.    Course During Hospital Stay:  He was admitted via ER for sepsis. Imaging reports pneumonia. He was treated with IV antibiotics. He improved and was discharged on two antibiotics.      The following portions of the patient's history were reviewed and updated as appropriate: allergies, current medications, past family history, past medical history, past social history, past surgical history, and problem list.    Review of Systems   Constitutional:  Negative for fever.   Respiratory:  Positive for cough. Negative for shortness of breath and wheezing.     Cardiovascular:  Negative for chest pain and palpitations.   Gastrointestinal:  Negative for abdominal pain, blood in stool, constipation, diarrhea, nausea and vomiting.   Genitourinary:  Negative for dysuria and hematuria.   Allergic/Immunologic: Negative for environmental allergies.     Objective   Physical Exam  Vitals reviewed.   Constitutional:       General: He is not in acute distress.     Appearance: He is well-developed. He is not diaphoretic.   HENT:      Head: Normocephalic and atraumatic.   Cardiovascular:      Rate and Rhythm: Normal rate and regular rhythm.      Heart sounds: Normal heart sounds. No murmur heard.    No friction rub. No gallop.   Pulmonary:      Effort: Pulmonary effort is normal. No respiratory distress.      Breath sounds: Normal breath sounds.   Abdominal:      General: Bowel sounds are normal. There is no distension.      Palpations: Abdomen is soft.      Tenderness: There is no abdominal tenderness.   Skin:     General: Skin is warm and dry.   Neurological:      Mental Status: He is alert and oriented to person, place, and time.   Psychiatric:         Behavior: Behavior normal.         Thought Content: Thought content normal.         Judgment: Judgment normal.       Assessment & Plan   Diagnoses and all orders for this visit:    1. Community acquired pneumonia, unspecified laterality (Primary)  -     CBC & Differential  -     Comprehensive Metabolic Panel    2. Essential hypertension      Plan: Finish antibiotics. Get cbc and cmp today. Monitor blood pressure at home. Resume medication if blood pressure is 140/80 or higher. Resume statin. Keep scheduled follow up and follow up as needed. Resume activity as tolerated. Lifting restrictions per surgery.       This document electronically signed by Danelle FLOWERS. August 18, 2023 09:57 EDT

## 2023-08-19 LAB
ALBUMIN SERPL-MCNC: 3.8 G/DL (ref 3.5–5.2)
ALBUMIN/GLOB SERPL: 1.4 G/DL
ALP SERPL-CCNC: 79 U/L (ref 39–117)
ALT SERPL W P-5'-P-CCNC: 71 U/L (ref 1–41)
ANION GAP SERPL CALCULATED.3IONS-SCNC: 13.7 MMOL/L (ref 5–15)
AST SERPL-CCNC: 22 U/L (ref 1–40)
BASOPHILS # BLD AUTO: 0.1 10*3/MM3 (ref 0–0.2)
BASOPHILS NFR BLD AUTO: 0.9 % (ref 0–1.5)
BILIRUB SERPL-MCNC: 0.2 MG/DL (ref 0–1.2)
BUN SERPL-MCNC: 16 MG/DL (ref 6–20)
BUN/CREAT SERPL: 18 (ref 7–25)
CALCIUM SPEC-SCNC: 9.4 MG/DL (ref 8.6–10.5)
CHLORIDE SERPL-SCNC: 105 MMOL/L (ref 98–107)
CO2 SERPL-SCNC: 21.3 MMOL/L (ref 22–29)
CREAT SERPL-MCNC: 0.89 MG/DL (ref 0.76–1.27)
DEPRECATED RDW RBC AUTO: 41.6 FL (ref 37–54)
EGFRCR SERPLBLD CKD-EPI 2021: 101.8 ML/MIN/1.73
EOSINOPHIL # BLD AUTO: 0.24 10*3/MM3 (ref 0–0.4)
EOSINOPHIL NFR BLD AUTO: 2.1 % (ref 0.3–6.2)
ERYTHROCYTE [DISTWIDTH] IN BLOOD BY AUTOMATED COUNT: 13.3 % (ref 12.3–15.4)
GLOBULIN UR ELPH-MCNC: 2.8 GM/DL
GLUCOSE SERPL-MCNC: 159 MG/DL (ref 65–99)
HCT VFR BLD AUTO: 39.1 % (ref 37.5–51)
HGB BLD-MCNC: 13.3 G/DL (ref 13–17.7)
IMM GRANULOCYTES # BLD AUTO: 0.07 10*3/MM3 (ref 0–0.05)
IMM GRANULOCYTES NFR BLD AUTO: 0.6 % (ref 0–0.5)
LYMPHOCYTES # BLD AUTO: 2.68 10*3/MM3 (ref 0.7–3.1)
LYMPHOCYTES NFR BLD AUTO: 23.5 % (ref 19.6–45.3)
MCH RBC QN AUTO: 29.3 PG (ref 26.6–33)
MCHC RBC AUTO-ENTMCNC: 34 G/DL (ref 31.5–35.7)
MCV RBC AUTO: 86.1 FL (ref 79–97)
MONOCYTES # BLD AUTO: 0.67 10*3/MM3 (ref 0.1–0.9)
MONOCYTES NFR BLD AUTO: 5.9 % (ref 5–12)
NEUTROPHILS NFR BLD AUTO: 67 % (ref 42.7–76)
NEUTROPHILS NFR BLD AUTO: 7.63 10*3/MM3 (ref 1.7–7)
NRBC BLD AUTO-RTO: 0 /100 WBC (ref 0–0.2)
PLATELET # BLD AUTO: 483 10*3/MM3 (ref 140–450)
PMV BLD AUTO: 10.4 FL (ref 6–12)
POTASSIUM SERPL-SCNC: 4.4 MMOL/L (ref 3.5–5.2)
PROT SERPL-MCNC: 6.6 G/DL (ref 6–8.5)
RBC # BLD AUTO: 4.54 10*6/MM3 (ref 4.14–5.8)
SODIUM SERPL-SCNC: 140 MMOL/L (ref 136–145)
WBC NRBC COR # BLD: 11.39 10*3/MM3 (ref 3.4–10.8)

## 2023-08-21 DIAGNOSIS — J18.9 COMMUNITY ACQUIRED PNEUMONIA, UNSPECIFIED LATERALITY: Primary | ICD-10-CM

## 2023-08-30 ENCOUNTER — CLINICAL SUPPORT (OUTPATIENT)
Dept: FAMILY MEDICINE CLINIC | Facility: CLINIC | Age: 53
End: 2023-08-30
Payer: OTHER GOVERNMENT

## 2023-08-30 DIAGNOSIS — K80.20 SYMPTOMATIC CHOLELITHIASIS: Primary | ICD-10-CM

## 2023-08-30 LAB
ALBUMIN SERPL-MCNC: 4.1 G/DL (ref 3.5–5.2)
ALBUMIN/GLOB SERPL: 1.4 G/DL
ALP SERPL-CCNC: 81 U/L (ref 39–117)
ALT SERPL W P-5'-P-CCNC: 37 U/L (ref 1–41)
ANION GAP SERPL CALCULATED.3IONS-SCNC: 12.8 MMOL/L (ref 5–15)
AST SERPL-CCNC: 19 U/L (ref 1–40)
BASOPHILS # BLD AUTO: 0.11 10*3/MM3 (ref 0–0.2)
BASOPHILS NFR BLD AUTO: 1.5 % (ref 0–1.5)
BILIRUB SERPL-MCNC: 0.3 MG/DL (ref 0–1.2)
BUN SERPL-MCNC: 15 MG/DL (ref 6–20)
BUN/CREAT SERPL: 18.3 (ref 7–25)
CALCIUM SPEC-SCNC: 9.4 MG/DL (ref 8.6–10.5)
CHLORIDE SERPL-SCNC: 104 MMOL/L (ref 98–107)
CO2 SERPL-SCNC: 23.2 MMOL/L (ref 22–29)
CREAT SERPL-MCNC: 0.82 MG/DL (ref 0.76–1.27)
DEPRECATED RDW RBC AUTO: 39.5 FL (ref 37–54)
EGFRCR SERPLBLD CKD-EPI 2021: 104.4 ML/MIN/1.73
EOSINOPHIL # BLD AUTO: 0.13 10*3/MM3 (ref 0–0.4)
EOSINOPHIL NFR BLD AUTO: 1.7 % (ref 0.3–6.2)
ERYTHROCYTE [DISTWIDTH] IN BLOOD BY AUTOMATED COUNT: 12.9 % (ref 12.3–15.4)
GLOBULIN UR ELPH-MCNC: 3 GM/DL
GLUCOSE SERPL-MCNC: 159 MG/DL (ref 65–99)
HCT VFR BLD AUTO: 39.8 % (ref 37.5–51)
HGB BLD-MCNC: 13.7 G/DL (ref 13–17.7)
IMM GRANULOCYTES # BLD AUTO: 0.01 10*3/MM3 (ref 0–0.05)
IMM GRANULOCYTES NFR BLD AUTO: 0.1 % (ref 0–0.5)
LYMPHOCYTES # BLD AUTO: 2.4 10*3/MM3 (ref 0.7–3.1)
LYMPHOCYTES NFR BLD AUTO: 31.9 % (ref 19.6–45.3)
MCH RBC QN AUTO: 29.1 PG (ref 26.6–33)
MCHC RBC AUTO-ENTMCNC: 34.4 G/DL (ref 31.5–35.7)
MCV RBC AUTO: 84.7 FL (ref 79–97)
MONOCYTES # BLD AUTO: 0.54 10*3/MM3 (ref 0.1–0.9)
MONOCYTES NFR BLD AUTO: 7.2 % (ref 5–12)
NEUTROPHILS NFR BLD AUTO: 4.34 10*3/MM3 (ref 1.7–7)
NEUTROPHILS NFR BLD AUTO: 57.6 % (ref 42.7–76)
NRBC BLD AUTO-RTO: 0 /100 WBC (ref 0–0.2)
PLATELET # BLD AUTO: 275 10*3/MM3 (ref 140–450)
PMV BLD AUTO: 11 FL (ref 6–12)
POTASSIUM SERPL-SCNC: 4.3 MMOL/L (ref 3.5–5.2)
PROT SERPL-MCNC: 7.1 G/DL (ref 6–8.5)
RBC # BLD AUTO: 4.7 10*6/MM3 (ref 4.14–5.8)
SODIUM SERPL-SCNC: 140 MMOL/L (ref 136–145)
WBC NRBC COR # BLD: 7.53 10*3/MM3 (ref 3.4–10.8)

## 2023-08-30 PROCEDURE — 80053 COMPREHEN METABOLIC PANEL: CPT | Performed by: NURSE PRACTITIONER

## 2023-08-30 PROCEDURE — 85025 COMPLETE CBC W/AUTO DIFF WBC: CPT | Performed by: NURSE PRACTITIONER

## 2023-08-30 PROCEDURE — 36415 COLL VENOUS BLD VENIPUNCTURE: CPT | Performed by: NURSE PRACTITIONER

## 2023-08-30 NOTE — PROGRESS NOTES
Venipuncture Blood Specimen Collection  Venipuncture performed in RIGHT HAND by Yeni Mahan RN with good hemostasis. Patient tolerated the procedure well without complications.   08/30/23   Yeni Mahan RN   none

## 2023-08-31 DIAGNOSIS — R73.9 HYPERGLYCEMIA: Primary | ICD-10-CM

## 2023-09-01 ENCOUNTER — CLINICAL SUPPORT (OUTPATIENT)
Dept: FAMILY MEDICINE CLINIC | Facility: CLINIC | Age: 53
End: 2023-09-01
Payer: OTHER GOVERNMENT

## 2023-09-01 DIAGNOSIS — I10 ESSENTIAL HYPERTENSION: Primary | Chronic | ICD-10-CM

## 2023-09-01 LAB — HBA1C MFR BLD: 6.3 % (ref 4.8–5.6)

## 2023-09-01 PROCEDURE — 83036 HEMOGLOBIN GLYCOSYLATED A1C: CPT | Performed by: NURSE PRACTITIONER

## 2023-09-01 NOTE — PROGRESS NOTES
Venipuncture Blood Specimen Collection  Venipuncture performed in Right hand by Teresa Piedra MA with good hemostasis. Patient tolerated the procedure well without complications.   09/01/23   Teresa Piedra MA

## 2023-09-08 ENCOUNTER — TELEPHONE (OUTPATIENT)
Dept: FAMILY MEDICINE CLINIC | Facility: CLINIC | Age: 53
End: 2023-09-08

## 2023-09-08 DIAGNOSIS — M19.91 PRIMARY OSTEOARTHRITIS, UNSPECIFIED SITE: ICD-10-CM

## 2023-09-08 DIAGNOSIS — I70.90 ARTERIOSCLEROSIS: ICD-10-CM

## 2023-09-08 DIAGNOSIS — I10 ESSENTIAL HYPERTENSION: Primary | Chronic | ICD-10-CM

## 2023-09-08 RX ORDER — ATORVASTATIN CALCIUM 20 MG/1
20 TABLET, FILM COATED ORAL DAILY
Qty: 90 TABLET | Refills: 1 | Status: SHIPPED | OUTPATIENT
Start: 2023-09-08

## 2023-09-08 RX ORDER — AMLODIPINE BESYLATE 5 MG/1
5 TABLET ORAL DAILY
Qty: 30 TABLET | Refills: 11 | Status: CANCELLED | OUTPATIENT
Start: 2023-09-08

## 2023-09-08 RX ORDER — FLUTICASONE PROPIONATE 50 MCG
2 SPRAY, SUSPENSION (ML) NASAL DAILY
Qty: 15.8 ML | Refills: 5 | Status: SHIPPED | OUTPATIENT
Start: 2023-09-08

## 2023-09-08 RX ORDER — LOSARTAN POTASSIUM 50 MG/1
50 TABLET ORAL DAILY
Qty: 90 TABLET | Refills: 1 | Status: SHIPPED | OUTPATIENT
Start: 2023-09-08

## 2023-09-08 NOTE — TELEPHONE ENCOUNTER
Caller: LIZBET ALLRED    Relationship: Emergency Contact    Best call back number: [phone]     Requested Prescriptions:   Requested Prescriptions      No prescriptions requested or ordered in this encounter      FLONASE NASAL SPRAY    Pharmacy where request should be sent: [facility] - [place], [place] - [address] - [phone]  - [phone] FX     Last office visit with prescribing clinician: 8/18/2023   Last telemedicine visit with prescribing clinician: Visit date not found   Next office visit with prescribing clinician: 9/14/2023         Does the patient have less than a 3 day supply:  [] Yes  [x] No    Would you like a call back once the refill request has been completed: [] Yes [x] No    If the office needs to give you a call back, can they leave a voicemail: [] Yes [x] No    Pam [place] Rep   09/08/23 08:36 EDT

## 2023-09-08 NOTE — TELEPHONE ENCOUNTER
Caller: LIZBET ALLRED    Relationship: Emergency Contact    Best call back number: [phone]     Requested Prescriptions:   Requested Prescriptions      No prescriptions requested or ordered in this encounter     DICLOFENAC  LOSARTAN  LIPITOR  20 MG    Pharm[facility] where request should be sent: [facility] [address] - [phone]  - [phone] FX     Last office visit with prescribing clinician: 8/18/2023   Last telemedicine visit with prescribing clinician: Visit date not found   Next office visit with prescribing clinician: 9/14/2023         Does the patient have less than a 3 day supply:  [x] Yes  [] No    Would you like a call back once the refill request has been completed: [] Yes [x] No    If the office needs to give you a call back, can they leave a voicemail: [] Yes [x] No    Pam [place] Rep   09/08/23 08:33 EDT

## 2023-09-08 NOTE — TELEPHONE ENCOUNTER
Patient is needing a refill on Amlodipine 5mg. Medication was stopped while in hospital due to sepsis and was told to keep an eye on his BP to see if it goes back up. Wife called this morning saying a week after he was discharged, his BP went back up.

## 2023-09-11 RX ORDER — AMLODIPINE BESYLATE 5 MG/1
5 TABLET ORAL DAILY
Qty: 30 TABLET | Refills: 11 | Status: SHIPPED | OUTPATIENT
Start: 2023-09-11

## 2023-10-11 ENCOUNTER — OFFICE VISIT (OUTPATIENT)
Dept: FAMILY MEDICINE CLINIC | Facility: CLINIC | Age: 53
End: 2023-10-11
Payer: OTHER GOVERNMENT

## 2023-10-11 ENCOUNTER — TELEPHONE (OUTPATIENT)
Dept: ORTHOPEDIC SURGERY | Facility: CLINIC | Age: 53
End: 2023-10-11

## 2023-10-11 VITALS
BODY MASS INDEX: 38.36 KG/M2 | HEIGHT: 76 IN | WEIGHT: 315 LBS | OXYGEN SATURATION: 97 % | DIASTOLIC BLOOD PRESSURE: 80 MMHG | HEART RATE: 85 BPM | TEMPERATURE: 98 F | RESPIRATION RATE: 16 BRPM | SYSTOLIC BLOOD PRESSURE: 136 MMHG

## 2023-10-11 DIAGNOSIS — G89.29 CHRONIC RIGHT-SIDED LOW BACK PAIN WITH RIGHT-SIDED SCIATICA: Chronic | ICD-10-CM

## 2023-10-11 DIAGNOSIS — M54.41 CHRONIC RIGHT-SIDED LOW BACK PAIN WITH RIGHT-SIDED SCIATICA: Chronic | ICD-10-CM

## 2023-10-11 DIAGNOSIS — Z23 NEED FOR INFLUENZA VACCINATION: ICD-10-CM

## 2023-10-11 DIAGNOSIS — I10 ESSENTIAL HYPERTENSION: Chronic | ICD-10-CM

## 2023-10-11 DIAGNOSIS — L73.9 CHRONIC FOLLICULITIS: Chronic | ICD-10-CM

## 2023-10-11 DIAGNOSIS — R73.03 PREDIABETES: Primary | Chronic | ICD-10-CM

## 2023-10-11 DIAGNOSIS — E78.2 MIXED HYPERLIPIDEMIA: Chronic | ICD-10-CM

## 2023-10-11 RX ORDER — DOXYCYCLINE HYCLATE 100 MG/1
100 CAPSULE ORAL 2 TIMES DAILY
Qty: 20 CAPSULE | Refills: 0 | Status: SHIPPED | OUTPATIENT
Start: 2023-10-11

## 2023-10-11 RX ORDER — BLOOD-GLUCOSE METER
1 KIT MISCELLANEOUS DAILY
Qty: 1 EACH | Refills: 0 | Status: SHIPPED | OUTPATIENT
Start: 2023-10-11

## 2023-10-11 RX ORDER — METHOCARBAMOL 500 MG/1
500 TABLET, FILM COATED ORAL 4 TIMES DAILY
Qty: 60 TABLET | Refills: 0 | Status: SHIPPED | OUTPATIENT
Start: 2023-10-11

## 2023-10-11 RX ORDER — METHYLPREDNISOLONE 4 MG/1
TABLET ORAL
Qty: 21 TABLET | Refills: 0 | Status: SHIPPED | OUTPATIENT
Start: 2023-10-11

## 2023-10-11 RX ORDER — LANCETS 30 GAUGE
1 EACH MISCELLANEOUS 2 TIMES DAILY
Qty: 100 EACH | Refills: 12 | Status: SHIPPED | OUTPATIENT
Start: 2023-10-11

## 2023-10-11 NOTE — TELEPHONE ENCOUNTER
Caller: LIZBET ALLRED    Relationship to patient: Emergency Contact    Best call back number: [phone]    Chief complaint: LEFT ARM/SHOULDER    Type of visit: FOLLOW UP FOR EMG    Requested date: ASAP     Additional notes:PATIENT'S WIFE SCHEDULED AN APPT ON 11/13/23, BUT IS ASKING IF PATIENT CAN BE WORKED IN EARLIER.

## 2023-10-11 NOTE — PROGRESS NOTES
Immunization  Immunization performed in right arm by Linh Camargo MA. Patient tolerated the procedure well without complications.  10/11/23   Linh Camargo MA

## 2023-10-11 NOTE — PROGRESS NOTES
"Subjective   Jimmy Dawson Jr. is a 52 y.o. male.     Chief Complaint   Patient presents with    Hypertension       History of Present Illness  He presents for follow up of essential hypertension, mixed hyperlipidemia, and prediabetes. He did restart his blood pressure medicine. He feels like his blood pressure has been ok. He reports good compliance with statin. He c/o low back pain running down his left leg for about a month or so. He states it is a constant ache but he gets sharp pain sometimes. He alternates diclofenac and advil for pain. He took tramadol in the past for pain. He denies incontinency of stool or urine. He is doing stretches.        The following portions of the patient's history were reviewed and updated as appropriate: allergies, current medications, past family history, past medical history, past social history, past surgical history and problem list.    Review of Systems   HENT:  Negative for rhinorrhea.    Respiratory:  Negative for cough, shortness of breath and wheezing.    Cardiovascular:  Negative for chest pain and palpitations.   Gastrointestinal:  Negative for abdominal pain, blood in stool, constipation, diarrhea, nausea and vomiting.   Genitourinary:  Negative for dysuria.   Musculoskeletal:  Positive for arthralgias, back pain and myalgias.   Skin:  Positive for rash. Negative for color change.       Objective     /80 (BP Location: Right arm, Patient Position: Sitting, Cuff Size: Large Adult)   Pulse 85   Temp 98 øF (36.7 øC) (Temporal)   Resp 16   Ht 193 cm (75.98\")   Wt (!) 150 kg (330 lb)   SpO2 97%   BMI 40.19 kg/mý     Physical Exam  Vitals reviewed.   Constitutional:       General: He is not in acute distress.     Appearance: He is well-developed. He is not diaphoretic.   HENT:      Head: Normocephalic and atraumatic.   Cardiovascular:      Rate and Rhythm: Normal rate and regular rhythm.      Heart sounds: Normal heart sounds. No murmur heard.     No friction " rub. No gallop.   Pulmonary:      Effort: Pulmonary effort is normal. No respiratory distress.      Breath sounds: Normal breath sounds.   Abdominal:      General: Bowel sounds are normal. There is no distension.      Palpations: Abdomen is soft.      Tenderness: There is no abdominal tenderness.   Musculoskeletal:      Lumbar back: Tenderness present.   Skin:     General: Skin is warm and dry.   Neurological:      Mental Status: He is alert and oriented to person, place, and time.   Psychiatric:         Behavior: Behavior normal.         Thought Content: Thought content normal.         Judgment: Judgment normal.         Current Outpatient Medications   Medication Sig Dispense Refill    amLODIPine (NORVASC) 5 MG tablet Take 1 tablet by mouth Daily. 30 tablet 11    ascorbic acid (VITAMIN C) 500 MG tablet Take 1 tablet by mouth Daily.      atorvastatin (Lipitor) 20 MG tablet Take 1 tablet by mouth Daily. 90 tablet 1    busPIRone (BUSPAR) 7.5 MG tablet Take 1 tablet by mouth Every Night.      diclofenac (VOLTAREN) 50 MG EC tablet Take 1 tablet by mouth 3 (Three) Times a Day As Needed (joint pain). 270 tablet 1    fexofenadine (ALLEGRA) 180 MG tablet Take 1 tablet by mouth Every Night.      fluticasone (FLONASE) 50 MCG/ACT nasal spray 2 sprays into the nostril(s) as directed by provider Daily. 15.8 mL 5    ipratropium-albuterol (DUO-NEB) 0.5-2.5 mg/3 ml nebulizer Take 3 mL by nebulization 4 times a day as needed for wheezing 360 mL 0    losartan (Cozaar) 50 MG tablet Take 1 tablet by mouth Daily. 90 tablet 1    melatonin 5 MG tablet tablet Take 1 tablet by mouth Every Night.      omeprazole (priLOSEC) 40 MG capsule TAKE ONE CAPSULE BY MOUTH EVERY DAY FOR THE STOMACH 30 capsule 5    venlafaxine XR (EFFEXOR-XR) 150 MG 24 hr capsule Take 1 capsule by mouth Daily. 90 capsule 3    Vitamin D, Cholecalciferol, (CHOLECALCIFEROL) 10 MCG (400 UNIT) tablet Take 1 tablet by mouth Daily.      doxycycline (VIBRAMYCIN) 100 MG capsule  Take 1 capsule by mouth 2 (Two) Times a Day. 20 capsule 0    glucose blood test strip 1 each by Other route Daily. Use as instructed 100 each 12    glucose monitor monitoring kit Use 1 each Daily. 1 each 0    Lancets misc Use 1 Device 2 (Two) Times a Day. 100 each 12    metFORMIN (Glucophage) 500 MG tablet Take 1 tablet by mouth 2 (Two) Times a Day With Meals. 60 tablet 2    methocarbamol (ROBAXIN) 500 MG tablet Take 1 tablet by mouth 4 (Four) Times a Day. 60 tablet 0    methylPREDNISolone (MEDROL) 4 MG dose pack Take as directed on package instructions. 21 tablet 0     No current facility-administered medications for this visit.            Assessment & Plan     Problem List Items Addressed This Visit          Cardiac and Vasculature    Essential hypertension (Chronic)       Skin    Chronic folliculitis    Relevant Medications    doxycycline (VIBRAMYCIN) 100 MG capsule     Other Visit Diagnoses       Prediabetes    -  Primary    Relevant Medications    glucose blood test strip    glucose monitor monitoring kit    Lancets misc    metFORMIN (Glucophage) 500 MG tablet    Mixed hyperlipidemia        Chronic right-sided low back pain with right-sided sciatica        Relevant Medications    methylPREDNISolone (MEDROL) 4 MG dose pack    methocarbamol (ROBAXIN) 500 MG tablet    Other Relevant Orders    Ambulatory Referral to Physical Therapy Evaluate and treat (Completed)    XR Spine Lumbar 2 or 3 View    Need for influenza vaccination        Relevant Orders    Fluzone (or Fluarix & Flulaval for VFC) >6mos (Completed)              ICD-10-CM ICD-9-CM   1. Prediabetes  R73.03 790.29   2. Essential hypertension  I10 401.9   3. Mixed hyperlipidemia  E78.2 272.2   4. Chronic folliculitis  L73.9 704.8   5. Chronic right-sided low back pain with right-sided sciatica  M54.41 724.2    G89.29 724.3     338.29   6. Need for influenza vaccination  Z23 V04.81     Plan: Continue current meds. A1c elevated. We discussed diet and exercise  to improve. Glucometer ordered. Normal glucose parameters discussed. Start metformin. Doxycycline ordered for folliculitis. Medrol and robaxin ordered for low back pain. Refer to physical therapy. Flu vaccine ordered with his consent. Follow up in 6 weeks and as needed.     @Body mass index is 40.19 kg/mý.              Understands disease processes and need for medications.  Understands reasons for urgent and emergent care.  Patient (& family) verbalized agreement for treatment plan.   Emotional support and active listening provided.  Patient provided time to verbalize feelings.                  This document has been electronically signed by LIONEL Grider   October 11, 2023 16:49 EDT

## 2023-10-18 ENCOUNTER — HOSPITAL ENCOUNTER (OUTPATIENT)
Dept: GENERAL RADIOLOGY | Facility: HOSPITAL | Age: 53
Discharge: HOME | End: 2023-10-18
Admitting: NURSE PRACTITIONER
Payer: OTHER GOVERNMENT

## 2023-10-18 DIAGNOSIS — G89.29 CHRONIC RIGHT-SIDED LOW BACK PAIN WITH RIGHT-SIDED SCIATICA: Chronic | ICD-10-CM

## 2023-10-18 DIAGNOSIS — M54.41 CHRONIC RIGHT-SIDED LOW BACK PAIN WITH RIGHT-SIDED SCIATICA: Chronic | ICD-10-CM

## 2023-10-18 PROCEDURE — 72100 X-RAY EXAM L-S SPINE 2/3 VWS: CPT

## 2023-10-20 DIAGNOSIS — M54.41 CHRONIC RIGHT-SIDED LOW BACK PAIN WITH RIGHT-SIDED SCIATICA: Primary | ICD-10-CM

## 2023-10-20 DIAGNOSIS — G89.29 CHRONIC RIGHT-SIDED LOW BACK PAIN WITH RIGHT-SIDED SCIATICA: Primary | ICD-10-CM

## 2023-11-06 ENCOUNTER — TELEPHONE (OUTPATIENT)
Dept: FAMILY MEDICINE CLINIC | Facility: CLINIC | Age: 53
End: 2023-11-06
Payer: OTHER GOVERNMENT

## 2023-11-06 DIAGNOSIS — G89.29 CHRONIC RIGHT-SIDED LOW BACK PAIN WITH RIGHT-SIDED SCIATICA: Chronic | ICD-10-CM

## 2023-11-06 DIAGNOSIS — L73.9 CHRONIC FOLLICULITIS: Chronic | ICD-10-CM

## 2023-11-06 DIAGNOSIS — M54.41 CHRONIC RIGHT-SIDED LOW BACK PAIN WITH RIGHT-SIDED SCIATICA: Chronic | ICD-10-CM

## 2023-11-06 RX ORDER — METHOCARBAMOL 500 MG/1
500 TABLET, FILM COATED ORAL 4 TIMES DAILY
Qty: 60 TABLET | Refills: 5 | Status: SHIPPED | OUTPATIENT
Start: 2023-11-06

## 2023-11-06 RX ORDER — DOXYCYCLINE HYCLATE 100 MG/1
100 CAPSULE ORAL 2 TIMES DAILY
Qty: 20 CAPSULE | Refills: 0 | Status: SHIPPED | OUTPATIENT
Start: 2023-11-06

## 2023-11-06 NOTE — TELEPHONE ENCOUNTER
1) ROBAXIN / MUSCLE RELAXER REFILL    2) DOXYCYCLIN WORKED TO CLEAR UP HIS SINUSES BUT NOW THE ISSUE IS BACK. CAN [facility]OR WRITE HIM ANOTHER RX FOR IT?    @ [place]

## 2023-11-13 ENCOUNTER — OFFICE VISIT (OUTPATIENT)
Dept: ORTHOPEDIC SURGERY | Facility: CLINIC | Age: 53
End: 2023-11-13
Payer: OTHER GOVERNMENT

## 2023-11-13 VITALS — HEIGHT: 76 IN | BODY MASS INDEX: 38.36 KG/M2 | WEIGHT: 315 LBS

## 2023-11-13 DIAGNOSIS — K21.9 GASTROESOPHAGEAL REFLUX DISEASE WITHOUT ESOPHAGITIS: Chronic | ICD-10-CM

## 2023-11-13 DIAGNOSIS — G56.22 CUBITAL TUNNEL SYNDROME ON LEFT: Primary | ICD-10-CM

## 2023-11-13 PROCEDURE — 99213 OFFICE O/P EST LOW 20 MIN: CPT | Performed by: ORTHOPAEDIC SURGERY

## 2023-11-13 RX ORDER — OMEPRAZOLE 40 MG/1
CAPSULE, DELAYED RELEASE ORAL
Qty: 30 CAPSULE | Refills: 5 | Status: SHIPPED | OUTPATIENT
Start: 2023-11-13 | End: 2023-11-13 | Stop reason: SDUPTHER

## 2023-11-13 RX ORDER — OMEPRAZOLE 40 MG/1
40 CAPSULE, DELAYED RELEASE ORAL DAILY
Qty: 30 CAPSULE | Refills: 5 | Status: SHIPPED | OUTPATIENT
Start: 2023-11-13

## 2023-11-13 NOTE — PROGRESS NOTES
Follow-up Visit         Patient: Jimmy Dawson Jr.  YOB: 1970  Date of Encounter: 11/13/2023      Chief  Complaint:   Chief Complaint   Patient presents with    Left Shoulder - Pain, Follow-up         HPI:  Jimmy Dawson Jr., 52 y.o. male presents in follow-up left hand pain numbness involving fifth finger and one half of the fourth.  He presents having EMG nerve conduction studies completed.  Symptoms remain unchanged.        Medical History:  Patient Active Problem List   Diagnosis    Gastroesophageal reflux disease without esophagitis    Depression    Essential hypertension    Palpitations    SVT (supraventricular tachycardia), s/p RF ablation, in 2000 x2    WPW (Brayan-Parkinson-White syndrome), s/p RF ablation 2000.    Chronic rhinitis    TAMIKO (obstructive sleep apnea)    Typical atrial flutter    History of 2019 novel coronavirus disease (COVID-19)    Anxiety    Chronic folliculitis    Primary osteoarthritis of left knee    Symptomatic cholelithiasis    CAP (community acquired pneumonia)    Status post laparoscopic cholecystectomy    Prediabetes    Mixed hyperlipidemia    Chronic right-sided low back pain with right-sided sciatica     Past Medical History:   Diagnosis Date    Acid reflux     Allergic     Anxiety     Arthritis     Depression     GERD (gastroesophageal reflux disease)     Hypertension     Infectious mononucleosis     Mixed hyperlipidemia 10/11/2023    TAMIKO (obstructive sleep apnea)     NO OXYGEN USE    PONV (postoperative nausea and vomiting)     Seasonal allergies     Sinusitis     Sleep apnea     c pap    Urinary tract infection     WPW (Brayan-Parkinson-White syndrome)            Social History:  Social History     Socioeconomic History    Marital status:    Tobacco Use    Smoking status: Never    Smokeless tobacco: Never   Vaping Use    Vaping Use: Never used   Substance and Sexual Activity    Alcohol use: Yes     Comment: 4-5 BEERS, TWICE A WEEK    Drug use: No     Sexual activity: Defer           Current Medications:    Current Outpatient Medications:     amLODIPine (NORVASC) 5 MG tablet, Take 1 tablet by mouth Daily., Disp: 30 tablet, Rfl: 11    ascorbic acid (VITAMIN C) 500 MG tablet, Take 1 tablet by mouth Daily., Disp: , Rfl:     atorvastatin (Lipitor) 20 MG tablet, Take 1 tablet by mouth Daily., Disp: 90 tablet, Rfl: 1    busPIRone (BUSPAR) 7.5 MG tablet, Take 1 tablet by mouth Every Night., Disp: , Rfl:     diclofenac (VOLTAREN) 50 MG EC tablet, Take 1 tablet by mouth 3 (Three) Times a Day As Needed (joint pain)., Disp: 270 tablet, Rfl: 1    fexofenadine (ALLEGRA) 180 MG tablet, Take 1 tablet by mouth Every Night., Disp: , Rfl:     fluticasone (FLONASE) 50 MCG/ACT nasal spray, 2 sprays into the nostril(s) as directed by provider Daily., Disp: 15.8 mL, Rfl: 5    glucose blood test strip, 1 each by Other route Daily. Use as instructed, Disp: 100 each, Rfl: 12    glucose monitor monitoring kit, Use 1 each Daily., Disp: 1 each, Rfl: 0    ipratropium-albuterol (DUO-NEB) 0.5-2.5 mg/3 ml nebulizer, Take 3 mL by nebulization 4 times a day as needed for wheezing, Disp: 360 mL, Rfl: 0    Lancets misc, Use 1 Device 2 (Two) Times a Day., Disp: 100 each, Rfl: 12    losartan (Cozaar) 50 MG tablet, Take 1 tablet by mouth Daily., Disp: 90 tablet, Rfl: 1    melatonin 5 MG tablet tablet, Take 1 tablet by mouth Every Night., Disp: , Rfl:     metFORMIN (Glucophage) 500 MG tablet, Take 1 tablet by mouth 2 (Two) Times a Day With Meals., Disp: 60 tablet, Rfl: 2    methocarbamol (ROBAXIN) 500 MG tablet, Take 1 tablet by mouth 4 (Four) Times a Day., Disp: 60 tablet, Rfl: 5    methylPREDNISolone (MEDROL) 4 MG dose pack, Take as directed on package instructions., Disp: 21 tablet, Rfl: 0    venlafaxine XR (EFFEXOR-XR) 150 MG 24 hr capsule, Take 1 capsule by mouth Daily., Disp: 90 capsule, Rfl: 3    Vitamin D, Cholecalciferol, (CHOLECALCIFEROL) 10 MCG (400 UNIT) tablet, Take 1 tablet by mouth  Daily., Disp: , Rfl:     doxycycline (VIBRAMYCIN) 100 MG capsule, Take 1 capsule by mouth 2 (Two) Times a Day., Disp: 20 capsule, Rfl: 0    omeprazole (priLOSEC) 40 MG capsule, Take 1 capsule by mouth Daily., Disp: 30 capsule, Rfl: 5        Allergies:  Allergies   Allergen Reactions    Aleve [Naproxen] Hives           Family History:  Family History   Problem Relation Age of Onset    Hypertension Mother     Hypertension Father     Hyperlipidemia Father     Diabetes Father     Heart disease Father     Diabetes Brother     Diabetes Maternal Grandfather     Heart attack Maternal Grandfather     Diabetes Paternal Grandfather     Diabetes Maternal Grandmother     Cancer Maternal Grandmother            Surgical History:  Past Surgical History:   Procedure Laterality Date    ABLATION OF DYSRHYTHMIC FOCUS  1999 and 2000    Dr. Carlin    CARDIAC CATHETERIZATION      CARDIAC ELECTROPHYSIOLOGY PROCEDURE N/A 06/04/2019    Procedure: Flutter;  Surgeon: Juan Carlin MD;  Location: [facility] INVASIVE LOCATION;  Service: Cardiology    CARDIAC SURGERY      CHOLECYSTECTOMY N/A 8/8/2023    Procedure: CHOLECYSTECTOMY LAPAROSCOPIC;  Surgeon: Miles Sanders MD;  Location: [facility] OR;  Service: General;  Laterality: N/A;    COLONOSCOPY      WISDOM TOOTH EXTRACTION             Radiology:   Nerve conduction studies demonstrate moderate to severe ulnar neuropathy secondary to ulnar nerve entrapment left elbow.        Orthopedic Examination: Left upper extremity reveals diminished sensation involving fourth and fifth fingers.  Normal thenar tone.          Assessment & Plan:   52 y.o. male presents with numbness involving his fourth and fifth fingers left hand with nerve conduction studies identifying moderate to severe ulnar neuropathy with minimal evidence of the cervical source and very mild median neuropathy.  We discussed options he would like to postpone surgery for now but will consider cubital tunnel release in the future he  will return.           Diagnosis Plan   1. Cubital tunnel syndrome on left                    Cc:  Nena Pike APRN              This document has been electronically signed by Huan Corrales MD   November 14, 2023 08:51 EST

## 2023-12-05 ENCOUNTER — OFFICE VISIT (OUTPATIENT)
Dept: ORTHOPEDIC SURGERY | Facility: CLINIC | Age: 53
End: 2023-12-05
Payer: OTHER GOVERNMENT

## 2023-12-05 VITALS
HEIGHT: 76 IN | OXYGEN SATURATION: 96 % | WEIGHT: 315 LBS | DIASTOLIC BLOOD PRESSURE: 81 MMHG | SYSTOLIC BLOOD PRESSURE: 128 MMHG | BODY MASS INDEX: 38.36 KG/M2 | HEART RATE: 83 BPM

## 2023-12-05 DIAGNOSIS — M25.652 HIP JOINT STIFFNESS, BILATERAL: ICD-10-CM

## 2023-12-05 DIAGNOSIS — F32.4 MAJOR DEPRESSIVE DISORDER WITH SINGLE EPISODE, IN PARTIAL REMISSION: ICD-10-CM

## 2023-12-05 DIAGNOSIS — S32.010A TRAUMATIC COMPRESSION FRACTURE OF L1 LUMBAR VERTEBRA, CLOSED, INITIAL ENCOUNTER: ICD-10-CM

## 2023-12-05 DIAGNOSIS — M46.1 SACROILIITIS: ICD-10-CM

## 2023-12-05 DIAGNOSIS — M79.10 MUSCLE PAIN: ICD-10-CM

## 2023-12-05 DIAGNOSIS — G89.29 CHRONIC MIDLINE LOW BACK PAIN WITH RIGHT-SIDED SCIATICA: Primary | ICD-10-CM

## 2023-12-05 DIAGNOSIS — S22.080A TRAUMATIC COMPRESSION FRACTURE OF T12 THORACIC VERTEBRA, CLOSED, INITIAL ENCOUNTER: ICD-10-CM

## 2023-12-05 DIAGNOSIS — M25.651 HIP JOINT STIFFNESS, BILATERAL: ICD-10-CM

## 2023-12-05 DIAGNOSIS — G57.01 PIRIFORMIS SYNDROME OF RIGHT SIDE: ICD-10-CM

## 2023-12-05 DIAGNOSIS — M54.41 CHRONIC RIGHT-SIDED LOW BACK PAIN WITH RIGHT-SIDED SCIATICA: Chronic | ICD-10-CM

## 2023-12-05 DIAGNOSIS — F41.9 ANXIETY: ICD-10-CM

## 2023-12-05 DIAGNOSIS — M54.41 CHRONIC MIDLINE LOW BACK PAIN WITH RIGHT-SIDED SCIATICA: Primary | ICD-10-CM

## 2023-12-05 DIAGNOSIS — M62.838 MUSCLE SPASM: ICD-10-CM

## 2023-12-05 DIAGNOSIS — G89.29 CHRONIC RIGHT-SIDED LOW BACK PAIN WITH RIGHT-SIDED SCIATICA: Chronic | ICD-10-CM

## 2023-12-05 RX ORDER — METHOCARBAMOL 500 MG/1
1000 TABLET, FILM COATED ORAL EVERY 8 HOURS PRN
Qty: 90 TABLET | Refills: 2 | Status: SHIPPED | OUTPATIENT
Start: 2023-12-05

## 2023-12-05 RX ORDER — BUSPIRONE HYDROCHLORIDE 7.5 MG/1
7.5 TABLET ORAL NIGHTLY
Qty: 90 TABLET | Refills: 1 | Status: SHIPPED | OUTPATIENT
Start: 2023-12-05

## 2023-12-05 RX ORDER — MELOXICAM 7.5 MG/1
15 TABLET ORAL DAILY
COMMUNITY

## 2023-12-05 RX ORDER — FLUTICASONE PROPIONATE 50 MCG
2 SPRAY, SUSPENSION (ML) NASAL DAILY
Qty: 15.8 ML | Refills: 5 | Status: SHIPPED | OUTPATIENT
Start: 2023-12-05

## 2023-12-05 RX ORDER — VENLAFAXINE HYDROCHLORIDE 150 MG/1
150 CAPSULE, EXTENDED RELEASE ORAL DAILY
Qty: 90 CAPSULE | Refills: 3 | Status: SHIPPED | OUTPATIENT
Start: 2023-12-05

## 2023-12-05 RX ORDER — FEXOFENADINE HCL 180 MG/1
180 TABLET ORAL NIGHTLY
Qty: 90 TABLET | Refills: 1 | Status: SHIPPED | OUTPATIENT
Start: 2023-12-05

## 2023-12-05 NOTE — PROGRESS NOTES
New Patient Visit      Patient: Jimmy Dawson Jr.  YOB: 1970  Date of Encounter: 12/05/2023  PCP: Nena Pike APRN  Referring Provider: No ref. provider found     Subjective   Jimmy Dawson Jr. is a 52 y.o. male who presents to the office today for evaluation of Initial Evaluation and Pain of the Lumbar Spine      Chief Complaint   Patient presents with    Lumbar Spine - Initial Evaluation, Pain       HPI  New patient who presents complaining of chronic low back pain that has been going on for years.  States that he has low back pain that has been causing numbness down into the lateral right thigh for several years worsening pain x 6 months that is getting radiation down into the right leg especially when getting in and out of the vehicle.  Does have a history of old compression fracture from when he fell off of a tractor bucket more than a year and a half ago.  Has been in physical therapy now for 3 weeks and using a TENS unit which helps.  Has been on diclofenac chronically for multiple issues for years which helps.  States that his PCP just picked changed this medicine to Mobic but he has not picked it up yet.  Patient also notes that he had a hospitalization in August of this year for 3 days with gallbladder removal of and was also treated for sepsis and pneumonia afterward and states that his back pain flared up badly after laying in bed in the hospital for that time  Patient Active Problem List   Diagnosis    Gastroesophageal reflux disease without esophagitis    Depression    Essential hypertension    Palpitations    SVT (supraventricular tachycardia), s/p RF ablation, in 2000 x2    WPW (Brayan-Parkinson-White syndrome), s/p RF ablation 2000.    Chronic rhinitis    TAMIKO (obstructive sleep apnea)    Typical atrial flutter    History of 2019 novel coronavirus disease (COVID-19)    Anxiety    Chronic folliculitis    Primary osteoarthritis of left knee    Symptomatic cholelithiasis     CAP (community acquired pneumonia)    Status post laparoscopic cholecystectomy    Prediabetes    Mixed hyperlipidemia    Chronic right-sided low back pain with right-sided sciatica       Past Medical History:   Diagnosis Date    Acid reflux     Allergic     Anxiety     Arthritis     Depression     GERD (gastroesophageal reflux disease)     Hypertension     Infectious mononucleosis     Mixed hyperlipidemia 10/11/2023    TAMIKO (obstructive sleep apnea)     NO OXYGEN USE    PONV (postoperative nausea and vomiting)     Seasonal allergies     Sinusitis     Sleep apnea     c pap    Urinary tract infection     WPW (Brayan-Parkinson-White syndrome)        Past Surgical History:   Procedure Laterality Date    ABLATION OF DYSRHYTHMIC FOCUS  1999 and 2000    Dr. Carlin    CARDIAC CATHETERIZATION      CARDIAC ELECTROPHYSIOLOGY PROCEDURE N/A 06/04/2019    Procedure: Flutter;  Surgeon: Juan Carlin MD;  Location:  [facility] INVASIVE LOCATION;  Service: Cardiology    CARDIAC SURGERY      CHOLECYSTECTOMY N/A 8/8/2023    Procedure: CHOLECYSTECTOMY LAPAROSCOPIC;  Surgeon: Miles Sanders MD;  Location:  [facility] OR;  Service: General;  Laterality: N/A;    COLONOSCOPY      WISDOM TOOTH EXTRACTION         Family History   Problem Relation Age of Onset    Hypertension Mother     Hypertension Father     Hyperlipidemia Father     Diabetes Father     Heart disease Father     Diabetes Brother     Diabetes Maternal Grandfather     Heart attack Maternal Grandfather     Diabetes Paternal Grandfather     Diabetes Maternal Grandmother     Cancer Maternal Grandmother        Social History     Socioeconomic History    Marital status:    Tobacco Use    Smoking status: Never    Smokeless tobacco: Never   Vaping Use    Vaping Use: Never used   Substance and Sexual Activity    Alcohol use: Yes     Comment: 4-5 BEERS, TWICE A WEEK    Drug use: No    Sexual activity: Defer       Current Outpatient Medications   Medication Sig Dispense Refill     amLODIPine (NORVASC) 5 MG tablet Take 1 tablet by mouth Daily. 30 tablet 11    ascorbic acid (VITAMIN C) 500 MG tablet Take 1 tablet by mouth Daily.      atorvastatin (Lipitor) 20 MG tablet Take 1 tablet by mouth Daily. 90 tablet 1    busPIRone (BUSPAR) 7.5 MG tablet Take 1 tablet by mouth Every Night.      doxycycline (VIBRAMYCIN) 100 MG capsule Take 1 capsule by mouth 2 (Two) Times a Day. 20 capsule 0    fexofenadine (ALLEGRA) 180 MG tablet Take 1 tablet by mouth Every Night.      fluticasone (FLONASE) 50 MCG/ACT nasal spray 2 sprays into the nostril(s) as directed by provider Daily. 15.8 mL 5    glucose blood test strip 1 each by Other route Daily. Use as instructed 100 each 12    glucose monitor monitoring kit Use 1 each Daily. 1 each 0    ipratropium-albuterol (DUO-NEB) 0.5-2.5 mg/3 ml nebulizer Take 3 mL by nebulization 4 times a day as needed for wheezing 360 mL 0    Lancets misc Use 1 Device 2 (Two) Times a Day. 100 each 12    losartan (Cozaar) 50 MG tablet Take 1 tablet by mouth Daily. 90 tablet 1    melatonin 5 MG tablet tablet Take 1 tablet by mouth Every Night.      metFORMIN (Glucophage) 500 MG tablet Take 1 tablet by mouth 2 (Two) Times a Day With Meals. 60 tablet 2    methocarbamol (ROBAXIN) 500 MG tablet Take 2 tablets by mouth Every 8 (Eight) Hours As Needed for Muscle Spasms. 90 tablet 2    methylPREDNISolone (MEDROL) 4 MG dose pack Take as directed on package instructions. 21 tablet 0    omeprazole (priLOSEC) 40 MG capsule Take 1 capsule by mouth Daily. 30 capsule 5    venlafaxine XR (EFFEXOR-XR) 150 MG 24 hr capsule Take 1 capsule by mouth Daily. 90 capsule 3    Vitamin D, Cholecalciferol, (CHOLECALCIFEROL) 10 MCG (400 UNIT) tablet Take 1 tablet by mouth Daily.      meloxicam (MOBIC) 7.5 MG tablet Take 2 tablets by mouth Daily.       No current facility-administered medications for this visit.       Allergies   Allergen Reactions    Aleve [Naproxen] Hives       Review of Systems  "  Constitutional:  Positive for activity change. Negative for fever.   Respiratory:  Negative for shortness of breath and wheezing.    Cardiovascular:  Negative for chest pain.   Musculoskeletal:  Positive for arthralgias and myalgias.   Skin:  Negative for color change and wound.   Neurological:  Positive for weakness and numbness.       Visit Vitals  /81 (BP Location: Left arm, Patient Position: Sitting, Cuff Size: Adult)   Pulse 83   Ht 193 cm (75.98\")   Wt (!) 153 kg (337 lb)   SpO2 96%   BMI 41.04 kg/m²     52 y.o.male  Physical Exam  Vitals and nursing note reviewed.   Constitutional:       General: He is not in acute distress.     Appearance: Normal appearance. He is obese.   Pulmonary:      Effort: Pulmonary effort is normal. No respiratory distress.   Musculoskeletal:      Lumbar back: Spasms and tenderness present. Decreased range of motion. Negative right straight leg raise test and negative left straight leg raise test.      Right hip: Decreased range of motion. Normal strength.      Left hip: Decreased range of motion. Normal strength.      Comments: Mild soreness of the lower lumbar spine.  Nontender in the T12-L1 area of the previous compression fractures    Negative Sanchez test seated and supine straight leg raise test  Tender right sacroiliac joint somewhat recreating the patient's pain.    Spasm of the hip and thigh musculature bilaterally with significantly restricted hip range of motion in flexion without significant pain.  Bent knee hip flexion bilaterally causes pain in the left SI area    Cannot recreate patient's radicular symptoms today on exam   Skin:     General: Skin is warm and dry.      Findings: No erythema.   Neurological:      General: No focal deficit present.      Mental Status: He is alert.      Sensory: No sensory deficit.      Motor: No weakness.         Radiology Results:    No radiology results for the last 30 days.  XR Spine Lumbar 2 or 3 View  Narrative: EXAM:    XR " Lumbosacral Spine, 2 or 3 Views     EXAM DATE:    10/18/2023 2:24 PM     CLINICAL HISTORY:    pain with sciatica; M54.41-Lumbago with sciatica, right side;  G89.29-Other chronic pain     TECHNIQUE:    Frontal and lateral views of the lumbar spine and sacrum.     COMPARISON:    No relevant prior studies available.     FINDINGS:    VERTEBRAE:  L1-T12 vertebral body anterior mild wedge compression  deformities.  Normal alignment.    SACRUM/COCCYX:  Unremarkable as visualized.  No acute fracture.    DISC SPACES:  No acute findings.  No significant narrowing.    SOFT TISSUES:  Unremarkable as visualized.     Impression:   No acute findings in the lumbar spine.        This report was finalized on 10/18/2023 2:03 PM by Dr. Sterling Acosta MD.         Assessment & Plan   Diagnoses and all orders for this visit:    1. Chronic midline low back pain with right-sided sciatica (Primary)    2. Traumatic compression fracture of L1 lumbar vertebra, closed, initial encounter  Comments:  old, healed    3. Traumatic compression fracture of T12 thoracic vertebra, closed, initial encounter  Comments:  old  healed    4. Sacroiliitis    5. Muscle pain    6. Muscle spasm    7. Piriformis syndrome of right side    8. Hip joint stiffness, bilateral    9. Chronic right-sided low back pain with right-sided sciatica  -     methocarbamol (ROBAXIN) 500 MG tablet; Take 2 tablets by mouth Every 8 (Eight) Hours As Needed for Muscle Spasms.  Dispense: 90 tablet; Refill: 2         MEDS ORDERED DURING VISIT:  New Medications Ordered This Visit   Medications    methocarbamol (ROBAXIN) 500 MG tablet     Sig: Take 2 tablets by mouth Every 8 (Eight) Hours As Needed for Muscle Spasms.     Dispense:  90 tablet     Refill:  2     MEDICATION ISSUES:  Discussed medication options and treatment plan of prescribed medication as well as the risks, benefits, and side effects including potential falls, possible impaired driving and metabolic adversities among others.  Patient is agreeable to call the office with any worsening of symptoms or onset of side effects. Patient is agreeable to call 911 or go to the nearest ER should he/she begin having SI/HI.     Discussion:  Cannot recreate patient's radicular symptoms today on exam.  Could be related to the spine or piriformis syndrome.  Patient will continue PT have given him extra exercises for his SI and piriformis and hip range of motion.  Will increase patient's methocarbamol dose.  Will hold off on changing NSAIDs as he has just been changed to Mobic by another physician and has not started yet.  Patient needs to use his TENS unit that he already has at home.  Will consider MRI if patient does not respond to PT.  Follow-up in 1 month             This document has been electronically signed by Dwayne Wang DO   December 5, 2023 10:16 EST    Part of this note may be an electronic transcription/translation of spoken language to printed text using the Dragon Dictation System.

## 2023-12-07 ENCOUNTER — TELEPHONE (OUTPATIENT)
Dept: FAMILY MEDICINE CLINIC | Facility: CLINIC | Age: 53
End: 2023-12-07

## 2023-12-07 NOTE — TELEPHONE ENCOUNTER
Are they wanting to see neurosurgery? Neurosurgery will not see without an MRI. Has he done 6 weeks of physical therapy?

## 2023-12-07 NOTE — TELEPHONE ENCOUNTER
Caller: LIZBET ALLRED    Relationship: Emergency Contact    Best call back number: [phone]     What is the medical concern/diagnosis: BACK PAIN    What specialty or service is being requested: NEUROSURGEON         Any additional details: PATIENTS WIFE WOULD LIKE CLARIFICATION ON THE REFERRAL PATIENT IS NEEDING AS PREVIOUSLY DISCUSSED. PLEASE  CALL PATIENTS WIFE

## 2023-12-12 DIAGNOSIS — I70.90 ARTERIOSCLEROSIS: ICD-10-CM

## 2023-12-12 DIAGNOSIS — I10 ESSENTIAL HYPERTENSION: Chronic | ICD-10-CM

## 2023-12-12 DIAGNOSIS — L73.9 CHRONIC FOLLICULITIS: Chronic | ICD-10-CM

## 2023-12-12 RX ORDER — ATORVASTATIN CALCIUM 20 MG/1
20 TABLET, FILM COATED ORAL DAILY
Qty: 90 TABLET | Refills: 1 | Status: SHIPPED | OUTPATIENT
Start: 2023-12-12

## 2023-12-12 RX ORDER — DOXYCYCLINE HYCLATE 100 MG/1
100 CAPSULE ORAL 2 TIMES DAILY
Qty: 20 CAPSULE | Refills: 0 | Status: SHIPPED | OUTPATIENT
Start: 2023-12-12

## 2023-12-12 RX ORDER — LOSARTAN POTASSIUM 50 MG/1
50 TABLET ORAL DAILY
Qty: 90 TABLET | Refills: 1 | Status: SHIPPED | OUTPATIENT
Start: 2023-12-12 | End: 2023-12-15 | Stop reason: SDUPTHER

## 2023-12-15 ENCOUNTER — TELEPHONE (OUTPATIENT)
Dept: FAMILY MEDICINE CLINIC | Facility: CLINIC | Age: 53
End: 2023-12-15

## 2023-12-15 DIAGNOSIS — I10 ESSENTIAL HYPERTENSION: Chronic | ICD-10-CM

## 2023-12-15 RX ORDER — LOSARTAN POTASSIUM 50 MG/1
75 TABLET ORAL DAILY
Qty: 180 TABLET | Refills: 1 | Status: SHIPPED | OUTPATIENT
Start: 2023-12-15

## 2023-12-15 NOTE — TELEPHONE ENCOUNTER
Caller: LIZBET ALLRED    Relationship: Emergency Contact    Best call back number: [phone]     What was the call regarding: PATIENT'S WIFE CALLED STATING THAT THE PATIENT'S PRESCRIPTION FOR LOSARTAN WAS WRITTEN FOR 1 TABLET A DAY INSTEAD OF 1 1/2 TABLET.

## 2024-01-02 ENCOUNTER — TELEPHONE (OUTPATIENT)
Dept: ORTHOPEDIC SURGERY | Facility: CLINIC | Age: 54
End: 2024-01-02
Payer: OTHER GOVERNMENT

## 2024-01-02 NOTE — TELEPHONE ENCOUNTER
Faxed request office notes to [facility]   fax [phone]    Seen Dr. Wang 12/05/2023  Seen Dr. Corrales 06/26/2023

## 2024-01-26 ENCOUNTER — OFFICE VISIT (OUTPATIENT)
Dept: FAMILY MEDICINE CLINIC | Facility: CLINIC | Age: 54
End: 2024-01-26
Payer: OTHER GOVERNMENT

## 2024-01-26 VITALS
RESPIRATION RATE: 18 BRPM | DIASTOLIC BLOOD PRESSURE: 70 MMHG | HEART RATE: 108 BPM | SYSTOLIC BLOOD PRESSURE: 128 MMHG | TEMPERATURE: 97.5 F | OXYGEN SATURATION: 98 % | BODY MASS INDEX: 38.36 KG/M2 | HEIGHT: 76 IN | WEIGHT: 315 LBS

## 2024-01-26 DIAGNOSIS — G47.30 SLEEP APNEA, UNSPECIFIED TYPE: Chronic | ICD-10-CM

## 2024-01-26 DIAGNOSIS — Z12.5 SCREENING PSA (PROSTATE SPECIFIC ANTIGEN): ICD-10-CM

## 2024-01-26 DIAGNOSIS — L73.9 CHRONIC FOLLICULITIS: Chronic | ICD-10-CM

## 2024-01-26 DIAGNOSIS — R73.03 PREDIABETES: Chronic | ICD-10-CM

## 2024-01-26 DIAGNOSIS — M54.41 CHRONIC RIGHT-SIDED LOW BACK PAIN WITH RIGHT-SIDED SCIATICA: Chronic | ICD-10-CM

## 2024-01-26 DIAGNOSIS — I10 ESSENTIAL HYPERTENSION: Primary | Chronic | ICD-10-CM

## 2024-01-26 DIAGNOSIS — G89.29 CHRONIC RIGHT-SIDED LOW BACK PAIN WITH RIGHT-SIDED SCIATICA: Chronic | ICD-10-CM

## 2024-01-26 DIAGNOSIS — L91.8 SKIN TAG: Chronic | ICD-10-CM

## 2024-01-26 DIAGNOSIS — E78.2 MIXED HYPERLIPIDEMIA: Chronic | ICD-10-CM

## 2024-01-26 DIAGNOSIS — H91.93 HEARING DEFICIT, BILATERAL: Chronic | ICD-10-CM

## 2024-01-26 DIAGNOSIS — E66.01 CLASS 3 SEVERE OBESITY WITH SERIOUS COMORBIDITY AND BODY MASS INDEX (BMI) OF 40.0 TO 44.9 IN ADULT, UNSPECIFIED OBESITY TYPE: Chronic | ICD-10-CM

## 2024-01-26 PROBLEM — E66.813 CLASS 3 SEVERE OBESITY WITH SERIOUS COMORBIDITY AND BODY MASS INDEX (BMI) OF 40.0 TO 44.9 IN ADULT: Chronic | Status: ACTIVE | Noted: 2024-01-26

## 2024-01-26 PROCEDURE — 83036 HEMOGLOBIN GLYCOSYLATED A1C: CPT | Performed by: NURSE PRACTITIONER

## 2024-01-26 PROCEDURE — 99214 OFFICE O/P EST MOD 30 MIN: CPT | Performed by: NURSE PRACTITIONER

## 2024-01-26 PROCEDURE — 80061 LIPID PANEL: CPT | Performed by: NURSE PRACTITIONER

## 2024-01-26 PROCEDURE — G0103 PSA SCREENING: HCPCS | Performed by: NURSE PRACTITIONER

## 2024-01-26 PROCEDURE — 36415 COLL VENOUS BLD VENIPUNCTURE: CPT | Performed by: NURSE PRACTITIONER

## 2024-01-26 PROCEDURE — 80053 COMPREHEN METABOLIC PANEL: CPT | Performed by: NURSE PRACTITIONER

## 2024-01-26 PROCEDURE — 85025 COMPLETE CBC W/AUTO DIFF WBC: CPT | Performed by: NURSE PRACTITIONER

## 2024-01-26 RX ORDER — GABAPENTIN 300 MG/1
300 CAPSULE ORAL 2 TIMES DAILY
COMMUNITY

## 2024-01-26 RX ORDER — MELOXICAM 15 MG/1
15 TABLET ORAL DAILY
COMMUNITY

## 2024-01-26 RX ORDER — METHOCARBAMOL 500 MG/1
1000 TABLET, FILM COATED ORAL EVERY 8 HOURS PRN
Qty: 90 TABLET | Refills: 2 | Status: SHIPPED | OUTPATIENT
Start: 2024-01-26

## 2024-01-26 RX ORDER — SEMAGLUTIDE 0.25 MG/.5ML
0.25 INJECTION, SOLUTION SUBCUTANEOUS WEEKLY
Qty: 2 ML | Refills: 0 | Status: SHIPPED | OUTPATIENT
Start: 2024-01-26

## 2024-01-26 NOTE — PROGRESS NOTES
Subjective   Jimmy Dawson Jr. is a 53 y.o. male.     Chief Complaint   Patient presents with    Hypertension    Hyperlipidemia    Prediabetes    Obesity    Rash    skin tags       History of Present Illness  He presents for follow up of essential hypertension, mixed hyperlipidemia, and prediabetes. He reports good blood pressure control. He reports good compliance with statin. He has not started metformin yet. He c/o being overweight. His weight started with treatment for anxiety and depression. He was then diagnosed with sleep apnea. He has dieted and exercised for 6 months. He has cut out pop and has been eating lean meats. He c/o feeling hungry all the time. He also presents for follow up of low back pain. He states ortho increased his muscle relaxer. The pain has improved some but it is still there. He has been doing physical therapy for more than 6 weeks. Ortho notes reviewed. He also presents for follow up of chronic folliculitis. Doxycycline helps but he is concerned about taking it all the time. He also c/o an increasing number of skin tags that is irritating.       The following portions of the patient's history were reviewed and updated as appropriate: allergies, current medications, past family history, past medical history, past social history, past surgical history and problem list.    Review of Systems   Constitutional:  Negative for fever.   Respiratory:  Negative for cough, shortness of breath and wheezing.    Cardiovascular:  Negative for chest pain and palpitations.   Gastrointestinal:  Negative for abdominal pain, blood in stool, constipation, diarrhea, nausea and vomiting.   Genitourinary:  Negative for dysuria and hematuria.   Musculoskeletal:  Positive for arthralgias and myalgias.   Skin:  Positive for rash. Negative for color change.   Neurological:  Negative for dizziness and headaches.   Psychiatric/Behavioral:  Negative for suicidal ideas. The patient is nervous/anxious.   "      Objective     /70 (BP Location: Right arm, Patient Position: Sitting, Cuff Size: Large Adult)   Pulse 108   Temp 97.5 °F (36.4 °C) (Temporal)   Resp 18   Ht 193 cm (75.98\")   Wt (!) 152 kg (334 lb)   SpO2 98%   BMI 40.67 kg/m²     Physical Exam  Vitals reviewed.   Constitutional:       General: He is not in acute distress.     Appearance: He is well-developed. He is not diaphoretic.   HENT:      Head: Normocephalic and atraumatic.   Cardiovascular:      Rate and Rhythm: Regular rhythm. Tachycardia present.      Heart sounds: Normal heart sounds. No murmur heard.     No friction rub. No gallop.   Pulmonary:      Effort: Pulmonary effort is normal. No respiratory distress.      Breath sounds: Normal breath sounds.   Abdominal:      General: Bowel sounds are normal. There is no distension.      Palpations: Abdomen is soft.      Tenderness: There is no abdominal tenderness.   Skin:     General: Skin is warm and dry.      Comments: Folliculitis on the face. Skin tags scattered on back and bilateral axillae.    Neurological:      Mental Status: He is alert and oriented to person, place, and time.   Psychiatric:         Behavior: Behavior normal.         Thought Content: Thought content normal.         Judgment: Judgment normal.         Current Outpatient Medications   Medication Sig Dispense Refill    amLODIPine (NORVASC) 5 MG tablet Take 1 tablet by mouth Daily. 30 tablet 11    ascorbic acid (VITAMIN C) 500 MG tablet Take 1 tablet by mouth Daily.      atorvastatin (Lipitor) 20 MG tablet Take 1 tablet by mouth Daily. 90 tablet 1    busPIRone (BUSPAR) 7.5 MG tablet Take 1 tablet by mouth Every Night. 90 tablet 1    fexofenadine (ALLEGRA) 180 MG tablet Take 1 tablet by mouth Every Night. 90 tablet 1    fluticasone (FLONASE) 50 MCG/ACT nasal spray 2 sprays into the nostril(s) as directed by provider Daily. 15.8 mL 5    gabapentin (NEURONTIN) 300 MG capsule Take 1 capsule by mouth 2 (Two) Times a Day.      " glucose blood test strip 1 each by Other route Daily. Use as instructed 100 each 12    glucose monitor monitoring kit Use 1 each Daily. 1 each 0    ipratropium-albuterol (DUO-NEB) 0.5-2.5 mg/3 ml nebulizer Take 3 mL by nebulization 4 times a day as needed for wheezing 360 mL 0    Lancets misc Use 1 Device 2 (Two) Times a Day. 100 each 12    losartan (Cozaar) 50 MG tablet Take 1.5 tablets by mouth Daily. 180 tablet 1    melatonin 5 MG tablet tablet Take 1 tablet by mouth Every Night.      meloxicam (MOBIC) 15 MG tablet Take 1 tablet by mouth Daily.      meloxicam (MOBIC) 7.5 MG tablet Take 2 tablets by mouth Daily.      metFORMIN (Glucophage) 500 MG tablet Take 1 tablet by mouth 2 (Two) Times a Day With Meals. 60 tablet 2    methocarbamol (ROBAXIN) 500 MG tablet Take 2 tablets by mouth Every 8 (Eight) Hours As Needed for Muscle Spasms. 90 tablet 2    omeprazole (priLOSEC) 40 MG capsule Take 1 capsule by mouth Daily. 30 capsule 5    venlafaxine XR (EFFEXOR-XR) 150 MG 24 hr capsule Take 1 capsule by mouth Daily. 90 capsule 3    Vitamin D, Cholecalciferol, (CHOLECALCIFEROL) 10 MCG (400 UNIT) tablet Take 1 tablet by mouth Daily.      Semaglutide-Weight Management (Wegovy) 0.25 MG/0.5ML solution auto-injector Inject 0.25 mg under the skin into the appropriate area as directed 1 (One) Time Per Week. 2 mL 0     No current facility-administered medications for this visit.            Assessment & Plan     Problem List Items Addressed This Visit       Essential hypertension - Primary (Chronic)    Relevant Medications    Semaglutide-Weight Management (Wegovy) 0.25 MG/0.5ML solution auto-injector    Other Relevant Orders    CBC & Differential    Comprehensive Metabolic Panel    Sleep apnea    Relevant Medications    Semaglutide-Weight Management (Wegovy) 0.25 MG/0.5ML solution auto-injector    Chronic folliculitis    Relevant Orders    Ambulatory Referral to Dermatology (Completed)    Prediabetes (Chronic)    Relevant  Medications    Semaglutide-Weight Management (Wegovy) 0.25 MG/0.5ML solution auto-injector    Other Relevant Orders    Hemoglobin A1c    Mixed hyperlipidemia (Chronic)    Relevant Medications    Semaglutide-Weight Management (Wegovy) 0.25 MG/0.5ML solution auto-injector    Other Relevant Orders    Lipid Panel    Chronic right-sided low back pain with right-sided sciatica (Chronic)    Relevant Medications    methocarbamol (ROBAXIN) 500 MG tablet    Other Relevant Orders    Ambulatory Referral to Physical Therapy Evaluate and treat (Completed)    MRI Lumbar Spine Without Contrast    Hearing deficit, bilateral (Chronic)    Relevant Orders    Ambulatory Referral to Audiology (Completed)    Skin tag (Chronic)    Relevant Orders    Ambulatory Referral to Dermatology (Completed)    Class 3 severe obesity with serious comorbidity and body mass index (BMI) of 40.0 to 44.9 in adult (Chronic)    Relevant Medications    Semaglutide-Weight Management (Wegovy) 0.25 MG/0.5ML solution auto-injector     Other Visit Diagnoses       Screening PSA (prostate specific antigen)        Relevant Orders    PSA Screen              ICD-10-CM ICD-9-CM   1. Essential hypertension  I10 401.9   2. Chronic right-sided low back pain with right-sided sciatica  M54.41 724.2    G89.29 724.3     338.29   3. Mixed hyperlipidemia  E78.2 272.2   4. Prediabetes  R73.03 790.29   5. Hearing deficit, bilateral  H91.93 V41.2   6. Screening PSA (prostate specific antigen)  Z12.5 V76.44   7. Chronic folliculitis  L73.9 704.8   8. Skin tag  L91.8 701.9   9. Class 3 severe obesity with serious comorbidity and body mass index (BMI) of 40.0 to 44.9 in adult, unspecified obesity type  E66.01 278.01    Z68.41 V85.41   10. Sleep apnea, unspecified type  G47.30 780.57     Plan: Get labs today. Continue current meds. Refer to pt pros in [place] at his request for pt for back pain. Get MRI of lumbar spine. Refer to dermatology. Refer to audiology per his request for hearing  loss. Start wegovy. We discussed the risks and side effects medullary thyroid cancer, pancreatitis, nausea, vomiting, diarrhea, and constipation. He verbalized an understanding of the same. He denies h/o or family h/o thyroid cancer. Follow up in one month and as needed.     @Body mass index is 40.67 kg/m².                Understands disease processes and need for medications.  Understands reasons for urgent and emergent care.  Patient (& family) verbalized agreement for treatment plan.   Emotional support and active listening provided.  Patient provided time to verbalize feelings.                  This document has been electronically signed by LIONEL Grider   January 26, 2024 16:44 EST

## 2024-01-26 NOTE — PROGRESS NOTES
Venipuncture Blood Specimen Collection  Venipuncture performed in right hand by Linh Camargo MA with good hemostasis. Patient tolerated the procedure well without complications.   01/26/24   Linh Camargo MA

## 2024-01-27 LAB
ALBUMIN SERPL-MCNC: 4.5 G/DL (ref 3.5–5.2)
ALBUMIN/GLOB SERPL: 1.4 G/DL
ALP SERPL-CCNC: 99 U/L (ref 39–117)
ALT SERPL W P-5'-P-CCNC: 30 U/L (ref 1–41)
ANION GAP SERPL CALCULATED.3IONS-SCNC: 13.3 MMOL/L (ref 5–15)
AST SERPL-CCNC: 19 U/L (ref 1–40)
BASOPHILS # BLD AUTO: 0.1 10*3/MM3 (ref 0–0.2)
BASOPHILS NFR BLD AUTO: 1.1 % (ref 0–1.5)
BILIRUB SERPL-MCNC: 0.2 MG/DL (ref 0–1.2)
BUN SERPL-MCNC: 18 MG/DL (ref 6–20)
BUN/CREAT SERPL: 19.6 (ref 7–25)
CALCIUM SPEC-SCNC: 9.5 MG/DL (ref 8.6–10.5)
CHLORIDE SERPL-SCNC: 104 MMOL/L (ref 98–107)
CHOLEST SERPL-MCNC: 111 MG/DL (ref 0–200)
CO2 SERPL-SCNC: 22.7 MMOL/L (ref 22–29)
CREAT SERPL-MCNC: 0.92 MG/DL (ref 0.76–1.27)
DEPRECATED RDW RBC AUTO: 39.7 FL (ref 37–54)
EGFRCR SERPLBLD CKD-EPI 2021: 98.2 ML/MIN/1.73
EOSINOPHIL # BLD AUTO: 0.19 10*3/MM3 (ref 0–0.4)
EOSINOPHIL NFR BLD AUTO: 2.1 % (ref 0.3–6.2)
ERYTHROCYTE [DISTWIDTH] IN BLOOD BY AUTOMATED COUNT: 12.9 % (ref 12.3–15.4)
GLOBULIN UR ELPH-MCNC: 3.2 GM/DL
GLUCOSE SERPL-MCNC: 97 MG/DL (ref 65–99)
HBA1C MFR BLD: 6.8 % (ref 4.8–5.6)
HCT VFR BLD AUTO: 40.4 % (ref 37.5–51)
HDLC SERPL-MCNC: 27 MG/DL (ref 40–60)
HGB BLD-MCNC: 14 G/DL (ref 13–17.7)
IMM GRANULOCYTES # BLD AUTO: 0.03 10*3/MM3 (ref 0–0.05)
IMM GRANULOCYTES NFR BLD AUTO: 0.3 % (ref 0–0.5)
LDLC SERPL CALC-MCNC: 53 MG/DL (ref 0–100)
LDLC/HDLC SERPL: 1.74 {RATIO}
LYMPHOCYTES # BLD AUTO: 3.47 10*3/MM3 (ref 0.7–3.1)
LYMPHOCYTES NFR BLD AUTO: 38.4 % (ref 19.6–45.3)
MCH RBC QN AUTO: 29.3 PG (ref 26.6–33)
MCHC RBC AUTO-ENTMCNC: 34.7 G/DL (ref 31.5–35.7)
MCV RBC AUTO: 84.5 FL (ref 79–97)
MONOCYTES # BLD AUTO: 0.72 10*3/MM3 (ref 0.1–0.9)
MONOCYTES NFR BLD AUTO: 8 % (ref 5–12)
NEUTROPHILS NFR BLD AUTO: 4.52 10*3/MM3 (ref 1.7–7)
NEUTROPHILS NFR BLD AUTO: 50.1 % (ref 42.7–76)
NRBC BLD AUTO-RTO: 0 /100 WBC (ref 0–0.2)
PLATELET # BLD AUTO: 312 10*3/MM3 (ref 140–450)
PMV BLD AUTO: 10.4 FL (ref 6–12)
POTASSIUM SERPL-SCNC: 3.9 MMOL/L (ref 3.5–5.2)
PROT SERPL-MCNC: 7.7 G/DL (ref 6–8.5)
PSA SERPL-MCNC: 0.88 NG/ML (ref 0–4)
RBC # BLD AUTO: 4.78 10*6/MM3 (ref 4.14–5.8)
SODIUM SERPL-SCNC: 140 MMOL/L (ref 136–145)
TRIGL SERPL-MCNC: 185 MG/DL (ref 0–150)
VLDLC SERPL-MCNC: 31 MG/DL (ref 5–40)
WBC NRBC COR # BLD AUTO: 9.03 10*3/MM3 (ref 3.4–10.8)

## 2024-02-12 ENCOUNTER — TELEPHONE (OUTPATIENT)
Dept: FAMILY MEDICINE CLINIC | Facility: CLINIC | Age: 54
End: 2024-02-12
Payer: OTHER GOVERNMENT

## 2024-02-12 DIAGNOSIS — R73.03 PREDIABETES: Chronic | ICD-10-CM

## 2024-02-12 DIAGNOSIS — I10 ESSENTIAL HYPERTENSION: Chronic | ICD-10-CM

## 2024-02-12 DIAGNOSIS — G47.30 SLEEP APNEA, UNSPECIFIED TYPE: Chronic | ICD-10-CM

## 2024-02-12 DIAGNOSIS — E66.01 CLASS 3 SEVERE OBESITY WITH SERIOUS COMORBIDITY AND BODY MASS INDEX (BMI) OF 40.0 TO 44.9 IN ADULT, UNSPECIFIED OBESITY TYPE: Chronic | ICD-10-CM

## 2024-02-12 DIAGNOSIS — E78.2 MIXED HYPERLIPIDEMIA: Chronic | ICD-10-CM

## 2024-02-12 DIAGNOSIS — E11.649 TYPE 2 DIABETES MELLITUS WITH HYPOGLYCEMIA WITHOUT COMA, WITHOUT LONG-TERM CURRENT USE OF INSULIN: Primary | ICD-10-CM

## 2024-02-12 RX ORDER — SEMAGLUTIDE 0.25 MG/.5ML
0.25 INJECTION, SOLUTION SUBCUTANEOUS WEEKLY
Qty: 2 ML | Refills: 0 | Status: SHIPPED | OUTPATIENT
Start: 2024-02-12 | End: 2024-02-12

## 2024-02-12 RX ORDER — SEMAGLUTIDE 1.34 MG/ML
0.25 INJECTION, SOLUTION SUBCUTANEOUS WEEKLY
Qty: 1.5 ML | Refills: 0 | Status: SHIPPED | OUTPATIENT
Start: 2024-02-12

## 2024-02-13 ENCOUNTER — TELEPHONE (OUTPATIENT)
Dept: FAMILY MEDICINE CLINIC | Facility: CLINIC | Age: 54
End: 2024-02-13
Payer: OTHER GOVERNMENT

## 2024-02-13 PROBLEM — E11.65 TYPE 2 DIABETES MELLITUS WITH HYPERGLYCEMIA: Status: ACTIVE | Noted: 2024-02-13

## 2024-02-15 DIAGNOSIS — R73.03 PREDIABETES: Chronic | ICD-10-CM

## 2024-02-15 DIAGNOSIS — E11.65 TYPE 2 DIABETES MELLITUS WITH HYPERGLYCEMIA, WITHOUT LONG-TERM CURRENT USE OF INSULIN: Primary | ICD-10-CM

## 2024-02-28 DIAGNOSIS — M54.41 CHRONIC RIGHT-SIDED LOW BACK PAIN WITH RIGHT-SIDED SCIATICA: Chronic | ICD-10-CM

## 2024-02-28 DIAGNOSIS — G89.29 CHRONIC RIGHT-SIDED LOW BACK PAIN WITH RIGHT-SIDED SCIATICA: Chronic | ICD-10-CM

## 2024-02-29 NOTE — PROGRESS NOTES
MRI of the lumbar spine reports arthritis with nerve compression in two areas. Does he want to see neurosurgery to discuss?

## 2024-03-04 DIAGNOSIS — M54.16 LUMBAR NERVE ROOT COMPRESSION: Primary | ICD-10-CM

## 2024-03-12 ENCOUNTER — TELEPHONE (OUTPATIENT)
Dept: FAMILY MEDICINE CLINIC | Facility: CLINIC | Age: 54
End: 2024-03-12
Payer: OTHER GOVERNMENT

## 2024-03-12 DIAGNOSIS — I10 ESSENTIAL HYPERTENSION: Chronic | ICD-10-CM

## 2024-03-12 RX ORDER — LOSARTAN POTASSIUM 50 MG/1
75 TABLET ORAL DAILY
Qty: 180 TABLET | Refills: 1 | Status: SHIPPED | OUTPATIENT
Start: 2024-03-12

## 2024-03-12 NOTE — TELEPHONE ENCOUNTER
----- Message from Yeni Mahan RN sent at 3/12/2024  8:58 AM EDT -----  Regarding: FW: Jimmy’s Losartan  Contact: [phone]    ----- Message -----  From: Jimmy Dawson Jr.  Sent: 3/11/2024   8:34 PM EDT  To: [facility]  Subject: Jimmy’s Losartan                              Jimmy takes one and a half of this medicine Losartan. When I picked up from [facility] it says take one pill daily. He’s been taking one and a half for a long time.

## 2024-03-20 ENCOUNTER — OFFICE VISIT (OUTPATIENT)
Dept: CARDIOLOGY | Facility: CLINIC | Age: 54
End: 2024-03-20
Payer: OTHER GOVERNMENT

## 2024-03-20 VITALS
SYSTOLIC BLOOD PRESSURE: 123 MMHG | HEIGHT: 76 IN | HEART RATE: 79 BPM | WEIGHT: 315 LBS | OXYGEN SATURATION: 97 % | DIASTOLIC BLOOD PRESSURE: 73 MMHG | BODY MASS INDEX: 38.36 KG/M2

## 2024-03-20 DIAGNOSIS — I47.10 SVT (SUPRAVENTRICULAR TACHYCARDIA): ICD-10-CM

## 2024-03-20 DIAGNOSIS — I10 ESSENTIAL HYPERTENSION: Primary | Chronic | ICD-10-CM

## 2024-03-20 PROCEDURE — 99213 OFFICE O/P EST LOW 20 MIN: CPT | Performed by: PHYSICIAN ASSISTANT

## 2024-03-20 NOTE — PROGRESS NOTES
Nena Pike APRN  Jimmy Dawson Jr.  1970 03/20/2024    Patient Active Problem List   Diagnosis    Gastroesophageal reflux disease without esophagitis    Depression    Essential hypertension    Palpitations    SVT (supraventricular tachycardia), s/p RF ablation, in 2000 x2    WPW (Brayan-Parkinson-White syndrome), s/p RF ablation 2000.    Chronic rhinitis    Sleep apnea    Typical atrial flutter    History of 2019 novel coronavirus disease (COVID-19)    Anxiety    Chronic folliculitis    Primary osteoarthritis of left knee    Symptomatic cholelithiasis    CAP (community acquired pneumonia)    Status post laparoscopic cholecystectomy    Prediabetes    Mixed hyperlipidemia    Chronic right-sided low back pain with right-sided sciatica    Hearing deficit, bilateral    Skin tag    Class 3 severe obesity with serious comorbidity and body mass index (BMI) of 40.0 to 44.9 in adult    Type 2 diabetes mellitus with hyperglycemia       Dear Nena Pike APRN:    Subjective     History of Present Illness:    Chief Complaint   Patient presents with    Follow-up     routine       Jimmy Dawson Jr. is a pleasant 53 y.o. male with a past medical history significant for WPW as well as atrial flutter status post ablation with no recurrence since.  Patient comes in for routine follow-up.     Jimmy reports that he has been doing well recently.  Since he was last seen he was hospitalized for pneumonia and sepsis.  Thankfully he was treated appropriately and discharged stable and reports since this he has done well without any sequelae.  He denies any chest pains today, shortness of breath, dizziness, or syncope.  Blood pressure is also well-controlled.    Allergies   Allergen Reactions    Aleve [Naproxen] Hives   :      Current Outpatient Medications:     amLODIPine (NORVASC) 5 MG tablet, Take 1 tablet by mouth Daily., Disp: 30 tablet, Rfl: 11    ascorbic acid (VITAMIN C) 500 MG tablet, Take 1 tablet by  mouth Daily., Disp: , Rfl:     atorvastatin (Lipitor) 20 MG tablet, Take 1 tablet by mouth Daily., Disp: 90 tablet, Rfl: 1    busPIRone (BUSPAR) 7.5 MG tablet, Take 1 tablet by mouth Every Night., Disp: 90 tablet, Rfl: 1    empagliflozin (Jardiance) 25 MG tablet tablet, Take 1 tablet by mouth Daily., Disp: 30 tablet, Rfl: 5    fexofenadine (ALLEGRA) 180 MG tablet, Take 1 tablet by mouth Every Night., Disp: 90 tablet, Rfl: 1    fluticasone (FLONASE) 50 MCG/ACT nasal spray, 2 sprays into the nostril(s) as directed by provider Daily., Disp: 15.8 mL, Rfl: 5    gabapentin (NEURONTIN) 300 MG capsule, Take 1 capsule by mouth 2 (Two) Times a Day., Disp: , Rfl:     glucose blood test strip, 1 each by Other route Daily. Use as instructed, Disp: 100 each, Rfl: 12    glucose monitor monitoring kit, Use 1 each Daily., Disp: 1 each, Rfl: 0    ipratropium-albuterol (DUO-NEB) 0.5-2.5 mg/3 ml nebulizer, Take 3 mL by nebulization 4 times a day as needed for wheezing, Disp: 360 mL, Rfl: 0    Lancets misc, Use 1 Device 2 (Two) Times a Day., Disp: 100 each, Rfl: 12    losartan (Cozaar) 50 MG tablet, Take 1.5 tablets by mouth Daily., Disp: 180 tablet, Rfl: 1    melatonin 5 MG tablet tablet, Take 1 tablet by mouth Every Night., Disp: , Rfl:     meloxicam (MOBIC) 15 MG tablet, Take 1 tablet by mouth Daily., Disp: , Rfl:     metFORMIN (Glucophage) 500 MG tablet, Take 1 tablet by mouth 2 (Two) Times a Day With Meals., Disp: 60 tablet, Rfl: 2    methocarbamol (ROBAXIN) 500 MG tablet, Take 2 tablets by mouth Every 8 (Eight) Hours As Needed for Muscle Spasms., Disp: 90 tablet, Rfl: 2    omeprazole (priLOSEC) 40 MG capsule, Take 1 capsule by mouth Daily., Disp: 30 capsule, Rfl: 5    Semaglutide,0.25 or 0.5MG/DOS, (Ozempic, 0.25 or 0.5 MG/DOSE,) 2 MG/1.5ML solution pen-injector, Inject 0.25 mg under the skin into the appropriate area as directed 1 (One) Time Per Week., Disp: 1.5 mL, Rfl: 0    venlafaxine XR (EFFEXOR-XR) 150 MG 24 hr capsule, Take  "1 capsule by mouth Daily., Disp: 90 capsule, Rfl: 3    Vitamin D, Cholecalciferol, (CHOLECALCIFEROL) 10 MCG (400 UNIT) tablet, Take 1 tablet by mouth Daily., Disp: , Rfl:     The following portions of the patient's history were reviewed and updated as appropriate: allergies, current medications, past family history, past medical history, past social history, past surgical history and problem list.    Social History     Tobacco Use    Smoking status: Never    Smokeless tobacco: Never   Vaping Use    Vaping status: Never Used   Substance Use Topics    Alcohol use: Yes     Comment: 4-5 BEERS, TWICE A WEEK    Drug use: No         Objective   Vitals:    03/20/24 0806   BP: 123/73   Pulse: 79   SpO2: 97%   Weight: (!) 152 kg (335 lb 6.4 oz)   Height: 193 cm (76\")     Body mass index is 40.83 kg/m².    ROS    Constitutional:       General: Not in acute distress.     Appearance: Healthy appearance. Well-developed and not in distress. Not diaphoretic.   Eyes:      Conjunctiva/sclera: Conjunctivae normal.      Pupils: Pupils are equal, round, and reactive to light.   HENT:      Head: Normocephalic and atraumatic.   Neck:      Vascular: No carotid bruit or JVD.   Pulmonary:      Effort: Pulmonary effort is normal. No respiratory distress.      Breath sounds: Normal breath sounds.   Cardiovascular:      Normal rate. Regular rhythm.   Edema:     Peripheral edema absent.   Skin:     General: Skin is cool.   Neurological:      Mental Status: Alert, oriented to person, place, and time and oriented to person, place and time.         Lab Results   Component Value Date     01/26/2024    K 3.9 01/26/2024     01/26/2024    CO2 22.7 01/26/2024    BUN 18 01/26/2024    CREATININE 0.92 01/26/2024    GLUCOSE 97 01/26/2024    CALCIUM 9.5 01/26/2024    AST 19 01/26/2024    ALT 30 01/26/2024    ALKPHOS 99 01/26/2024    LABIL2 1.3 (L) 01/25/2016     Lab Results   Component Value Date    CKTOTAL 147 11/06/2020     Lab Results " "  Component Value Date    WBC 9.03 01/26/2024    HGB 14.0 01/26/2024    HCT 40.4 01/26/2024     01/26/2024     Lab Results   Component Value Date    INR 1.02 08/11/2023    INR 1.08 06/03/2019     Lab Results   Component Value Date    MG 2.0 11/06/2020     Lab Results   Component Value Date    TSH 1.780 11/06/2020    PSA 0.876 01/26/2024    TRIG 185 (H) 01/26/2024    HDL 27 (L) 01/26/2024    LDL 53 01/26/2024      No results found for: \"BNP\"    During this visit the following were done:  Labs Reviewed []    Labs Ordered []    Radiology Reports Reviewed []    Radiology Ordered []    PCP Records Reviewed []    Referring Provider Records Reviewed []    ER Records Reviewed []    Hospital Records Reviewed []    History Obtained From Family []    Radiology Images Reviewed []    Other Reviewed []    Records Requested []       Procedures    Assessment & Plan    Diagnosis Plan   1. Essential hypertension        2. SVT (supraventricular tachycardia), s/p RF ablation, in 2000 x2                 Recommendations:  Essential hypertension  Well-controlled continue current regimen  History of SVT status post ablation  Currently asymptomatic denies any breakthrough episodes.    Return in about 1 year (around 3/20/2025).    As always, I appreciate very much the opportunity to participate in the cardiovascular care of your patients.      With Best Regards,    Anthony Ruff PA-C          "

## 2024-03-26 ENCOUNTER — OFFICE VISIT (OUTPATIENT)
Dept: NEUROSURGERY | Facility: CLINIC | Age: 54
End: 2024-03-26
Payer: OTHER GOVERNMENT

## 2024-03-26 VITALS — RESPIRATION RATE: 17 BRPM | HEIGHT: 76 IN | TEMPERATURE: 97.8 F | BODY MASS INDEX: 38.36 KG/M2 | WEIGHT: 315 LBS

## 2024-03-26 DIAGNOSIS — M51.36 DISC DEGENERATION, LUMBAR: ICD-10-CM

## 2024-03-26 DIAGNOSIS — E66.01 MORBID OBESITY: ICD-10-CM

## 2024-03-26 DIAGNOSIS — M54.9 MECHANICAL BACK PAIN: Primary | ICD-10-CM

## 2024-03-26 PROCEDURE — 99203 OFFICE O/P NEW LOW 30 MIN: CPT | Performed by: NEUROLOGICAL SURGERY

## 2024-03-26 NOTE — PROGRESS NOTES
Patient: Jimmy Dawson Jr.  : 1970    Primary Care Provider: Nena Pike APRN    Requesting Provider: As above        History    Chief Complaint: Chronic low back pain.    History of Present Illness: Ms. Dawson is a 53-year-old  who has a greater than 10-year history of low back pain.  Typically he has flareups.  Since he was hospitalized in August of last year he has had increased pain.  The pain predominately involves his back.  When getting out of a vehicle he will get a sharp pain extending into the back of the right calf.  His symptoms are in general worse with protracted standing, walking, sitting too long.  He has to frequently change position to get comfortable.  His symptoms are worse by the end of the day.  He has chronically had numbness in the ulnar aspects of his left arm and hand as well as in the right lateral thigh.  He has been treated with gabapentin, Robaxin, and Mobic.  These medicines help for the first part of the day but he is more uncomfortable by the evening.  He has done physical therapy for several months and that helps briefly with his symptoms.    Review of Systems   Constitutional:  Negative for activity change, appetite change, chills, diaphoresis, fatigue, fever and unexpected weight change.   HENT:  Negative for congestion, dental problem, drooling, ear discharge, ear pain, facial swelling, hearing loss, mouth sores, nosebleeds, postnasal drip, rhinorrhea, sinus pressure, sneezing, sore throat, tinnitus, trouble swallowing and voice change.    Eyes:  Negative for photophobia, pain, discharge, redness, itching and visual disturbance.   Respiratory:  Negative for apnea, cough, choking, chest tightness, shortness of breath, wheezing and stridor.    Cardiovascular:  Negative for chest pain, palpitations and leg swelling.   Gastrointestinal:  Negative for abdominal distention, abdominal pain, anal bleeding, blood in stool, constipation, diarrhea, nausea,  "rectal pain and vomiting.   Endocrine: Negative for cold intolerance, heat intolerance, polydipsia, polyphagia and polyuria.   Genitourinary:  Negative for decreased urine volume, difficulty urinating, dysuria, enuresis, flank pain, frequency, genital sores, hematuria and urgency.   Musculoskeletal:  Positive for back pain. Negative for arthralgias, gait problem, joint swelling, myalgias, neck pain and neck stiffness.   Skin:  Negative for color change, pallor, rash and wound.   Allergic/Immunologic: Negative for environmental allergies, food allergies and immunocompromised state.   Neurological:  Negative for dizziness, tremors, seizures, syncope, facial asymmetry, speech difficulty, weakness, light-headedness, numbness and headaches.   Hematological:  Negative for adenopathy. Does not bruise/bleed easily.   Psychiatric/Behavioral:  Negative for agitation, behavioral problems, confusion, decreased concentration, dysphoric mood, hallucinations, self-injury, sleep disturbance and suicidal ideas. The patient is not nervous/anxious and is not hyperactive.    All other systems reviewed and are negative.      The patient's past medical history, past surgical history, family history, and social history have been reviewed at length in the electronic medical record.      Physical Exam:   Temp 97.8 °F (36.6 °C) (Infrared)   Resp 17   Ht 193 cm (76\")   Wt (!) 147 kg (325 lb)   BMI 39.56 kg/m²   CONSTITUTIONAL: Patient is well-nourished, pleasant and appears stated age.  MUSCULOSKELETAL:  Straight leg raising is negative.  Quinn's Sign is negative.  ROM in the low back is normal.  Tenderness in the back to palpation is not observed.  NEUROLOGICAL:  Orientation, memory, attention span, language function, and cognition have been examined and are intact.  Strength is intact in the lower extremities to direct testing.  Muscle tone is normal throughout.  Station and gait are normal.  Sensation is intact to light touch testing " throughout.  Deep tendon reflexes are 1+ and symmetrical.  Coordination is intact.      Medical Decision Making    Data Review:   (All imaging studies were personally reviewed unless stated otherwise)  Plain films of the lumbar spine dated 10/18/2023 demonstrate some moderate degenerative change.    The report for a lumbar MRI study dated 2/22/2024 demonstrates diffuse facet arthropathy and disc bulging.  There is said to be some recess and foraminal narrowing but no report is made of central stenosis.    Diagnosis:   1.  Mechanical low back pain.  2.  Possible lumbar radiculopathy.  3.  Right meralgia paresthetica.  4.  Left ulnar neuropathy.    Treatment Options:   Based on his complaints and at least the reports I do not think there is any role for surgery in this setting.  Most of his pain is mechanical in nature.  He might benefit from some facet blocks and/or rhizotomies in a pain clinic setting.  He is going to obtain a copy of his MRI and follow-up with me so that I can check on that.      Scribed for Leighton Johnson MD by Joanne Romano CMA on 3/26/2024 09:10 EDT       I, Dr. Johnson, personally performed the services described in the documentation, as scribed in my presence, and it is both accurate and complete.

## 2024-04-09 ENCOUNTER — OFFICE VISIT (OUTPATIENT)
Dept: NEUROSURGERY | Facility: CLINIC | Age: 54
End: 2024-04-09
Payer: OTHER GOVERNMENT

## 2024-04-09 VITALS — WEIGHT: 315 LBS | TEMPERATURE: 98 F | BODY MASS INDEX: 38.36 KG/M2 | RESPIRATION RATE: 17 BRPM | HEIGHT: 76 IN

## 2024-04-09 DIAGNOSIS — E66.01 MORBID OBESITY: ICD-10-CM

## 2024-04-09 DIAGNOSIS — M51.36 DISC DEGENERATION, LUMBAR: ICD-10-CM

## 2024-04-09 DIAGNOSIS — M54.9 MECHANICAL BACK PAIN: Primary | ICD-10-CM

## 2024-04-09 PROCEDURE — 99213 OFFICE O/P EST LOW 20 MIN: CPT | Performed by: NEUROLOGICAL SURGERY

## 2024-04-09 NOTE — PROGRESS NOTES
Patient: Jimmy Dawson Jr.  : 1970    Primary Care Provider: Nena Pike APRN    Requesting Provider: As above        History    Chief Complaint: Chronic low back pain.    History of Present Illness: Ms. Dawson is a 53-year-old  who has a greater than 10-year history of low back pain.  Typically he has flareups.  Since he was hospitalized in August of last year he has had increased pain.  The pain predominately involves his back.  When getting out of a vehicle he will get a sharp pain extending into the back of the right calf.  His symptoms are in general worse with protracted standing, walking, sitting too long.  He has to frequently change position to get comfortable.  His symptoms are worse by the end of the day.  He has chronically had numbness in the ulnar aspects of his left arm and hand as well as in the right lateral thigh.  He has been treated with gabapentin, Robaxin, and Mobic.  These medicines help for the first part of the day but he is more uncomfortable by the evening.  He has done physical therapy for several months and that helps briefly with his symptoms.  His symptoms remain unchanged.  He is not sure that his medications are helping.    Review of Systems   Constitutional:  Negative for activity change, appetite change, chills, diaphoresis, fatigue, fever and unexpected weight change.   HENT:  Negative for congestion, dental problem, drooling, ear discharge, ear pain, facial swelling, hearing loss, mouth sores, nosebleeds, postnasal drip, rhinorrhea, sinus pressure, sneezing, sore throat, tinnitus, trouble swallowing and voice change.    Eyes:  Negative for photophobia, pain, discharge, redness, itching and visual disturbance.   Respiratory:  Negative for apnea, cough, choking, chest tightness, shortness of breath, wheezing and stridor.    Cardiovascular:  Negative for chest pain, palpitations and leg swelling.   Gastrointestinal:  Negative for abdominal  "distention, abdominal pain, anal bleeding, blood in stool, constipation, diarrhea, nausea, rectal pain and vomiting.   Endocrine: Negative for cold intolerance, heat intolerance, polydipsia, polyphagia and polyuria.   Genitourinary:  Negative for decreased urine volume, difficulty urinating, dysuria, enuresis, flank pain, frequency, genital sores, hematuria and urgency.   Musculoskeletal:  Positive for back pain. Negative for arthralgias, gait problem, joint swelling, myalgias, neck pain and neck stiffness.   Skin:  Negative for color change, pallor, rash and wound.   Allergic/Immunologic: Negative for environmental allergies, food allergies and immunocompromised state.   Neurological:  Negative for dizziness, tremors, seizures, syncope, facial asymmetry, speech difficulty, weakness, light-headedness, numbness and headaches.   Hematological:  Negative for adenopathy. Does not bruise/bleed easily.   Psychiatric/Behavioral:  Negative for agitation, behavioral problems, confusion, decreased concentration, dysphoric mood, hallucinations, self-injury, sleep disturbance and suicidal ideas. The patient is not nervous/anxious and is not hyperactive.    All other systems reviewed and are negative.      The patient's past medical history, past surgical history, family history, and social history have been reviewed at length in the electronic medical record.      Physical Exam:   Temp 98 °F (36.7 °C) (Infrared)   Resp 17   Ht 193 cm (76\")   Wt (!) 147 kg (325 lb)   BMI 39.56 kg/m²   Deferred    Medical Decision Making    Data Review:   (All imaging studies were personally reviewed unless stated otherwise)  Plain films of the lumbar spine dated 10/18/2023 demonstrate some moderate degenerative change.     MRI of the lumbar spine dated 2/22/2024 is reviewed today.  There is diffuse degenerative disc disease and some facet arthropathy.  There is mild to moderate canal narrowing at L2-3 and mild canal narrowing at " L3-4.    Diagnosis:   1.  Mechanical low back pain.  2.  Possible lumbar radiculopathy.  3.  Right meralgia paresthetica.  4.  Left ulnar neuropathy.    Treatment Options:   I believe that most of the patient's pain is mechanical in nature.  I have not recommended surgical intervention.  I am going to refer him to one of my pain colleagues.  His daughter is treated by Dr. Quezada.  I have recommended stopping the Robaxin but continuing the meloxicam and gabapentin for now.    Scribed for Leighton Johnson MD by Joanne Romano CMA on 4/9/2024 09:25 EDT         I, Dr. Johnson, personally performed the services described in the documentation, as scribed in my presence, and it is both accurate and complete.

## 2024-04-12 DIAGNOSIS — M54.41 CHRONIC RIGHT-SIDED LOW BACK PAIN WITH RIGHT-SIDED SCIATICA: Chronic | ICD-10-CM

## 2024-04-12 DIAGNOSIS — G89.29 CHRONIC RIGHT-SIDED LOW BACK PAIN WITH RIGHT-SIDED SCIATICA: Chronic | ICD-10-CM

## 2024-04-12 RX ORDER — METHOCARBAMOL 500 MG/1
1000 TABLET, FILM COATED ORAL EVERY 8 HOURS PRN
Qty: 90 TABLET | Refills: 2 | Status: SHIPPED | OUTPATIENT
Start: 2024-04-12

## 2024-05-06 ENCOUNTER — TELEPHONE (OUTPATIENT)
Dept: NEUROSURGERY | Facility: CLINIC | Age: 54
End: 2024-05-06
Payer: OTHER GOVERNMENT

## 2024-05-14 ENCOUNTER — OFFICE VISIT (OUTPATIENT)
Dept: FAMILY MEDICINE CLINIC | Facility: CLINIC | Age: 54
End: 2024-05-14
Payer: OTHER GOVERNMENT

## 2024-05-14 VITALS
DIASTOLIC BLOOD PRESSURE: 90 MMHG | SYSTOLIC BLOOD PRESSURE: 150 MMHG | WEIGHT: 315 LBS | RESPIRATION RATE: 18 BRPM | BODY MASS INDEX: 38.36 KG/M2 | HEART RATE: 83 BPM | TEMPERATURE: 97.5 F | OXYGEN SATURATION: 97 % | HEIGHT: 76 IN

## 2024-05-14 DIAGNOSIS — I10 ESSENTIAL HYPERTENSION: Chronic | ICD-10-CM

## 2024-05-14 DIAGNOSIS — E11.65 TYPE 2 DIABETES MELLITUS WITH HYPERGLYCEMIA, WITHOUT LONG-TERM CURRENT USE OF INSULIN: Chronic | ICD-10-CM

## 2024-05-14 DIAGNOSIS — M54.41 CHRONIC RIGHT-SIDED LOW BACK PAIN WITH RIGHT-SIDED SCIATICA: Primary | Chronic | ICD-10-CM

## 2024-05-14 DIAGNOSIS — F41.9 ANXIETY: Chronic | ICD-10-CM

## 2024-05-14 DIAGNOSIS — G89.29 CHRONIC RIGHT-SIDED LOW BACK PAIN WITH RIGHT-SIDED SCIATICA: Primary | Chronic | ICD-10-CM

## 2024-05-14 PROCEDURE — 99214 OFFICE O/P EST MOD 30 MIN: CPT | Performed by: NURSE PRACTITIONER

## 2024-05-14 RX ORDER — BUSPIRONE HYDROCHLORIDE 7.5 MG/1
7.5 TABLET ORAL 3 TIMES DAILY PRN
Qty: 270 TABLET | Refills: 1 | Status: SHIPPED | OUTPATIENT
Start: 2024-05-14

## 2024-05-28 DIAGNOSIS — I70.90 ARTERIOSCLEROSIS: ICD-10-CM

## 2024-05-28 DIAGNOSIS — K21.9 GASTROESOPHAGEAL REFLUX DISEASE WITHOUT ESOPHAGITIS: Chronic | ICD-10-CM

## 2024-05-28 DIAGNOSIS — I10 ESSENTIAL HYPERTENSION: Chronic | ICD-10-CM

## 2024-05-28 DIAGNOSIS — G89.29 CHRONIC RIGHT-SIDED LOW BACK PAIN WITH RIGHT-SIDED SCIATICA: Chronic | ICD-10-CM

## 2024-05-28 DIAGNOSIS — E11.65 TYPE 2 DIABETES MELLITUS WITH HYPERGLYCEMIA, WITHOUT LONG-TERM CURRENT USE OF INSULIN: ICD-10-CM

## 2024-05-28 DIAGNOSIS — F41.9 ANXIETY: ICD-10-CM

## 2024-05-28 DIAGNOSIS — F32.4 MAJOR DEPRESSIVE DISORDER WITH SINGLE EPISODE, IN PARTIAL REMISSION: ICD-10-CM

## 2024-05-28 DIAGNOSIS — M54.41 CHRONIC RIGHT-SIDED LOW BACK PAIN WITH RIGHT-SIDED SCIATICA: Chronic | ICD-10-CM

## 2024-05-28 DIAGNOSIS — R73.03 PREDIABETES: Chronic | ICD-10-CM

## 2024-05-30 DIAGNOSIS — F41.9 ANXIETY: Chronic | ICD-10-CM

## 2024-05-30 RX ORDER — VENLAFAXINE HYDROCHLORIDE 150 MG/1
150 CAPSULE, EXTENDED RELEASE ORAL DAILY
Qty: 90 CAPSULE | Refills: 3 | Status: SHIPPED | OUTPATIENT
Start: 2024-05-30

## 2024-05-30 RX ORDER — FLUTICASONE PROPIONATE 50 MCG
2 SPRAY, SUSPENSION (ML) NASAL DAILY
Qty: 15.8 ML | Refills: 5 | Status: SHIPPED | OUTPATIENT
Start: 2024-05-30

## 2024-05-30 RX ORDER — METHOCARBAMOL 500 MG/1
1000 TABLET, FILM COATED ORAL EVERY 8 HOURS PRN
Qty: 90 TABLET | Refills: 2 | Status: SHIPPED | OUTPATIENT
Start: 2024-05-30

## 2024-05-30 RX ORDER — FEXOFENADINE HCL 180 MG/1
180 TABLET ORAL NIGHTLY
Qty: 90 TABLET | Refills: 1 | Status: SHIPPED | OUTPATIENT
Start: 2024-05-30

## 2024-05-30 RX ORDER — BUSPIRONE HYDROCHLORIDE 7.5 MG/1
7.5 TABLET ORAL 3 TIMES DAILY PRN
Qty: 270 TABLET | Refills: 1 | Status: SHIPPED | OUTPATIENT
Start: 2024-05-30

## 2024-05-30 RX ORDER — OMEPRAZOLE 40 MG/1
40 CAPSULE, DELAYED RELEASE ORAL DAILY
Qty: 90 CAPSULE | Refills: 1 | Status: SHIPPED | OUTPATIENT
Start: 2024-05-30

## 2024-05-30 RX ORDER — ATORVASTATIN CALCIUM 20 MG/1
20 TABLET, FILM COATED ORAL DAILY
Qty: 90 TABLET | Refills: 1 | Status: SHIPPED | OUTPATIENT
Start: 2024-05-30

## 2024-05-30 RX ORDER — LOSARTAN POTASSIUM 50 MG/1
75 TABLET ORAL DAILY
Qty: 180 TABLET | Refills: 1 | Status: SHIPPED | OUTPATIENT
Start: 2024-05-30

## 2024-05-30 RX ORDER — LANCETS 30 GAUGE
1 EACH MISCELLANEOUS 2 TIMES DAILY
Qty: 100 EACH | Refills: 12 | Status: SHIPPED | OUTPATIENT
Start: 2024-05-30

## 2024-05-30 RX ORDER — BLOOD-GLUCOSE METER
1 KIT MISCELLANEOUS DAILY
Qty: 1 EACH | Refills: 0 | Status: SHIPPED | OUTPATIENT
Start: 2024-05-30

## 2024-06-06 ENCOUNTER — OFFICE VISIT (OUTPATIENT)
Dept: FAMILY MEDICINE CLINIC | Facility: CLINIC | Age: 54
End: 2024-06-06
Payer: OTHER GOVERNMENT

## 2024-06-06 VITALS
SYSTOLIC BLOOD PRESSURE: 114 MMHG | BODY MASS INDEX: 38.36 KG/M2 | OXYGEN SATURATION: 97 % | HEART RATE: 100 BPM | WEIGHT: 315 LBS | TEMPERATURE: 97.7 F | DIASTOLIC BLOOD PRESSURE: 76 MMHG | HEIGHT: 76 IN

## 2024-06-06 DIAGNOSIS — T63.301A SPIDER BITE WOUND, ACCIDENTAL OR UNINTENTIONAL, INITIAL ENCOUNTER: Primary | ICD-10-CM

## 2024-06-06 LAB — HBA1C MFR BLD: 5.8 % (ref 4.8–5.6)

## 2024-06-06 PROCEDURE — 83036 HEMOGLOBIN GLYCOSYLATED A1C: CPT | Performed by: NURSE PRACTITIONER

## 2024-06-06 PROCEDURE — 36415 COLL VENOUS BLD VENIPUNCTURE: CPT | Performed by: NURSE PRACTITIONER

## 2024-06-06 PROCEDURE — 99213 OFFICE O/P EST LOW 20 MIN: CPT | Performed by: NURSE PRACTITIONER

## 2024-06-06 RX ORDER — DOXYCYCLINE HYCLATE 100 MG/1
100 CAPSULE ORAL 2 TIMES DAILY
Qty: 20 CAPSULE | Refills: 0 | Status: SHIPPED | OUTPATIENT
Start: 2024-06-06

## 2024-06-06 NOTE — PROGRESS NOTES
Venipuncture Blood Specimen Collection  Venipuncture performed in right arm by Linh Camargo MA with good hemostasis. Patient tolerated the procedure well without complications.   06/06/24   Linh Camargo MA

## 2024-06-06 NOTE — PROGRESS NOTES
"Subjective   Jimmy Dawson Jr. is a 53 y.o. male.     Chief Complaint   Patient presents with    Insect Bite     2 days ago       History of Present Illness  He presents with c/o a spider bite on his right upper arm that he noticed 2 days ago. He c/o itching and redness at the site.   Insect Bite  Associated symptoms include a rash. Pertinent negatives include no chest pain, coughing or fever.        The following portions of the patient's history were reviewed and updated as appropriate: allergies, current medications, past family history, past medical history, past social history, past surgical history and problem list.    Review of Systems   Constitutional:  Negative for fever.   Respiratory:  Negative for cough, shortness of breath and wheezing.    Cardiovascular:  Negative for chest pain and palpitations.   Skin:  Positive for rash.       Objective     /76 (BP Location: Right arm, Patient Position: Sitting, Cuff Size: Large Adult)   Pulse 100   Temp 97.7 °F (36.5 °C) (Oral)   Ht 193 cm (75.98\")   Wt (!) 146 kg (322 lb 12.8 oz)   SpO2 97%   BMI 39.31 kg/m²     Physical Exam  Vitals reviewed.   Constitutional:       General: He is not in acute distress.     Appearance: He is well-developed. He is not diaphoretic.   HENT:      Head: Normocephalic and atraumatic.   Cardiovascular:      Rate and Rhythm: Normal rate and regular rhythm.      Heart sounds: Normal heart sounds. No murmur heard.     No friction rub. No gallop.   Pulmonary:      Effort: Pulmonary effort is normal. No respiratory distress.      Breath sounds: Normal breath sounds.   Abdominal:      General: Bowel sounds are normal. There is no distension.      Palpations: Abdomen is soft.      Tenderness: There is no abdominal tenderness.   Skin:     General: Skin is warm and dry.             Comments: Erythematous papule right upper inner arm with surrounding erythema. No pus or drainage. No blackness.    Neurological:      Mental Status: " He is alert and oriented to person, place, and time.   Psychiatric:         Behavior: Behavior normal.         Thought Content: Thought content normal.         Judgment: Judgment normal.         Current Outpatient Medications   Medication Sig Dispense Refill    amLODIPine (NORVASC) 5 MG tablet Take 1 tablet by mouth Daily. 30 tablet 11    ascorbic acid (VITAMIN C) 500 MG tablet Take 1 tablet by mouth Daily.      atorvastatin (Lipitor) 20 MG tablet Take 1 tablet by mouth Daily. 90 tablet 1    busPIRone (BUSPAR) 7.5 MG tablet Take 1 tablet by mouth 3 (Three) Times a Day As Needed (anxiety). 270 tablet 1    empagliflozin (Jardiance) 25 MG tablet tablet Take 1 tablet by mouth Daily. 90 tablet 1    fexofenadine (ALLEGRA) 180 MG tablet Take 1 tablet by mouth Every Night. 90 tablet 1    fluticasone (FLONASE) 50 MCG/ACT nasal spray 2 sprays into the nostril(s) as directed by provider Daily. 15.8 mL 5    gabapentin (NEURONTIN) 300 MG capsule Take 1 capsule by mouth 2 (Two) Times a Day.      glucose blood test strip 1 each by Other route Daily. Use as instructed 100 each 12    glucose monitor monitoring kit Use 1 each Daily. 1 each 0    ipratropium-albuterol (DUO-NEB) 0.5-2.5 mg/3 ml nebulizer Take 3 mL by nebulization 4 times a day as needed for wheezing 360 mL 0    Lancets misc Use 1 Device 2 (Two) Times a Day. 100 each 12    losartan (Cozaar) 50 MG tablet Take 1.5 tablets by mouth Daily. 180 tablet 1    melatonin 5 MG tablet tablet Take 1 tablet by mouth Every Night.      meloxicam (MOBIC) 15 MG tablet Take 1 tablet by mouth Daily.      metFORMIN (Glucophage) 500 MG tablet Take 1 tablet by mouth 2 (Two) Times a Day With Meals. 180 tablet 1    methocarbamol (ROBAXIN) 500 MG tablet Take 2 tablets by mouth Every 8 (Eight) Hours As Needed for Muscle Spasms. 90 tablet 2    omeprazole (priLOSEC) 40 MG capsule Take 1 capsule by mouth Daily. 90 capsule 1    venlafaxine XR (EFFEXOR-XR) 150 MG 24 hr capsule Take 1 capsule by mouth  Daily. 90 capsule 3    Vitamin D, Cholecalciferol, (CHOLECALCIFEROL) 10 MCG (400 UNIT) tablet Take 1 tablet by mouth Daily.      doxycycline (VIBRAMYCIN) 100 MG capsule Take 1 capsule by mouth 2 (Two) Times a Day. 20 capsule 0     No current facility-administered medications for this visit.            Assessment & Plan     Problem List Items Addressed This Visit    None  Visit Diagnoses       Spider bite wound, accidental or unintentional, initial encounter    -  Primary    Relevant Medications    doxycycline (VIBRAMYCIN) 100 MG capsule              ICD-10-CM ICD-9-CM   1. Spider bite wound, accidental or unintentional, initial encounter  T63.301A 989.5     E905.1       Plan: Erythema circled on the skin and dated. Start doxycycline. Return to clinic with worsening symptoms. Keep scheduled follow up and follow up as needed.    @Body mass index is 39.31 kg/m².           Understands disease processes and need for medications.  Understands reasons for urgent and emergent care.  Patient (& family) verbalized agreement for treatment plan.   Emotional support and active listening provided.  Patient provided time to verbalize feelings.                  This document has been electronically signed by LIONEL Grider   June 6, 2024 09:47 EDT

## 2024-06-07 ENCOUNTER — TELEPHONE (OUTPATIENT)
Dept: FAMILY MEDICINE CLINIC | Facility: CLINIC | Age: 54
End: 2024-06-07
Payer: OTHER GOVERNMENT

## 2024-06-07 NOTE — TELEPHONE ENCOUNTER
----- Message from Linh CRABTREE sent at 6/7/2024  1:01 PM EDT -----  Regarding: FW: Bloodwork   Contact: [phone]    ----- Message -----  From: Jimmy Dawson Jr.  Sent: 6/7/2024  12:45 PM EDT  To: [facility]  Subject: Bloodwork                                        Did you do bloodwork for cholesterol and triglycerides?  Haven’t got results on those yet if so.

## 2024-06-10 ENCOUNTER — TELEPHONE (OUTPATIENT)
Dept: PAIN MEDICINE | Facility: CLINIC | Age: 54
End: 2024-06-10

## 2024-06-10 NOTE — TELEPHONE ENCOUNTER
Hub staff attempted to follow warm transfer process and was unsuccessful     Caller: LIZBET ALLRED    Relationship to patient: Emergency Contact    Best call back number: [phone]    Patient is needing: CALLBACK - RECD A CALL FROM OFFICE AND GIVES PERMISSION TO DISCUSS CARE/SCHEDULE WITH SPOUSE

## 2024-06-28 ENCOUNTER — TELEPHONE (OUTPATIENT)
Dept: FAMILY MEDICINE CLINIC | Facility: CLINIC | Age: 54
End: 2024-06-28
Payer: OTHER GOVERNMENT

## 2024-06-28 DIAGNOSIS — E11.65 TYPE 2 DIABETES MELLITUS WITH HYPERGLYCEMIA, WITHOUT LONG-TERM CURRENT USE OF INSULIN: ICD-10-CM

## 2024-06-28 NOTE — TELEPHONE ENCOUNTER
LIZBET SAYS TERRENCE'S REFILL WERE SENT IN TO [facility] BUT TWO OF THEM WERE SUPPOSED TO GO TO  [facility].    METFORMIN & JARDIANCE

## 2024-07-03 NOTE — PROGRESS NOTES
"Chief Complaint: \"Lower back pain\"        History of Present Illness:   Patient: Mr. Jimmy Dawson Jr., 53 y.o. male   Referring Physician: Dr. Leighton Johnson   Reason for Referral: Consultation for chronic intractable lower back pain.   Pain History: Patient reports a greater than 10-year history of chronic intractable lower back pain, which began without incident. Since he was hospitalized in August of 2023, he has experienced increased lower back pain. He reports that when getting out of a vehicle, he experiences a sharp pain from his lower back into his right thigh. His pain increases with protracted standing, walking, sitting. He has to change positions frequently to get comfortable. His symptoms are also worse by the end of the day. He has chronically had numbness in lateral aspect of his right thigh. MRI of the lumbar spine without contrast on 02/22/2024 revealed disc bulges, facet hypertrophy, ligamentum flavum hypertrophy from L1-L2 through L5-S1. At L4-L5: Ligamentum flavum hypertrophy.  Moderate central canal stenosis.  Moderate foraminal stenosis. At L2-L3 and L3-L4: Mild to moderate canal stenosis. Jimmy Dawson Jr. underwent neurosurgical consultation with Dr. Leighton Johnson, on 04/09/2024, and was found not to be a surgical candidate. Jimmy Dawson Jr. has failed to obtain pain relief with conservative measures for more than 10 months including oral analgesics (gabapentin, Robaxin, Mobic, etc), topical analgesics, ice, heat, TENs, physical therapy (last visit within the past month, 6-8 weeks), physical therapist directed home exercise program HEP (ongoing), therapeutic massage, to name a few. Pain has progressed in intensity over the past years.  Pain Description: Constant lower back pain with intermittent exacerbation, described as aching, dull, sharp, throbbing, burning, and numbing sensation.   Radiation of Pain: The pain radiates into the lateral aspect of his thigh  Pain intensity " today: 6/10   Average pain intensity last week: 5/10  Pain intensity ranges from: 4/10 to 8/10  Aggravating factors: Pain increases with bending forward, arching his back, twisting, protracted sitting, standing, walking. Patient describes neurogenic claudication. Patient does not use a cane or walker   Alleviating factors: Pain decreases with changing positions, moving away from the site of pain, lying down prone  Associated Symptoms:   Patient reports intermittent pain, numbness (RT anterolateral thigh), and weakness in the lower extremities.   Patient denies any new bladder or bowel problems.   Patient denies difficulties with his balance or recent falls.   Pain interferes with ADLs, general activities, and affects patient's quality of life  Pain does not interfere with sleep  Muscle spasms: LEgs  Stiffness: LB    Review of previous therapies and additional medical records:  Jimmy Dawson Jr. has already failed the following measures, including:   Conservative Measures: Oral analgesics (gabapentin, Robaxin, Mobic, etc), topical analgesics, ice, heat, TENs, physical therapy (last visit within the past month, 6-8 weeks), physical therapist directed home exercise program HEP (ongoing), therapeutic massage  Interventional Measures: None for his back. Hx knee injections > 6 months ago  Surgical Measures: No history of previous cervical spine, lumbar spine or hip surgery   Jimmy Dawson Jr. underwent neurosurgical consultation with Dr. Leighton Johnson, on 04/09/2024, and was found not to be a surgical candidate.  Jimmy Dawson Jr. presents with significant comorbidities including anxiety, depression, GERD, HTN, HLP, TAMIKO, Hx WPW (Brayan-Parkinson-White syndrome) s/p RF ablation 2000, obesity, type 2 diabetes mellitus   In terms of current analgesics, Jimmy Dawson Jr. takes: meloxicam, methocarbamol, gabapentin. Patient also takes BusPar, melatonin, venlafaxine   I have reviewed Torres Report consistent with  medication reconciliation.  SOAPP/ORT: Low Risk     PHQ-9 Depression Screening  Little interest or pleasure in doing things? 0-->not at all   Feeling down, depressed, or hopeless? 3-->nearly every day   Trouble falling or staying asleep, or sleeping too much? 0-->not at all   Feeling tired or having little energy? 2-->more than half the days   Poor appetite or overeating? 0-->not at all   Feeling bad about yourself - or that you are a failure or have let yourself or your family down? 2-->more than half the days   Trouble concentrating on things, such as reading the newspaper or watching television? 3-->nearly every day   Moving or speaking so slowly that other people could have noticed? Or the opposite - being so fidgety or restless that you have been moving around a lot more than usual? 2-->more than half the days   Thoughts that you would be better off dead, or of hurting yourself in some way? 0-->not at all   PHQ-9 Total Score 12   If you checked off any problems, how difficult have these problems made it for you to do your work, take care of things at home, or get along with other people? very difficult      Pain Self-Efficacy Questionnaire (PSEQ)  ITEM 07-09  2024        I can enjoy things despite the pain. 2        I can do most of the household chores (tidying up, washing dishes, etc), despite the pain. 5        I can socialize with my friends or family members as often as I used to do, despite the pain. 2        I can cope with my pain in most situations. 3        I can do some form of work, despite the pain (includes housework, paid, and unpaid work). 5        I can still do many of the things I enjoy doing, such as hobbies or leisure activity despite pain. 3        I can cope with my pain without medications. 4        I can accomplish most of my goals in life despite the pain. 3        I can live in a normal lifestyle, despite the pain. 1        I can gradually become more active, despite the pain. 2         TOTAL SCORE 30/60            Global Pain Scale 07-09 2024          Pain 14          Feelings 15          Clinical outcomes 8          Activities 16          GPS Total: 53            The [place] Back Pain Disability Scale   DATE 07-09 2024          Sleep through the night 0          Turn over in bed 2          Get out of bed 3          Make your bed 0          Put on socks (pantyhose) 2          Ride in a car 3          Sit in a chair for several hours 3          Stand up for 20-30 minutes 4          Climb one flight of stairs 3          Walk a few blocks (200-300 yards)  4          Walk several miles 4          Run one block (about 50 yards) 5          Take food out of the refrigerator 0          Reach up to high shelves 0          Move a chair 0          Pull or push heavy doors 0          Bend over to clean the bathtub 4          Throw a ball 1          Carry two bags of groceries 1          Lift and carry a heavy suitcase 1          Total score 44            [place] Claudication Score  All questions are in reference to an average for the past month 07-09 2024          PAIN FREQUENCY:   How often have you experienced pain in your back or buttock or pain that goes down your back, buttock, and/or legs?  Not at all  Less than once a week  At least once a week  Every day for at least a few minutes  Every day for most of the day  Every minute of the day 4          PAIN SEVERITY:   How would you describe the worst pain you have had in your back, buttock, and/or legs?  0. None  1. Mild  2. Moderate  3. Severe  4. Very severe  5. Intolerable 3          BACK PAIN SEVERITY:   How would you describe the pain or discomfort in your back and/or buttocks?  0. None  1. Mild  2. Moderate  3. Severe  4. Very severe  5. Intolerable 3          LEG PAIN SEVERITY:   How would you describe the pain or discomfort in your legs or feet?  None  Mild  Moderate  Severe  Very severe  Intolerable 2          NERVE SYMPTOM SEVERITY:   How  would you describe the numbness or tingling in your legs or feet?  None  Mild  Moderate  Severe  Very severe  Intolerable 3          LEG WEAKNESS:   How would you describe the strength in your legs, ankles, or feet?  None  Mild  Moderate  Severe  Very severe  Intolerable 0          BALANCE:   Which statement best describes your steadiness when standing or walking?  0. I have had no problems with my balance.  1. I sometimes feel off-balance but I am able to walk without any aid.  2. I often feel off-balance but I am able to walk with an aid.  3. I am unable to walk without an aid.  4. I have difficulty walking despite using an aid.  5. I cannot stand up. 1          WALKING DISTANCE:   When you went for a walk, how far were you able to walk before your back or leg started giving you trouble?  0. No limits or more than 2 miles   1. More than 1/4 mile but less than 2 miles  2. More than 100 yards but less than 1/4 mile  3. More than 50 feet but less than 100 yards  4. Less than 50 feet  5. Not at all 2          WALKING ABILITY:   Which statement best describes your walking ability?  0. There is no limit to my walking ability.  1. I can walk far enough to do everything I want to do.  2. I am able to walk comfortably from my home to the shops or my transport.  3. I am able to walk comfortably around the house.  4. I am able to walk only from the bedroom to the bathroom or kitchen.  5. I am not able to walk at all. 2          WALKING SPEED:   Which statement best describes your walking?  0. I am able to walk at a normal speed.  1. I walk slowly but standing upright.  2. I walk slowly and bent forward.  3. I have to stop and stand still when I walk.  4. I have to stop and sit down when I walk.  5. I cannot walk at all. 4          Total Score: Total Score _____% = (___) x 100                                                              50  24              Review of Diagnostic Studies:  I have independently reviewed and  interpreted the images with the patient and used the images and a tridimensional spine model to explain findings. I have also reviewed the reports.  MRI of the lumbar spine without contrast on 02/22/2024 revealed preservation of vertebral body heights and alignment.  Axial imaging:  L1-L2: Disc bulge, facet hypertrophy.  No significant canal or foraminal stenosis  L2-L3: Broad-based disc bulge, facet hypertrophy, ligamentum flavum hypertrophy.  Moderate central canal stenosis.  Mild left and moderate right neuroforaminal stenosis  L3-L4: Disc bulge, facet hypertrophy.  Mild central canal stenosis.  Mild right neuroforaminal stenosis  L4-L5: Broad-based disc bulge, facet hypertrophy. RT Facet joint effusion, Ligamentum flavum hypertrophy. Mild to moderate central canal stenosis.  Moderate right lateral recess and foraminal stenosis  L5-S1: Disc bulge, facet hypertrophy.  Mild to moderate right neuroforaminal stenosis.    Review of Systems   HENT:  Positive for hearing loss and tinnitus.    Respiratory:  Positive for apnea.    Musculoskeletal:  Positive for arthralgias, back pain, gait problem, myalgias and neck stiffness.   Psychiatric/Behavioral:  The patient is nervous/anxious.    All other systems reviewed and are negative.        Patient Active Problem List   Diagnosis    Gastroesophageal reflux disease without esophagitis    Depression    Essential hypertension    Palpitations    SVT (supraventricular tachycardia), s/p RF ablation, in 2000 x2    WPW (Brayan-Parkinson-White syndrome), s/p RF ablation 2000.    Chronic rhinitis    Sleep apnea    Typical atrial flutter    History of 2019 novel coronavirus disease (COVID-19)    Anxiety    Chronic folliculitis    Primary osteoarthritis of left knee    Symptomatic cholelithiasis    CAP (community acquired pneumonia)    Status post laparoscopic cholecystectomy    Prediabetes    Mixed hyperlipidemia    Chronic right-sided low back pain with right-sided sciatica    Hearing  deficit, bilateral    Skin tag    Class 3 severe obesity with serious comorbidity and body mass index (BMI) of 40.0 to 44.9 in adult    Type 2 diabetes mellitus with hyperglycemia    Spondylosis of lumbar region without myelopathy or radiculopathy    Spinal instabilities, lumbar region    Degeneration of lumbar or lumbosacral intervertebral disc    Ligamentum flavum hypertrophy    Anxiety and depression    Physical deconditioning    BMI 35.0-35.9,adult    Lumbar stenosis with neurogenic claudication       Past Medical History:   Diagnosis Date    Acid reflux     Allergic     Anxiety     Arthritis     Depression     GERD (gastroesophageal reflux disease)     Hypertension     Infectious mononucleosis     Mixed hyperlipidemia 10/11/2023    TAMIKO (obstructive sleep apnea)     NO OXYGEN USE    PONV (postoperative nausea and vomiting)     Seasonal allergies     Sinusitis     Sleep apnea     c pap    Urinary tract infection     WPW (Brayan-Parkinson-White syndrome)          Past Surgical History:   Procedure Laterality Date    ABLATION OF DYSRHYTHMIC FOCUS  1999 and 2000    Dr. Carlin    CARDIAC CATHETERIZATION      CARDIAC ELECTROPHYSIOLOGY PROCEDURE N/A 06/04/2019    Procedure: Flutter;  Surgeon: Juan Carlin MD;  Location: [facility] INVASIVE LOCATION;  Service: Cardiology    CARDIAC SURGERY      CHOLECYSTECTOMY N/A 8/8/2023    Procedure: CHOLECYSTECTOMY LAPAROSCOPIC;  Surgeon: Miles Sanders MD;  Location: [facility] OR;  Service: General;  Laterality: N/A;    COLONOSCOPY      WISDOM TOOTH EXTRACTION           Family History   Problem Relation Age of Onset    Hypertension Mother     Hypertension Father     Hyperlipidemia Father     Diabetes Father     Heart disease Father     Diabetes Brother     Diabetes Maternal Grandfather     Heart attack Maternal Grandfather     Diabetes Paternal Grandfather     Diabetes Maternal Grandmother     Cancer Maternal Grandmother          Social History     Socioeconomic History     Marital status:    Tobacco Use    Smoking status: Never    Smokeless tobacco: Never   Vaping Use    Vaping status: Never Used   Substance and Sexual Activity    Alcohol use: Yes     Comment: 4-5 BEERS, TWICE A WEEK    Drug use: No    Sexual activity: Defer           Current Outpatient Medications:     amLODIPine (NORVASC) 5 MG tablet, Take 1 tablet by mouth Daily., Disp: 30 tablet, Rfl: 11    ascorbic acid (VITAMIN C) 500 MG tablet, Take 1 tablet by mouth Daily., Disp: , Rfl:     atorvastatin (Lipitor) 20 MG tablet, Take 1 tablet by mouth Daily., Disp: 90 tablet, Rfl: 1    busPIRone (BUSPAR) 7.5 MG tablet, Take 1 tablet by mouth 3 (Three) Times a Day As Needed (anxiety)., Disp: 270 tablet, Rfl: 1    doxycycline (VIBRAMYCIN) 100 MG capsule, Take 1 capsule by mouth 2 (Two) Times a Day., Disp: 20 capsule, Rfl: 0    empagliflozin (Jardiance) 25 MG tablet tablet, Take 1 tablet by mouth Daily., Disp: 90 tablet, Rfl: 1    fexofenadine (ALLEGRA) 180 MG tablet, Take 1 tablet by mouth Every Night., Disp: 90 tablet, Rfl: 1    fluticasone (FLONASE) 50 MCG/ACT nasal spray, 2 sprays into the nostril(s) as directed by provider Daily., Disp: 15.8 mL, Rfl: 5    gabapentin (NEURONTIN) 300 MG capsule, Take 1 capsule by mouth 2 (Two) Times a Day., Disp: , Rfl:     glucose blood test strip, 1 each by Other route Daily. Use as instructed, Disp: 100 each, Rfl: 12    glucose monitor monitoring kit, Use 1 each Daily., Disp: 1 each, Rfl: 0    ipratropium-albuterol (DUO-NEB) 0.5-2.5 mg/3 ml nebulizer, Take 3 mL by nebulization 4 times a day as needed for wheezing, Disp: 360 mL, Rfl: 0    Lancets misc, Use 1 Device 2 (Two) Times a Day., Disp: 100 each, Rfl: 12    losartan (Cozaar) 50 MG tablet, Take 1.5 tablets by mouth Daily., Disp: 180 tablet, Rfl: 1    melatonin 5 MG tablet tablet, Take 1 tablet by mouth Every Night., Disp: , Rfl:     meloxicam (MOBIC) 15 MG tablet, Take 1 tablet by mouth Daily., Disp: , Rfl:     metFORMIN  "(Glucophage) 500 MG tablet, Take 1 tablet by mouth 2 (Two) Times a Day With Meals., Disp: 180 tablet, Rfl: 1    methocarbamol (ROBAXIN) 500 MG tablet, Take 2 tablets by mouth Every 8 (Eight) Hours As Needed for Muscle Spasms., Disp: 90 tablet, Rfl: 2    omeprazole (priLOSEC) 40 MG capsule, Take 1 capsule by mouth Daily., Disp: 90 capsule, Rfl: 1    venlafaxine XR (EFFEXOR-XR) 150 MG 24 hr capsule, Take 1 capsule by mouth Daily., Disp: 90 capsule, Rfl: 3    Vitamin D, Cholecalciferol, (CHOLECALCIFEROL) 10 MCG (400 UNIT) tablet, Take 1 tablet by mouth Daily., Disp: , Rfl:       Allergies   Allergen Reactions    Aleve [Naproxen] Hives         Pulse 76   Ht 193 cm (76\")   Wt (!) 148 kg (325 lb 3.2 oz)   SpO2 98%   BMI 39.58 kg/m²       Physical Exam:  Constitutional: Patient appears well-developed, well-nourished, well-hydrated, appears younger than stated age  HEENT: Head: Normocephalic and atraumatic  Eyes: Conjunctivae and lids are normal  Pupils: Equal, round, reactive to light  Peripheral vascular exam: Femoral: right 2+, left 2+. Posterior tibialis: right 2+ and left 2+. Dorsalis pedis: right 2+ and left 2+. No edema.   Musculoskeletal   Gait and station: Gait evaluation demonstrated a normal gait. Able to walk on heels, toes, tandem walking   Lumbar Spine: Passive and active range of motion are limited by body habitus but without significant pain. Extension, flexion, lateral flexion, rotation of the lumbar spine did not increase or reproduce pain. Lumbar facet joint loading maneuvers are negative.   Sacroiliac Joints: Quinn's test; Gaenslen's test; thigh thrust test; SI compression test; posterior shear test: Negative   Piriformis maneuvers: Negative   Right Hip Joint: The range of motion of the hip joint is limited to flexion and internal rotation but without pain   Left Hip Joint: The range of motion of the hip joint is limited to flexion and internal rotation but without pain   Palpation of the bilateral " psoas tendons and iliopsoas bursas: Unrevealing   Palpation of the bilateral greater trochanters: Unrevealing   Examination of the Iliotibial band: Unrevealing   Neurological:   Patient is alert and oriented to person, place, and time.   Speech: Normal.   Cortical function: Normal mental status.   Reflex Scores:  Right patellar: 1+  Left patellar: 1+  Right Achilles: 1+  Left Achilles: 1+  Motor strength: 5/5  Motor Tone: Normal  Involuntary movements: None.   Superficial/Primitive Reflexes: Primitive reflexes were absent.   Right Robertson: Absent  Left Robertson: Absent  Right ankle clonus: Absent  Left ankle clonus: Absent   Babinsky: Absent  Long tract signs: Negative. Straight leg raising test: Negative. Femoral stretch sign: Negative.   Sensory exam: Intact to light touch, intact pain and temperature sensation, intact vibration sensation and normal proprioception  Balance: Normal Romberg's sign: Negative   Skin and subcutaneous tissue: Skin is warm and intact. No rash noted. No cyanosis.   Psychiatric: Judgment and insight: Normal. Recent and remote memory: Intact. Mood and affect: Normal.     ASSESSMENT:   1. Lumbar stenosis with neurogenic claudication    2. Spinal instabilities, lumbar region    3. Degeneration of lumbar or lumbosacral intervertebral disc    4. Ligamentum flavum hypertrophy    5. Spondylosis of lumbar region without myelopathy or radiculopathy    6. Prediabetes    7. BMI 35.0-35.9,adult    8. Anxiety and depression    9. Physical deconditioning      PLAN/MEDICAL DECISION MAKING:  Mr. Jimmy Dawson Jr., 53 y.o. male presents with a greater than 10-year history of chronic lower back pain. Since he was hospitalized in August of 2023, he has experienced increased lower back pain associated with neurogenic claudication. He reports that when getting out of a vehicle, he experiences a sharp pain from his lower back into his right thigh. His pain increases with protracted standing, walking, sitting.  He has to change positions frequently to get comfortable. He has had chronic numbness in the anterolateral aspect of his right thigh. MRI of the lumbar spine without contrast on 02/22/2024 revealed disc bulges, facet hypertrophy, ligamentum flavum hypertrophy from L1-L2 through L5-S1. At L4-L5: Ligamentum flavum hypertrophy.  Moderate central canal stenosis.  Moderate foraminal stenosis. At L2-L3 moderate canal, lateral recess RT>LT and NF stenosis. L3-L4: Mild to moderate canal stenosis. Jimmy Dawson Jr. underwent neurosurgical consultation with Dr. Leighton Johnson, on 04/09/2024, and was found not to be a surgical candidate. Jimmy Dawson Jr. has failed to obtain pain relief with conservative measures for more than 10 months including oral analgesics (gabapentin, Robaxin, Mobic, etc), topical analgesics, ice, heat, TENs, physical therapy (last visit within the past month, 6-8 weeks), physical therapist directed home exercise program HEP (ongoing), therapeutic massage, to name a few. Pain has progressed in intensity over the past years. Symptoms appear to correlate with findings at L2-L3. A comprehensive evaluation including history and physical exam along with pertinent physiologic and functional assessment was performed. Patient presents with intractable pain due to the diagnoses listed above. Patient has failed to respond to conservative modalities, as referenced under HPI. I have documented the impact of patient's moderate-to-severe pain contributing to significant impairment in daily activities, ADLs, and a negative impact on the patient's quality of life, as reflected on Global Pain Scale 53/100; The [place] Back Pain Disability Scale 44/100; [place] Claudication Score 24/50; [place]etti Gait & Balance Assessment Tool  (low risk for falls). I have reviewed pertinent supporting diagnostic studies of patient's chronic pain condition as well as all available pertinent medical records to patient's chronic pain  condition including previous therapies, as referenced above. PHQ-9 Depression Screening 12; Pain Self-Efficacy Questionnaire (PSEQ) 30/60. I had a lengthy conversation with Mr. Jimmy Jimenez Eunice Gomez regarding his chronic pain condition and potential therapeutic options including risks, benefits, alternative therapies, to name a few. We have discussed using a stepwise approach starting with the least intense level of care as determined by the extent required to diagnose and or treat a patient's condition. The proposed treatments are consistent with the patient's medical condition and known to be safe and effective by current guidelines and the standard of care. The duration and frequency proposed are considered appropriate for the service in accordance with accepted standards of medical practice for the diagnosis and treatment of the patient's condition and intended to improve the patient's level of function. These services will be furnished in a setting appropriate to the patient's medical needs and condition. Therefore, I have proposed the following plan:    1. Interventional pain management measures: Patient does not take blood thinners. Patient will be scheduled for diagnostic and therapeutic bilateral L2-L3 transforaminal epidural steroid injections using the lowest effective dose of steroids, under C-arm fluoroscopic guidance, with the use of contrast dye to confirm appropriate needle placement and spread of contrast dye. We may repeat therapeutic bilateral L2-L3 transforaminal epidural steroid injections Vs diagnostic and therapeutic bilateral L4-L5 transforaminal epidural steroid injections depending on patient's outcome and following current guidelines. Epidurals will be limited to a maximum of 4 sessions per spinal region in a rolling twelve (12) month period. Continuation of epidural steroid injections over 12 months would only be considered under the following provisions;  Patient is a high-risk surgical  candidate, or the patient does not desire surgery, or recurrence of pain in the same location relieved with ESIs for at least three months and epidural provides at least 50% sustained improvement of pain and/or 50% objective improvement in function (using same scale as baseline)  Pain is severe enough to cause a significant degree of functional disability or vocational disability  The primary care provider will be notified regarding continuation of procedures and repeat steroid use   Other options for treatment of patient's chronic pain would include ([facility]), Minuteman L2-L3 vs L4-L5; Vs PNS; Vs SCS trial     2. Diagnostic studies:   A. Lumbar Spine X-rays, full views including flexion and extension to assess lumbar stability  B. EMG/NCV of the bilateral lower extremities   C. Prior to SCS: MRI of the thoracic spine without contrast to assess capacity and patency of the spinal canal and epidural space prior to spinal cord stimulator trial and implant    3. Pharmacological measures: Reviewed and discussed; Patient takes meloxicam, methocarbamol, gabapentin. Patient also takes BusPar, melatonin, venlafaxine. Patient has declined additional pharmacological measures     4. Long-term rehabilitation efforts:  A. The patient does not have a history of falls. Also, I performed a risk assessment for falls using the Tinetti gait & balance assessment tool (scored low risk for falls).   B. Patient has completed a physical therapy program and does not have additional visits available for 2024  C. Contrast therapy: Apply ice-packs for 15-20 minutes, followed by heating pads for 15-20 minutes to affected area   D. Start a low impact exercise program such as water therapy, swimming, yoga  E. Prior to PNS/SCS: Referral to Dr. Karlos Randall for psychological screening for peripheral nerve stimulation, spinal cord stimulation  F. Referral to [facility]. Patient's Body mass index is 39.58 kg/m².  Patient counseled on the importance of weight loss to help with overall health and pain control.   LUIS Dawson Jr.  reports that he has never smoked. He has never used smokeless tobacco.    5. The patient and his family have been instructed to contact my office with any questions or difficulties. The patient understands the plan and agrees to proceed accordingly.    The patient has a documented plan of care to address chronic pain. Jimmy Dawson Jr. reports a pain score of 6/10.  Given his pain assessment as noted, treatment options were discussed and the following options were decided upon as a follow-up plan to address the patient's pain: continuation of current treatment plan for pain, educational materials on pain management, home exercises and therapy, prescription for non-opiod analgesics, referral to Physical Therapy, steroid injections, use of non-medical modalities (ice, heat, stretching and/or behavior modifications), and interventional pain management measures .               Pain Management Panel          Latest Ref Rng & Units 11/6/2020   Pain Management Panel   Amphetamine, Urine Qual Negative Negative    Barbiturates Screen, Urine Negative Negative    Benzodiazepine Screen, Urine Negative Negative    Buprenorphine, Screen, Urine Negative Negative    Cocaine Screen, Urine Negative Negative    Methadone Screen , Urine Negative Negative         URIAH query complete. URIAH reviewed by Joel Quezada MD.     Pain Medications               gabapentin (NEURONTIN) 300 MG capsule Take 1 capsule by mouth 2 (Two) Times a Day.    meloxicam (MOBIC) 15 MG tablet Take 1 tablet by mouth Daily.    methocarbamol (ROBAXIN) 500 MG tablet Take 2 tablets by mouth Every 8 (Eight) Hours As Needed for Muscle Spasms.    venlafaxine XR (EFFEXOR-XR) 150 MG 24 hr capsule Take 1 capsule by mouth Daily.             No orders of the defined types were placed in this encounter.       Please note that portions of this  note were completed with a voice recognition program.   Any copied data in any portion of my note has been reviewed by myself and accurate.     The 21st Century Cures Act makes medical notes like this available to patients in the interest of transparency. This is a medical document intended as peer to peer communication. It is written in medical language and may contain abbreviations or verbiage that are unfamiliar. It may appear blunt or direct. Medical documents are intended to carry relevant information, facts as evident, and the clinical opinion of the practitioner.     Joel Quezada MD    Patient Care Team:  Nena Pike APRN as PCP - General (Family Medicine)  Leighton Johnson MD as Consulting Physician (Neurosurgery)  Nena Pike APRN as Primary Care Provider (Family Medicine)  Mariah Quinn APRN as Referring Physician (Nurse Practitioner)     No orders of the defined types were placed in this encounter.        Future Appointments   Date Time Provider Department Center   3/20/2025  8:15 AM Anthony Ruff PA-C [facility]

## 2024-07-07 PROBLEM — M53.2X6: Status: ACTIVE | Noted: 2024-07-07

## 2024-07-07 PROBLEM — M51.379 DEGENERATION OF LUMBAR OR LUMBOSACRAL INTERVERTEBRAL DISC: Status: ACTIVE | Noted: 2024-07-07

## 2024-07-07 PROBLEM — M24.28 LIGAMENTUM FLAVUM HYPERTROPHY: Status: ACTIVE | Noted: 2024-07-07

## 2024-07-07 PROBLEM — M47.816 SPONDYLOSIS OF LUMBAR REGION WITHOUT MYELOPATHY OR RADICULOPATHY: Status: ACTIVE | Noted: 2024-07-07

## 2024-07-07 PROBLEM — M51.37 DEGENERATION OF LUMBAR OR LUMBOSACRAL INTERVERTEBRAL DISC: Status: ACTIVE | Noted: 2024-07-07

## 2024-07-09 ENCOUNTER — HOSPITAL ENCOUNTER (OUTPATIENT)
Dept: GENERAL RADIOLOGY | Facility: HOSPITAL | Age: 54
Discharge: HOME | End: 2024-07-09
Admitting: ANESTHESIOLOGY
Payer: OTHER GOVERNMENT

## 2024-07-09 ENCOUNTER — OFFICE VISIT (OUTPATIENT)
Dept: PAIN MEDICINE | Facility: CLINIC | Age: 54
End: 2024-07-09
Payer: OTHER GOVERNMENT

## 2024-07-09 VITALS — HEART RATE: 76 BPM | BODY MASS INDEX: 38.36 KG/M2 | HEIGHT: 76 IN | WEIGHT: 315 LBS | OXYGEN SATURATION: 98 %

## 2024-07-09 DIAGNOSIS — M53.2X6 SPINAL INSTABILITIES, LUMBAR REGION: ICD-10-CM

## 2024-07-09 DIAGNOSIS — R53.81 PHYSICAL DECONDITIONING: ICD-10-CM

## 2024-07-09 DIAGNOSIS — M48.062 LUMBAR STENOSIS WITH NEUROGENIC CLAUDICATION: Primary | ICD-10-CM

## 2024-07-09 DIAGNOSIS — M51.37 DEGENERATION OF LUMBAR OR LUMBOSACRAL INTERVERTEBRAL DISC: ICD-10-CM

## 2024-07-09 DIAGNOSIS — F41.9 ANXIETY AND DEPRESSION: ICD-10-CM

## 2024-07-09 DIAGNOSIS — M24.28 LIGAMENTUM FLAVUM HYPERTROPHY: ICD-10-CM

## 2024-07-09 DIAGNOSIS — M47.816 SPONDYLOSIS OF LUMBAR REGION WITHOUT MYELOPATHY OR RADICULOPATHY: ICD-10-CM

## 2024-07-09 DIAGNOSIS — M48.062 LUMBAR STENOSIS WITH NEUROGENIC CLAUDICATION: ICD-10-CM

## 2024-07-09 DIAGNOSIS — F32.A ANXIETY AND DEPRESSION: ICD-10-CM

## 2024-07-09 DIAGNOSIS — R73.03 PREDIABETES: Chronic | ICD-10-CM

## 2024-07-09 PROCEDURE — 72114 X-RAY EXAM L-S SPINE BENDING: CPT

## 2024-07-09 PROCEDURE — 99204 OFFICE O/P NEW MOD 45 MIN: CPT | Performed by: ANESTHESIOLOGY

## 2024-07-16 DIAGNOSIS — M48.062 LUMBAR STENOSIS WITH NEUROGENIC CLAUDICATION: Primary | ICD-10-CM

## 2024-07-24 ENCOUNTER — DOCUMENTATION (OUTPATIENT)
Dept: PAIN MEDICINE | Facility: CLINIC | Age: 54
End: 2024-07-24

## 2024-07-24 ENCOUNTER — OUTSIDE FACILITY SERVICE (OUTPATIENT)
Dept: PAIN MEDICINE | Facility: CLINIC | Age: 54
End: 2024-07-24
Payer: OTHER GOVERNMENT

## 2024-07-24 PROCEDURE — 64483 NJX AA&/STRD TFRM EPI L/S 1: CPT | Performed by: ANESTHESIOLOGY

## 2024-07-24 NOTE — PROGRESS NOTES
[facility]     PROCEDURE DATE: 07/24/2024    PREOPERATIVE DIAGNOSES:  1. Lumbar stenosis with neurogenic claudication   2. Spinal instabilities, lumbar region   3. Degeneration of lumbar or lumbosacral intervertebral disc   4. Ligamentum flavum hypertrophy   5. Spondylosis of lumbar region without myelopathy or radiculopathy   6. Prediabetes   7. BMI 35.0-35.9,adult   8. Anxiety and depression   9. Physical deconditioning     POSTOPERATIVE DIAGNOSES:  1. Lumbar stenosis with neurogenic claudication   2. Spinal instabilities, lumbar region   3. Degeneration of lumbar or lumbosacral intervertebral disc   4. Ligamentum flavum hypertrophy   5. Spondylosis of lumbar region without myelopathy or radiculopathy   6. Prediabetes   7. BMI 35.0-35.9,adult   8. Anxiety and depression   9. Physical deconditioning     PROCEDURE: Diagnostic and therapeutic bilateral L2-L3 transforaminal epidural steroid injection    ANESTHESIA: Local anesthesia plus IV sedation    COMPLICATIONS: None    EBL: 0    MEDICAL NECESSITY: A comprehensive evaluation, including history and physical exam and pertinent physiologic and functional assessment, was performed. The patient presents with intractable pain due to the diagnoses listed above. The patient has failed to respond to conservative modalities including the impact of patient's moderate-to-severe pain contributing to significant impairment in daily activities, ADLs, and a negative impact on quality of life, as referenced under HPI. Supporting diagnostic studies of patient's chronic pain condition have been reviewed. We have discussed using a stepwise approach starting with the shortest or least intense level of treatment, care, or service as determined by the extent required to diagnose and or treat a patient's condition. The treatments proposed are consistent with the patient's medical condition and are known to be as safe and effective by current guidelines and standard of care. See OV note for  additional details.     PROCEDURE SUMMARY: After explaining all the risks and benefits of the procedure, an informed consent was obtained.  The patient's surgical site was confirmed with the patient and marked in the holding room accordingly. The patient was transferred to the procedure room and placed on the operating room table in the prone position. Time out was completed. Noninvasive monitors were applied. Administration of IV sedation was performed by a designated procedure room nurse, who monitored the patient's level of consciousness and physiological status. Pertinent information was reported on a sedation flow sheet, which is part of the patient's permanent medical records. Start sedation time: 09:03 AM. The patient's vital signs remained within normal limits. The lumbar spine area was prepped and draped in a sterile fashion.  Using C-arm fluoroscopic guidance, the six o'clock area of the bilateral L2 pedicles (targets) were identified.  The skin and tissues overlying the targets were anesthetized with five ml of 1% lidocaine. Then, 22-gauge styletted needles were advanced into the most posterior and superior portion of the bilateral L2-L3 neural foramina without any difficulties. The patient did not experience paresthesia. AP and lateral X-ray views were obtained confirming appropriate needle placement. Aspiration was negative for blood and/or CSF. One ml of Omnipaque-240 was injected per side and contrast dye was seen bathing the bilateral L2 nerve roots with spread into the epidural space. X-rays were obtained and scanned in the patient's chart. Two ml of 0.25% bupivacaine with 4 mg of dexamethasone were injected per side. Fluoroscopy was utilized using low-dose of radiation applying collimation, pulsed mode, and shielding following ALARA recommendations.  Fluoroscopy time: 8 seconds. End sedation time: 09:08 AM. The patient tolerated the procedure well and without incident.  Upon completion of the  procedure, the patient was transferred to the recovery room in stable condition. The patient was discharged from the [facility] Center neurologically intact and with appropriate discharge instructions. Pain level before procedure: 5/10. Pain level after procedure: 0/10.    PLAN:   Follow-up with LIONEL Pierre in 10 weeks. We may repeat therapeutic bilateral L2-L3 transforaminal epidural steroid injections Vs diagnostic and therapeutic bilateral L4-L5 transforaminal epidural steroid injections depending on patient's outcome and following current guidelines. Other options for treatment of patient's chronic pain would include ([facility]), Minuteman L2-L3 vs L4-L5; Vs PNS; Vs SCS trial   Diagnostic studies: See OV note.  Pharmacological measures: See OV note.  Long-term rehabilitation efforts: See OV note.  The patient and his family have been instructed to contact my office with any questions or difficulties. The patient understands the plan and agrees to proceed accordingly.        Signatures  Electronically signed by: Joel Quezada M.D.; JULY 24, 2024, 09:31 AM EST (Author)

## 2024-08-04 DIAGNOSIS — E11.65 TYPE 2 DIABETES MELLITUS WITH HYPERGLYCEMIA, WITHOUT LONG-TERM CURRENT USE OF INSULIN: ICD-10-CM

## 2024-08-04 DIAGNOSIS — I70.90 ARTERIOSCLEROSIS: ICD-10-CM

## 2024-08-04 DIAGNOSIS — K21.9 GASTROESOPHAGEAL REFLUX DISEASE WITHOUT ESOPHAGITIS: Chronic | ICD-10-CM

## 2024-08-04 DIAGNOSIS — F32.4 MAJOR DEPRESSIVE DISORDER WITH SINGLE EPISODE, IN PARTIAL REMISSION: ICD-10-CM

## 2024-08-04 DIAGNOSIS — I10 ESSENTIAL HYPERTENSION: Chronic | ICD-10-CM

## 2024-08-04 DIAGNOSIS — R73.03 PREDIABETES: Chronic | ICD-10-CM

## 2024-08-04 DIAGNOSIS — M54.41 CHRONIC RIGHT-SIDED LOW BACK PAIN WITH RIGHT-SIDED SCIATICA: Chronic | ICD-10-CM

## 2024-08-04 DIAGNOSIS — F41.9 ANXIETY: ICD-10-CM

## 2024-08-04 DIAGNOSIS — G89.29 CHRONIC RIGHT-SIDED LOW BACK PAIN WITH RIGHT-SIDED SCIATICA: Chronic | ICD-10-CM

## 2024-08-05 RX ORDER — FLUTICASONE PROPIONATE 50 MCG
2 SPRAY, SUSPENSION (ML) NASAL DAILY
Qty: 15.8 ML | Refills: 5 | Status: SHIPPED | OUTPATIENT
Start: 2024-08-05

## 2024-08-05 RX ORDER — VENLAFAXINE HYDROCHLORIDE 150 MG/1
150 CAPSULE, EXTENDED RELEASE ORAL DAILY
Qty: 90 CAPSULE | Refills: 3 | Status: SHIPPED | OUTPATIENT
Start: 2024-08-05

## 2024-08-05 RX ORDER — LOSARTAN POTASSIUM 50 MG/1
75 TABLET ORAL DAILY
Qty: 180 TABLET | Refills: 1 | Status: SHIPPED | OUTPATIENT
Start: 2024-08-05

## 2024-08-05 RX ORDER — METHOCARBAMOL 500 MG/1
1000 TABLET, FILM COATED ORAL EVERY 8 HOURS PRN
Qty: 90 TABLET | Refills: 2 | Status: SHIPPED | OUTPATIENT
Start: 2024-08-05

## 2024-08-05 RX ORDER — ATORVASTATIN CALCIUM 20 MG/1
20 TABLET, FILM COATED ORAL DAILY
Qty: 90 TABLET | Refills: 1 | Status: SHIPPED | OUTPATIENT
Start: 2024-08-05

## 2024-08-05 RX ORDER — FEXOFENADINE HCL 180 MG/1
180 TABLET ORAL NIGHTLY
Qty: 90 TABLET | Refills: 1 | Status: SHIPPED | OUTPATIENT
Start: 2024-08-05

## 2024-08-05 RX ORDER — OMEPRAZOLE 40 MG/1
40 CAPSULE, DELAYED RELEASE ORAL DAILY
Qty: 90 CAPSULE | Refills: 1 | Status: SHIPPED | OUTPATIENT
Start: 2024-08-05

## 2024-08-06 ENCOUNTER — TELEPHONE (OUTPATIENT)
Dept: ORTHOPEDIC SURGERY | Facility: CLINIC | Age: 54
End: 2024-08-06
Payer: OTHER GOVERNMENT

## 2024-08-06 NOTE — TELEPHONE ENCOUNTER
Fax RFS form to [phone] [facility] along with clinical information.  Fax confirmation received.

## 2024-08-07 ENCOUNTER — TELEPHONE (OUTPATIENT)
Dept: PAIN MEDICINE | Facility: CLINIC | Age: 54
End: 2024-08-07
Payer: OTHER GOVERNMENT

## 2024-08-07 NOTE — TELEPHONE ENCOUNTER
Caller: LIZBET ALLRED    Relationship: Emergency Contact    Best call back number: [phone]    What is the best time to reach you: ANYTIME    Who are you requesting to speak with (clinical staff, provider,  specific staff member): CLINICAL      What was the call regarding: WIFE STATES THAT PT HAD A PROCEDURE DONE ON 7.24.24 AND NO ONE HAS CALLED TO FOLLOW ON THE PT AFTER THE PROCEDURE. ALSO THERE HAS NOT BEEN A FOLLOW UP APPOINTMENT MADE.    PLEASE CONTACT WIFE OR PT AND ADVISE.  PLEASE CALL PT BEFORE MAKING ANOTHER APPOINTMENT    Is it okay if the provider responds through [facility]: -NO- PREFER TO TALK TO SOMEONE    ”

## 2024-08-08 NOTE — TELEPHONE ENCOUNTER
DIANE for pt inquiring how he was doing after his procedure on 7/24/24, and advised of his follow up with Lois in October.

## 2024-08-21 RX ORDER — AMLODIPINE BESYLATE 5 MG/1
5 TABLET ORAL DAILY
Qty: 30 TABLET | Refills: 6 | Status: SHIPPED | OUTPATIENT
Start: 2024-08-21

## 2024-08-30 RX ORDER — AMLODIPINE BESYLATE 5 MG/1
5 TABLET ORAL DAILY
Qty: 30 TABLET | Refills: 6 | OUTPATIENT
Start: 2024-08-30

## 2024-09-12 DIAGNOSIS — M54.41 CHRONIC RIGHT-SIDED LOW BACK PAIN WITH RIGHT-SIDED SCIATICA: Chronic | ICD-10-CM

## 2024-09-12 DIAGNOSIS — G89.29 CHRONIC RIGHT-SIDED LOW BACK PAIN WITH RIGHT-SIDED SCIATICA: Chronic | ICD-10-CM

## 2024-09-12 RX ORDER — METHOCARBAMOL 500 MG/1
TABLET, FILM COATED ORAL
Qty: 90 TABLET | Refills: 0 | Status: SHIPPED | OUTPATIENT
Start: 2024-09-12

## 2024-09-14 DIAGNOSIS — F32.4 MAJOR DEPRESSIVE DISORDER WITH SINGLE EPISODE, IN PARTIAL REMISSION: ICD-10-CM

## 2024-09-14 DIAGNOSIS — E11.65 TYPE 2 DIABETES MELLITUS WITH HYPERGLYCEMIA, WITHOUT LONG-TERM CURRENT USE OF INSULIN: ICD-10-CM

## 2024-09-14 DIAGNOSIS — F41.9 ANXIETY: Chronic | ICD-10-CM

## 2024-09-14 DIAGNOSIS — F41.9 ANXIETY: ICD-10-CM

## 2024-09-16 RX ORDER — FLUTICASONE PROPIONATE 50 MCG
2 SPRAY, SUSPENSION (ML) NASAL DAILY
Qty: 15.8 G | Refills: 11 | Status: SHIPPED | OUTPATIENT
Start: 2024-09-16

## 2024-09-16 RX ORDER — BUSPIRONE HYDROCHLORIDE 7.5 MG/1
7.5 TABLET ORAL 3 TIMES DAILY PRN
Qty: 270 TABLET | Refills: 1 | Status: SHIPPED | OUTPATIENT
Start: 2024-09-16

## 2024-09-16 RX ORDER — VENLAFAXINE HYDROCHLORIDE 150 MG/1
150 CAPSULE, EXTENDED RELEASE ORAL DAILY
Qty: 90 CAPSULE | Refills: 3 | Status: SHIPPED | OUTPATIENT
Start: 2024-09-16

## 2024-09-16 RX ORDER — FEXOFENADINE HCL 180 MG/1
180 TABLET ORAL NIGHTLY
Qty: 90 TABLET | Refills: 1 | Status: SHIPPED | OUTPATIENT
Start: 2024-09-16

## 2024-09-16 RX ORDER — GABAPENTIN 300 MG/1
300 CAPSULE ORAL 2 TIMES DAILY
OUTPATIENT
Start: 2024-09-16

## 2024-09-16 RX ORDER — MELOXICAM 15 MG/1
15 TABLET ORAL DAILY
Qty: 90 TABLET | Refills: 3 | Status: SHIPPED | OUTPATIENT
Start: 2024-09-16

## 2024-09-24 RX ORDER — FEXOFENADINE HCL 180 MG/1
180 TABLET ORAL NIGHTLY
Qty: 90 TABLET | Refills: 1 | Status: CANCELLED | OUTPATIENT
Start: 2024-09-24

## 2024-09-30 DIAGNOSIS — M25.562 PAIN IN BOTH KNEES, UNSPECIFIED CHRONICITY: Primary | ICD-10-CM

## 2024-09-30 DIAGNOSIS — M25.561 PAIN IN BOTH KNEES, UNSPECIFIED CHRONICITY: Primary | ICD-10-CM

## 2024-10-08 DIAGNOSIS — M54.41 CHRONIC RIGHT-SIDED LOW BACK PAIN WITH RIGHT-SIDED SCIATICA: Chronic | ICD-10-CM

## 2024-10-08 DIAGNOSIS — G89.29 CHRONIC RIGHT-SIDED LOW BACK PAIN WITH RIGHT-SIDED SCIATICA: Chronic | ICD-10-CM

## 2024-10-09 DIAGNOSIS — I70.90 ARTERIOSCLEROSIS: ICD-10-CM

## 2024-10-10 RX ORDER — METHOCARBAMOL 500 MG/1
1000 TABLET, FILM COATED ORAL EVERY 8 HOURS PRN
Qty: 90 TABLET | Refills: 0 | Status: SHIPPED | OUTPATIENT
Start: 2024-10-10

## 2024-10-10 RX ORDER — ATORVASTATIN CALCIUM 20 MG/1
20 TABLET, FILM COATED ORAL DAILY
Qty: 90 TABLET | Refills: 1 | Status: SHIPPED | OUTPATIENT
Start: 2024-10-10

## 2024-10-21 ENCOUNTER — OFFICE VISIT (OUTPATIENT)
Dept: PAIN MEDICINE | Facility: CLINIC | Age: 54
End: 2024-10-21
Payer: OTHER GOVERNMENT

## 2024-10-21 VITALS — BODY MASS INDEX: 38.36 KG/M2 | WEIGHT: 315 LBS | HEIGHT: 76 IN

## 2024-10-21 DIAGNOSIS — R53.81 PHYSICAL DECONDITIONING: ICD-10-CM

## 2024-10-21 DIAGNOSIS — F41.9 ANXIETY AND DEPRESSION: ICD-10-CM

## 2024-10-21 DIAGNOSIS — F32.A ANXIETY AND DEPRESSION: ICD-10-CM

## 2024-10-21 DIAGNOSIS — M48.062 LUMBAR STENOSIS WITH NEUROGENIC CLAUDICATION: ICD-10-CM

## 2024-10-21 DIAGNOSIS — M48.062 LUMBAR STENOSIS WITH NEUROGENIC CLAUDICATION: Primary | ICD-10-CM

## 2024-10-21 DIAGNOSIS — R73.03 PREDIABETES: ICD-10-CM

## 2024-10-21 DIAGNOSIS — M51.372 DEGENERATION OF INTERVERTEBRAL DISC OF LUMBOSACRAL REGION WITH DISCOGENIC BACK PAIN AND LOWER EXTREMITY PAIN: ICD-10-CM

## 2024-10-21 DIAGNOSIS — M47.816 SPONDYLOSIS OF LUMBAR REGION WITHOUT MYELOPATHY OR RADICULOPATHY: ICD-10-CM

## 2024-10-21 DIAGNOSIS — M24.28 LIGAMENTUM FLAVUM HYPERTROPHY: ICD-10-CM

## 2024-10-21 PROCEDURE — 99214 OFFICE O/P EST MOD 30 MIN: CPT | Performed by: NURSE PRACTITIONER

## 2024-10-21 RX ORDER — TIZANIDINE 2 MG/1
2 TABLET ORAL EVERY 8 HOURS PRN
Qty: 90 TABLET | Refills: 0 | Status: SHIPPED | OUTPATIENT
Start: 2024-10-21

## 2024-10-21 NOTE — PROGRESS NOTES
"Chief Complaint: \"Lower back pain and pain into my right leg\"        History of Present Illness:   Patient: Mr. Jimmy Dawson Jr., 53 y.o. male originally referred by Dr. Leighton Johnson in consultation for chronic intractable lower back pain.  Patient reports a greater than 10-year history of chronic intractable lower back pain.  He underwent hospitalization in August 2023, he reports since that time his lower back pain has steadily increased.  His pain increases with protracted standing, walking, sitting. He has to change positions frequently to get comfortable. His symptoms are also worse by the end of the day. He has chronically had numbness in lateral aspect of his right thigh. MRI of the lumbar spine without contrast on 02/22/2024 revealed disc bulges, facet hypertrophy, ligamentum flavum hypertrophy from L1-L2 through L5-S1. At L4-L5: Ligamentum flavum hypertrophy.  Moderate central canal stenosis.  Moderate foraminal stenosis. At L2-L3 and L3-L4: Mild to moderate canal stenosis.  He underwent neurosurgical consultation with Dr. Leighton Johnson, on 04/09/2024, and was found not to be a surgical candidate.  He has failed to obtain pain relief with conservative measures including oral analgesics, topical analgesics, ice, heat, TENs, physical therapy, physical therapist directed home exercise program HEP (ongoing), therapeutic massage, to name a few.  We last saw him on July 24, 2024 when he underwent diagnostic and therapeutic bilateral L2-L3 transforaminal epidural steroid injection, from which overall he reports experiencing almost complete resolution of his pain for 2 months.  He then tells me he twisted his leg, and then developed severe right sided lower back pain with radiation into his right lower extremity.  He describes pain that begins in the right side of his lower back extends into the posterior and lateral portion of his thigh, and into his calf.  He denies any symptoms on the left side, since " last epidural. He returns today for postprocedure follow-up and evaluation.  Pain Description: Constant lower back pain with intermittent exacerbation, described as aching, dull, sharp, throbbing, burning, and numbing sensation.   Radiation of Pain: The pain radiates into the posterior and lateral aspect of his right thigh and into the right calf  Pain intensity today: 7/10   Average pain intensity last week: 7/10  Pain intensity ranges from: 6/10 to 10/10  Aggravating factors: Pain increases with bending, arching his back, twisting, protracted sitting, standing, walking. Patient describes neurogenic claudication. Patient does not use a cane or walker   Alleviating factors: Pain decreases with changing positions, moving away from the site of pain, lying down prone  Associated Symptoms:   Patient reports pain, numbness (RT anterolateral thigh), and weakness in the lower extremities.   Patient denies any new bladder or bowel problems.   Patient denies difficulties with his balance or recent falls.   Pain interferes with ADLs, general activities, and affects patient's quality of life  Pain does not interfere with sleep  Muscle spasms  Stiffness    Review of previous therapies and additional medical records:  Jimmy Dawson Jr. has already failed the following measures, including:   Conservative Measures: Oral analgesics (gabapentin, Robaxin, Mobic, etc), topical analgesics, ice, heat, TENs, physical therapy (last visit within the past month, 6-8 weeks), physical therapist directed home exercise program HEP (ongoing), therapeutic massage  Interventional Measures: 07/24/2024: DxTx bilateral L2-L3 transforaminal epidural steroid injection  Surgical Measures: No history of previous cervical spine, lumbar spine or hip surgery   Jimmy Dawson Jr. underwent neurosurgical consultation with Dr. Leighton Johnson, on 04/09/2024, and was found not to be a surgical candidate.  Jimmy Dawson Jr. presents with significant  comorbidities including anxiety, depression, GERD, HTN, HLP, TAMIKO, Hx WPW (Brayan-Parkinson-White syndrome) s/p RF ablation 2000, obesity, type 2 diabetes mellitus   In terms of current analgesics, Jimmy Dawson Jr. takes: meloxicam, methocarbamol, gabapentin. Patient also takes BusPar, melatonin, venlafaxine   I have reviewed Torres Report consistent with medication reconciliation.  SOAPP/ORT: Low Risk     Global Pain Scale 07-09  2024 10-21  2024         Pain 14 15         Feelings 15 18         Clinical outcomes 8 11         Activities 16 0         GPS Total: 53 44           The [place] Back Pain Disability Scale   DATE 07-09  2024 10-21  2024         Sleep through the night 0 0         Turn over in bed 2 2         Get out of bed 3 2         Make your bed 0 3         Put on socks (pantyhose) 2 3         Ride in a car 3 3         Sit in a chair for several hours 3 4         Stand up for 20-30 minutes 4 4         Climb one flight of stairs 3 4         Walk a few blocks (200-300 yards)  4 4         Walk several miles 4 5         Run one block (about 50 yards) 5 5         Take food out of the refrigerator 0 1         Reach up to high shelves 0 2         Move a chair 0 2         Pull or push heavy doors 0 3         Bend over to clean the bathtub 4 5         Throw a ball 1 2         Carry two bags of groceries 1 2         Lift and carry a heavy suitcase 1 2         Total score 44 58             Review of New Diagnostic Studies:  Lumbar spine x-ray flexion-extension views 7/9/2024: No instability    Review of Diagnostic Studies:    MRI of the lumbar spine without contrast on 02/22/2024 revealed preservation of vertebral body heights and alignment.    L1-L2: Disc bulge, facet hypertrophy.  No significant canal or foraminal stenosis  L2-L3: Broad-based disc bulge, facet hypertrophy, ligamentum flavum hypertrophy.  Moderate central canal stenosis.  Mild left and moderate right neuroforaminal stenosis  L3-L4: Disc bulge,  facet hypertrophy.  Mild central canal stenosis.  Mild right neuroforaminal stenosis  L4-L5: Broad-based disc bulge, facet hypertrophy. Right Facet joint effusion, Ligamentum flavum hypertrophy. Mild to moderate central canal stenosis.  Moderate right lateral recess and foraminal stenosis  L5-S1: Disc bulge, facet hypertrophy.  Mild to moderate right neuroforaminal stenosis.    Review of Systems   HENT:  Positive for hearing loss and tinnitus.    Respiratory:  Positive for apnea.    Musculoskeletal:  Positive for arthralgias, back pain, gait problem, myalgias and neck pain.   Neurological:  Positive for headaches.   Psychiatric/Behavioral:  Positive for agitation. The patient is nervous/anxious.    All other systems reviewed and are negative.        Patient Active Problem List   Diagnosis    Gastroesophageal reflux disease without esophagitis    Depression    Essential hypertension    Palpitations    SVT (supraventricular tachycardia), s/p RF ablation, in 2000 x2    WPW (Brayan-Parkinson-White syndrome), s/p RF ablation 2000.    Chronic rhinitis    Sleep apnea    Typical atrial flutter    History of 2019 novel coronavirus disease (COVID-19)    Anxiety    Chronic folliculitis    Primary osteoarthritis of left knee    Symptomatic cholelithiasis    CAP (community acquired pneumonia)    Status post laparoscopic cholecystectomy    Prediabetes    Mixed hyperlipidemia    Chronic right-sided low back pain with right-sided sciatica    Hearing deficit, bilateral    Skin tag    Class 3 severe obesity with serious comorbidity and body mass index (BMI) of 40.0 to 44.9 in adult    Type 2 diabetes mellitus with hyperglycemia    Spondylosis of lumbar region without myelopathy or radiculopathy    Spinal instabilities, lumbar region    Degeneration of lumbar or lumbosacral intervertebral disc    Ligamentum flavum hypertrophy    Anxiety and depression    Physical deconditioning    BMI 35.0-35.9,adult    Lumbar stenosis with neurogenic  claudication       Past Medical History:   Diagnosis Date    Acid reflux     Allergic     Anxiety     Arthritis     Depression     GERD (gastroesophageal reflux disease)     Hypertension     Infectious mononucleosis     Mixed hyperlipidemia 10/11/2023    TAMIKO (obstructive sleep apnea)     NO OXYGEN USE    PONV (postoperative nausea and vomiting)     Seasonal allergies     Sinusitis     Sleep apnea     c pap    Urinary tract infection     WPW (Brayan-Parkinson-White syndrome)          Past Surgical History:   Procedure Laterality Date    ABLATION OF DYSRHYTHMIC FOCUS  1999 and 2000    Dr. Carlin    CARDIAC CATHETERIZATION      CARDIAC ELECTROPHYSIOLOGY PROCEDURE N/A 06/04/2019    Procedure: Flutter;  Surgeon: Juan Carlin MD;  Location:  [facility];  Service: Cardiology    CARDIAC SURGERY      CHOLECYSTECTOMY N/A 8/8/2023    Procedure: CHOLECYSTECTOMY LAPAROSCOPIC;  Surgeon: Miles Sanders MD;  Location:  [facility];  Service: General;  Laterality: N/A;    COLONOSCOPY      WISDOM TOOTH EXTRACTION           Family History   Problem Relation Age of Onset    Hypertension Mother     Hypertension Father     Hyperlipidemia Father     Diabetes Father     Heart disease Father     Diabetes Brother     Diabetes Maternal Grandfather     Heart attack Maternal Grandfather     Diabetes Paternal Grandfather     Diabetes Maternal Grandmother     Cancer Maternal Grandmother          Social History     Socioeconomic History    Marital status:    Tobacco Use    Smoking status: Never    Smokeless tobacco: Never   Vaping Use    Vaping status: Never Used   Substance and Sexual Activity    Alcohol use: Yes     Comment: 4-5 BEERS, TWICE A WEEK    Drug use: No    Sexual activity: Defer           Current Outpatient Medications:     amLODIPine (NORVASC) 5 MG tablet, Take 1 tablet by mouth Daily., Disp: 30 tablet, Rfl: 6    ascorbic acid (VITAMIN C) 500 MG tablet, Take 1 tablet by mouth Daily., Disp: , Rfl:      atorvastatin (Lipitor) 20 MG tablet, Take 1 tablet by mouth Daily., Disp: 90 tablet, Rfl: 1    busPIRone (BUSPAR) 7.5 MG tablet, Take 1 tablet by mouth 3 (Three) Times a Day As Needed (anxiety)., Disp: 270 tablet, Rfl: 1    doxycycline (VIBRAMYCIN) 100 MG capsule, Take 1 capsule by mouth 2 (Two) Times a Day., Disp: 20 capsule, Rfl: 0    empagliflozin (Jardiance) 25 MG tablet tablet, Take 1 tablet by mouth Daily., Disp: 90 tablet, Rfl: 1    fexofenadine (ALLEGRA) 180 MG tablet, Take 1 tablet by mouth Every Night., Disp: 90 tablet, Rfl: 1    fluticasone (FLONASE) 50 MCG/ACT nasal spray, 2 sprays into the nostril(s) as directed by provider Daily., Disp: 15.8 g, Rfl: 11    gabapentin (NEURONTIN) 300 MG capsule, Take 1 capsule by mouth 2 (Two) Times a Day., Disp: , Rfl:     glucose blood test strip, 1 each by Other route Daily. Use as instructed, Disp: 100 each, Rfl: 12    glucose monitor monitoring kit, Use 1 each Daily., Disp: 1 each, Rfl: 0    ipratropium-albuterol (DUO-NEB) 0.5-2.5 mg/3 ml nebulizer, Take 3 mL by nebulization 4 times a day as needed for wheezing, Disp: 360 mL, Rfl: 0    Lancets misc, Use 1 Device 2 (Two) Times a Day., Disp: 100 each, Rfl: 12    losartan (Cozaar) 50 MG tablet, Take 1.5 tablets by mouth Daily., Disp: 180 tablet, Rfl: 1    melatonin 5 MG tablet tablet, Take 1 tablet by mouth Every Night., Disp: , Rfl:     meloxicam (MOBIC) 15 MG tablet, Take 1 tablet by mouth Daily., Disp: 90 tablet, Rfl: 3    metFORMIN (Glucophage) 500 MG tablet, Take 1 tablet by mouth 2 (Two) Times a Day With Meals., Disp: 180 tablet, Rfl: 1    methocarbamol (ROBAXIN) 500 MG tablet, Take 2 tablets BY MOUTH EVERY 8 HOURS AS NEEDED FOR MUSCLE SPASMS, Disp: 90 tablet, Rfl: 0    omeprazole (priLOSEC) 40 MG capsule, Take 1 capsule by mouth Daily., Disp: 90 capsule, Rfl: 1    venlafaxine XR (EFFEXOR-XR) 150 MG 24 hr capsule, Take 1 capsule by mouth Daily., Disp: 90 capsule, Rfl: 3    Vitamin D, Cholecalciferol,  "(CHOLECALCIFEROL) 10 MCG (400 UNIT) tablet, Take 1 tablet by mouth Daily., Disp: , Rfl:     tiZANidine (ZANAFLEX) 2 MG tablet, Take 1 tablet by mouth Every 8 (Eight) Hours As Needed for Muscle Spasms. Start 1/2 tab QHS, then continue 1/2 tab to 1 tab TID PRN muscle spasms, Disp: 90 tablet, Rfl: 0      Allergies   Allergen Reactions    Aleve [Naproxen] Hives         Ht 193 cm (76\")   Wt (!) 149 kg (327 lb 6.4 oz)   BMI 39.85 kg/m²       Physical Exam:  Constitutional: Patient appears well-developed, well-nourished, well-hydrated, appears younger than stated age  HEENT: Head: Normocephalic and atraumatic  Eyes: Conjunctivae and lids are normal  Pupils: Equal, round, reactive to light  Peripheral vascular exam: Femoral: right 2+, left 2+. Posterior tibialis: right 2+ and left 2+. Dorsalis pedis: right 2+ and left 2+. No edema.   Musculoskeletal   Gait and station: Gait evaluation demonstrated a normal gait. Able to walk on heels, toes, tandem walking   Lumbar Spine: Passive and active range of motion are limited secondary to pain. Extension, flexion, lateral flexion, rotation of the lumbar spine increased and reproduced his pain. Lumbar facet joint loading maneuvers are positive.   Quinn's test and Gaenslen's test is negative  Piriformis maneuvers: Negative   Hip Joints: The range of motion of the hip joints is limited to flexion and internal rotation but without pain   Palpation of the bilateral psoas tendons and iliopsoas bursas: Unrevealing   Palpation of the bilateral greater trochanters: Unrevealing   Examination of the Iliotibial band: Unrevealing   Neurological:   Patient is alert and oriented to person, place, and time.   Speech: Normal.   Cortical function: Normal mental status.   Reflex Scores:  Right patellar: 1+  Left patellar: 1+  Right Achilles: 1+  Left Achilles: 1+  Motor strength: 5/5  Motor Tone: Normal  Involuntary movements: None.   Superficial/Primitive Reflexes: Primitive reflexes were absent. "   Right Robertson: Absent  Left Robertson: Absent  Right ankle clonus: Absent  Left ankle clonus: Absent   Babinsky: Absent  Long tract signs: Negative. Straight leg raising test: Negative. Femoral stretch sign: Negative.   Sensory exam: Intact to light touch, intact pain and temperature sensation, intact vibration sensation and normal proprioception  Balance: Normal Romberg's sign: Negative   Skin and subcutaneous tissue: Skin is warm and intact. No rash noted. No cyanosis.   Psychiatric: Judgment and insight: Normal. Recent and remote memory: Intact. Mood and affect: Normal.     ASSESSMENT:   1. Lumbar stenosis with neurogenic claudication    2. Spondylosis of lumbar region without myelopathy or radiculopathy    3. Ligamentum flavum hypertrophy    4. Degeneration of intervertebral disc of lumbosacral region with discogenic back pain and lower extremity pain    5. Anxiety and depression    6. Prediabetes    7. Physical deconditioning        PLAN/MEDICAL DECISION MAKING:  Mr. Jimmy Dawson Jr., 53 y.o. male reports a greater than 10-year history of chronic intractable lower back pain.  He underwent hospitalization in August 2023, he reports since that time his lower back pain has steadily increased. His pain increases with protracted standing, walking, sitting. He has to change positions frequently to get comfortable. His symptoms are also worse by the end of the day. He has chronically had numbness in lateral aspect of his right thigh. MRI of the lumbar spine without contrast on 02/22/2024 revealed disc bulges, facet hypertrophy, ligamentum flavum hypertrophy from L1-L2 through L5-S1. At L4-L5: Ligamentum flavum hypertrophy.  Moderate central canal stenosis.  Moderate foraminal stenosis. At L2-L3 and L3-L4: Mild to moderate canal stenosis.  He underwent neurosurgical consultation with Dr. Leighton Johnson, on 04/09/2024, and was found not to be a surgical candidate.  He has failed to obtain pain relief with  conservative measures including oral analgesics, topical analgesics, ice, heat, TENs, physical therapy, physical therapist directed home exercise program HEP (ongoing), therapeutic massage, to name a few. We last saw him on July 24, 2024 when he underwent diagnostic and therapeutic bilateral L2-L3 transforaminal epidural steroid injection, from which overall he reports experiencing almost complete resolution of his pain for 2 months.  He then tells me he twisted his leg, and then developed severe right sided lower back pain with radiation into his right lower extremity.  He describes pain that begins in the right side of his lower back extends into the posterior and lateral portion of his thigh, and into his calf.  He denies any symptoms on the left side, since last epidural.  He appears to be having pain emanating from a different level in his lower back.  A comprehensive evaluation including history and physical exam along with pertinent physiologic and functional assessment was performed. Patient presents with intractable pain due to the diagnoses listed above. Patient has failed to respond to conservative modalities, as referenced under HPI. I had a lengthy conversation with . Jimmy Jimenez Eunice Gomez regarding his chronic pain condition and potential therapeutic options including risks, benefits, alternative therapies, to name a few. We have discussed using a stepwise approach starting with the least intense level of care as determined by the extent required to diagnose and or treat a patient's condition. The proposed treatments are consistent with the patient's medical condition and known to be safe and effective by current guidelines and the standard of care. The duration and frequency proposed are considered appropriate for the service in accordance with accepted standards of medical practice for the diagnosis and treatment of the patient's condition and intended to improve the patient's level of function.  These services will be furnished in a setting appropriate to the patient's medical needs and condition. Therefore, I have proposed the following plan:  1. Interventional pain management measures: Patient does not take blood thinners. Patient will be scheduled for diagnostic and therapeutic right L4-L5 transforaminal epidural steroid injections using the lowest effective dose of steroids, under C-arm fluoroscopic guidance, with the use of contrast dye to confirm appropriate needle placement and spread of contrast dye. We may repeat therapeutic bilateral L2-L3 transforaminal epidural steroid injections Vs therapeutic right versus bilateral L4-L5 transforaminal epidural steroid injections depending on patient's outcome and following current guidelines. Epidurals will be limited to a maximum of 4 sessions per spinal region in a rolling twelve (12) month period. Continuation of epidural steroid injections over 12 months would only be considered under the following provisions;  Patient is a high-risk surgical candidate, or the patient does not desire surgery, or recurrence of pain in the same location relieved with ESIs for at least three months and epidural provides at least 50% sustained improvement of pain and/or 50% objective improvement in function (using same scale as baseline)  Pain is severe enough to cause a significant degree of functional disability or vocational disability  The primary care provider will be notified regarding continuation of procedures and repeat steroid use   Other options for treatment of patient's chronic pain would include ([facility]), [employer] L2-L3 vs L4-L5; Vs PNS; Vs SCS trial   2. Diagnostic studies:   A. EMG/NCV of the bilateral lower extremities   B. Prior to SCS: MRI of the thoracic spine without contrast to assess capacity and patency of the spinal canal and epidural space prior to spinal cord stimulator trial and implant  3. Pharmacological measures: Reviewed and discussed; Patient  takes meloxicam, methocarbamol, gabapentin. Patient also takes BusPar, melatonin, venlafaxine. Patient has declined additional pharmacological measures   4. Long-term rehabilitation efforts:  A. The patient does not have a history of falls.  B. Patient has completed a physical therapy program and does not have additional visits available for 2024  C. Contrast therapy: Apply ice-packs for 15-20 minutes, followed by heating pads for 15-20 minutes to affected area   D. Start a low impact exercise program such as water therapy, swimming, yoga  E. Prior to PNS/SCS: Referral to Dr. Karlos Randall for psychological screening for peripheral nerve stimulation, spinal cord stimulation  F. Patient's Body mass index is 39.58 kg/m². Patient counseled on the importance of weight loss to help with overall health and pain control.   LUIS Jimenez Eunice Gomez  reports that he has never smoked. He has never used smokeless tobacco.    5. The patient and his family have been instructed to contact my office with any questions or difficulties. The patient understands the plan and agrees to proceed accordingly.      Pain Medications               gabapentin (NEURONTIN) 300 MG capsule Take 1 capsule by mouth 2 (Two) Times a Day.    meloxicam (MOBIC) 15 MG tablet Take 1 tablet by mouth Daily.    methocarbamol (ROBAXIN) 500 MG tablet Take 2 tablets BY MOUTH EVERY 8 HOURS AS NEEDED FOR MUSCLE SPASMS    venlafaxine XR (EFFEXOR-XR) 150 MG 24 hr capsule Take 1 capsule by mouth Daily.    tiZANidine (ZANAFLEX) 2 MG tablet Take 1 tablet by mouth Every 8 (Eight) Hours As Needed for Muscle Spasms. Start 1/2 tab QHS, then continue 1/2 tab to 1 tab TID PRN muscle spasms             No orders of the defined types were placed in this encounter.       Please note that portions of this note were completed with a voice recognition program.   Any copied data in any portion of my note has been reviewed by myself and accurate.     The 21st Century Cures Act makes  medical notes like this available to patients in the interest of transparency. This is a medical document intended as peer to peer communication. It is written in medical language and may contain abbreviations or verbiage that are unfamiliar. It may appear blunt or direct. Medical documents are intended to carry relevant information, facts as evident, and the clinical opinion of the practitioner.     LIONEL Roberson    Patient Care Team:  Nena Pike APRN as PCP - General (Family Medicine)  Leighton Johnson MD as Consulting Physician (Neurosurgery)  Nena Pike APRN as Primary Care Provider (Family Medicine)  Mariah Quinn APRN as Referring Physician (Nurse Practitioner)     New Medications Ordered This Visit   Medications    tiZANidine (ZANAFLEX) 2 MG tablet     Sig: Take 1 tablet by mouth Every 8 (Eight) Hours As Needed for Muscle Spasms. Start 1/2 tab QHS, then continue 1/2 tab to 1 tab TID PRN muscle spasms     Dispense:  90 tablet     Refill:  0         Future Appointments   Date Time Provider Department Center   10/23/2024  1:50 PM Huan Corrales MD [facility]   3/20/2025  8:15 AM Anthony Ruff PA-C [facility]

## 2024-10-23 ENCOUNTER — HOSPITAL ENCOUNTER (OUTPATIENT)
Dept: GENERAL RADIOLOGY | Facility: HOSPITAL | Age: 54
Discharge: HOME | End: 2024-10-23
Admitting: ORTHOPAEDIC SURGERY
Payer: OTHER GOVERNMENT

## 2024-10-23 ENCOUNTER — OFFICE VISIT (OUTPATIENT)
Dept: ORTHOPEDIC SURGERY | Facility: CLINIC | Age: 54
End: 2024-10-23
Payer: OTHER GOVERNMENT

## 2024-10-23 VITALS — WEIGHT: 315 LBS | HEIGHT: 76 IN | BODY MASS INDEX: 38.36 KG/M2

## 2024-10-23 DIAGNOSIS — M25.561 PAIN IN BOTH KNEES, UNSPECIFIED CHRONICITY: ICD-10-CM

## 2024-10-23 DIAGNOSIS — F41.9 ANXIETY: Chronic | ICD-10-CM

## 2024-10-23 DIAGNOSIS — E11.65 TYPE 2 DIABETES MELLITUS WITH HYPERGLYCEMIA, WITHOUT LONG-TERM CURRENT USE OF INSULIN: ICD-10-CM

## 2024-10-23 DIAGNOSIS — K21.9 GASTROESOPHAGEAL REFLUX DISEASE WITHOUT ESOPHAGITIS: Chronic | ICD-10-CM

## 2024-10-23 DIAGNOSIS — I10 ESSENTIAL HYPERTENSION: Chronic | ICD-10-CM

## 2024-10-23 DIAGNOSIS — F32.4 MAJOR DEPRESSIVE DISORDER WITH SINGLE EPISODE, IN PARTIAL REMISSION: ICD-10-CM

## 2024-10-23 DIAGNOSIS — M17.0 PRIMARY OSTEOARTHRITIS OF BOTH KNEES: Primary | ICD-10-CM

## 2024-10-23 DIAGNOSIS — I70.90 ARTERIOSCLEROSIS: ICD-10-CM

## 2024-10-23 DIAGNOSIS — M25.562 PAIN IN BOTH KNEES, UNSPECIFIED CHRONICITY: ICD-10-CM

## 2024-10-23 PROCEDURE — 73562 X-RAY EXAM OF KNEE 3: CPT

## 2024-10-23 RX ADMIN — LIDOCAINE HYDROCHLORIDE 5 ML: 10 INJECTION, SOLUTION EPIDURAL; INFILTRATION; INTRACAUDAL; PERINEURAL at 15:55

## 2024-10-23 NOTE — PROGRESS NOTES
Established New Problem         Patient: Jimmy Dawson Jr.  YOB: 1970  Date of Encounter: 10/23/2024      Chief  Complaint:   Chief Complaint   Patient presents with   • Left Knee - Pain, Osteoarthritis, Follow-up     [facility]   • Right Knee - Pain, Osteoarthritis, Follow-up         HPI:  Jimmy Dawson Jr., 53 y.o. male presents in follow-up bilateral knee pain known osteoarthritis with previous injections bilateral knees approximately 1 and half years ago with good response with symptoms just recently returning he is increased his activity level recently and symptoms have returned.  He reports no trauma.        Medical History:  Patient Active Problem List   Diagnosis   • Gastroesophageal reflux disease without esophagitis   • Depression   • Essential hypertension   • Palpitations   • SVT (supraventricular tachycardia), s/p RF ablation, in 2000 x2   • WPW (Brayan-Parkinson-White syndrome), s/p RF ablation 2000.   • Chronic rhinitis   • Sleep apnea   • Typical atrial flutter   • History of 2019 novel coronavirus disease (COVID-19)   • Anxiety   • Chronic folliculitis   • Primary osteoarthritis of left knee   • Symptomatic cholelithiasis   • CAP (community acquired pneumonia)   • Status post laparoscopic cholecystectomy   • Prediabetes   • Mixed hyperlipidemia   • Chronic right-sided low back pain with right-sided sciatica   • Hearing deficit, bilateral   • Skin tag   • Class 3 severe obesity with serious comorbidity and body mass index (BMI) of 40.0 to 44.9 in adult   • Type 2 diabetes mellitus with hyperglycemia   • Spondylosis of lumbar region without myelopathy or radiculopathy   • Spinal instabilities, lumbar region   • Degeneration of lumbar or lumbosacral intervertebral disc   • Ligamentum flavum hypertrophy   • Anxiety and depression   • Physical deconditioning   • BMI 35.0-35.9,adult   • Lumbar stenosis with neurogenic claudication     Past Medical History:   Diagnosis Date   • Acid  reflux    • Allergic    • Anxiety    • Arthritis    • Depression    • GERD (gastroesophageal reflux disease)    • Hypertension    • Infectious mononucleosis    • Mixed hyperlipidemia 10/11/2023   • TAMIKO (obstructive sleep apnea)     NO OXYGEN USE   • PONV (postoperative nausea and vomiting)    • Seasonal allergies    • Sinusitis    • Sleep apnea     c pap   • Urinary tract infection    • WPW (Brayan-Parkinson-White syndrome)            Social History:  Social History     Socioeconomic History   • Marital status:    Tobacco Use   • Smoking status: Never   • Smokeless tobacco: Never   Vaping Use   • Vaping status: Never Used   Substance and Sexual Activity   • Alcohol use: Yes     Comment: 4-5 BEERS, TWICE A WEEK   • Drug use: No   • Sexual activity: Defer           Current Medications:    Current Outpatient Medications:   •  amLODIPine (NORVASC) 5 MG tablet, Take 1 tablet by mouth Daily., Disp: 30 tablet, Rfl: 6  •  ascorbic acid (VITAMIN C) 500 MG tablet, Take 1 tablet by mouth Daily., Disp: , Rfl:   •  doxycycline (VIBRAMYCIN) 100 MG capsule, Take 1 capsule by mouth 2 (Two) Times a Day., Disp: 20 capsule, Rfl: 0  •  gabapentin (NEURONTIN) 300 MG capsule, Take 1 capsule by mouth 2 (Two) Times a Day., Disp: , Rfl:   •  glucose blood test strip, 1 each by Other route Daily. Use as instructed, Disp: 100 each, Rfl: 12  •  glucose monitor monitoring kit, Use 1 each Daily., Disp: 1 each, Rfl: 0  •  ipratropium-albuterol (DUO-NEB) 0.5-2.5 mg/3 ml nebulizer, Take 3 mL by nebulization 4 times a day as needed for wheezing, Disp: 360 mL, Rfl: 0  •  Lancets misc, Use 1 Device 2 (Two) Times a Day., Disp: 100 each, Rfl: 12  •  melatonin 5 MG tablet tablet, Take 1 tablet by mouth Every Night., Disp: , Rfl:   •  meloxicam (MOBIC) 15 MG tablet, Take 1 tablet by mouth Daily., Disp: 90 tablet, Rfl: 3  •  methocarbamol (ROBAXIN) 500 MG tablet, Take 2 tablets BY MOUTH EVERY 8 HOURS AS NEEDED FOR MUSCLE SPASMS, Disp: 90 tablet, Rfl:  0  •  tiZANidine (ZANAFLEX) 2 MG tablet, Take 1 tablet by mouth Every 8 (Eight) Hours As Needed for Muscle Spasms. Start 1/2 tab QHS, then continue 1/2 tab to 1 tab TID PRN muscle spasms, Disp: 90 tablet, Rfl: 0  •  Vitamin D, Cholecalciferol, (CHOLECALCIFEROL) 10 MCG (400 UNIT) tablet, Take 1 tablet by mouth Daily., Disp: , Rfl:   •  atorvastatin (Lipitor) 20 MG tablet, Take 1 tablet by mouth Daily., Disp: 90 tablet, Rfl: 1  •  busPIRone (BUSPAR) 7.5 MG tablet, Take 1 tablet by mouth 3 (Three) Times a Day As Needed (anxiety)., Disp: 270 tablet, Rfl: 1  •  empagliflozin (Jardiance) 25 MG tablet tablet, Take 1 tablet by mouth Daily., Disp: 90 tablet, Rfl: 1  •  fexofenadine (ALLEGRA) 180 MG tablet, Take 1 tablet by mouth Every Night., Disp: 90 tablet, Rfl: 1  •  fluticasone (FLONASE) 50 MCG/ACT nasal spray, Administer 2 sprays into the nostril(s) as directed by provider Daily., Disp: 15.8 g, Rfl: 11  •  losartan (Cozaar) 50 MG tablet, Take 1.5 tablets by mouth Daily., Disp: 180 tablet, Rfl: 1  •  metFORMIN (Glucophage) 500 MG tablet, Take 1 tablet by mouth 2 (Two) Times a Day With Meals., Disp: 180 tablet, Rfl: 1  •  omeprazole (priLOSEC) 40 MG capsule, Take 1 capsule by mouth Daily., Disp: 90 capsule, Rfl: 1  •  venlafaxine XR (EFFEXOR-XR) 150 MG 24 hr capsule, Take 1 capsule by mouth Daily., Disp: 90 capsule, Rfl: 1        Allergies:  Allergies   Allergen Reactions   • Aleve [Naproxen] Hives           Family History:  Family History   Problem Relation Age of Onset   • Hypertension Mother    • Hypertension Father    • Hyperlipidemia Father    • Diabetes Father    • Heart disease Father    • Diabetes Brother    • Diabetes Maternal Grandfather    • Heart attack Maternal Grandfather    • Diabetes Paternal Grandfather    • Diabetes Maternal Grandmother    • Cancer Maternal Grandmother            Surgical History:  Past Surgical History:   Procedure Laterality Date   • ABLATION OF DYSRHYTHMIC FOCUS  1999 and 2000      "[facility]   • CARDIAC CATHETERIZATION     • CARDIAC ELECTROPHYSIOLOGY PROCEDURE N/A 06/04/2019    Procedure: Flutter;  Surgeon: Juan Carlin MD;  Location:  [facility];  Service: Cardiology   • CARDIAC SURGERY     • CHOLECYSTECTOMY N/A 8/8/2023    Procedure: CHOLECYSTECTOMY LAPAROSCOPIC;  Surgeon: Miles Sanders MD;  Location:  [facility];  Service: General;  Laterality: N/A;   • COLONOSCOPY     • WISDOM TOOTH EXTRACTION           Vitals:  Vitals:    10/23/24 1328   Weight: (!) 149 kg (328 lb 7.8 oz)   Height: 193 cm (75.98\")     Body mass index is 40 kg/m².        Radiology:   XR Knee 3 View Bilateral    Result Date: 10/23/2024  Bilateral osteoarthritis.     This report was finalized on 10/23/2024 4:06 PM by Alena Gooden M.D..           Orthopedic Examination: bilateral knees reveals minimal effusion right and left knees with no gross instability moderate medial joint line tenderness.          Assessment & Plan:   53 y.o. male presents follow-up known osteoarthritis bilateral knees.  Today he is provided Monovisc intra-articular with lidocaine block bilateral knees.  He will follow-up in the future as needed.           Diagnosis Plan   1. Primary osteoarthritis of both knees              Large Joint Arthrocentesis: R knee  Date/Time: 10/23/2024 3:55 PM  Consent given by: patient  Site marked: site marked  Timeout: Immediately prior to procedure a time out was called to verify the correct patient, procedure, equipment, support staff and site/side marked as required   Supporting Documentation  Indications: pain   Procedure Details  Location: knee - R knee  Preparation: Patient was prepped and draped in the usual sterile fashion  Needle size: 20 G  Approach: anterolateral  Medications administered: 88 mg Hyaluronan 88 MG/4ML; 5 mL lidocaine PF 1% 1 %  Patient tolerance: patient tolerated the procedure well with no immediate complications      Large Joint Arthrocentesis: L knee  Date/Time: " 10/23/2024 3:55 PM  Consent given by: patient  Site marked: site marked  Timeout: Immediately prior to procedure a time out was called to verify the correct patient, procedure, equipment, support staff and site/side marked as required   Supporting Documentation  Indications: pain   Procedure Details  Location: knee - L knee  Preparation: Patient was prepped and draped in the usual sterile fashion  Needle size: 20 G  Approach: anterolateral  Medications administered: 5 mL lidocaine PF 1% 1 %; 88 mg Hyaluronan 88 MG/4ML  Patient tolerance: patient tolerated the procedure well with no immediate complications          Cc:  Nena Pike, LIONEL              This document has been electronically signed by Huan Corrales MD   October 25, 2024 15:49 EDT

## 2024-10-24 RX ORDER — VENLAFAXINE HYDROCHLORIDE 150 MG/1
150 CAPSULE, EXTENDED RELEASE ORAL DAILY
Qty: 90 CAPSULE | Refills: 1 | Status: SHIPPED | OUTPATIENT
Start: 2024-10-24

## 2024-10-24 RX ORDER — ATORVASTATIN CALCIUM 20 MG/1
20 TABLET, FILM COATED ORAL DAILY
Qty: 90 TABLET | Refills: 1 | Status: SHIPPED | OUTPATIENT
Start: 2024-10-24

## 2024-10-24 RX ORDER — LOSARTAN POTASSIUM 50 MG/1
75 TABLET ORAL DAILY
Qty: 180 TABLET | Refills: 1 | Status: SHIPPED | OUTPATIENT
Start: 2024-10-24

## 2024-10-24 RX ORDER — OMEPRAZOLE 40 MG/1
40 CAPSULE, DELAYED RELEASE ORAL DAILY
Qty: 90 CAPSULE | Refills: 1 | Status: SHIPPED | OUTPATIENT
Start: 2024-10-24

## 2024-10-24 RX ORDER — FLUTICASONE PROPIONATE 50 UG/1
2 SPRAY, METERED NASAL DAILY
Qty: 15.8 G | Refills: 11 | Status: SHIPPED | OUTPATIENT
Start: 2024-10-24

## 2024-10-24 RX ORDER — BUSPIRONE HYDROCHLORIDE 7.5 MG/1
7.5 TABLET ORAL 3 TIMES DAILY PRN
Qty: 270 TABLET | Refills: 1 | Status: SHIPPED | OUTPATIENT
Start: 2024-10-24

## 2024-10-24 RX ORDER — FEXOFENADINE HCL 180 MG/1
180 TABLET ORAL NIGHTLY
Qty: 90 TABLET | Refills: 1 | Status: SHIPPED | OUTPATIENT
Start: 2024-10-24

## 2024-10-25 RX ORDER — LIDOCAINE HYDROCHLORIDE 10 MG/ML
5 INJECTION, SOLUTION EPIDURAL; INFILTRATION; INTRACAUDAL; PERINEURAL
Status: COMPLETED | OUTPATIENT
Start: 2024-10-23 | End: 2024-10-23

## 2024-11-06 ENCOUNTER — OUTSIDE FACILITY SERVICE (OUTPATIENT)
Dept: PAIN MEDICINE | Facility: CLINIC | Age: 54
End: 2024-11-06
Payer: OTHER GOVERNMENT

## 2024-11-06 PROCEDURE — 64483 NJX AA&/STRD TFRM EPI L/S 1: CPT | Performed by: ANESTHESIOLOGY

## 2024-11-08 ENCOUNTER — TELEPHONE (OUTPATIENT)
Dept: PAIN MEDICINE | Facility: CLINIC | Age: 54
End: 2024-11-08
Payer: OTHER GOVERNMENT

## 2024-11-08 NOTE — TELEPHONE ENCOUNTER
I spoke with the patient regarding how he is feeling after his procedure with Dr Quezada on 11/6/24. Patient reports that he is doing well. Patient stated he did not experience pain when he got home. But this morning he is experiencing pain. Advised patient to give it more time and to use ice, heat, or Tylenol. Patient was understanding and had no further questions.     Jimmy Dawson Jr underwent a TFESI on 11/6/24. Patient reported 100% relief at the time of after procedure exam with Dr Quezada. In addition, patient experienced significant functional improvement (perforing activities without experiencing pain compared with examoination prior to procedure that produced these activities.)    Pain level before procedure: 10/10  Pain level after procedure: 0/10    Today, the patient reports 0% pain relief (pain level 10/10.)    Patient denies side effects or complications.  Patient does not have any questions or concerns at this time.    Advised patient of follow up with LIONEL Pierre on 3/6/25.

## 2024-11-20 DIAGNOSIS — I70.90 ARTERIOSCLEROSIS: ICD-10-CM

## 2024-11-20 DIAGNOSIS — E11.65 TYPE 2 DIABETES MELLITUS WITH HYPERGLYCEMIA, WITHOUT LONG-TERM CURRENT USE OF INSULIN: ICD-10-CM

## 2024-11-20 DIAGNOSIS — I10 ESSENTIAL HYPERTENSION: Chronic | ICD-10-CM

## 2024-11-20 DIAGNOSIS — R73.03 PREDIABETES: Chronic | ICD-10-CM

## 2024-11-20 DIAGNOSIS — F41.9 ANXIETY: Chronic | ICD-10-CM

## 2024-11-20 DIAGNOSIS — K21.9 GASTROESOPHAGEAL REFLUX DISEASE WITHOUT ESOPHAGITIS: Chronic | ICD-10-CM

## 2024-11-20 DIAGNOSIS — F32.4 MAJOR DEPRESSIVE DISORDER WITH SINGLE EPISODE, IN PARTIAL REMISSION: ICD-10-CM

## 2024-11-20 RX ORDER — AMLODIPINE BESYLATE 5 MG/1
5 TABLET ORAL DAILY
Qty: 30 TABLET | Refills: 6 | Status: SHIPPED | OUTPATIENT
Start: 2024-11-20

## 2024-11-20 RX ORDER — TIZANIDINE 2 MG/1
2 TABLET ORAL EVERY 8 HOURS PRN
Qty: 90 TABLET | Refills: 0 | Status: SHIPPED | OUTPATIENT
Start: 2024-11-20

## 2024-11-21 RX ORDER — VENLAFAXINE HYDROCHLORIDE 150 MG/1
150 CAPSULE, EXTENDED RELEASE ORAL DAILY
Qty: 90 CAPSULE | Refills: 1 | Status: SHIPPED | OUTPATIENT
Start: 2024-11-21

## 2024-11-21 RX ORDER — FLUTICASONE PROPIONATE 50 MCG
2 SPRAY, SUSPENSION (ML) NASAL DAILY
Qty: 15.8 G | Refills: 11 | Status: SHIPPED | OUTPATIENT
Start: 2024-11-21

## 2024-11-21 RX ORDER — OMEPRAZOLE 40 MG/1
40 CAPSULE, DELAYED RELEASE ORAL DAILY
Qty: 90 CAPSULE | Refills: 1 | Status: SHIPPED | OUTPATIENT
Start: 2024-11-21

## 2024-11-21 RX ORDER — FEXOFENADINE HCL 180 MG/1
180 TABLET ORAL NIGHTLY
Qty: 90 TABLET | Refills: 1 | Status: SHIPPED | OUTPATIENT
Start: 2024-11-21

## 2024-11-21 RX ORDER — LOSARTAN POTASSIUM 50 MG/1
75 TABLET ORAL DAILY
Qty: 180 TABLET | Refills: 1 | Status: SHIPPED | OUTPATIENT
Start: 2024-11-21

## 2024-11-21 RX ORDER — BUSPIRONE HYDROCHLORIDE 7.5 MG/1
7.5 TABLET ORAL 3 TIMES DAILY PRN
Qty: 270 TABLET | Refills: 1 | Status: SHIPPED | OUTPATIENT
Start: 2024-11-21

## 2024-11-21 RX ORDER — ATORVASTATIN CALCIUM 20 MG/1
20 TABLET, FILM COATED ORAL DAILY
Qty: 90 TABLET | Refills: 1 | Status: SHIPPED | OUTPATIENT
Start: 2024-11-21

## 2024-12-30 RX ORDER — TIZANIDINE 2 MG/1
2 TABLET ORAL EVERY 8 HOURS PRN
Qty: 90 TABLET | Refills: 0 | Status: SHIPPED | OUTPATIENT
Start: 2024-12-30

## 2025-01-08 RX ORDER — TIZANIDINE 2 MG/1
2 TABLET ORAL EVERY 8 HOURS PRN
Qty: 90 TABLET | Refills: 0 | OUTPATIENT
Start: 2025-01-08

## 2025-02-10 RX ORDER — TIZANIDINE 2 MG/1
2 TABLET ORAL EVERY 8 HOURS PRN
Qty: 90 TABLET | Refills: 0 | Status: SHIPPED | OUTPATIENT
Start: 2025-02-10

## 2025-02-17 DIAGNOSIS — I10 ESSENTIAL HYPERTENSION: Chronic | ICD-10-CM

## 2025-02-17 DIAGNOSIS — I70.90 ARTERIOSCLEROSIS: ICD-10-CM

## 2025-02-17 DIAGNOSIS — E11.65 TYPE 2 DIABETES MELLITUS WITH HYPERGLYCEMIA, WITHOUT LONG-TERM CURRENT USE OF INSULIN: ICD-10-CM

## 2025-02-17 DIAGNOSIS — K21.9 GASTROESOPHAGEAL REFLUX DISEASE WITHOUT ESOPHAGITIS: Chronic | ICD-10-CM

## 2025-02-18 RX ORDER — OMEPRAZOLE 40 MG/1
40 CAPSULE, DELAYED RELEASE ORAL DAILY
Qty: 90 CAPSULE | Refills: 1 | Status: SHIPPED | OUTPATIENT
Start: 2025-02-18

## 2025-02-18 RX ORDER — FEXOFENADINE HCL 180 MG/1
180 TABLET ORAL NIGHTLY
Qty: 90 TABLET | Refills: 1 | Status: SHIPPED | OUTPATIENT
Start: 2025-02-18

## 2025-02-18 RX ORDER — LOSARTAN POTASSIUM 50 MG/1
75 TABLET ORAL DAILY
Qty: 180 TABLET | Refills: 1 | Status: SHIPPED | OUTPATIENT
Start: 2025-02-18

## 2025-02-18 RX ORDER — ATORVASTATIN CALCIUM 20 MG/1
20 TABLET, FILM COATED ORAL DAILY
Qty: 90 TABLET | Refills: 1 | Status: SHIPPED | OUTPATIENT
Start: 2025-02-18

## 2025-03-04 ENCOUNTER — OFFICE VISIT (OUTPATIENT)
Dept: FAMILY MEDICINE CLINIC | Facility: CLINIC | Age: 55
End: 2025-03-04
Payer: OTHER GOVERNMENT

## 2025-03-04 VITALS
HEIGHT: 76 IN | OXYGEN SATURATION: 97 % | BODY MASS INDEX: 38.36 KG/M2 | DIASTOLIC BLOOD PRESSURE: 80 MMHG | HEART RATE: 106 BPM | TEMPERATURE: 97.7 F | WEIGHT: 315 LBS | RESPIRATION RATE: 18 BRPM | SYSTOLIC BLOOD PRESSURE: 128 MMHG

## 2025-03-04 DIAGNOSIS — G47.30 SLEEP APNEA, UNSPECIFIED TYPE: Chronic | ICD-10-CM

## 2025-03-04 DIAGNOSIS — Z13.29 SCREENING FOR THYROID DISORDER: ICD-10-CM

## 2025-03-04 DIAGNOSIS — F41.9 ANXIETY AND DEPRESSION: Chronic | ICD-10-CM

## 2025-03-04 DIAGNOSIS — J06.9 UPPER RESPIRATORY TRACT INFECTION, UNSPECIFIED TYPE: ICD-10-CM

## 2025-03-04 DIAGNOSIS — Z00.01 ENCOUNTER FOR PREVENTATIVE ADULT HEALTH CARE EXAM WITH ABNORMAL FINDINGS: Primary | ICD-10-CM

## 2025-03-04 DIAGNOSIS — Z12.5 SCREENING FOR MALIGNANT NEOPLASM OF PROSTATE: ICD-10-CM

## 2025-03-04 DIAGNOSIS — I10 ESSENTIAL HYPERTENSION: Chronic | ICD-10-CM

## 2025-03-04 DIAGNOSIS — F32.A ANXIETY AND DEPRESSION: Chronic | ICD-10-CM

## 2025-03-04 DIAGNOSIS — J02.9 ACUTE PHARYNGITIS, UNSPECIFIED ETIOLOGY: ICD-10-CM

## 2025-03-04 DIAGNOSIS — E78.2 MIXED HYPERLIPIDEMIA: Chronic | ICD-10-CM

## 2025-03-04 DIAGNOSIS — E11.65 TYPE 2 DIABETES MELLITUS WITH HYPERGLYCEMIA, WITHOUT LONG-TERM CURRENT USE OF INSULIN: Chronic | ICD-10-CM

## 2025-03-04 LAB
EXPIRATION DATE: NORMAL
FLUAV AG UPPER RESP QL IA.RAPID: NOT DETECTED
FLUBV AG UPPER RESP QL IA.RAPID: NOT DETECTED
INTERNAL CONTROL: NORMAL
Lab: NORMAL
SARS-COV-2 AG UPPER RESP QL IA.RAPID: NOT DETECTED

## 2025-03-04 PROCEDURE — 82570 ASSAY OF URINE CREATININE: CPT | Performed by: NURSE PRACTITIONER

## 2025-03-04 PROCEDURE — 87428 SARSCOV & INF VIR A&B AG IA: CPT | Performed by: NURSE PRACTITIONER

## 2025-03-04 PROCEDURE — 80053 COMPREHEN METABOLIC PANEL: CPT | Performed by: NURSE PRACTITIONER

## 2025-03-04 PROCEDURE — 82043 UR ALBUMIN QUANTITATIVE: CPT | Performed by: NURSE PRACTITIONER

## 2025-03-04 PROCEDURE — 85025 COMPLETE CBC W/AUTO DIFF WBC: CPT | Performed by: NURSE PRACTITIONER

## 2025-03-04 PROCEDURE — 84443 ASSAY THYROID STIM HORMONE: CPT | Performed by: NURSE PRACTITIONER

## 2025-03-04 PROCEDURE — G0103 PSA SCREENING: HCPCS | Performed by: NURSE PRACTITIONER

## 2025-03-04 PROCEDURE — 36415 COLL VENOUS BLD VENIPUNCTURE: CPT | Performed by: NURSE PRACTITIONER

## 2025-03-04 PROCEDURE — 83036 HEMOGLOBIN GLYCOSYLATED A1C: CPT | Performed by: NURSE PRACTITIONER

## 2025-03-04 PROCEDURE — 80061 LIPID PANEL: CPT | Performed by: NURSE PRACTITIONER

## 2025-03-04 PROCEDURE — 99396 PREV VISIT EST AGE 40-64: CPT | Performed by: NURSE PRACTITIONER

## 2025-03-04 RX ORDER — BUSPIRONE HYDROCHLORIDE 10 MG/1
10 TABLET ORAL 3 TIMES DAILY
Qty: 90 TABLET | Refills: 1 | Status: SHIPPED | OUTPATIENT
Start: 2025-03-04

## 2025-03-04 RX ORDER — SEMAGLUTIDE 1.34 MG/ML
0.25 INJECTION, SOLUTION SUBCUTANEOUS WEEKLY
Qty: 1.5 ML | Refills: 0 | Status: SHIPPED | OUTPATIENT
Start: 2025-03-04

## 2025-03-04 NOTE — PROGRESS NOTES
"Subjective   Jimmyfranklin Dawson Jr. is a 54 y.o. male.     Chief Complaint   Patient presents with    Annual Exam    Sore Throat    Hypertension    Hyperlipidemia    Diabetes    Anxiety    Sleep Apnea       History of Present Illness  He presents for annual wellness exam. He doesn't exercise or eat healthy. Last eye exam was about 2 years ago. He is unsure about his last dental exam. He presents for follow up of essential hypertension, mixed hyperlipidemia, and diabetes. He reports good blood pressure control. He reports good compliance with atorvastatin. He reports good blood sugar control. He uses cpap every night for sleep apnea. He presents today with c/o sore throat that started last night. He denies fever. He has not tried any treatment. He is unsure of ill contacts. His wife states he has been more irritable. He denies suicidal and homicidal ideations.        The following portions of the patient's history were reviewed and updated as appropriate: allergies, current medications, past family history, past medical history, past social history, past surgical history and problem list.    Review of Systems   Constitutional:  Positive for fatigue. Negative for fever.   HENT:  Positive for postnasal drip and sore throat.    Respiratory:  Negative for cough, shortness of breath and wheezing.    Cardiovascular:  Negative for chest pain and palpitations.   Gastrointestinal:  Negative for blood in stool, diarrhea, nausea and vomiting.   Genitourinary:  Positive for hematuria.   Psychiatric/Behavioral:  Positive for agitation. Negative for suicidal ideas. The patient is nervous/anxious.        Objective     /80 (BP Location: Right arm, Patient Position: Sitting, Cuff Size: Large Adult)   Pulse 106   Temp 97.7 °F (36.5 °C) (Tympanic)   Resp 18   Ht 193 cm (75.98\")   Wt (!) 145 kg (320 lb)   SpO2 97%   BMI 38.97 kg/m²     Physical Exam  Vitals reviewed.   Constitutional:       General: He is not in acute " distress.     Appearance: He is well-developed. He is not diaphoretic.   HENT:      Head: Normocephalic and atraumatic.      Right Ear: Hearing, tympanic membrane, ear canal and external ear normal.      Left Ear: Hearing, tympanic membrane, ear canal and external ear normal.      Nose: Nose normal.      Right Sinus: No maxillary sinus tenderness or frontal sinus tenderness.      Left Sinus: No maxillary sinus tenderness or frontal sinus tenderness.      Mouth/Throat:      Pharynx: Uvula midline. Pharyngeal swelling and posterior oropharyngeal erythema present.   Eyes:      General: Lids are normal.      Conjunctiva/sclera: Conjunctivae normal.      Pupils: Pupils are equal, round, and reactive to light.   Neck:      Trachea: Trachea normal. No tracheal tenderness or tracheal deviation.   Cardiovascular:      Rate and Rhythm: Regular rhythm. Tachycardia present.      Heart sounds: Normal heart sounds, S1 normal and S2 normal. No murmur heard.     No friction rub. No gallop.   Pulmonary:      Effort: Pulmonary effort is normal. No respiratory distress.      Breath sounds: Normal breath sounds.   Abdominal:      General: Bowel sounds are normal. There is no distension.      Palpations: Abdomen is soft.      Tenderness: There is no abdominal tenderness.   Skin:     General: Skin is warm and dry.   Neurological:      Mental Status: He is alert and oriented to person, place, and time.   Psychiatric:         Behavior: Behavior normal.         Thought Content: Thought content normal.         Judgment: Judgment normal.         Current Outpatient Medications   Medication Sig Dispense Refill    amLODIPine (NORVASC) 5 MG tablet Take 1 tablet by mouth Daily. 30 tablet 6    ascorbic acid (VITAMIN C) 500 MG tablet Take 1 tablet by mouth Daily.      atorvastatin (Lipitor) 20 MG tablet Take 1 tablet by mouth Daily. 90 tablet 1    empagliflozin (Jardiance) 25 MG tablet tablet Take 1 tablet by mouth Daily. 90 tablet 1    fexofenadine  (ALLEGRA) 180 MG tablet Take 1 tablet by mouth Every Night. 90 tablet 1    fluticasone (FLONASE) 50 MCG/ACT nasal spray Administer 2 sprays into the nostril(s) as directed by provider Daily. 15.8 g 11    gabapentin (NEURONTIN) 300 MG capsule Take 1 capsule by mouth 2 (Two) Times a Day.      glucose blood test strip 1 each by Other route Daily. Use as instructed 100 each 12    glucose monitor monitoring kit Use 1 each Daily. 1 each 0    Lancets misc Use 1 Device 2 (Two) Times a Day. 100 each 12    losartan (Cozaar) 50 MG tablet Take 1.5 tablets by mouth Daily. 180 tablet 1    melatonin 5 MG tablet tablet Take 1 tablet by mouth Every Night.      meloxicam (MOBIC) 15 MG tablet Take 1 tablet by mouth Daily. 90 tablet 3    metFORMIN (Glucophage) 500 MG tablet Take 1 tablet by mouth 2 (Two) Times a Day With Meals. 180 tablet 1    omeprazole (priLOSEC) 40 MG capsule Take 1 capsule by mouth Daily. 90 capsule 1    tiZANidine (ZANAFLEX) 2 MG tablet Take 1 tablet by mouth Every 8 (Eight) Hours As Needed for Muscle Spasms. Start 1/2 tab QHS, then continue 1/2 tab to 1 tab TID PRN muscle spasms 90 tablet 0    venlafaxine XR (EFFEXOR-XR) 150 MG 24 hr capsule Take 1 capsule by mouth Daily. 90 capsule 1    Vitamin D, Cholecalciferol, (CHOLECALCIFEROL) 10 MCG (400 UNIT) tablet Take 1 tablet by mouth Daily.      amoxicillin-clavulanate (AUGMENTIN) 875-125 MG per tablet Take 1 tablet by mouth 2 (Two) Times a Day. 20 tablet 0    busPIRone (BUSPAR) 10 MG tablet Take 1 tablet by mouth 3 (Three) Times a Day. 90 tablet 1    Semaglutide,0.25 or 0.5MG/DOS, (Ozempic, 0.25 or 0.5 MG/DOSE,) 2 MG/1.5ML solution pen-injector Inject 0.25 mg under the skin into the appropriate area as directed 1 (One) Time Per Week. 1.5 mL 0     No current facility-administered medications for this visit.            Assessment & Plan     Problem List Items Addressed This Visit       Essential hypertension (Chronic)    Relevant Orders    CBC & Differential     Comprehensive Metabolic Panel    Sleep apnea    Mixed hyperlipidemia (Chronic)    Relevant Orders    Lipid Panel    Type 2 diabetes mellitus with hyperglycemia    Relevant Medications    Semaglutide,0.25 or 0.5MG/DOS, (Ozempic, 0.25 or 0.5 MG/DOSE,) 2 MG/1.5ML solution pen-injector    Other Relevant Orders    Microalbumin / Creatinine Urine Ratio - Urine, Clean Catch    Hemoglobin A1c    Anxiety and depression    Relevant Medications    busPIRone (BUSPAR) 10 MG tablet     Other Visit Diagnoses       Encounter for preventative adult health care exam with abnormal findings    -  Primary    Upper respiratory tract infection, unspecified type        Relevant Medications    amoxicillin-clavulanate (AUGMENTIN) 875-125 MG per tablet    Other Relevant Orders    POCT SARS-CoV-2 + Flu Antigen KYLE (Completed)    Screening for thyroid disorder        Relevant Orders    TSH    Screening for malignant neoplasm of prostate        Relevant Orders    PSA Screen    Acute pharyngitis, unspecified etiology        Relevant Medications    amoxicillin-clavulanate (AUGMENTIN) 875-125 MG per tablet              ICD-10-CM ICD-9-CM   1. Encounter for preventative adult health care exam with abnormal findings  Z00.01 V70.0   2. Upper respiratory tract infection, unspecified type  J06.9 465.9   3. Essential hypertension  I10 401.9   4. Mixed hyperlipidemia  E78.2 272.2   5. Type 2 diabetes mellitus with hyperglycemia, without long-term current use of insulin  E11.65 250.00     790.29   6. Screening for thyroid disorder  Z13.29 V77.0   7. Anxiety and depression  F41.9 300.00    F32.A 311   8. Screening for malignant neoplasm of prostate  Z12.5 V76.44   9. Acute pharyngitis, unspecified etiology  J02.9 462   10. Sleep apnea, unspecified type  G47.30 780.57       Plan: Counseled to get colonoscopy in 5 years from previous colonoscopy.  COVID and flu negative.  Augmentin ordered for pharyngitis.  Increase buspirone for better control of agitation  and anxiety.  Stop if you develop suicidal thoughts and seek medical attention.  Get labs today.  Add Ozempic.We discussed the risks and side effects medullary thyroid cancer, pancreatitis, nausea, vomiting, diarrhea, bowel obstruction and constipation. he verbalized an understanding of the same. he denies h/o or family h/o thyroid cancer.  Follow-up in 6 months and 1 month.      @Body mass index is 38.97 kg/m².           Understands disease processes and need for medications.  Understands reasons for urgent and emergent care.  Patient (& family) verbalized agreement for treatment plan.   Emotional support and active listening provided.  Patient provided time to verbalize feelings.           Class 2 Severe Obesity (BMI >=35 and <=39.9). Obesity-related health conditions include the following: obstructive sleep apnea, hypertension, diabetes mellitus, and dyslipidemias. Obesity is unchanged. BMI is is above average; BMI management plan is completed. We discussed Information on healthy weight added to patient's after visit summary.      This document has been electronically signed by LIONEL Grider   March 4, 2025 14:55 EST

## 2025-03-04 NOTE — PATIENT INSTRUCTIONS
"Healthy Eating, Adult  Healthy eating may help you get and keep a healthy body weight, reduce the risk of chronic disease, and live a long and productive life. It is important to follow a healthy eating pattern. Your nutritional and calorie needs should be met mainly by different nutrient-rich foods.  What are tips for following this plan?  Reading food labels  Read labels and choose the following:  Reduced or low sodium products.  Juices with 100% fruit juice.  Foods with low saturated fats (<3 g per serving) and high polyunsaturated and monounsaturated fats.  Foods with whole grains, such as whole wheat, cracked wheat, brown rice, and wild rice.  Whole grains that are fortified with folic acid. This is recommended for females who are pregnant or who want to become pregnant.  Read labels and do not eat or drink the following:  Foods or drinks with added sugars. These include foods that contain brown sugar, corn sweetener, corn syrup, dextrose, fructose, glucose, high-fructose corn syrup, honey, invert sugar, lactose, malt syrup, maltose, molasses, raw sugar, sucrose, trehalose, or turbinado sugar.  Limit your intake of added sugars to less than 10% of your total daily calories. Do not eat more than the following amounts of added sugar per day:  6 teaspoons (25 g) for females.  9 teaspoons (38 g) for males.  Foods that contain processed or refined starches and grains.  Refined grain products, such as white flour, degermed cornmeal, white bread, and white rice.  Shopping  Choose nutrient-rich snacks, such as vegetables, whole fruits, and nuts. Avoid high-calorie and high-sugar snacks, such as potato chips, fruit snacks, and candy.  Use oil-based dressings and spreads on foods instead of solid fats such as butter, margarine, sour cream, or cream cheese.  Limit pre-made sauces, mixes, and \"instant\" products such as flavored rice, instant noodles, and ready-made pasta.  Try more plant-protein sources, such as tofu, " tempeh, black beans, edamame, lentils, nuts, and seeds.  Explore eating plans such as the Mediterranean diet or vegetarian diet.  Try heart-healthy dips made with beans and healthy fats like hummus and guacamole. Vegetables go great with these.  Cooking  Use oil to sauté or stir-russ foods instead of solid fats such as butter, margarine, or lard.  Try baking, boiling, grilling, or broiling instead of frying.  Remove the fatty part of meats before cooking.  Steam vegetables in water or broth.  Meal planning    At meals, imagine dividing your plate into fourths:  One-half of your plate is fruits and vegetables.  One-fourth of your plate is whole grains.  One-fourth of your plate is protein, especially lean meats, poultry, eggs, tofu, beans, or nuts.  Include low-fat dairy as part of your daily diet.  Lifestyle  Choose healthy options in all settings, including home, work, school, restaurants, or stores.  Prepare your food safely:  Wash your hands after handling raw meats.  Where you prepare food, keep surfaces clean by regularly washing with hot, soapy water.  Keep raw meats separate from ready-to-eat foods, such as fruits and vegetables.  , meat, poultry, and eggs to the recommended temperature. Get a food thermometer.  Store foods at safe temperatures. In general:  Keep cold foods at 40°F (4.4°C) or below.  Keep hot foods at 140°F (60°C) or above.  Keep your freezer at 0°F (-17.8°C) or below.  Foods are not safe to eat if they have been between the temperatures of °F (4.4-60°C) for more than 2 hours.  What foods should I eat?  Fruits  Aim to eat 1½-2½ cups of fresh, canned (in natural juice), or frozen fruits each day. One cup of fruit equals 1 small apple, 1 large banana, 8 large strawberries, 1 cup (237 g) canned fruit, ½ cup (82 g) dried fruit, or 1 cup (240 mL) 100% juice.  Vegetables  Aim to eat 2-4 cups of fresh and frozen vegetables each day, including different varieties and colors. One cup  of vegetables equals 1 cup (91 g) broccoli or cauliflower florets, 2 medium carrots, 2 cups (150 g) raw, leafy greens, 1 large tomato, 1 large nguyen pepper, 1 large sweet potato, or 1 medium white potato.  Grains  Aim to eat 5-10 ounce-equivalents of whole grains each day. Examples of 1 ounce-equivalent of grains include 1 slice of bread, 1 cup (40 g) ready-to-eat cereal, 3 cups (24 g) popcorn, or ½ cup (93 g) cooked rice.  Meats and other proteins  Try to eat 5-7 ounce-equivalents of protein each day. Examples of 1 ounce-equivalent of protein include 1 egg, ½ oz nuts (12 almonds, 24 pistachios, or 7 walnut halves), 1/4 cup (90 g) cooked beans, 6 tablespoons (90 g) hummus or 1 tablespoon (16 g) peanut butter. A cut of meat or fish that is the size of a deck of cards is about 3-4 ounce-equivalents (85 g).  Of the protein you eat each week, try to have at least 8 sounce (227 g) of seafood. This is about 2 servings per week. This includes salmon, trout, herring, sardines, and anchovies.  Dairy  Aim to eat 3 cup-equivalents of fat-free or low-fat dairy each day. Examples of 1 cup-equivalent of dairy include 1 cup (240 mL) milk, 8 ounces (250 g) yogurt, 1½ ounces (44 g) natural cheese, or 1 cup (240 mL) fortified soy milk.  Fats and oils  Aim for about 5 teaspoons (21 g) of fats and oils per day. Choose monounsaturated fats, such as canola and olive oils, mayonnaise made with olive oil or avocado oil, avocados, peanut butter, and most nuts, or polyunsaturated fats, such as sunflower, corn, and soybean oils, walnuts, pine nuts, sesame seeds, sunflower seeds, and flaxseed.  Beverages  Aim for 6 eight-ounce glasses of water per day. Limit coffee to 3-5 eight-ounce cups per day.  Limit caffeinated beverages that have added calories, such as soda and energy drinks.  If you drink alcohol:  Limit how much you have to:  0-1 drink a day if you are female.  0-2 drinks a day if you are male.  Know how much alcohol is in your drink.  In the [place] one drink is one 12 oz bottle of beer (355 mL), one 5 oz glass of wine (148 mL), or one 1½ oz glass of hard liquor (44 mL).  Seasoning and other foods  Try not to add too much salt to your food. Try using herbs and spices instead of salt.  Try not to add sugar to food.  This information is based on [place] nutrition guidelines. To learn more, visit [employer]. Exact amounts may vary. You may need different amounts.  This information is not intended to replace advice given to you by your health care provider. Make sure you discuss any questions you have with your health care provider.  Document Revised: 09/18/2023 Document Reviewed: 09/18/2023  [employer] Patient Education © 2024 [employer]

## 2025-03-04 NOTE — PROGRESS NOTES
Medications reviewed no changes  Allergies reviewed no new  Histories reviewed no changes  Surgeries reviewed no new.       Venipuncture Blood Specimen Collection  Venipuncture performed in right hand by Linh Camargo MA with good hemostasis. Patient tolerated the procedure well without complications.   03/04/25   Linh Camargo MA

## 2025-03-05 LAB
ALBUMIN SERPL-MCNC: 4.4 G/DL (ref 3.5–5.2)
ALBUMIN UR-MCNC: 3 MG/DL
ALBUMIN/GLOB SERPL: 1.3 G/DL
ALP SERPL-CCNC: 95 U/L (ref 39–117)
ALT SERPL W P-5'-P-CCNC: 20 U/L (ref 1–41)
ANION GAP SERPL CALCULATED.3IONS-SCNC: 10.7 MMOL/L (ref 5–15)
AST SERPL-CCNC: 16 U/L (ref 1–40)
BASOPHILS # BLD AUTO: 0.09 10*3/MM3 (ref 0–0.2)
BASOPHILS NFR BLD AUTO: 0.9 % (ref 0–1.5)
BILIRUB SERPL-MCNC: 0.3 MG/DL (ref 0–1.2)
BUN SERPL-MCNC: 20 MG/DL (ref 6–20)
BUN/CREAT SERPL: 23.3 (ref 7–25)
CALCIUM SPEC-SCNC: 9.7 MG/DL (ref 8.6–10.5)
CHLORIDE SERPL-SCNC: 106 MMOL/L (ref 98–107)
CHOLEST SERPL-MCNC: 120 MG/DL (ref 0–200)
CO2 SERPL-SCNC: 23.3 MMOL/L (ref 22–29)
CREAT SERPL-MCNC: 0.86 MG/DL (ref 0.76–1.27)
CREAT UR-MCNC: 133.3 MG/DL
DEPRECATED RDW RBC AUTO: 41 FL (ref 37–54)
EGFRCR SERPLBLD CKD-EPI 2021: 101.6 ML/MIN/1.73
EOSINOPHIL # BLD AUTO: 0.08 10*3/MM3 (ref 0–0.4)
EOSINOPHIL NFR BLD AUTO: 0.8 % (ref 0.3–6.2)
ERYTHROCYTE [DISTWIDTH] IN BLOOD BY AUTOMATED COUNT: 13.3 % (ref 12.3–15.4)
GLOBULIN UR ELPH-MCNC: 3.4 GM/DL
GLUCOSE SERPL-MCNC: 106 MG/DL (ref 65–99)
HBA1C MFR BLD: 6.1 % (ref 4.8–5.6)
HCT VFR BLD AUTO: 44.3 % (ref 37.5–51)
HDLC SERPL-MCNC: 30 MG/DL (ref 40–60)
HGB BLD-MCNC: 15 G/DL (ref 13–17.7)
IMM GRANULOCYTES # BLD AUTO: 0.04 10*3/MM3 (ref 0–0.05)
IMM GRANULOCYTES NFR BLD AUTO: 0.4 % (ref 0–0.5)
LDLC SERPL CALC-MCNC: 63 MG/DL (ref 0–100)
LDLC/HDLC SERPL: 1.95 {RATIO}
LYMPHOCYTES # BLD AUTO: 2.44 10*3/MM3 (ref 0.7–3.1)
LYMPHOCYTES NFR BLD AUTO: 23.6 % (ref 19.6–45.3)
MCH RBC QN AUTO: 28.9 PG (ref 26.6–33)
MCHC RBC AUTO-ENTMCNC: 33.9 G/DL (ref 31.5–35.7)
MCV RBC AUTO: 85.4 FL (ref 79–97)
MICROALBUMIN/CREAT UR: 22.5 MG/G (ref 0–29)
MONOCYTES # BLD AUTO: 0.87 10*3/MM3 (ref 0.1–0.9)
MONOCYTES NFR BLD AUTO: 8.4 % (ref 5–12)
NEUTROPHILS NFR BLD AUTO: 6.81 10*3/MM3 (ref 1.7–7)
NEUTROPHILS NFR BLD AUTO: 65.9 % (ref 42.7–76)
NRBC BLD AUTO-RTO: 0 /100 WBC (ref 0–0.2)
PLATELET # BLD AUTO: 293 10*3/MM3 (ref 140–450)
PMV BLD AUTO: 10.3 FL (ref 6–12)
POTASSIUM SERPL-SCNC: 4 MMOL/L (ref 3.5–5.2)
PROT SERPL-MCNC: 7.8 G/DL (ref 6–8.5)
PSA SERPL-MCNC: 1.04 NG/ML (ref 0–4)
RBC # BLD AUTO: 5.19 10*6/MM3 (ref 4.14–5.8)
SODIUM SERPL-SCNC: 140 MMOL/L (ref 136–145)
TRIGL SERPL-MCNC: 158 MG/DL (ref 0–150)
TSH SERPL DL<=0.05 MIU/L-ACNC: 2.33 UIU/ML (ref 0.27–4.2)
VLDLC SERPL-MCNC: 27 MG/DL (ref 5–40)
WBC NRBC COR # BLD AUTO: 10.33 10*3/MM3 (ref 3.4–10.8)

## 2025-03-06 ENCOUNTER — OFFICE VISIT (OUTPATIENT)
Dept: PAIN MEDICINE | Facility: CLINIC | Age: 55
End: 2025-03-06
Payer: OTHER GOVERNMENT

## 2025-03-06 VITALS — HEIGHT: 76 IN | BODY MASS INDEX: 38.36 KG/M2 | WEIGHT: 315 LBS

## 2025-03-06 DIAGNOSIS — M47.816 SPONDYLOSIS OF LUMBAR REGION WITHOUT MYELOPATHY OR RADICULOPATHY: ICD-10-CM

## 2025-03-06 DIAGNOSIS — M53.2X6 SPINAL INSTABILITIES, LUMBAR REGION: ICD-10-CM

## 2025-03-06 DIAGNOSIS — F41.9 ANXIETY AND DEPRESSION: ICD-10-CM

## 2025-03-06 DIAGNOSIS — R73.03 PREDIABETES: ICD-10-CM

## 2025-03-06 DIAGNOSIS — M48.062 LUMBAR STENOSIS WITH NEUROGENIC CLAUDICATION: ICD-10-CM

## 2025-03-06 DIAGNOSIS — R53.81 PHYSICAL DECONDITIONING: ICD-10-CM

## 2025-03-06 DIAGNOSIS — F32.A ANXIETY AND DEPRESSION: ICD-10-CM

## 2025-03-06 DIAGNOSIS — M24.28 LIGAMENTUM FLAVUM HYPERTROPHY: ICD-10-CM

## 2025-03-06 PROCEDURE — 99214 OFFICE O/P EST MOD 30 MIN: CPT | Performed by: NURSE PRACTITIONER

## 2025-03-06 NOTE — PROGRESS NOTES
"Chief Complaint: \"Lower back pain and pain into my right leg\"        History of Present Illness:   Patient: Mr. Jimmy Dawson Jr., 54 y.o. male originally referred by Dr. Leighton Johnson in consultation for chronic intractable lower back pain.  Patient reports a greater than 10-year history of chronic intractable lower back pain.  He underwent hospitalization in August 2023, he reports since that time his lower back pain has steadily increased.  His pain increases with protracted standing, walking, sitting. He has to change positions frequently to get comfortable. His symptoms are also worse by the end of the day. He has chronically had numbness in lateral aspect of his right thigh. MRI of the lumbar spine without contrast on 02/22/2024 revealed disc bulges, facet hypertrophy, ligamentum flavum hypertrophy from L1-L2 through L5-S1. At L4-L5: Ligamentum flavum hypertrophy.  Moderate central canal stenosis.  Moderate foraminal stenosis. At L2-L3 and L3-L4: Mild to moderate canal stenosis.  He underwent neurosurgical consultation with Dr. Leighton Johnson, on 04/09/2024, and was found not to be a surgical candidate.  He has failed to obtain pain relief with conservative measures including oral analgesics, topical analgesics, ice, heat, TENs, physical therapy, physical therapist directed home exercise program HEP (ongoing), therapeutic massage, to name a few.  On July 24, 2024 he underwent diagnostic and therapeutic bilateral L2-L3 transforaminal epidural steroid injection, from which overall he reported experiencing almost complete resolution of his pain for 2 months.  He then twisted his leg, and then developed severe right sided lower back pain with radiation into his right lower extremity.  He described pain that begins in the right side of his lower back extends into the posterior and lateral portion of his thigh, and into his calf.  He denied any symptoms on the left side.  Therefore, on 11/6/2024, he underwent " diagnostic and therapeutic right L4-L5 transforaminal epidural steroid injection, from which he reports experiencing almost complete pain relief and functional improvement that is ongoing.  He does continue to have aspects of mechanical lower back pain.  He returns today for postprocedure follow-up and evaluation.  Pain Description: Constant dull lower back pain with intermittent exacerbation, described as aching, dull, sharp, throbbing, burning, and numbing sensation.   Radiation of Pain: The pain no longer radiates into the posterior and lateral aspect of his right thigh and into the right calf.  He primarily complains of lower back pain across the lower back today.  Pain intensity today: 3/10   Average pain intensity last week: 3/10  Pain intensity ranges from: 3/10 to 7/10  Aggravating factors: Pain increases with bending, extension, twisting, protracted sitting, standing, walking. Patient describes neurogenic claudication. Patient does not use a cane or walker   Alleviating factors: Pain decreases with changing positions, moving away from the site of pain, lying down prone  Associated Symptoms:   Patient denies pain, numbness or weakness in the lower extremities today.   Patient denies any new bladder or bowel problems.   Patient denies difficulties with his balance or recent falls.   Pain interferes with ADLs, general activities, and affects patient's quality of life  Pain does not interfere with sleep  Muscle spasms  Stiffness    Review of previous therapies and additional medical records:  Jimmy Dawson Jr. has already failed the following measures, including:   Conservative Measures: Oral analgesics (gabapentin, Robaxin, Mobic, etc), topical analgesics, ice, heat, TENs, physical therapy (last visit within the past month, 6-8 weeks), physical therapist directed home exercise program HEP (ongoing), therapeutic massage  Interventional Measures: 07/24/2024: DxTx bilateral L2-L3 transforaminal epidural  steroid injection  11/06/2024: DxTx right L4-L5 transforaminal epidural steroid injections  Surgical Measures: No history of previous cervical spine, lumbar spine or hip surgery   Jimmy Dawson Jr. underwent neurosurgical consultation with Dr. Leighton Johnson, on 04/09/2024, and was found not to be a surgical candidate.  Jimmy Dawson Jr. presents with significant comorbidities including anxiety, depression, GERD, HTN, HLP, TAMIKO, Hx WPW (Brayan-Parkinson-White syndrome) s/p RF ablation 2000, obesity, type 2 diabetes mellitus   In terms of current analgesics, Jimmy Dawson Jr. takes: meloxicam, methocarbamol, gabapentin. Patient also takes BusPar, melatonin, venlafaxine   I have reviewed Torres Report consistent with medication reconciliation.  SOAPP/ORT: Low Risk     Global Pain Scale 07-09  2024 10-21  2024         Pain 14 15         Feelings 15 18         Clinical outcomes 8 11         Activities 16 0         GPS Total: 53 44           The [place] Back Pain Disability Scale   DATE 07-09  2024 10-21  2024 03-06  2025        Sleep through the night 0 0 1        Turn over in bed 2 2 1        Get out of bed 3 2 1        Make your bed 0 3 1        Put on socks (pantyhose) 2 3 3        Ride in a car 3 3 3        Sit in a chair for several hours 3 4 4        Stand up for 20-30 minutes 4 4 4        Climb one flight of stairs 3 4 2        Walk a few blocks (200-300 yards)  4 4 4        Walk several miles 4 5 5        Run one block (about 50 yards) 5 5 5        Take food out of the refrigerator 0 1 0        Reach up to high shelves 0 2 0        Move a chair 0 2 0        Pull or push heavy doors 0 3 2        Bend over to clean the bathtub 4 5 4        Throw a ball 1 2 1        Carry two bags of groceries 1 2 1        Lift and carry a heavy suitcase 1 2 3        Total score 44 58 45            Review of Diagnostic Studies:  Lumbar spine x-ray flexion-extension views 7/9/2024: No instability   MRI of the lumbar spine  without contrast on 02/22/2024 revealed preservation of vertebral body heights and alignment.    L1-L2: Disc bulge, facet hypertrophy.  No significant canal or foraminal stenosis  L2-L3: Broad-based disc bulge, facet hypertrophy, ligamentum flavum hypertrophy.  Moderate central canal stenosis.  Mild left and moderate right neuroforaminal stenosis  L3-L4: Disc bulge, facet hypertrophy.  Mild central canal stenosis.  Mild right neuroforaminal stenosis  L4-L5: Broad-based disc bulge, facet hypertrophy. Right Facet joint effusion, Ligamentum flavum hypertrophy. Mild to moderate central canal stenosis.  Moderate right lateral recess and foraminal stenosis  L5-S1: Disc bulge, facet hypertrophy.  Mild to moderate right neuroforaminal stenosis.    Review of Systems   HENT:  Positive for hearing loss and tinnitus.    Respiratory:  Positive for apnea.    Musculoskeletal:  Positive for arthralgias, back pain, gait problem and myalgias.   Neurological:  Positive for dizziness.   Psychiatric/Behavioral:  Positive for agitation. The patient is nervous/anxious.    All other systems reviewed and are negative.        Patient Active Problem List   Diagnosis    Gastroesophageal reflux disease without esophagitis    Depression    Essential hypertension    Palpitations    SVT (supraventricular tachycardia), s/p RF ablation, in 2000 x2    WPW (Brayan-Parkinson-White syndrome), s/p RF ablation 2000.    Chronic rhinitis    Sleep apnea    Typical atrial flutter    History of 2019 novel coronavirus disease (COVID-19)    Anxiety    Chronic folliculitis    Primary osteoarthritis of left knee    Symptomatic cholelithiasis    CAP (community acquired pneumonia)    Status post laparoscopic cholecystectomy    Prediabetes    Mixed hyperlipidemia    Chronic right-sided low back pain with right-sided sciatica    Hearing deficit, bilateral    Skin tag    Class 3 severe obesity with serious comorbidity and body mass index (BMI) of 40.0 to 44.9 in adult     Type 2 diabetes mellitus with hyperglycemia    Spondylosis of lumbar region without myelopathy or radiculopathy    Spinal instabilities, lumbar region    Degeneration of lumbar or lumbosacral intervertebral disc    Ligamentum flavum hypertrophy    Anxiety and depression    Physical deconditioning    BMI 35.0-35.9,adult    Lumbar stenosis with neurogenic claudication       Past Medical History:   Diagnosis Date    Acid reflux     Allergic     Anxiety     Arthritis     Depression     GERD (gastroesophageal reflux disease)     Hypertension     Infectious mononucleosis     Mixed hyperlipidemia 10/11/2023    TAMIKO (obstructive sleep apnea)     NO OXYGEN USE    PONV (postoperative nausea and vomiting)     Seasonal allergies     Sinusitis     Sleep apnea     c pap    Urinary tract infection     WPW (Brayan-Parkinson-White syndrome)          Past Surgical History:   Procedure Laterality Date    ABLATION OF DYSRHYTHMIC FOCUS  1999 and 2000    Dr. Carlin    CARDIAC CATHETERIZATION      CARDIAC ELECTROPHYSIOLOGY PROCEDURE N/A 06/04/2019    Procedure: Flutter;  Surgeon: Juan Carlin MD;  Location: [facility] INVASIVE LOCATION;  Service: Cardiology    CARDIAC SURGERY      CHOLECYSTECTOMY N/A 8/8/2023    Procedure: CHOLECYSTECTOMY LAPAROSCOPIC;  Surgeon: Miles Sanders MD;  Location: [facility];  Service: General;  Laterality: N/A;    COLONOSCOPY      WISDOM TOOTH EXTRACTION           Family History   Problem Relation Age of Onset    Hypertension Mother     Hypertension Father     Hyperlipidemia Father     Diabetes Father     Heart disease Father     Diabetes Brother     Diabetes Maternal Grandfather     Heart attack Maternal Grandfather     Diabetes Paternal Grandfather     Diabetes Maternal Grandmother     Cancer Maternal Grandmother          Social History     Socioeconomic History    Marital status:    Tobacco Use    Smoking status: Never    Smokeless tobacco: Never   Vaping Use    Vaping status: Never Used    Substance and Sexual Activity    Alcohol use: Yes     Comment: 4-5 BEERS, TWICE A WEEK    Drug use: No    Sexual activity: Defer           Current Outpatient Medications:     amLODIPine (NORVASC) 5 MG tablet, Take 1 tablet by mouth Daily., Disp: 30 tablet, Rfl: 6    amoxicillin-clavulanate (AUGMENTIN) 875-125 MG per tablet, Take 1 tablet by mouth 2 (Two) Times a Day., Disp: 20 tablet, Rfl: 0    ascorbic acid (VITAMIN C) 500 MG tablet, Take 1 tablet by mouth Daily., Disp: , Rfl:     atorvastatin (Lipitor) 20 MG tablet, Take 1 tablet by mouth Daily., Disp: 90 tablet, Rfl: 1    busPIRone (BUSPAR) 10 MG tablet, Take 1 tablet by mouth 3 (Three) Times a Day., Disp: 90 tablet, Rfl: 1    empagliflozin (Jardiance) 25 MG tablet tablet, Take 1 tablet by mouth Daily., Disp: 90 tablet, Rfl: 1    fexofenadine (ALLEGRA) 180 MG tablet, Take 1 tablet by mouth Every Night., Disp: 90 tablet, Rfl: 1    fluticasone (FLONASE) 50 MCG/ACT nasal spray, Administer 2 sprays into the nostril(s) as directed by provider Daily., Disp: 15.8 g, Rfl: 11    gabapentin (NEURONTIN) 300 MG capsule, Take 1 capsule by mouth 2 (Two) Times a Day., Disp: , Rfl:     glucose blood test strip, 1 each by Other route Daily. Use as instructed, Disp: 100 each, Rfl: 12    glucose monitor monitoring kit, Use 1 each Daily., Disp: 1 each, Rfl: 0    Lancets misc, Use 1 Device 2 (Two) Times a Day., Disp: 100 each, Rfl: 12    losartan (Cozaar) 50 MG tablet, Take 1.5 tablets by mouth Daily., Disp: 180 tablet, Rfl: 1    melatonin 5 MG tablet tablet, Take 1 tablet by mouth Every Night., Disp: , Rfl:     meloxicam (MOBIC) 15 MG tablet, Take 1 tablet by mouth Daily., Disp: 90 tablet, Rfl: 3    metFORMIN (Glucophage) 500 MG tablet, Take 1 tablet by mouth 2 (Two) Times a Day With Meals., Disp: 180 tablet, Rfl: 1    omeprazole (priLOSEC) 40 MG capsule, Take 1 capsule by mouth Daily., Disp: 90 capsule, Rfl: 1    Semaglutide,0.25 or 0.5MG/DOS, (Ozempic, 0.25 or 0.5 MG/DOSE,) 2  "MG/1.5ML solution pen-injector, Inject 0.25 mg under the skin into the appropriate area as directed 1 (One) Time Per Week., Disp: 1.5 mL, Rfl: 0    tiZANidine (ZANAFLEX) 2 MG tablet, Take 1 tablet by mouth Every 8 (Eight) Hours As Needed for Muscle Spasms. Start 1/2 tab QHS, then continue 1/2 tab to 1 tab TID PRN muscle spasms, Disp: 90 tablet, Rfl: 0    venlafaxine XR (EFFEXOR-XR) 150 MG 24 hr capsule, Take 1 capsule by mouth Daily., Disp: 90 capsule, Rfl: 1    Vitamin D, Cholecalciferol, (CHOLECALCIFEROL) 10 MCG (400 UNIT) tablet, Take 1 tablet by mouth Daily., Disp: , Rfl:       Allergies   Allergen Reactions    Aleve [Naproxen] Hives         Ht 193 cm (75.98\")   Wt (!) 145 kg (320 lb 9.6 oz)   BMI 39.05 kg/m²       Physical Exam:  Constitutional: Patient appears well-developed, well-nourished, well-hydrated, appears younger than stated age  HEENT: Head: Normocephalic and atraumatic  Eyes: Conjunctivae and lids are normal  Pupils: Equal, round, reactive to light  Peripheral vascular exam: Femoral: right 2+, left 2+. Posterior tibialis: right 2+ and left 2+. Dorsalis pedis: right 2+ and left 2+. No edema.   Musculoskeletal   Gait and station: Gait evaluation demonstrated a normal gait. Able to walk on heels, toes, tandem walking   Lumbar Spine: Passive and active range of motion are limited secondary to pain. Extension, flexion, lateral flexion, rotation of the lumbar spine increased and reproduced his pain. Lumbar facet joint loading maneuvers are positive.   Quinn's test and Gaenslen's test is negative  Piriformis maneuvers: Negative   Hip Joints: The range of motion of the hip joints is limited to flexion and internal rotation but without pain   Palpation of the bilateral psoas tendons and iliopsoas bursas: Unrevealing   Palpation of the bilateral greater trochanters: Unrevealing   Examination of the Iliotibial band: Unrevealing   Neurological:   Patient is alert and oriented to person, place, and time. "   Speech: Normal.   Cortical function: Normal mental status.   Reflex Scores:  Right patellar: 1+  Left patellar: 1+  Right Achilles: 1+  Left Achilles: 1+  Motor strength: 5/5  Motor Tone: Normal  Involuntary movements: None.   Superficial/Primitive Reflexes: Primitive reflexes were absent.   Right Robertson: Absent  Left Robertson: Absent  Right ankle clonus: Absent  Left ankle clonus: Absent   Babinsky: Absent  Long tract signs: Negative. Straight leg raising test: Negative. Femoral stretch sign: Negative.   Sensory exam: Intact to light touch, intact pain and temperature sensation, intact vibration sensation and normal proprioception  Balance: Normal Romberg's sign: Negative   Skin and subcutaneous tissue: Skin is warm and intact. No rash noted. No cyanosis.   Psychiatric: Judgment and insight: Normal. Recent and remote memory: Intact. Mood and affect: Normal.     ASSESSMENT:   1. Lumbar stenosis with neurogenic claudication    2. Spondylosis of lumbar region without myelopathy or radiculopathy    3. Ligamentum flavum hypertrophy    4. Spinal instabilities, lumbar region    5. Physical deconditioning    6. Prediabetes    7. Anxiety and depression          PLAN/MEDICAL DECISION MAKING:  Mr. Jimmy Dawson Jr., 54 y.o. male reports a greater than 10-year history of chronic intractable lower back pain.  He underwent hospitalization in August 2023, he reports since that time his lower back pain has steadily increased. His pain increases with protracted standing, walking, sitting. He has to change positions frequently to get comfortable. His symptoms are also worse by the end of the day. He has chronically had numbness in lateral aspect of his right thigh. MRI of the lumbar spine without contrast on 02/22/2024 revealed disc bulges, facet hypertrophy, ligamentum flavum hypertrophy from L1-L2 through L5-S1. At L4-L5: Ligamentum flavum hypertrophy.  Moderate central canal stenosis.  Moderate foraminal stenosis. At L2-L3  and L3-L4: Mild to moderate canal stenosis.  He underwent neurosurgical consultation with Dr. Leighton Johnson, on 04/09/2024, and was found not to be a surgical candidate.  He has failed to obtain pain relief with conservative measures including oral analgesics, topical analgesics, ice, heat, TENs, physical therapy, physical therapist directed home exercise program HEP (ongoing), therapeutic massage, to name a few. We last saw him on July 24, 2024 when he underwent diagnostic and therapeutic bilateral L2-L3 transforaminal epidural steroid injection, from which overall he reports experiencing almost complete resolution of his pain for 2 months.  He then tells me he twisted his leg, and then developed severe right sided lower back pain with radiation into his right lower extremity.  He described pain that began in the right side of his lower back extending into the posterior and lateral portion of his thigh, and into his calf.  He denies any symptoms on the left side, since last epidural.   Therefore, on 11/6/2024, he underwent diagnostic and therapeutic right L4-L5 transforaminal epidural steroid injection, from which he reports experiencing almost complete pain relief and functional improvement that is ongoing.  He does continue to have aspects of mechanical lower back pain.  Since his pain is quite improved, I am going to initiate a new course of physical therapy to work on strengthening.  A comprehensive evaluation including history and physical exam along with pertinent physiologic and functional assessment was performed. Patient presents with intractable pain due to the diagnoses listed above. Patient has failed to respond to conservative modalities, as referenced under HPI. I had a lengthy conversation with . Jimmy Jimenez Eunice Gomez regarding his chronic pain condition and potential therapeutic options including risks, benefits, alternative therapies, to name a few. We have discussed using a stepwise approach  starting with the least intense level of care as determined by the extent required to diagnose and or treat a patient's condition. The proposed treatments are consistent with the patient's medical condition and known to be safe and effective by current guidelines and the standard of care. The duration and frequency proposed are considered appropriate for the service in accordance with accepted standards of medical practice for the diagnosis and treatment of the patient's condition and intended to improve the patient's level of function. These services will be furnished in a setting appropriate to the patient's medical needs and condition. Therefore, I have proposed the following plan:  1. Interventional pain management measures: Patient does not take blood thinners.  Depending on continued response and if leg symptoms recur, we may repeat right L4-L5 transforaminal epidural steroid injections using the lowest effective dose of steroids, under C-arm fluoroscopic guidance, with the use of contrast dye to confirm appropriate needle placement and spread of contrast dye. We may repeat therapeutic bilateral L2-L3 transforaminal epidural steroid injections Vs therapeutic right versus bilateral L4-L5 transforaminal epidural steroid injections depending on patient's outcome and following current guidelines. Epidurals will be limited to a maximum of 4 sessions per spinal region in a rolling twelve (12) month period. Continuation of epidural steroid injections over 12 months would only be considered under the following provisions;  Patient is a high-risk surgical candidate, or the patient does not desire surgery, or recurrence of pain in the same location relieved with ESIs for at least three months and epidural provides at least 50% sustained improvement of pain and/or 50% objective improvement in function (using same scale as baseline)  Pain is severe enough to cause a significant degree of functional disability or  vocational disability  The primary care provider will be notified regarding continuation of procedures and repeat steroid use   Other options for treatment of patient's chronic pain would include (MBBs), [place] L2-L3 vs L4-L5; Vs PNS; Vs SCS trial   2. Diagnostic studies:   A. EMG/NCV of the bilateral lower extremities   B. Prior to SCS: MRI of the thoracic spine without contrast to assess capacity and patency of the spinal canal and epidural space prior to spinal cord stimulator trial and implant  3. Pharmacological measures: Reviewed and discussed; Patient takes meloxicam, methocarbamol, gabapentin. Patient also takes BusPar, melatonin, venlafaxine. Patient has declined additional pharmacological measures   4. Long-term rehabilitation efforts:  A. The patient does not have a history of falls.  B. Patient has completed a physical therapy program and does not have additional visits available for 2024  C. Contrast therapy: Apply ice-packs for 15-20 minutes, followed by heating pads for 15-20 minutes to affected area   D. Start a low impact exercise program such as water therapy, swimming, yoga  E. Prior to PNS/SCS: Referral to Dr. Karlos Randall for psychological screening for peripheral nerve stimulation, spinal cord stimulation  F. Patient's Body mass index is 39.58 kg/m². Patient counseled on the importance of weight loss to help with overall health and pain control.   SUREKHA. Jimmy Dawson Jr.  reports that he has never smoked. He has never used smokeless tobacco.  5. The patient and his family have been instructed to contact my office with any questions or difficulties. The patient understands the plan and agrees to proceed accordingly.      Pain Medications               gabapentin (NEURONTIN) 300 MG capsule Take 1 capsule by mouth 2 (Two) Times a Day.    meloxicam (MOBIC) 15 MG tablet Take 1 tablet by mouth Daily.    tiZANidine (ZANAFLEX) 2 MG tablet Take 1 tablet by mouth Every 8 (Eight) Hours As Needed for  Muscle Spasms. Start 1/2 tab QHS, then continue 1/2 tab to 1 tab TID PRN muscle spasms    venlafaxine XR (EFFEXOR-XR) 150 MG 24 hr capsule Take 1 capsule by mouth Daily.             Orders Placed This Encounter   Procedures    Ambulatory Referral to Physical Therapy for Evaluation & Treatment     Referral Priority:   Routine     Referral Type:   Physical Therapy     Referral Reason:   Specialty Services Required     Requested Specialty:   Physical Therapy     Number of Visits Requested:   1        Please note that portions of this note were completed with a voice recognition program.   Any copied data in any portion of my note has been reviewed by myself and accurate.     The 21st Century Cures Act makes medical notes like this available to patients in the interest of transparency. This is a medical document intended as peer to peer communication. It is written in medical language and may contain abbreviations or verbiage that are unfamiliar. It may appear blunt or direct. Medical documents are intended to carry relevant information, facts as evident, and the clinical opinion of the practitioner.     LIONEL Roberson    Patient Care Team:  Nena Pike APRN as PCP - General (Family Medicine)  Leighton Johnson MD as Consulting Physician (Neurosurgery)  Nena Pike APRN as Primary Care Provider (Family Medicine)  Mariah Quinn APRN as Referring Physician (Nurse Practitioner)  Huan Corrales MD as Surgeon (Orthopedic Surgery)     No orders of the defined types were placed in this encounter.        Future Appointments   Date Time Provider Department Center   3/20/2025  8:15 AM Anthony Ruff PA-C [facility]   4/3/2025  1:00 PM Nena Pike APRN [facility]

## 2025-03-06 NOTE — PROGRESS NOTES
Electrolytes, liver, and kidney functions are normal.  Blood counts are normal.  Prostate level is normal.  Thyroid level is normal.  Triglycerides are mildly elevated.  Cut back on breads, sweets, pasta, and carbs.

## 2025-03-07 RX ORDER — FLUCONAZOLE 100 MG/1
100 TABLET ORAL DAILY
Qty: 3 TABLET | Refills: 0 | Status: SHIPPED | OUTPATIENT
Start: 2025-03-07

## 2025-03-12 RX ORDER — TIZANIDINE 2 MG/1
2 TABLET ORAL EVERY 8 HOURS PRN
Qty: 90 TABLET | Refills: 0 | Status: SHIPPED | OUTPATIENT
Start: 2025-03-12

## 2025-03-13 ENCOUNTER — PRIOR AUTHORIZATION (OUTPATIENT)
Dept: FAMILY MEDICINE CLINIC | Facility: CLINIC | Age: 55
End: 2025-03-13
Payer: OTHER GOVERNMENT

## 2025-03-13 NOTE — TELEPHONE ENCOUNTER
PRIOR AUTHORIZATION ON OZEMPIC HAS BEEN DENIED.    PATIENT'S A1C WAS 6.1%, INDICATIVE OF ADEQUATE GLYCEMIC CONTROL PER [employer] GUIDELINES  PATIENT'S METFORMIN DOES IS NOT MAXIMIZED AT 1000 MG TWICE DAILY  ADDITIONAL INFORMATION  ONCE METFORMIN DOES IS MAXIMIZED AT 1000 MG TWICE DAILY, MAY RE-CHECK A1C FOLLOWING 12 WEEKS OF THERAPY AND IF A1C IS >7%, MAY RESUBMIT REQUEST FOR OZEMPIC.

## 2025-03-18 ENCOUNTER — TELEPHONE (OUTPATIENT)
Dept: PAIN MEDICINE | Facility: CLINIC | Age: 55
End: 2025-03-18
Payer: OTHER GOVERNMENT

## 2025-03-18 ENCOUNTER — OFFICE VISIT (OUTPATIENT)
Dept: FAMILY MEDICINE CLINIC | Facility: CLINIC | Age: 55
End: 2025-03-18
Payer: OTHER GOVERNMENT

## 2025-03-18 VITALS
DIASTOLIC BLOOD PRESSURE: 80 MMHG | HEART RATE: 82 BPM | HEIGHT: 76 IN | SYSTOLIC BLOOD PRESSURE: 120 MMHG | BODY MASS INDEX: 38.36 KG/M2 | TEMPERATURE: 96.7 F | OXYGEN SATURATION: 93 % | WEIGHT: 315 LBS | RESPIRATION RATE: 18 BRPM

## 2025-03-18 DIAGNOSIS — M54.42 ACUTE LEFT-SIDED LOW BACK PAIN WITH LEFT-SIDED SCIATICA: Primary | ICD-10-CM

## 2025-03-18 RX ORDER — KETOROLAC TROMETHAMINE 30 MG/ML
30 INJECTION, SOLUTION INTRAMUSCULAR; INTRAVENOUS ONCE
Status: COMPLETED | OUTPATIENT
Start: 2025-03-18 | End: 2025-03-18

## 2025-03-18 RX ORDER — TRIAMCINOLONE ACETONIDE 40 MG/ML
40 INJECTION, SUSPENSION INTRA-ARTICULAR; INTRAMUSCULAR ONCE
Status: COMPLETED | OUTPATIENT
Start: 2025-03-18 | End: 2025-03-18

## 2025-03-18 RX ADMIN — KETOROLAC TROMETHAMINE 30 MG: 30 INJECTION, SOLUTION INTRAMUSCULAR; INTRAVENOUS at 15:05

## 2025-03-18 RX ADMIN — TRIAMCINOLONE ACETONIDE 40 MG: 40 INJECTION, SUSPENSION INTRA-ARTICULAR; INTRAMUSCULAR at 15:05

## 2025-03-18 NOTE — TELEPHONE ENCOUNTER
Caller:  LIZBET   Relationship to Patient: WIFE  Phone Number: [phone]   Reason for Call: PATIENTS WIFE CALLING STATING THAT HE RE INJURED HIS BACK OVER THE WEEKEND AND HE IS IN A LOT OF PAIN AND CAN BARELY WALK WANTS TO GET IN ASAP

## 2025-03-18 NOTE — PROGRESS NOTES
"Subjective   Jimmy Dawson Jr. is a 54 y.o. male.     Chief Complaint   Patient presents with    Back Pain       History of Present Illness  He presents with c/o back pain since Friday night. He states it continued until Monday and he couldn't hardly walk. He used the heating pad. He has been taking tylenol and muscle relaxer. Pain is sharp in his left hip and running down his left leg. He states driving down the road hitting bumps he could feel it in his left arm and neck too. He denies incontinence of stool and urine. He had an MRI a little over a year ago that reports degenerative disc disease throughout the lumbar spine. He was getting injections. [facility] but he hasn't started it yet.        The following portions of the patient's history were reviewed and updated as appropriate: allergies, current medications, past family history, past medical history, past social history, past surgical history and problem list.    Review of Systems   Constitutional:  Negative for fever.   Respiratory:  Negative for cough, shortness of breath and wheezing.    Cardiovascular:  Negative for chest pain and palpitations.   Genitourinary:  Negative for dysuria and hematuria.   Musculoskeletal:  Positive for arthralgias, back pain and myalgias.       Objective     /80 (BP Location: Right arm, Patient Position: Sitting, Cuff Size: Large Adult)   Pulse 82   Temp 96.7 °F (35.9 °C) (Tympanic)   Resp 18   Ht 193 cm (75.98\")   Wt (!) 147 kg (324 lb)   SpO2 93%   BMI 39.46 kg/m²     Physical Exam  Vitals reviewed.   Constitutional:       General: He is not in acute distress.     Appearance: He is well-developed. He is not diaphoretic.   HENT:      Head: Normocephalic and atraumatic.   Cardiovascular:      Rate and Rhythm: Normal rate and regular rhythm.      Heart sounds: Normal heart sounds. No murmur heard.     No friction rub. No gallop.   Pulmonary:      Effort: " Pulmonary effort is normal. No respiratory distress.      Breath sounds: Normal breath sounds.   Abdominal:      General: Bowel sounds are normal. There is no distension.      Palpations: Abdomen is soft.      Tenderness: There is no abdominal tenderness.   Musculoskeletal:      Lumbar back: Tenderness present.      Left hip: Decreased range of motion.   Skin:     General: Skin is warm and dry.   Neurological:      Mental Status: He is alert and oriented to person, place, and time.   Psychiatric:         Behavior: Behavior normal.         Thought Content: Thought content normal.         Judgment: Judgment normal.         Current Outpatient Medications   Medication Sig Dispense Refill    amLODIPine (NORVASC) 5 MG tablet Take 1 tablet by mouth Daily. 30 tablet 6    ascorbic acid (VITAMIN C) 500 MG tablet Take 1 tablet by mouth Daily.      atorvastatin (Lipitor) 20 MG tablet Take 1 tablet by mouth Daily. 90 tablet 1    busPIRone (BUSPAR) 10 MG tablet Take 1 tablet by mouth 3 (Three) Times a Day. 90 tablet 1    empagliflozin (Jardiance) 25 MG tablet tablet Take 1 tablet by mouth Daily. 90 tablet 1    fexofenadine (ALLEGRA) 180 MG tablet Take 1 tablet by mouth Every Night. 90 tablet 1    fluticasone (FLONASE) 50 MCG/ACT nasal spray Administer 2 sprays into the nostril(s) as directed by provider Daily. 15.8 g 11    gabapentin (NEURONTIN) 300 MG capsule Take 1 capsule by mouth 2 (Two) Times a Day.      glucose blood test strip 1 each by Other route Daily. Use as instructed 100 each 12    glucose monitor monitoring kit Use 1 each Daily. 1 each 0    Lancets misc Use 1 Device 2 (Two) Times a Day. 100 each 12    losartan (Cozaar) 50 MG tablet Take 1.5 tablets by mouth Daily. 180 tablet 1    melatonin 5 MG tablet tablet Take 1 tablet by mouth Every Night.      meloxicam (MOBIC) 15 MG tablet Take 1 tablet by mouth Daily. 90 tablet 3    metFORMIN (Glucophage) 500 MG tablet Take 1 tablet by mouth 2 (Two) Times a Day With Meals.  180 tablet 1    omeprazole (priLOSEC) 40 MG capsule Take 1 capsule by mouth Daily. 90 capsule 1    Semaglutide,0.25 or 0.5MG/DOS, (Ozempic, 0.25 or 0.5 MG/DOSE,) 2 MG/1.5ML solution pen-injector Inject 0.25 mg under the skin into the appropriate area as directed 1 (One) Time Per Week. 1.5 mL 0    tiZANidine (ZANAFLEX) 2 MG tablet Take 1 tablet by mouth Every 8 (Eight) Hours As Needed for Muscle Spasms. Start 1/2 tab QHS, then continue 1/2 tab to 1 tab TID PRN muscle spasms 90 tablet 0    venlafaxine XR (EFFEXOR-XR) 150 MG 24 hr capsule Take 1 capsule by mouth Daily. 90 capsule 1    Vitamin D, Cholecalciferol, (CHOLECALCIFEROL) 10 MCG (400 UNIT) tablet Take 1 tablet by mouth Daily.       Current Facility-Administered Medications   Medication Dose Route Frequency Provider Last Rate Last Admin    ketorolac (TORADOL) injection 30 mg  30 mg Intramuscular Once Nena Pike APRN        triamcinolone acetonide (KENALOG-40) injection 40 mg  40 mg Intramuscular Once Nena Pike APRN                Assessment & Plan     Problem List Items Addressed This Visit    None  Visit Diagnoses         Acute left-sided low back pain with left-sided sciatica    -  Primary    Relevant Medications    triamcinolone acetonide (KENALOG-40) injection 40 mg    ketorolac (TORADOL) injection 30 mg              ICD-10-CM ICD-9-CM   1. Acute left-sided low back pain with left-sided sciatica  M54.42 724.2     724.3       Plan: toradol and kenalog given for back pain. Rest, no heavy lifting. Alternate ice and heat. Follow up next week if no better.    @Body mass index is 39.46 kg/m².           Understands disease processes and need for medications.  Understands reasons for urgent and emergent care.  Patient (& family) verbalized agreement for treatment plan.   Emotional support and active listening provided.  Patient provided time to verbalize feelings.                  This document has been electronically signed by Nena Pike  APRN   March 18, 2025 14:59 EDT

## 2025-03-18 NOTE — TELEPHONE ENCOUNTER
"Surgery/Procedure:  diagnostic and therapeutic right L4-L5 transforaminal epidural steroid injections using the lowest effective dose of steroids   Surgery/Procedure Date:  11/06/2024  Last visit:   Office Visit with Lois Grace APRN (03/06/2025)   Next visit: N/A     Reason for call:    Copied from HUB:    \"Reason for Call: Patient's wife calling stating that he re injured his back over the weekend and he is in a lot of pain and can barely walk wants to get in ASAP.\"    S/w patient to gather further information.     Patient reports that on Saturday morning he woke up with left hip pain that radiated down left leg. After he got up to move around, it got a little better.     The same thing happened Sunday-left hip pain going down left leg. Moving around helps a little.     Monday morning he woke up with the same pain-drove to work (1 hour drive) and by the time he got to work he could barely walk-he was in so much pain.   He had to leave work early and today he was unable to go to work.   Patient reports that the pain level in the morning is a 10 and when he is up moving around it is an 8.     He is currently sitting on his recliner with his lumbar cushion and states this helps some. He states that standing up and pressing on his lower back helps with the pain as well.     Patient has tried a heating pad, lumbar cushion, Advil and Tylenol with minimum relief.   Patient said he had this pain previously before he got his TFESI injection in November.    Patient has not started PT and is unsure if he needs to start it now.     Patient is scheduled to see PCP at 02:30 today.   "

## 2025-03-18 NOTE — PROGRESS NOTES
Injection  Injection performed in left ventralgluteal  by Linh Camargo MA. Patient tolerated the procedure well without complications.  03/18/25   Linh Camargo MA

## 2025-03-19 ENCOUNTER — PATIENT MESSAGE (OUTPATIENT)
Dept: PAIN MEDICINE | Facility: CLINIC | Age: 55
End: 2025-03-19
Payer: OTHER GOVERNMENT

## 2025-03-20 ENCOUNTER — TELEPHONE (OUTPATIENT)
Dept: FAMILY MEDICINE CLINIC | Facility: CLINIC | Age: 55
End: 2025-03-20

## 2025-03-20 NOTE — TELEPHONE ENCOUNTER
Caller: LIZBET ALLRED    Relationship: Emergency Contact    Best call back number: [phone]    What medication are you requesting: SOMETHING FOR BACK PAIN. HE CANNOT TAKE TYLENOL 3. CAN THE PATIENT HAVE ANOTHER SHOT?  PLEASE CALL TO LET LIZBET KNOW.     What are your current symptoms: PAIN LOWER BACK, WAS SEEN IN OFFICE FOR THIS.  THE PATIENT STILL HAS PAIN    How long have you been experiencing symptoms: 6 DAYS    Have you had these symptoms before:    [x] Yes  [] No    Have you been treated for these symptoms before:   [x] Yes  [] No    If a prescription is needed, what is your preferred pharmacy and phone number:    [facility]  [phone]    Additional notes:

## 2025-03-20 NOTE — TELEPHONE ENCOUNTER
"Sent patient a message asking if he can have his TFESI on 03/26/2025-awaiting response.     Patient sent the following message about the Tizanidine:    \"And could you ask Lois if she could up his tizanidine if she thinks it would help?\"  "

## 2025-03-24 DIAGNOSIS — M48.062 LUMBAR STENOSIS WITH NEUROGENIC CLAUDICATION: Primary | ICD-10-CM

## 2025-03-26 ENCOUNTER — OUTSIDE FACILITY SERVICE (OUTPATIENT)
Dept: PAIN MEDICINE | Facility: CLINIC | Age: 55
End: 2025-03-26
Payer: OTHER GOVERNMENT

## 2025-03-26 ENCOUNTER — DOCUMENTATION (OUTPATIENT)
Dept: PAIN MEDICINE | Facility: CLINIC | Age: 55
End: 2025-03-26

## 2025-03-26 PROCEDURE — 64483 NJX AA&/STRD TFRM EPI L/S 1: CPT | Performed by: ANESTHESIOLOGY

## 2025-03-26 NOTE — PROGRESS NOTES
Jimmy reports 100% ongoing pain relief since his diagnostic and therapeutic right L4-L5 transforaminal epidural steroid injection   He is here for Diagnostic and therapeutic left L4-L5 transforaminal epidural steroid injection   Pain level 10/10    Joel Quezada MD

## 2025-04-01 ENCOUNTER — OFFICE VISIT (OUTPATIENT)
Dept: CARDIOLOGY | Facility: CLINIC | Age: 55
End: 2025-04-01
Payer: OTHER GOVERNMENT

## 2025-04-01 VITALS
DIASTOLIC BLOOD PRESSURE: 80 MMHG | SYSTOLIC BLOOD PRESSURE: 132 MMHG | HEIGHT: 76 IN | WEIGHT: 315 LBS | OXYGEN SATURATION: 96 % | HEART RATE: 97 BPM | BODY MASS INDEX: 38.36 KG/M2

## 2025-04-01 DIAGNOSIS — E78.2 MIXED HYPERLIPIDEMIA: Chronic | ICD-10-CM

## 2025-04-01 DIAGNOSIS — I47.10 SVT (SUPRAVENTRICULAR TACHYCARDIA): Primary | ICD-10-CM

## 2025-04-01 DIAGNOSIS — I10 ESSENTIAL HYPERTENSION: Chronic | ICD-10-CM

## 2025-04-01 PROCEDURE — 99213 OFFICE O/P EST LOW 20 MIN: CPT | Performed by: PHYSICIAN ASSISTANT

## 2025-04-01 PROCEDURE — 93000 ELECTROCARDIOGRAM COMPLETE: CPT | Performed by: PHYSICIAN ASSISTANT

## 2025-04-01 NOTE — PROGRESS NOTES
Nena Pike, LIONEL  Jimmy Dawson Jr.  1970 04/01/2025    Patient Active Problem List   Diagnosis    Gastroesophageal reflux disease without esophagitis    Depression    Essential hypertension    Palpitations    SVT (supraventricular tachycardia), s/p RF ablation, in 2000 x2    WPW (Brayan-Parkinson-White syndrome), s/p RF ablation 2000.    Chronic rhinitis    Sleep apnea    Typical atrial flutter    History of 2019 novel coronavirus disease (COVID-19)    Anxiety    Chronic folliculitis    Primary osteoarthritis of left knee    Symptomatic cholelithiasis    CAP (community acquired pneumonia)    Status post laparoscopic cholecystectomy    Prediabetes    Mixed hyperlipidemia    Chronic right-sided low back pain with right-sided sciatica    Hearing deficit, bilateral    Skin tag    Class 3 severe obesity with serious comorbidity and body mass index (BMI) of 40.0 to 44.9 in adult    Type 2 diabetes mellitus with hyperglycemia    Spondylosis of lumbar region without myelopathy or radiculopathy    Spinal instabilities, lumbar region    Degeneration of lumbar or lumbosacral intervertebral disc    Ligamentum flavum hypertrophy    Anxiety and depression    Physical deconditioning    BMI 35.0-35.9,adult    Lumbar stenosis with neurogenic claudication       Dear Nena Pike, LIONEL:    Subjective     History of Present Illness:    Chief Complaint   Patient presents with    Follow-up       Jimmy Dawson Jr. is a pleasant 54 y.o. male with a past medical history significant for WPW as well as atrial flutter status post ablation with no recurrence since.  Patient comes in for routine follow-up.     Jimmy has no complaints with open questioning.  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  However he reports his biggest limitation lately has been some lumbar disease with degenerative disc disease.  From cardiac standpoint he denies any chest pains,  shortness of breath, dizziness, or syncope.  He denies any known breakthrough episodes of atrial fibrillation blood pressure is well-controlled.      Allergies   Allergen Reactions    Aleve [Naproxen] Hives   :      Current Outpatient Medications:     amLODIPine (NORVASC) 5 MG tablet, Take 1 tablet by mouth Daily., Disp: 30 tablet, Rfl: 6    ascorbic acid (VITAMIN C) 500 MG tablet, Take 1 tablet by mouth Daily., Disp: , Rfl:     atorvastatin (Lipitor) 20 MG tablet, Take 1 tablet by mouth Daily., Disp: 90 tablet, Rfl: 1    busPIRone (BUSPAR) 10 MG tablet, Take 1 tablet by mouth 3 (Three) Times a Day., Disp: 90 tablet, Rfl: 1    empagliflozin (Jardiance) 25 MG tablet tablet, Take 1 tablet by mouth Daily., Disp: 90 tablet, Rfl: 1    fexofenadine (ALLEGRA) 180 MG tablet, Take 1 tablet by mouth Every Night., Disp: 90 tablet, Rfl: 1    fluticasone (FLONASE) 50 MCG/ACT nasal spray, Administer 2 sprays into the nostril(s) as directed by provider Daily., Disp: 15.8 g, Rfl: 11    gabapentin (NEURONTIN) 300 MG capsule, Take 1 capsule by mouth 2 (Two) Times a Day., Disp: , Rfl:     glucose blood test strip, 1 each by Other route Daily. Use as instructed, Disp: 100 each, Rfl: 12    glucose monitor monitoring kit, Use 1 each Daily., Disp: 1 each, Rfl: 0    Lancets misc, Use 1 Device 2 (Two) Times a Day., Disp: 100 each, Rfl: 12    losartan (Cozaar) 50 MG tablet, Take 1.5 tablets by mouth Daily., Disp: 180 tablet, Rfl: 1    melatonin 5 MG tablet tablet, Take 1 tablet by mouth Every Night., Disp: , Rfl:     meloxicam (MOBIC) 15 MG tablet, Take 1 tablet by mouth Daily., Disp: 90 tablet, Rfl: 3    metFORMIN (Glucophage) 500 MG tablet, Take 1 tablet by mouth 2 (Two) Times a Day With Meals., Disp: 180 tablet, Rfl: 1    omeprazole (priLOSEC) 40 MG capsule, Take 1 capsule by mouth Daily., Disp: 90 capsule, Rfl: 1    Semaglutide,0.25 or 0.5MG/DOS, (Ozempic, 0.25 or 0.5 MG/DOSE,) 2 MG/1.5ML solution pen-injector, Inject 0.25 mg under the  "skin into the appropriate area as directed 1 (One) Time Per Week., Disp: 1.5 mL, Rfl: 0    tiZANidine (ZANAFLEX) 4 MG tablet, Take 1 tablet by mouth Every 8 (Eight) Hours As Needed for Muscle Spasms. Start 1/2 tab QHS, then continue 1/2 tab to 1 tab TID PRN muscle spasms, Disp: 90 tablet, Rfl: 0    venlafaxine XR (EFFEXOR-XR) 150 MG 24 hr capsule, Take 1 capsule by mouth Daily., Disp: 90 capsule, Rfl: 1    Vitamin D, Cholecalciferol, (CHOLECALCIFEROL) 10 MCG (400 UNIT) tablet, Take 1 tablet by mouth Daily., Disp: , Rfl:     The following portions of the patient's history were reviewed and updated as appropriate: allergies, current medications, past family history, past medical history, past social history, past surgical history and problem list.    Social History     Tobacco Use    Smoking status: Never    Smokeless tobacco: Never   Vaping Use    Vaping status: Never Used   Substance Use Topics    Alcohol use: Yes     Comment: 4-5 BEERS, TWICE A WEEK    Drug use: No         Objective   Vitals:    04/01/25 1353   BP: 132/80   BP Location: Left arm   Patient Position: Sitting   Cuff Size: Adult   Pulse: 97   SpO2: 96%   Weight: (!) 147 kg (323 lb)   Height: 193 cm (75.98\")     Body mass index is 39.34 kg/m².    ROS    Constitutional:       General: Not in acute distress.     Appearance: Healthy appearance. Well-developed and not in distress. Not diaphoretic.   Eyes:      Conjunctiva/sclera: Conjunctivae normal.      Pupils: Pupils are equal, round, and reactive to light.   HENT:      Head: Normocephalic and atraumatic.   Neck:      Vascular: No carotid bruit or JVD.   Pulmonary:      Effort: Pulmonary effort is normal. No respiratory distress.      Breath sounds: Normal breath sounds.   Cardiovascular:      Normal rate. Regular rhythm.   Edema:     Peripheral edema absent.   Skin:     General: Skin is cool.   Neurological:      Mental Status: Alert, oriented to person, place, and time and oriented to person, place and " "time.         Lab Results   Component Value Date     03/04/2025    K 4.0 03/04/2025     03/04/2025    CO2 23.3 03/04/2025    BUN 20 03/04/2025    CREATININE 0.86 03/04/2025    GLUCOSE 106 (H) 03/04/2025    CALCIUM 9.7 03/04/2025    AST 16 03/04/2025    ALT 20 03/04/2025    ALKPHOS 95 03/04/2025     Lab Results   Component Value Date    CKTOTAL 147 11/06/2020     Lab Results   Component Value Date    WBC 10.33 03/04/2025    HGB 15.0 03/04/2025    HCT 44.3 03/04/2025     03/04/2025     Lab Results   Component Value Date    INR 1.02 08/11/2023    INR 1.08 06/03/2019     Lab Results   Component Value Date    MG 2.0 11/06/2020     Lab Results   Component Value Date    TSH 2.330 03/04/2025    PSA 1.040 03/04/2025    TRIG 158 (H) 03/04/2025    HDL 30 (L) 03/04/2025    LDL 63 03/04/2025      No results found for: \"BNP\"    During this visit the following were done:  Labs Reviewed []    Labs Ordered []    Radiology Reports Reviewed []    Radiology Ordered []    PCP Records Reviewed []    Referring Provider Records Reviewed []    ER Records Reviewed []    Hospital Records Reviewed []    History Obtained From Family []    Radiology Images Reviewed []    Other Reviewed []    Records Requested []         ECG 12 Lead    Date/Time: 4/1/2025 2:29 PM  Performed by: Anthony Ruff PA-C    Authorized by: Anthony Ruff PA-C  Comparison: compared with previous ECG   Similar to previous ECG  Rhythm: sinus rhythm  Conduction: conduction normal  ST Segments: ST segments normal    Clinical impression: normal ECG        Assessment & Plan    Diagnosis Plan   1. SVT (supraventricular tachycardia), s/p RF ablation, in 2000 x2        2. Mixed hyperlipidemia        3. Essential hypertension                 Recommendations:  SVT  No known reoccurrence doing well completely asymptomatic  Essential hypertension  Well-controlled    Return in about 1 year (around 4/1/2026).    As always, I appreciate very much the opportunity " to participate in the cardiovascular care of your patients.      With Best Regards,    Anthony Ruff PA-C

## 2025-04-10 RX ORDER — LIDOCAINE 50 MG/G
1 PATCH TOPICAL EVERY 24 HOURS
Qty: 60 PATCH | Refills: 2 | Status: SHIPPED | OUTPATIENT
Start: 2025-04-10

## 2025-04-22 DIAGNOSIS — K21.9 GASTROESOPHAGEAL REFLUX DISEASE WITHOUT ESOPHAGITIS: Chronic | ICD-10-CM

## 2025-04-22 DIAGNOSIS — F41.9 ANXIETY AND DEPRESSION: Chronic | ICD-10-CM

## 2025-04-22 DIAGNOSIS — F32.4 MAJOR DEPRESSIVE DISORDER WITH SINGLE EPISODE, IN PARTIAL REMISSION: ICD-10-CM

## 2025-04-22 DIAGNOSIS — F32.A ANXIETY AND DEPRESSION: Chronic | ICD-10-CM

## 2025-04-22 DIAGNOSIS — E11.65 TYPE 2 DIABETES MELLITUS WITH HYPERGLYCEMIA, WITHOUT LONG-TERM CURRENT USE OF INSULIN: ICD-10-CM

## 2025-04-22 DIAGNOSIS — I10 ESSENTIAL HYPERTENSION: Chronic | ICD-10-CM

## 2025-04-22 DIAGNOSIS — R73.03 PREDIABETES: Chronic | ICD-10-CM

## 2025-04-22 DIAGNOSIS — I70.90 ARTERIOSCLEROSIS: ICD-10-CM

## 2025-04-22 RX ORDER — AMLODIPINE BESYLATE 5 MG/1
5 TABLET ORAL DAILY
Qty: 30 TABLET | Refills: 6 | OUTPATIENT
Start: 2025-04-22

## 2025-04-22 RX ORDER — OMEPRAZOLE 40 MG/1
40 CAPSULE, DELAYED RELEASE ORAL DAILY
Qty: 90 CAPSULE | Refills: 1 | Status: SHIPPED | OUTPATIENT
Start: 2025-04-22

## 2025-04-22 RX ORDER — AMLODIPINE BESYLATE 5 MG/1
5 TABLET ORAL DAILY
Qty: 30 TABLET | Refills: 6 | Status: SHIPPED | OUTPATIENT
Start: 2025-04-22

## 2025-04-22 RX ORDER — VENLAFAXINE HYDROCHLORIDE 150 MG/1
150 CAPSULE, EXTENDED RELEASE ORAL DAILY
Qty: 90 CAPSULE | Refills: 1 | Status: SHIPPED | OUTPATIENT
Start: 2025-04-22

## 2025-04-22 RX ORDER — ATORVASTATIN CALCIUM 20 MG/1
20 TABLET, FILM COATED ORAL DAILY
Qty: 90 TABLET | Refills: 1 | Status: SHIPPED | OUTPATIENT
Start: 2025-04-22

## 2025-04-22 RX ORDER — BUSPIRONE HYDROCHLORIDE 10 MG/1
10 TABLET ORAL 3 TIMES DAILY
Qty: 90 TABLET | Refills: 1 | Status: SHIPPED | OUTPATIENT
Start: 2025-04-22

## 2025-04-22 RX ORDER — FLUTICASONE PROPIONATE 50 MCG
2 SPRAY, SUSPENSION (ML) NASAL DAILY
Qty: 15.8 G | Refills: 11 | Status: SHIPPED | OUTPATIENT
Start: 2025-04-22

## 2025-04-22 RX ORDER — FEXOFENADINE HCL 180 MG/1
180 TABLET ORAL NIGHTLY
Qty: 90 TABLET | Refills: 1 | Status: SHIPPED | OUTPATIENT
Start: 2025-04-22

## 2025-04-22 RX ORDER — LOSARTAN POTASSIUM 50 MG/1
75 TABLET ORAL DAILY
Qty: 180 TABLET | Refills: 1 | Status: SHIPPED | OUTPATIENT
Start: 2025-04-22

## 2025-04-24 RX ORDER — LIDOCAINE 50 MG/G
1 PATCH TOPICAL EVERY 24 HOURS
Qty: 60 PATCH | Refills: 2 | Status: SHIPPED | OUTPATIENT
Start: 2025-04-24

## 2025-05-20 DIAGNOSIS — F41.9 ANXIETY AND DEPRESSION: Chronic | ICD-10-CM

## 2025-05-20 DIAGNOSIS — F32.A ANXIETY AND DEPRESSION: Chronic | ICD-10-CM

## 2025-05-20 DIAGNOSIS — K21.9 GASTROESOPHAGEAL REFLUX DISEASE WITHOUT ESOPHAGITIS: Chronic | ICD-10-CM

## 2025-05-20 RX ORDER — BUSPIRONE HYDROCHLORIDE 10 MG/1
10 TABLET ORAL 3 TIMES DAILY
Qty: 90 TABLET | Refills: 1 | Status: SHIPPED | OUTPATIENT
Start: 2025-05-20

## 2025-05-20 RX ORDER — OMEPRAZOLE 40 MG/1
40 CAPSULE, DELAYED RELEASE ORAL DAILY
Qty: 90 CAPSULE | Refills: 1 | Status: SHIPPED | OUTPATIENT
Start: 2025-05-20

## 2025-06-02 ENCOUNTER — OFFICE VISIT (OUTPATIENT)
Dept: FAMILY MEDICINE CLINIC | Facility: CLINIC | Age: 55
End: 2025-06-02
Payer: OTHER GOVERNMENT

## 2025-06-02 VITALS
HEART RATE: 78 BPM | HEIGHT: 76 IN | DIASTOLIC BLOOD PRESSURE: 62 MMHG | TEMPERATURE: 97.5 F | SYSTOLIC BLOOD PRESSURE: 108 MMHG | OXYGEN SATURATION: 95 % | BODY MASS INDEX: 38.36 KG/M2 | WEIGHT: 315 LBS

## 2025-06-02 DIAGNOSIS — J06.9 ACUTE URI: ICD-10-CM

## 2025-06-02 DIAGNOSIS — R05.9 COUGH IN ADULT: Primary | ICD-10-CM

## 2025-06-02 PROCEDURE — 99213 OFFICE O/P EST LOW 20 MIN: CPT | Performed by: NURSE PRACTITIONER

## 2025-06-02 PROCEDURE — 87428 SARSCOV & INF VIR A&B AG IA: CPT | Performed by: NURSE PRACTITIONER

## 2025-06-02 RX ORDER — DEXTROMETHORPHAN HYDROBROMIDE AND PROMETHAZINE HYDROCHLORIDE 15; 6.25 MG/5ML; MG/5ML
5 SYRUP ORAL 4 TIMES DAILY PRN
Qty: 240 ML | Refills: 0 | Status: SHIPPED | OUTPATIENT
Start: 2025-06-02

## 2025-06-02 RX ORDER — METHYLPREDNISOLONE 4 MG/1
TABLET ORAL
Qty: 21 TABLET | Refills: 0 | Status: SHIPPED | OUTPATIENT
Start: 2025-06-02

## 2025-06-02 RX ORDER — CEFDINIR 300 MG/1
300 CAPSULE ORAL 2 TIMES DAILY
Qty: 20 CAPSULE | Refills: 0 | Status: SHIPPED | OUTPATIENT
Start: 2025-06-02

## 2025-06-02 NOTE — PROGRESS NOTES
"Subjective   Jimmyfranklin Dawson Jr. is a 54 y.o. male.     Chief Complaint   Patient presents with    Cough     He has had a productive cough for 2 days with yellow/green sputum.    Diarrhea     He had some diarrhea 2 days ago but states it has cleared up now.    Nasal Congestion     He has had a runny nose with clear mucous for 2 days.    Headache     He has had a \"sinus\" head ache for 2 days.    Generalized Body Aches     For the past 2 days he has had some body aches.       History of Present Illness  He presents with c/o diarrhea that started and ended yesterday. He c/o sore throat and headache. He c/o body aches. He has taken dayquil. He is coughing up yellow sputum.   Cough  Associated symptoms: headaches, postnasal drip and sore throat    Associated symptoms: no fever    Diarrhea   Associated symptoms include coughing and headaches. Pertinent negatives include no fever.   Headache    Associated symptoms: cough, diarrhea, headaches and sore throat      Associated symptoms: no fever         The following portions of the patient's history were reviewed and updated as appropriate: allergies, current medications, past family history, past medical history, past social history, past surgical history and problem list.    Review of Systems   Constitutional:  Negative for fever.   HENT:  Positive for postnasal drip and sore throat.    Respiratory:  Positive for cough.    Gastrointestinal:  Positive for diarrhea.   Neurological:  Positive for headaches.       Objective     /62 (BP Location: Right arm, Patient Position: Sitting, Cuff Size: Large Adult)   Pulse 78   Temp 97.5 °F (36.4 °C) (Tympanic)   Ht 193 cm (76\")   Wt (!) 146 kg (321 lb 12.8 oz)   SpO2 95%   BMI 39.17 kg/m²     Physical Exam  Vitals reviewed.   Constitutional:       General: He is not in acute distress.     Appearance: He is well-developed. He is not diaphoretic.   HENT:      Head: Normocephalic and atraumatic.      Right Ear: Hearing, " tympanic membrane, ear canal and external ear normal.      Left Ear: Hearing, tympanic membrane, ear canal and external ear normal.      Nose: Congestion present.      Right Sinus: No maxillary sinus tenderness or frontal sinus tenderness.      Left Sinus: No maxillary sinus tenderness or frontal sinus tenderness.      Mouth/Throat:      Pharynx: Uvula midline.   Eyes:      General: Lids are normal.      Conjunctiva/sclera: Conjunctivae normal.      Pupils: Pupils are equal, round, and reactive to light.   Neck:      Trachea: Trachea normal. No tracheal tenderness or tracheal deviation.   Cardiovascular:      Rate and Rhythm: Normal rate and regular rhythm.      Heart sounds: Normal heart sounds, S1 normal and S2 normal. No murmur heard.     No friction rub. No gallop.   Pulmonary:      Effort: Pulmonary effort is normal. No respiratory distress.      Breath sounds: Normal breath sounds.   Abdominal:      General: Bowel sounds are normal. There is no distension.      Palpations: Abdomen is soft.      Tenderness: There is no abdominal tenderness.   Skin:     General: Skin is warm and dry.   Neurological:      Mental Status: He is alert and oriented to person, place, and time.   Psychiatric:         Behavior: Behavior normal.         Thought Content: Thought content normal.         Judgment: Judgment normal.         Current Outpatient Medications   Medication Sig Dispense Refill    amLODIPine (NORVASC) 5 MG tablet Take 1 tablet by mouth Daily. 30 tablet 6    ascorbic acid (VITAMIN C) 500 MG tablet Take 1 tablet by mouth Daily.      atorvastatin (Lipitor) 20 MG tablet Take 1 tablet by mouth Daily. 90 tablet 1    busPIRone (BUSPAR) 10 MG tablet Take 1 tablet by mouth 3 (Three) Times a Day. 90 tablet 1    empagliflozin (Jardiance) 25 MG tablet tablet Take 1 tablet by mouth Daily. 90 tablet 1    fexofenadine (ALLEGRA) 180 MG tablet Take 1 tablet by mouth Every Night. 90 tablet 1    fluticasone (FLONASE) 50 MCG/ACT nasal  spray Administer 2 sprays into the nostril(s) as directed by provider Daily. 15.8 g 11    gabapentin (NEURONTIN) 300 MG capsule Take 1 capsule by mouth 2 (Two) Times a Day.      glucose blood test strip 1 each by Other route Daily. Use as instructed 100 each 12    glucose monitor monitoring kit Use 1 each Daily. 1 each 0    Lancets misc Use 1 Device 2 (Two) Times a Day. 100 each 12    lidocaine (LIDODERM) 5 % Place 1 patch on the skin as directed by provider Daily. Remove & Discard patch within 12 hours or as directed by MD 60 patch 2    losartan (Cozaar) 50 MG tablet Take 1.5 tablets by mouth Daily. 180 tablet 1    melatonin 5 MG tablet tablet Take 1 tablet by mouth Every Night.      meloxicam (MOBIC) 15 MG tablet Take 1 tablet by mouth Daily. 90 tablet 3    metFORMIN (Glucophage) 500 MG tablet Take 1 tablet by mouth 2 (Two) Times a Day With Meals. 180 tablet 1    omeprazole (priLOSEC) 40 MG capsule Take 1 capsule by mouth Daily. 90 capsule 1    Semaglutide,0.25 or 0.5MG/DOS, (Ozempic, 0.25 or 0.5 MG/DOSE,) 2 MG/1.5ML solution pen-injector Inject 0.25 mg under the skin into the appropriate area as directed 1 (One) Time Per Week. 1.5 mL 0    tiZANidine (ZANAFLEX) 4 MG tablet Take 1 tablet by mouth Every 8 (Eight) Hours As Needed for Muscle Spasms. Start 1/2 tab QHS, then continue 1/2 tab to 1 tab TID PRN muscle spasms 90 tablet 0    venlafaxine XR (EFFEXOR-XR) 150 MG 24 hr capsule Take 1 capsule by mouth Daily. 90 capsule 1    Vitamin D, Cholecalciferol, (CHOLECALCIFEROL) 10 MCG (400 UNIT) tablet Take 1 tablet by mouth Daily.       No current facility-administered medications for this visit.            Assessment & Plan     Problem List Items Addressed This Visit    None  Visit Diagnoses         Cough in adult    -  Primary    Relevant Orders    POCT SARS-CoV-2 Antigen KYLE + Flu              ICD-10-CM ICD-9-CM   1. Cough in adult  R05.9 786.2       Plan: COVID and flu negative.  Cefdinir and Medrol ordered for acute  URI.  Promethazine DM ordered as needed for cough.  Do not take and drive as this may make you sleepy.  Keep scheduled follow-up and follow-up as needed.    @Body mass index is 39.17 kg/m².           Understands disease processes and need for medications.  Understands reasons for urgent and emergent care.  Patient (& family) verbalized agreement for treatment plan.   Emotional support and active listening provided.  Patient provided time to verbalize feelings.                  This document has been electronically signed by LIONEL Grider   June 2, 2025 14:30 EDT

## 2025-06-16 DIAGNOSIS — F32.A ANXIETY AND DEPRESSION: Chronic | ICD-10-CM

## 2025-06-16 DIAGNOSIS — K21.9 GASTROESOPHAGEAL REFLUX DISEASE WITHOUT ESOPHAGITIS: Chronic | ICD-10-CM

## 2025-06-16 DIAGNOSIS — F32.4 MAJOR DEPRESSIVE DISORDER WITH SINGLE EPISODE, IN PARTIAL REMISSION: ICD-10-CM

## 2025-06-16 DIAGNOSIS — F41.9 ANXIETY AND DEPRESSION: Chronic | ICD-10-CM

## 2025-06-16 DIAGNOSIS — I10 ESSENTIAL HYPERTENSION: Chronic | ICD-10-CM

## 2025-06-16 DIAGNOSIS — I70.90 ARTERIOSCLEROSIS: ICD-10-CM

## 2025-06-16 DIAGNOSIS — E11.65 TYPE 2 DIABETES MELLITUS WITH HYPERGLYCEMIA, WITHOUT LONG-TERM CURRENT USE OF INSULIN: ICD-10-CM

## 2025-06-16 RX ORDER — OMEPRAZOLE 40 MG/1
40 CAPSULE, DELAYED RELEASE ORAL DAILY
Qty: 90 CAPSULE | Refills: 1 | Status: SHIPPED | OUTPATIENT
Start: 2025-06-16

## 2025-06-16 RX ORDER — LOSARTAN POTASSIUM 50 MG/1
75 TABLET ORAL DAILY
Qty: 180 TABLET | Refills: 1 | Status: SHIPPED | OUTPATIENT
Start: 2025-06-16

## 2025-06-16 RX ORDER — FLUTICASONE PROPIONATE 50 MCG
2 SPRAY, SUSPENSION (ML) NASAL DAILY
Qty: 15.8 G | Refills: 11 | Status: SHIPPED | OUTPATIENT
Start: 2025-06-16

## 2025-06-16 RX ORDER — FEXOFENADINE HCL 180 MG/1
180 TABLET ORAL NIGHTLY
Qty: 90 TABLET | Refills: 1 | Status: SHIPPED | OUTPATIENT
Start: 2025-06-16

## 2025-06-16 RX ORDER — ATORVASTATIN CALCIUM 20 MG/1
20 TABLET, FILM COATED ORAL DAILY
Qty: 90 TABLET | Refills: 1 | Status: SHIPPED | OUTPATIENT
Start: 2025-06-16

## 2025-06-16 RX ORDER — BUSPIRONE HYDROCHLORIDE 10 MG/1
10 TABLET ORAL 3 TIMES DAILY
Qty: 90 TABLET | Refills: 1 | Status: SHIPPED | OUTPATIENT
Start: 2025-06-16

## 2025-06-16 RX ORDER — VENLAFAXINE HYDROCHLORIDE 150 MG/1
150 CAPSULE, EXTENDED RELEASE ORAL DAILY
Qty: 90 CAPSULE | Refills: 1 | Status: SHIPPED | OUTPATIENT
Start: 2025-06-16

## 2025-06-17 DIAGNOSIS — B37.9 CANDIDIASIS: Primary | ICD-10-CM

## 2025-06-17 RX ORDER — FLUCONAZOLE 100 MG/1
100 TABLET ORAL DAILY
Qty: 7 TABLET | Refills: 0 | Status: SHIPPED | OUTPATIENT
Start: 2025-06-17

## 2025-06-17 RX ORDER — LIDOCAINE 50 MG/G
1 PATCH TOPICAL EVERY 24 HOURS
Qty: 60 PATCH | Refills: 2 | Status: SHIPPED | OUTPATIENT
Start: 2025-06-17

## 2025-07-03 DIAGNOSIS — I70.90 ARTERIOSCLEROSIS: ICD-10-CM

## 2025-07-03 DIAGNOSIS — I10 ESSENTIAL HYPERTENSION: Chronic | ICD-10-CM

## 2025-07-03 DIAGNOSIS — F32.A ANXIETY AND DEPRESSION: Chronic | ICD-10-CM

## 2025-07-03 DIAGNOSIS — F32.4 MAJOR DEPRESSIVE DISORDER WITH SINGLE EPISODE, IN PARTIAL REMISSION: ICD-10-CM

## 2025-07-03 DIAGNOSIS — K21.9 GASTROESOPHAGEAL REFLUX DISEASE WITHOUT ESOPHAGITIS: Chronic | ICD-10-CM

## 2025-07-03 DIAGNOSIS — F41.9 ANXIETY AND DEPRESSION: Chronic | ICD-10-CM

## 2025-07-03 DIAGNOSIS — E11.65 TYPE 2 DIABETES MELLITUS WITH HYPERGLYCEMIA, WITHOUT LONG-TERM CURRENT USE OF INSULIN: ICD-10-CM

## 2025-07-03 RX ORDER — AMLODIPINE BESYLATE 5 MG/1
5 TABLET ORAL DAILY
Qty: 30 TABLET | Refills: 6 | Status: SHIPPED | OUTPATIENT
Start: 2025-07-03

## 2025-07-03 RX ORDER — LOSARTAN POTASSIUM 50 MG/1
75 TABLET ORAL DAILY
Qty: 180 TABLET | Refills: 1 | OUTPATIENT
Start: 2025-07-03

## 2025-07-03 RX ORDER — ATORVASTATIN CALCIUM 20 MG/1
20 TABLET, FILM COATED ORAL DAILY
Qty: 90 TABLET | Refills: 1 | OUTPATIENT
Start: 2025-07-03

## 2025-07-03 RX ORDER — VENLAFAXINE HYDROCHLORIDE 150 MG/1
150 CAPSULE, EXTENDED RELEASE ORAL DAILY
Qty: 90 CAPSULE | Refills: 1 | OUTPATIENT
Start: 2025-07-03

## 2025-07-03 RX ORDER — BUSPIRONE HYDROCHLORIDE 10 MG/1
10 TABLET ORAL 3 TIMES DAILY
Qty: 90 TABLET | Refills: 1 | OUTPATIENT
Start: 2025-07-03

## 2025-07-03 RX ORDER — FLUTICASONE PROPIONATE 50 MCG
2 SPRAY, SUSPENSION (ML) NASAL DAILY
Qty: 15.8 G | Refills: 11 | OUTPATIENT
Start: 2025-07-03

## 2025-07-03 RX ORDER — OMEPRAZOLE 40 MG/1
40 CAPSULE, DELAYED RELEASE ORAL DAILY
Qty: 90 CAPSULE | Refills: 1 | OUTPATIENT
Start: 2025-07-03

## 2025-07-03 RX ORDER — FEXOFENADINE HCL 180 MG/1
180 TABLET ORAL NIGHTLY
Qty: 90 TABLET | Refills: 1 | OUTPATIENT
Start: 2025-07-03

## 2025-07-03 NOTE — TELEPHONE ENCOUNTER
Spoke with patient, advised [facility]. He understands, no further questions at this time.

## 2025-07-21 ENCOUNTER — TELEPHONE (OUTPATIENT)
Dept: CARDIOLOGY | Facility: CLINIC | Age: 55
End: 2025-07-21
Payer: OTHER GOVERNMENT

## 2025-07-21 NOTE — TELEPHONE ENCOUNTER
Caller: LIZBET ALLRED    Relationship: Emergency Contact    Best call back number: [phone]     What is the best time to reach you: ANYTIME    Who are you requesting to speak with (clinical staff, provider,  specific staff member): PROVIDER    Do you know the name of the person who called: NA    What was the call regarding: AUTH  FOR UPCOMING CARDIOLOGY VISIT. PT'S [facility] AND THEY LET HER KNOW WE NEED TO SEND ANOTHER PRE-AUTH TO THEM TO GET APPT APPROVED FOR 4. APPT. PLEASE ADVISE. NOT SURE IF THEY ARE NEEDING TO BE SURE LAST VISIT WAS ALSO COVERED SINCE THE NEXT APPT IS NEXT YEAR. PLEASE ADVISE AND F/U WITH PT'S WIFE TO LET HER KNOW.     Is it okay if the provider responds through [facility]: NO

## 2025-07-29 NOTE — TELEPHONE ENCOUNTER
Caller: LIZBET ALLRED    Relationship: Emergency Contact    Best call back number: [phone]     What is the best time to reach you: ANY     Who are you requesting to speak with (clinical staff, provider,  specific staff member):      What was the call regarding:   CALLER IS REQUESTING TO SPEAK WITH THE  ABOUT [facility] AUTH, STATES THIS IS A PA FROM THE [facility]. UNABLE TO WT TO OFFICE.    Is it okay if the provider responds through MyChart: CALL

## 2025-08-21 ENCOUNTER — OFFICE VISIT (OUTPATIENT)
Dept: FAMILY MEDICINE CLINIC | Facility: CLINIC | Age: 55
End: 2025-08-21
Payer: OTHER GOVERNMENT

## 2025-08-21 VITALS
DIASTOLIC BLOOD PRESSURE: 88 MMHG | TEMPERATURE: 98.1 F | HEIGHT: 76 IN | WEIGHT: 315 LBS | HEART RATE: 96 BPM | SYSTOLIC BLOOD PRESSURE: 134 MMHG | RESPIRATION RATE: 16 BRPM | OXYGEN SATURATION: 96 % | BODY MASS INDEX: 38.36 KG/M2

## 2025-08-21 DIAGNOSIS — K12.2 ORAL ABSCESS: Primary | ICD-10-CM

## 2025-08-21 PROCEDURE — 99213 OFFICE O/P EST LOW 20 MIN: CPT | Performed by: NURSE PRACTITIONER

## 2025-08-21 RX ORDER — CLINDAMYCIN HYDROCHLORIDE 300 MG/1
300 CAPSULE ORAL 3 TIMES DAILY
Qty: 30 CAPSULE | Refills: 0 | Status: SHIPPED | OUTPATIENT
Start: 2025-08-21

## 2025-08-23 LAB
BACTERIA SPEC AEROBE CULT: NORMAL
GRAM STN SPEC: NORMAL

## 2025-08-27 ENCOUNTER — OFFICE VISIT (OUTPATIENT)
Age: 55
End: 2025-08-27
Payer: OTHER GOVERNMENT

## 2025-08-27 VITALS — HEIGHT: 76 IN | WEIGHT: 315 LBS | BODY MASS INDEX: 38.36 KG/M2

## 2025-08-27 DIAGNOSIS — M24.28 LIGAMENTUM FLAVUM HYPERTROPHY: ICD-10-CM

## 2025-08-27 DIAGNOSIS — F32.A ANXIETY AND DEPRESSION: ICD-10-CM

## 2025-08-27 DIAGNOSIS — R53.81 PHYSICAL DECONDITIONING: ICD-10-CM

## 2025-08-27 DIAGNOSIS — M53.2X6 SPINAL INSTABILITIES, LUMBAR REGION: ICD-10-CM

## 2025-08-27 DIAGNOSIS — M47.816 SPONDYLOSIS OF LUMBAR REGION WITHOUT MYELOPATHY OR RADICULOPATHY: ICD-10-CM

## 2025-08-27 DIAGNOSIS — F41.9 ANXIETY AND DEPRESSION: ICD-10-CM

## 2025-08-27 DIAGNOSIS — R73.03 PREDIABETES: ICD-10-CM

## 2025-08-27 PROCEDURE — 99214 OFFICE O/P EST MOD 30 MIN: CPT | Performed by: NURSE PRACTITIONER

## (undated) DEVICE — ENDOPATH ELECTROSURGERY PROBE PLUS II PISTOL HAND CONTROL PISTOL GRIP HANDLES WITH SUCTION AND IRRRIGATION FOR HAND CONTROL MONOPOLAR ELCTROSURGERY USE ONLY WITH PROBE PLUS II SHAFTS: Brand: ENDOPATH

## (undated) DEVICE — SUT VIC 2/0 UR6 27IN J602H

## (undated) DEVICE — LIMB HOLDER, WRIST/ANKLE: Brand: DEROYAL

## (undated) DEVICE — PK LAP GEN 70

## (undated) DEVICE — GLV SURG PREMIERPRO MIC LTX PF SZ7.5 BRN

## (undated) DEVICE — ANTIBACTERIAL UNDYED BRAIDED (POLYGLACTIN 910), SYNTHETIC ABSORBABLE SUTURE: Brand: COATED VICRYL

## (undated) DEVICE — Device: Brand: WEBSTER

## (undated) DEVICE — ADULT, W/LG. BACK PAD, RADIOTRANSPARENT ELEMENT AND LEAD WIRE: Brand: DEFIBRILLATION ELECTRODES

## (undated) DEVICE — DECANTER: Brand: UNBRANDED

## (undated) DEVICE — CATH ULTRASND ECHO ACUNAV FOR ACUSON 8F 90CM

## (undated) DEVICE — ENDOPATH ELECTROSURGERY PROBE PLUS II 5MM RIGHT ANGLE MONOPOLAR ELECTROSURGERY SUCTION AND IRRRIGATION SHAFTS WITH RIGHT ANGLE ELECTRODE - USE ONLY WITH PROBE PLUS II HANDLES: Brand: ENDOPATH

## (undated) DEVICE — Device: Brand: THERMOCOOL SF NAV

## (undated) DEVICE — CANN NASL CO2 DIVIDED A/

## (undated) DEVICE — SOL NACL 0.9PCT 1000ML

## (undated) DEVICE — MONOPOLAR METZENBAUM SCISSOR TIP, DISPOSABLE: Brand: MONOPOLAR METZENBAUM SCISSOR TIP, DISPOSABLE

## (undated) DEVICE — CYSTO/BLADDER IRRIGATION SET, REGULATING CLAMP

## (undated) DEVICE — TROCAR: Brand: KII FIOS FIRST ENTRY

## (undated) DEVICE — APPL CHLORAPREP HI/LITE 26ML ORNG

## (undated) DEVICE — TROCAR: Brand: KII® SLEEVE

## (undated) DEVICE — 40595 XL TRENDELENBURG POSITIONING KIT: Brand: 40595 XL TRENDELENBURG POSITIONING KIT

## (undated) DEVICE — PATIENT RETURN ELECTRODE, SINGLE-USE, CONTACT QUALITY MONITORING, ADULT, WITH 9FT CORD, FOR PATIENTS WEIGING OVER 33LBS. (15KG): Brand: MEGADYNE

## (undated) DEVICE — INTRO SHEATH ENGAGE W/50 GW .038 7F12

## (undated) DEVICE — Device: Brand: REFERENCE PATCH CARTO 3

## (undated) DEVICE — DECANT BG O JET

## (undated) DEVICE — DRN WND HUBLSS FLUT FULL PERF SIL10MM

## (undated) DEVICE — ST INF PRI SMRTSTE 20DRP 2VLV 24ML 117

## (undated) DEVICE — JACKSON-PRATT 100CC BULB RESERVOIR: Brand: CARDINAL HEALTH

## (undated) DEVICE — INTRO SHEATH FASTCATH SAFL .038IN 8.5F 60CM

## (undated) DEVICE — LEX ELECTRO PHYSIOLOGY: Brand: MEDLINE INDUSTRIES, INC.

## (undated) DEVICE — ST EXT IV SMARTSITE 2VLV SP M LL 5ML IV1

## (undated) DEVICE — SET PRIMARY GRVTY 10DP MALE LL 104IN

## (undated) DEVICE — ENDOPATH XCEL BLADELESS TROCARS WITH STABILITY SLEEVES: Brand: ENDOPATH XCEL

## (undated) DEVICE — INTRO SHEATH ENGAGE W/50 GW .038 9F12

## (undated) DEVICE — CATH QUAD CRD 6F5MM